# Patient Record
Sex: FEMALE | Race: WHITE | HISPANIC OR LATINO | Employment: UNEMPLOYED | URBAN - METROPOLITAN AREA
[De-identification: names, ages, dates, MRNs, and addresses within clinical notes are randomized per-mention and may not be internally consistent; named-entity substitution may affect disease eponyms.]

---

## 2018-07-24 ENCOUNTER — APPOINTMENT (OUTPATIENT)
Dept: NEUROLOGY | Facility: MEDICAL CENTER | Age: 33
End: 2018-07-24

## 2019-06-05 ENCOUNTER — OFFICE VISIT (OUTPATIENT)
Dept: NEUROLOGY | Facility: MEDICAL CENTER | Age: 34
End: 2019-06-05
Payer: MEDICAID

## 2019-06-05 VITALS
WEIGHT: 213.8 LBS | TEMPERATURE: 97.9 F | HEART RATE: 88 BPM | HEIGHT: 65 IN | BODY MASS INDEX: 35.62 KG/M2 | RESPIRATION RATE: 15 BRPM | DIASTOLIC BLOOD PRESSURE: 88 MMHG | SYSTOLIC BLOOD PRESSURE: 150 MMHG | OXYGEN SATURATION: 95 %

## 2019-06-05 DIAGNOSIS — G40.209 LOCALIZATION-RELATED FOCAL EPILEPSY WITH COMPLEX PARTIAL SEIZURES (HCC): Primary | ICD-10-CM

## 2019-06-05 PROCEDURE — 99205 OFFICE O/P NEW HI 60 MIN: CPT | Performed by: PSYCHIATRY & NEUROLOGY

## 2019-06-05 RX ORDER — LAMOTRIGINE 200 MG/1
300 TABLET ORAL 2 TIMES DAILY
Refills: 0 | COMMUNITY
Start: 2019-05-02 | End: 2019-06-05 | Stop reason: SDUPTHER

## 2019-06-05 RX ORDER — LAMOTRIGINE 200 MG/1
300 TABLET ORAL 2 TIMES DAILY
Qty: 270 TAB | Refills: 3 | Status: SHIPPED | OUTPATIENT
Start: 2019-06-05 | End: 2020-07-04 | Stop reason: SDUPTHER

## 2019-06-05 ASSESSMENT — ENCOUNTER SYMPTOMS
LOSS OF CONSCIOUSNESS: 0
TREMORS: 0
SEIZURES: 1
MEMORY LOSS: 0
HEADACHES: 0
DIZZINESS: 0

## 2019-06-05 ASSESSMENT — PATIENT HEALTH QUESTIONNAIRE - PHQ9: CLINICAL INTERPRETATION OF PHQ2 SCORE: 0

## 2019-06-05 ASSESSMENT — PAIN SCALES - GENERAL: PAINLEVEL: NO PAIN

## 2019-06-05 NOTE — PROGRESS NOTES
Subjective:      Noy Rose is a 34 y.o. female who presents for consultation from the office of Dr. Tristan, with a history of childhood onset epilepsy.    GREGORIA Villafuerte is a pleasant 34-year-old right-handed female whose seizure disorder was diagnosed at about 10 years of age when she suffered from her first generalized tonic-clonic seizure.  In retrospect she had begun to notice simple partial seizures that probably had started at about 3 years of age.    Discussions with her parents evidently found that she had brief episodes of unusual eye movements occur right about 3 years of age, though she was not clear whether or not there were associated changes in her behavior. Nothing was made much of this because they were brief.  She was doing well.  Subjectively, about 2 years later, she began to have episodes of a creepy sense of déjà vu, there was some confusion, she does not know if the eye movements were there at the same time.  These were brief, she would always recover.  It is not clear if anyone who observed her during these events saw a change in her behavior.    Her first generalized seizure occurred at night while she was asleep.  She had felt like she had the flu the preceding evening.  She woke with a postictal state, also having bitten her tongue and mouth, she had vomited and was incontinent.  It was at that time that she saw a neurologist.  The generalized seizures have since recurred infrequently, occurring anytime of day or night, she thinks she has had maybe a total of 15 in her lifetime, the last was 6 years ago when she had run out of medication.  With 2 pregnancies there was no effect on her seizures, nor was there a change when she was on birth control.    The complex partial events are still a creepy déjà vu with a heart racing, she hears things but cannot respond verbally, loud noises are very disconcerting for her.  Observers note that she stares and is less responsive, her   has noted that the legs can twitch if she is seated.  There is no loss of muscle tone, she is not incontinent.  The seizure stops after about 30 seconds.  With some mild malaise and confusion, the whole episode takes 5 minutes.  She is amnestic for portion.  Her last event was about 3 months ago, these events occur maybe 3 times in a year.    Originally on Tegretol, much younger at the time, she had gotten herself off the drug, and the simple partial events continued to worsen.  Because of this, Keppra was started but she had a significant personality change, she has been on lamotrigine ever since, starting at 50 mg twice daily, increasing to her present dose of 300 mg, twice daily after her last generalized tonic seizure 6 years ago.  She tolerates the drug without issues of rash, headache, dystonia or tremor, etc.  She is compliant.    When things first started as a child, she remembers being told that her EEG showed generalized abnormalities, after her first generalized seizure, they began to show left temporal localization and secondary generalization, her last EEG was after her seizure 6 years ago, again with a left temporal localization.  Her last MRI was as a child, she presumes it was normal.    The medical history is completely unremarkable otherwise.  There is no surgical history of note.  Her birth and developmental histories are both unremarkable.  As a child she denies any myoclonic type activity while she was asleep, he has never been a part of her seizures as an adult.    Her menses are regular, she is not on birth control.  Her father has a history of childhood onset seizures, they are generalized tonic-clonic, he was also recently diagnosed with MS.  Her mother has diabetes, she has 10 siblings none of them suffer from seizures.  Her 8-year-old and 13-year-old sons are both doing well.    She smokes maybe 0.5 pack/day, drinks alcohol rarely.  She is a full-time mother.  Her , Den, works  "locally in a dairy plant.  She is on Lamictal 300 mg, twice daily.    Review of Systems   Skin: Negative for itching and rash.   Neurological: Positive for seizures. Negative for dizziness, tremors, loss of consciousness and headaches.   Psychiatric/Behavioral: Negative for memory loss.   All other systems reviewed and are negative.       Objective:     /88 (BP Location: Right arm, Patient Position: Sitting)   Pulse 88   Temp 36.6 °C (97.9 °F) (Temporal)   Resp 15   Ht 1.651 m (5' 5\")   Wt 97 kg (213 lb 12.8 oz)   SpO2 95%   BMI 35.58 kg/m²      Physical Exam    She appears in no acute distress.  Vital signs are stable.  There is no malar rash or jaw claudication.  The neck is supple, range of motion is full.  Cardiac evaluation is unremarkable.  There is no evidence of rash.    She is fully oriented, there is no aphasia, agnosia, apraxia, or inattention.    PERRLA LA/EOMI, funduscopic exam reveals sharp disc margins, facial movements are symmetric, visual fields are full to finger counting on confrontation.  Sensory exam is intact to temperature and light touch, the tongue and uvula are midline without bulbar dysfunction, shoulder shrug and head rotation are intact and symmetric.    Musculoskeletal exam reveals normal tone without tremor, asterixis, or drift.  Strength is 5/5 bilaterally, reflexes are brisk and present at all points including the ankles, there are no asymmetries, both toes are downgoing.    She walks with normal station, arm swing is symmetric, heel, toe, and tandem walking can all be done independently.  There is no appendicular dystaxia throughout, fine motor control and repetitive movements are intact and symmetric with normal amplitude and frequencies bilaterally.    Sensory exam is intact to vibration and temperature, Romberg is absent.     Assessment/Plan:     1. 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  Fortunately the generalized seizures are infrequent, dose escalation on lamotrigine has been appropriate, she is in a fairly good therapeutic range at this time.  She understands the need for compliance.    We spent some time talking about the nature of her disorder, given what sounds like is a genetic predisposition from her father, her children are at risk and she is quite conscious of this fact.  Given that she is awake and alert as the focal seizures begin, she is certainly safe to drive.  I think her prognosis is quite good for continued control, though seizure disorders can worsen slowly as patient's age.  Still, on medication, she has breakthrough events, she will likely require medication lifelong.  Seizure recurrence risk includes structural abnormalities on MRI imaging and also abnormalities on EEG studies.  With this in mind, MRI and EEG will be done.  Baseline lamotrigine level will be checked as well CBC, CMP and vitamin D level.    We will continue lamotrigine 300 mg, twice daily.  We talked about circumstances under which she must go to an emergency room versus calling my office for seizure recurrence.  It was also discussed clearly that she must obtain some type of medical identification, and why this is the case.  We talked about circumstances that lower thresholds including alcohol, fatigue, stress, sleep deprivation, and medication such as Ultram, Benadryl, Sudafed, etc.  As for the vitamin D level, she was told of risk of osteoporosis and osteopenia in patients with epilepsy, let alone the risk for fracture during a generalized attack.  She should be on a vitamin D and calcium supplement daily.    We will call her with test results as they become available, otherwise we can follow-up in 6 months.  She will call the office for seizure recurrences as we have  discussed.    - lamotrigine (LAMICTAL) 200 MG tablet; Take 1.5 Tabs by mouth 2 times a day.  Dispense: 270 Tab; Refill: 3  - LAMOTRIGINE; Future  - CBC WITH DIFFERENTIAL; Future  - Comp Metabolic Panel; Future  - VITAMIN D, 1,25 + 25-HYDROXY  - MR-BRAIN-W/O; Future  - REFERRAL TO NEURODIAGNOSTICS (EEG,EP,EMG/NCS/DBS) Modality Requested: EEG    Time: 60-minute spent face-to-face for exam, review, discussion, and education, of this over 50% of the time spent counseling and coordinating care surrounding all of the above issues.

## 2019-08-14 ENCOUNTER — HOSPITAL ENCOUNTER (OUTPATIENT)
Dept: RADIOLOGY | Facility: MEDICAL CENTER | Age: 34
End: 2019-08-14
Attending: PSYCHIATRY & NEUROLOGY
Payer: MEDICAID

## 2019-08-14 DIAGNOSIS — G40.209 LOCALIZATION-RELATED FOCAL EPILEPSY WITH COMPLEX PARTIAL SEIZURES (HCC): ICD-10-CM

## 2019-08-14 PROCEDURE — 70551 MRI BRAIN STEM W/O DYE: CPT

## 2019-10-01 ENCOUNTER — NON-PROVIDER VISIT (OUTPATIENT)
Dept: NEUROLOGY | Facility: MEDICAL CENTER | Age: 34
End: 2019-10-01
Payer: MEDICAID

## 2019-10-01 DIAGNOSIS — G40.209 LOCALIZATION-RELATED FOCAL EPILEPSY WITH COMPLEX PARTIAL SEIZURES (HCC): ICD-10-CM

## 2019-10-01 PROCEDURE — 95819 EEG AWAKE AND ASLEEP: CPT | Performed by: PSYCHIATRY & NEUROLOGY

## 2019-10-01 NOTE — PROCEDURES
VIDEO ELECTROENCEPHALOGRAM REPORT      Referring provider: Dr. Javier Tristan    DOS: October 1, 2019 of 30-minute duration.    INDICATION:  Noy Rose 34 y.o. female presenting with a history of focal onset seizures with secondary generalization.  EEG is requested as a baseline study.  No previous tracings are available for comparison.    CURRENT ANTIEPILEPTIC REGIMEN: Lamictal 300 mg, twice daily.    TECHNIQUE: 30 channel video electroencephalogram (EEG) was performed in accordance with the international 10-20 system. The study was reviewed in bipolar and referential montages. The recording examined the patient during wakeful and drowsy/sleep state(s).     DESCRIPTION OF THE RECORD:  During the wakefulness, the background showed a symmetrical 9-10 Hz alpha activity posteriorly with amplitude of 70 mV.  There was reactivity to eye closure/opening.  A normal anterior-posterior gradient was noted with faster beta frequencies seen anteriorly.  During drowsiness, theta/delta frequencies were seen.    During the sleep state, background shows diffuse high-amplitude 4-5 Hz delta activity.  Symmetrical high-amplitude sleep spindles and vertex sharps were seen in the leads over the central regions.     ACTIVATION PROCEDURES:   Hyperventilation was performed by the patient for a total of 3 minutes. The technician performing the test noted good effort. This technique produced electroencephalographic changes in keeping with the expected bilaterally synchronous, frontally predominant, high amplitude slow waves build up.     Intermittent Photic stimulation was performed in a stepwise fashion from 1 to 30 Hz and elicited a normal response (photic driving), most noticeable in the posterior leads.    ICTAL AND/OR INTERICTAL FINDINGS:   Throughout the tracing, especially with drowsiness, focal slowing consisting of very sharply contoured, rhythmic theta activity seen of the right mid and posterior temporal region.  On rare  occasion there is correlate over the left posterior temporal region.  No clear epileptiform activity is seen.  With sleep, this slowing attenuates.    EKG: sampling of the EKG recording demonstrated sinus rhythm.     EVENTS: None    INTERPRETATION:  This is an abnormal video EEG recording in the awake and drowsy/sleep state(s).  Clinical correlation is recommended.  Findings are consistent with a right mid and posterior temporal region dysfunction with rhythmic discharge consistent with possible underlying epileptogenic potential, no clear epileptiform/seizure activity is seen.    Note: A normal EEG does not rule out epilepsy.  If the clinical suspicion remains high for seizures, a prolonged recording to capture clinical or subclinical events may be helpful.        Cyrus Medina M.D.

## 2019-11-12 ENCOUNTER — TELEPHONE (OUTPATIENT)
Dept: SCHEDULING | Facility: IMAGING CENTER | Age: 34
End: 2019-11-12

## 2019-12-05 ENCOUNTER — OFFICE VISIT (OUTPATIENT)
Dept: NEUROLOGY | Facility: MEDICAL CENTER | Age: 34
End: 2019-12-05
Payer: MEDICAID

## 2019-12-05 ENCOUNTER — HOSPITAL ENCOUNTER (OUTPATIENT)
Dept: LAB | Facility: MEDICAL CENTER | Age: 34
End: 2019-12-05
Attending: PSYCHIATRY & NEUROLOGY
Payer: MEDICAID

## 2019-12-05 VITALS
BODY MASS INDEX: 33.49 KG/M2 | SYSTOLIC BLOOD PRESSURE: 132 MMHG | HEIGHT: 65 IN | DIASTOLIC BLOOD PRESSURE: 78 MMHG | HEART RATE: 90 BPM | WEIGHT: 201 LBS | OXYGEN SATURATION: 95 %

## 2019-12-05 DIAGNOSIS — G40.209 LOCALIZATION-RELATED FOCAL EPILEPSY WITH COMPLEX PARTIAL SEIZURES (HCC): Primary | ICD-10-CM

## 2019-12-05 DIAGNOSIS — G40.209 LOCALIZATION-RELATED FOCAL EPILEPSY WITH COMPLEX PARTIAL SEIZURES (HCC): ICD-10-CM

## 2019-12-05 LAB
25(OH)D3 SERPL-MCNC: 21 NG/ML (ref 30–100)
ALBUMIN SERPL BCP-MCNC: 4.7 G/DL (ref 3.2–4.9)
ALBUMIN/GLOB SERPL: 1.6 G/DL
ALP SERPL-CCNC: 68 U/L (ref 30–99)
ALT SERPL-CCNC: 30 U/L (ref 2–50)
ANION GAP SERPL CALC-SCNC: 13 MMOL/L (ref 0–11.9)
AST SERPL-CCNC: 17 U/L (ref 12–45)
BASOPHILS # BLD AUTO: 0.2 % (ref 0–1.8)
BASOPHILS # BLD: 0.03 K/UL (ref 0–0.12)
BILIRUB SERPL-MCNC: 0.3 MG/DL (ref 0.1–1.5)
BUN SERPL-MCNC: 12 MG/DL (ref 8–22)
CALCIUM SERPL-MCNC: 9.6 MG/DL (ref 8.5–10.5)
CHLORIDE SERPL-SCNC: 105 MMOL/L (ref 96–112)
CO2 SERPL-SCNC: 22 MMOL/L (ref 20–33)
CREAT SERPL-MCNC: 0.68 MG/DL (ref 0.5–1.4)
EOSINOPHIL # BLD AUTO: 0 K/UL (ref 0–0.51)
EOSINOPHIL NFR BLD: 0 % (ref 0–6.9)
ERYTHROCYTE [DISTWIDTH] IN BLOOD BY AUTOMATED COUNT: 54.1 FL (ref 35.9–50)
GLOBULIN SER CALC-MCNC: 2.9 G/DL (ref 1.9–3.5)
GLUCOSE SERPL-MCNC: 159 MG/DL (ref 65–99)
HCT VFR BLD AUTO: 46.4 % (ref 37–47)
HGB BLD-MCNC: 14.4 G/DL (ref 12–16)
IMM GRANULOCYTES # BLD AUTO: 0.11 K/UL (ref 0–0.11)
IMM GRANULOCYTES NFR BLD AUTO: 0.6 % (ref 0–0.9)
LYMPHOCYTES # BLD AUTO: 2.25 K/UL (ref 1–4.8)
LYMPHOCYTES NFR BLD: 12.7 % (ref 22–41)
MCH RBC QN AUTO: 30.6 PG (ref 27–33)
MCHC RBC AUTO-ENTMCNC: 31 G/DL (ref 33.6–35)
MCV RBC AUTO: 98.7 FL (ref 81.4–97.8)
MONOCYTES # BLD AUTO: 0.51 K/UL (ref 0–0.85)
MONOCYTES NFR BLD AUTO: 2.9 % (ref 0–13.4)
NEUTROPHILS # BLD AUTO: 14.87 K/UL (ref 2–7.15)
NEUTROPHILS NFR BLD: 83.6 % (ref 44–72)
NRBC # BLD AUTO: 0 K/UL
NRBC BLD-RTO: 0 /100 WBC
PLATELET # BLD AUTO: 281 K/UL (ref 164–446)
PMV BLD AUTO: 10 FL (ref 9–12.9)
POTASSIUM SERPL-SCNC: 3.8 MMOL/L (ref 3.6–5.5)
PROT SERPL-MCNC: 7.6 G/DL (ref 6–8.2)
RBC # BLD AUTO: 4.7 M/UL (ref 4.2–5.4)
SODIUM SERPL-SCNC: 140 MMOL/L (ref 135–145)
WBC # BLD AUTO: 17.8 K/UL (ref 4.8–10.8)

## 2019-12-05 PROCEDURE — 99213 OFFICE O/P EST LOW 20 MIN: CPT | Performed by: PSYCHIATRY & NEUROLOGY

## 2019-12-05 PROCEDURE — 82306 VITAMIN D 25 HYDROXY: CPT

## 2019-12-05 PROCEDURE — 80175 DRUG SCREEN QUAN LAMOTRIGINE: CPT

## 2019-12-05 PROCEDURE — 82652 VIT D 1 25-DIHYDROXY: CPT

## 2019-12-05 PROCEDURE — 80053 COMPREHEN METABOLIC PANEL: CPT

## 2019-12-05 PROCEDURE — 36415 COLL VENOUS BLD VENIPUNCTURE: CPT

## 2019-12-05 PROCEDURE — 85025 COMPLETE CBC W/AUTO DIFF WBC: CPT

## 2019-12-05 RX ORDER — PREDNISONE 20 MG/1
20 TABLET ORAL DAILY
COMMUNITY
End: 2021-06-14

## 2019-12-05 RX ORDER — RANITIDINE 150 MG/1
150 TABLET ORAL 2 TIMES DAILY
COMMUNITY
End: 2021-06-14

## 2019-12-05 ASSESSMENT — ENCOUNTER SYMPTOMS
SEIZURES: 1
WEAKNESS: 0

## 2019-12-06 NOTE — PROGRESS NOTES
"Subjective:      Laly Rose is a 34 y.o. female who presents with her brother, for follow-up, with a history of focal onset seizures with altered sensorium.     HPI    Since last seen, Laly has done well.  Unfortunately she missed a dose of her drug on one occasion, she paid the price with a subsequent focal seizure.  Typical in nature, it resolved spontaneously with little post drome.  There is no secondary generalization.  This occurred maybe 1 month ago.  There has been a significant amount of stress in her life at this time.    She tolerates lamotrigine 300 mg, twice daily, without issue.  She does not drink.  At the time of procedure she was not sleep deprived, she had been eating regularly, was not using any OTC medications.    Her EEG was done on October 1, 2019, revealing bitemporal, this time right greater than left sided, involvement without clear epileptiform activity, simple paroxysmal irregular abnormalities.  Unfortunately, she was never able to get her blood work drawn, she was incorrectly told by laboratory staff that she needed to fast for the blood work, specifically vitamin D level, drug level and CBC with CMP.    Medical, surgical and family histories are reviewed in the electronic health record, there are no new drug allergies.  She is dealing with generalized angioedema.  She is on Lamictal 300 mg, twice daily, prednisone and Zantac.    Review of Systems   Cardiovascular: Positive for leg swelling.   Skin: Negative for rash.   Neurological: Positive for seizures. Negative for weakness.   All other systems reviewed and are negative.       Objective:     /78 (BP Location: Left arm, Patient Position: Sitting, BP Cuff Size: Adult)   Pulse 90   Ht 1.651 m (5' 5\")   Wt 91.2 kg (201 lb)   SpO2 95%   BMI 33.45 kg/m²      Physical Exam    She appears in no acute distress.  Vital signs are stable.  There is no malar rash.  The neck is supple.  Range of motion is full.  Cardiac evaluation " reveals a regular rhythm.    Including mental status, cranial nerves, musculoskeletal, coordination, and since evaluations, the full neurologic examination is done and reveals no evidence of deficits nor toxicities.     Assessment/Plan:     1. 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  She knows not to do this again.  The abnormalities on her EEG suggest that we now have some bilateral temporal modeling which makes the seizure disorder she has a little bit more refractory and difficult to treat, this will certainly explain some of the difficulties with increasing seizures over time.  She knows she will be on medicine lifelong.  She was told to get her drug levels drawn, fasting is not an issue.  We will call her with results if there are significant abnormalities, this is being done mostly as baseline.  She did obtain a medical alert identification.  We follow-up in 6 months, and if stable, we can do it on an annual basis.    Time: 20-minute spent face-to-face for exam, review, discussion, and education, of this over 50% of the time spent counseling and coordinating care.

## 2019-12-07 LAB
1,25(OH)2D3 SERPL-MCNC: 94.5 PG/ML (ref 19.9–79.3)
LAMOTRIGINE SERPL-MCNC: 7.4 UG/ML (ref 2.5–15)

## 2020-04-27 ENCOUNTER — APPOINTMENT (OUTPATIENT)
Dept: NEUROLOGY | Facility: MEDICAL CENTER | Age: 35
End: 2020-04-27
Payer: MEDICAID

## 2020-07-04 DIAGNOSIS — G40.209 LOCALIZATION-RELATED FOCAL EPILEPSY WITH COMPLEX PARTIAL SEIZURES (HCC): ICD-10-CM

## 2020-07-06 RX ORDER — LAMOTRIGINE 200 MG/1
300 TABLET ORAL 2 TIMES DAILY
Qty: 270 TAB | Refills: 3 | Status: SHIPPED | OUTPATIENT
Start: 2020-07-06 | End: 2021-06-14 | Stop reason: SDUPTHER

## 2020-08-06 ENCOUNTER — TELEPHONE (OUTPATIENT)
Dept: NEUROLOGY | Facility: MEDICAL CENTER | Age: 35
End: 2020-08-06

## 2020-08-06 NOTE — TELEPHONE ENCOUNTER
----- Message from Noy Rose sent at 8/5/2020  3:50 PM PDT -----  Regarding: Non-Urgent Medical Question  Contact: [phone]  Carlos Medina,    I just wanted to let you know that I had to reschedule my Appt. because I have come into contact with someone who possibly has Covid. She did get tested, but will not get the results until after my Aug. 10th. Unfortunately, The soonest Appt. I could get is November 30th. Also, I wanted you to know that I have been experiencing more focal seizures. About a week ago, I had 3 within a 24 hour period. They are quick, but I usually never have that many so close together. I haven't missed any doses of my meds but I have been having trouble sleeping, dizziness, and having migraines. So I don't know if that has anything to do with it. I haven't had any grandmals, or any other issues.  Please let me know what you think. Thanks

## 2020-08-06 NOTE — TELEPHONE ENCOUNTER
Let Laly know that it is a little bit disconcerting that she had that cluster occur once, but if things have calmed down, I would rather observe.  At the time if she was sleep deprived, the migraines had picked up, it is very possible all that simply provoked that small cluster.  I would be more concerned if she continues to have clusters of seizures without the sleep deprivation, increased migraine activity, etc.  For now, tell her to stay on board with her present regimen.

## 2021-06-14 ENCOUNTER — OFFICE VISIT (OUTPATIENT)
Dept: NEUROLOGY | Facility: MEDICAL CENTER | Age: 36
End: 2021-06-14
Attending: PSYCHIATRY & NEUROLOGY
Payer: MEDICAID

## 2021-06-14 VITALS
HEART RATE: 75 BPM | BODY MASS INDEX: 35.04 KG/M2 | TEMPERATURE: 97.5 F | DIASTOLIC BLOOD PRESSURE: 76 MMHG | OXYGEN SATURATION: 99 % | SYSTOLIC BLOOD PRESSURE: 130 MMHG | WEIGHT: 210.32 LBS | HEIGHT: 65 IN

## 2021-06-14 DIAGNOSIS — G40.209 LOCALIZATION-RELATED FOCAL EPILEPSY WITH COMPLEX PARTIAL SEIZURES (HCC): Primary | ICD-10-CM

## 2021-06-14 DIAGNOSIS — R20.2 PARESTHESIA OF BOTH FEET: ICD-10-CM

## 2021-06-14 PROBLEM — E28.2 POLYCYSTIC OVARY SYNDROME: Status: ACTIVE | Noted: 2021-06-14

## 2021-06-14 PROCEDURE — 99214 OFFICE O/P EST MOD 30 MIN: CPT | Performed by: PSYCHIATRY & NEUROLOGY

## 2021-06-14 PROCEDURE — 99211 OFF/OP EST MAY X REQ PHY/QHP: CPT | Performed by: PSYCHIATRY & NEUROLOGY

## 2021-06-14 RX ORDER — LAMOTRIGINE 200 MG/1
300 TABLET ORAL 2 TIMES DAILY
Qty: 270 TABLET | Refills: 3 | Status: SHIPPED | OUTPATIENT
Start: 2021-06-14 | End: 2022-03-23 | Stop reason: SDUPTHER

## 2021-06-14 ASSESSMENT — ENCOUNTER SYMPTOMS
LOSS OF CONSCIOUSNESS: 0
SENSORY CHANGE: 1
MEMORY LOSS: 0
TINGLING: 1
FALLS: 0
SEIZURES: 1

## 2021-06-14 ASSESSMENT — PATIENT HEALTH QUESTIONNAIRE - PHQ9: CLINICAL INTERPRETATION OF PHQ2 SCORE: 0

## 2021-06-14 ASSESSMENT — FIBROSIS 4 INDEX: FIB4 SCORE: 0.41

## 2021-06-14 NOTE — PROGRESS NOTES
"Subjective:      Laly Rose is a 36 y.o. female who presents for follow-up with a history of focal onset seizures with altered sensorium, but who has begun to suffer from uncomfortable paresthesias and dysesthesias in both feet beginning in mid 2020.    HPI    Seizures: Laly states that her seizures are well controlled.  She will have an occasional simple partial event where she suddenly stares off, noticeable to others, but she recovers within 30 seconds or so.  These are random, she cannot identify a clear pattern given how infrequent they are.  They certainly have not been escalating.  She has not been able to identify a cause when they happen.  She remains on Lamictal 300 mg, twice daily.  She is compliant, thus this is not an issue with seizure recurrence.    Dysesthesias: This started, she estimates, in July of last year.  She remembers a \"swimming\" feeling with the big toes of both feet, it became somewhat painful, and spread to involve the toes and then the distal portions of both feet, measuring from the instep.  There is some loss of sensation, at times some hypersensitivity of the skin.  There is no change in skin texture or color.  She can get some edema every once in a while, this does not make the symptoms worse.  There is no weakness.  Whether she walks or sits, the symptoms are about the same.  She is not injured her feet.  She denies back pain or sciatic-like symptoms radiating into the lower extremities on either side.  Bowel and bladder functions are maintained.  She has no such symptoms in the hands.    She was given gabapentin to help with the symptoms, the medication made her lower extremities feel weaker, she stopped the drug with resolution.    Diagnosed with diabetes years ago, she has been off oral medications for quite some time.  Review of our records from December, 2019, revealed a random glucose of 159, note was made that she was on steroids at the time because of severe pruritus.  " "Evidently her last hemoglobin A1c from last year was normal, though she does not know the value.  She was told that this was part of her \"fatty liver\" condition.    Medical, surgical and family histories are reviewed in the electronic health record, there are no new drug allergies.  She is on Lamictal 300 mg, twice daily.    Review of Systems   Musculoskeletal: Negative for falls.   Neurological: Positive for tingling, sensory change and seizures. Negative for loss of consciousness.   Psychiatric/Behavioral: Negative for memory loss.   All other systems reviewed and are negative.       Objective:     /76 (BP Location: Left arm)   Pulse 75   Temp 36.4 °C (97.5 °F) (Temporal)   Ht 1.651 m (5' 5\")   Wt 95.4 kg (210 lb 5.1 oz)   SpO2 99%   BMI 35.00 kg/m²      Physical Exam    She appears in no acute distress.  Vital signs are stable.  There is no malar rash or temporal tenderness.  The neck is supple, range of motion is full.  Cardiac evaluation is unremarkable.  Straight leg raising is negative bilaterally.  Distal pulses in the feet are intact.  There is no evidence of vascular insufficiency changes on either side.    Neurological Exam    She is fully oriented, in quick and cursory fashion, cognition is intact.    PERRLA/EOMI, visual fields are full, facial movements are symmetric, there is no bulbar dysfunction.    Musculoskeletal exam reveals normal tone throughout, there is no tremor, asterixis, or drift.  Strength is 5/5 throughout, attention paid to both lower extremities.  Reflexes are brisk and present at all points, none are dropped and there are no asymmetries in the legs.  The toes are downgoing bilaterally.    Repetitive movements and fine motor control are also intact and symmetric, showing normal amplitude and frequencies bilaterally.  She stands easily, armswing is symmetric, he does not look at the ground as she walks.  Stride length is maintained.  There is no appendicular dystaxia with " any of the extremities.    Sensory exam is intact to vibration and temperature in all 4 extremities.     Assessment/Plan:     1. 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  The occasional focal seizure is her baseline, these are so infrequent that I do not think adjusting her medication further is really necessary.  This type of micromanaging seizures that are otherwise very well controlled does not really amount to much.  She is complex.  Call the office if things get worse.  She had can follow-up in 1 year.    - lamotrigine (LAMICTAL) 200 MG tablet; Take 1.5 Tablets by mouth 2 times a day.  Dispense: 270 tablet; Refill: 3    2. Paresthesia of both feet  Separate issue, she describes nerve symptoms that could be consistent with a neuropathy, but at least her examination is normal.  Still, small fiber sensory neuropathies can present like this.  She may be a prediabetic, I will need to defer subsequent work-up and evaluations to her PCP.  She lacks a history of typical risk, absent autoimmune disease, history of malignancy, blood dyscrasia, drug exposure, etc.  This has nothing to do with the fatty liver or, for that matter, most types of hepatic disease.  For now we can simply observe.    Time: 25 minutes spent face-to-face for exam, review, discussion, and education, of this over 70% of the time spent counseling and coordinating care.

## 2021-07-29 ENCOUNTER — TELEPHONE (OUTPATIENT)
Dept: SCHEDULING | Facility: IMAGING CENTER | Age: 36
End: 2021-07-29

## 2021-08-31 ENCOUNTER — OFFICE VISIT (OUTPATIENT)
Dept: MEDICAL GROUP | Facility: MEDICAL CENTER | Age: 36
End: 2021-08-31
Attending: PHYSICIAN ASSISTANT
Payer: MEDICAID

## 2021-08-31 ENCOUNTER — TELEPHONE (OUTPATIENT)
Dept: MEDICAL GROUP | Facility: MEDICAL CENTER | Age: 36
End: 2021-08-31

## 2021-08-31 VITALS
OXYGEN SATURATION: 96 % | TEMPERATURE: 97.6 F | HEART RATE: 85 BPM | RESPIRATION RATE: 18 BRPM | SYSTOLIC BLOOD PRESSURE: 170 MMHG | BODY MASS INDEX: 35.27 KG/M2 | HEIGHT: 65 IN | WEIGHT: 211.7 LBS | DIASTOLIC BLOOD PRESSURE: 98 MMHG

## 2021-08-31 DIAGNOSIS — I83.813 VARICOSE VEINS OF BOTH LOWER EXTREMITIES WITH PAIN: ICD-10-CM

## 2021-08-31 DIAGNOSIS — R22.0 SCALP MASS: ICD-10-CM

## 2021-08-31 DIAGNOSIS — E88.819 INSULIN RESISTANCE: ICD-10-CM

## 2021-08-31 DIAGNOSIS — E28.2 PCOS (POLYCYSTIC OVARIAN SYNDROME): ICD-10-CM

## 2021-08-31 DIAGNOSIS — Z71.6 ENCOUNTER FOR SMOKING CESSATION COUNSELING: ICD-10-CM

## 2021-08-31 PROCEDURE — 99204 OFFICE O/P NEW MOD 45 MIN: CPT | Performed by: PHYSICIAN ASSISTANT

## 2021-08-31 PROCEDURE — 99203 OFFICE O/P NEW LOW 30 MIN: CPT | Performed by: PHYSICIAN ASSISTANT

## 2021-08-31 PROCEDURE — 99213 OFFICE O/P EST LOW 20 MIN: CPT | Performed by: PHYSICIAN ASSISTANT

## 2021-08-31 RX ORDER — GABAPENTIN 300 MG/1
300 CAPSULE ORAL
COMMUNITY
Start: 2021-07-20 | End: 2022-06-06 | Stop reason: SDUPTHER

## 2021-08-31 RX ORDER — METFORMIN HYDROCHLORIDE 500 MG/1
500 TABLET, EXTENDED RELEASE ORAL DAILY
Qty: 30 TABLET | Refills: 5 | Status: SHIPPED | OUTPATIENT
Start: 2021-08-31 | End: 2022-03-07 | Stop reason: SDUPTHER

## 2021-08-31 RX ORDER — NICOTINE 21 MG/24HR
1 PATCH, TRANSDERMAL 24 HOURS TRANSDERMAL EVERY 24 HOURS
Qty: 42 PATCH | Refills: 0 | Status: SHIPPED | OUTPATIENT
Start: 2021-08-31 | End: 2021-09-15

## 2021-08-31 SDOH — ECONOMIC STABILITY: FOOD INSECURITY: WITHIN THE PAST 12 MONTHS, THE FOOD YOU BOUGHT JUST DIDN'T LAST AND YOU DIDN'T HAVE MONEY TO GET MORE.: NEVER TRUE

## 2021-08-31 SDOH — ECONOMIC STABILITY: HOUSING INSECURITY
IN THE LAST 12 MONTHS, WAS THERE A TIME WHEN YOU DID NOT HAVE A STEADY PLACE TO SLEEP OR SLEPT IN A SHELTER (INCLUDING NOW)?: NO

## 2021-08-31 SDOH — HEALTH STABILITY: PHYSICAL HEALTH: ON AVERAGE, HOW MANY DAYS PER WEEK DO YOU ENGAGE IN MODERATE TO STRENUOUS EXERCISE (LIKE A BRISK WALK)?: 0 DAYS

## 2021-08-31 SDOH — ECONOMIC STABILITY: FOOD INSECURITY: WITHIN THE PAST 12 MONTHS, YOU WORRIED THAT YOUR FOOD WOULD RUN OUT BEFORE YOU GOT MONEY TO BUY MORE.: NEVER TRUE

## 2021-08-31 SDOH — ECONOMIC STABILITY: INCOME INSECURITY: IN THE LAST 12 MONTHS, WAS THERE A TIME WHEN YOU WERE NOT ABLE TO PAY THE MORTGAGE OR RENT ON TIME?: NO

## 2021-08-31 SDOH — ECONOMIC STABILITY: INCOME INSECURITY: HOW HARD IS IT FOR YOU TO PAY FOR THE VERY BASICS LIKE FOOD, HOUSING, MEDICAL CARE, AND HEATING?: NOT HARD AT ALL

## 2021-08-31 SDOH — HEALTH STABILITY: PHYSICAL HEALTH: ON AVERAGE, HOW MANY MINUTES DO YOU ENGAGE IN EXERCISE AT THIS LEVEL?: 0 MIN

## 2021-08-31 SDOH — HEALTH STABILITY: MENTAL HEALTH
STRESS IS WHEN SOMEONE FEELS TENSE, NERVOUS, ANXIOUS, OR CAN'T SLEEP AT NIGHT BECAUSE THEIR MIND IS TROUBLED. HOW STRESSED ARE YOU?: TO SOME EXTENT

## 2021-08-31 SDOH — ECONOMIC STABILITY: TRANSPORTATION INSECURITY
IN THE PAST 12 MONTHS, HAS LACK OF TRANSPORTATION KEPT YOU FROM MEETINGS, WORK, OR FROM GETTING THINGS NEEDED FOR DAILY LIVING?: NO

## 2021-08-31 SDOH — ECONOMIC STABILITY: TRANSPORTATION INSECURITY
IN THE PAST 12 MONTHS, HAS THE LACK OF TRANSPORTATION KEPT YOU FROM MEDICAL APPOINTMENTS OR FROM GETTING MEDICATIONS?: NO

## 2021-08-31 SDOH — ECONOMIC STABILITY: HOUSING INSECURITY

## 2021-08-31 SDOH — ECONOMIC STABILITY: TRANSPORTATION INSECURITY
IN THE PAST 12 MONTHS, HAS LACK OF RELIABLE TRANSPORTATION KEPT YOU FROM MEDICAL APPOINTMENTS, MEETINGS, WORK OR FROM GETTING THINGS NEEDED FOR DAILY LIVING?: NO

## 2021-08-31 ASSESSMENT — SOCIAL DETERMINANTS OF HEALTH (SDOH)
HOW OFTEN DO YOU GET TOGETHER WITH FRIENDS OR RELATIVES?: NEVER
HOW MANY DRINKS CONTAINING ALCOHOL DO YOU HAVE ON A TYPICAL DAY WHEN YOU ARE DRINKING: PATIENT DECLINED
HOW OFTEN DO YOU ATTENT MEETINGS OF THE CLUB OR ORGANIZATION YOU BELONG TO?: NEVER
IN A TYPICAL WEEK, HOW MANY TIMES DO YOU TALK ON THE PHONE WITH FAMILY, FRIENDS, OR NEIGHBORS?: MORE THAN THREE TIMES A WEEK
IN A TYPICAL WEEK, HOW MANY TIMES DO YOU TALK ON THE PHONE WITH FAMILY, FRIENDS, OR NEIGHBORS?: MORE THAN THREE TIMES A WEEK
DO YOU BELONG TO ANY CLUBS OR ORGANIZATIONS SUCH AS CHURCH GROUPS UNIONS, FRATERNAL OR ATHLETIC GROUPS, OR SCHOOL GROUPS?: NO
HOW HARD IS IT FOR YOU TO PAY FOR THE VERY BASICS LIKE FOOD, HOUSING, MEDICAL CARE, AND HEATING?: NOT HARD AT ALL
HOW OFTEN DO YOU ATTEND CHURCH OR RELIGIOUS SERVICES?: NEVER
HOW OFTEN DO YOU ATTENT MEETINGS OF THE CLUB OR ORGANIZATION YOU BELONG TO?: NEVER
HOW OFTEN DO YOU HAVE SIX OR MORE DRINKS ON ONE OCCASION: NEVER
HOW OFTEN DO YOU ATTEND CHURCH OR RELIGIOUS SERVICES?: NEVER
DO YOU BELONG TO ANY CLUBS OR ORGANIZATIONS SUCH AS CHURCH GROUPS UNIONS, FRATERNAL OR ATHLETIC GROUPS, OR SCHOOL GROUPS?: NO
WITHIN THE PAST 12 MONTHS, YOU WORRIED THAT YOUR FOOD WOULD RUN OUT BEFORE YOU GOT THE MONEY TO BUY MORE: NEVER TRUE
HOW OFTEN DO YOU HAVE A DRINK CONTAINING ALCOHOL: NEVER
HOW OFTEN DO YOU GET TOGETHER WITH FRIENDS OR RELATIVES?: NEVER

## 2021-08-31 ASSESSMENT — LIFESTYLE VARIABLES
HOW OFTEN DO YOU HAVE SIX OR MORE DRINKS ON ONE OCCASION: NEVER
HOW MANY STANDARD DRINKS CONTAINING ALCOHOL DO YOU HAVE ON A TYPICAL DAY: PATIENT DECLINED
HOW OFTEN DO YOU HAVE A DRINK CONTAINING ALCOHOL: NEVER

## 2021-08-31 ASSESSMENT — FIBROSIS 4 INDEX: FIB4 SCORE: 0.41

## 2021-08-31 NOTE — ASSESSMENT & PLAN NOTE
Laly presents today for follow up. She reports a history of PCOS induced insulin resistance in which she was on Metformin. She reports that her A1c has always been normal in the past. It has been her fasting glucose levels have been consistently elevated. She states that her last A1c was drawn 5 months ago and was: 5.9%. She used to take Metformin 500 mg once daily for her PCOS which was very beneficial for her symptoms, and overall kept her A1c controlled. She would like to discuss getting started back on the Metformin.

## 2021-08-31 NOTE — PROGRESS NOTES
"Chief Complaint   Patient presents with   • Establish Care     Subjective:     HPI:   Noy Rose is a 36 y.o. female here to establish care and to discuss the evaluation and management of:    Varicose veins of both lower extremities with pain  Laly presents today to establish care. She reports a history of varicose veins and \"popped blood vessels\" of the bilateral feet. She describes the pain as \"sharp, shooting pains.\" Associated symptoms include: \"painful skin on occasion,\" numbness without tingling and occasional swelling. The pain is made worse by wearing socks and shoes. The pain is usually worse in the left than the right. She reports that she has had a hx of painful varicose veins in the past in which she underwent a procedure of the bilateral lower legs that she reports was very beneficial for her pain. This was approximately 2 years ago. She is concerned that this is now occurring in her ankles/feet as the veins and \"popped blood vessels\" are tender on palpation.     Insulin resistance  Laly presents today for follow up. She reports a history of PCOS induced insulin resistance in which she was on Metformin. She reports that her A1c has always been normal in the past. It has been her fasting glucose levels have been consistently elevated. She states that her last A1c was drawn 5 months ago and was: 5.9%. She used to take Metformin 500 mg once daily for her PCOS which was very beneficial for her symptoms, and overall kept her A1c controlled. She would like to discuss getting started back on the Metformin.     Scalp mass  Laly presents today to establish care. She reports the development of two masses just underneath the skin that are located at the base of the head, one on the left side and another on the right side. She has noticed that these masses have grown over time. She also reports associated worsening headaches with the growth rate. She reports pain on palpation of the right side, but not the " left. She denies any associated rash or drainage. This has never been evaluated previously.     Encounter for smoking cessation counseling  This is a chronic condition.  She is smoking cigarettes.   Years used: 15 on and off smoking.   Packs/day: .50/day.   Previously quit/prior attempts: 10 times. Vaping and cold turkey.   Duration of success: little over 1 year.   Method used: oral nicotine pouches.   Triggers/reason for relapse: cravings, stress, anxiety.   Denies dyspnea, coughing or wheezing.  Set quit date: 9/14/21.  Quitting partner or good support system: Not really, partner smokes.     ROS  See HPI.     Allergies   Allergen Reactions   • Codeine Itching, Nausea and Shortness of Breath   • Hydrocodone-Acetaminophen Anxiety, Itching, Nausea, Palpitations and Shortness of Breath     Current medicines (including changes today)  Current Outpatient Medications   Medication Sig Dispense Refill   • metFORMIN ER (GLUCOPHAGE XR) 500 MG TABLET SR 24 HR Take 1 Tablet by mouth every day. 30 Tablet 5   • nicotine (NICODERM) 21 MG/24HR PATCH 24 HR Place 1 Patch on the skin every 24 hours. 42 Patch 0   • gabapentin (NEURONTIN) 300 MG Cap Take 300 mg by mouth.     • lamotrigine (LAMICTAL) 200 MG tablet Take 1.5 Tablets by mouth 2 times a day. 270 tablet 3     No current facility-administered medications for this visit.     She  has a past medical history of Anxiety, Head ache, Insulin resistance, Kidney cyst, acquired, and Kidney disease.  She  has a past surgical history that includes primary c section (2006, 2011) and tubal coagulation laparoscopic bilateral.  Social History     Tobacco Use   • Smoking status: Current Every Day Smoker     Packs/day: 0.50     Years: 20.00     Pack years: 10.00     Types: Cigarettes   • Smokeless tobacco: Never Used   • Tobacco comment: has quit on and off.    Vaping Use   • Vaping Use: Never used   Substance Use Topics   • Alcohol use: No     Comment: occ   • Drug use: No     Family  "History   Problem Relation Age of Onset   • Diabetes Mother    • Hyperlipidemia Maternal Uncle    • Hypertension Maternal Uncle    • Diabetes Maternal Uncle    • Cancer Maternal Grandmother         breast   • Cancer Paternal Grandfather      Family Status   Relation Name Status   • Mo  (Not Specified)   • MUnc  (Not Specified)   • MGMo     • PGFa       Patient Active Problem List    Diagnosis Date Noted   • Varicose veins of both lower extremities with pain 2021   • Insulin resistance 2021   • Scalp mass 2021   • Encounter for smoking cessation counseling 2021   • Paresthesia of both feet 2021   • Polycystic ovary syndrome 2021   • Localization-related focal epilepsy with complex partial seizures (HCC) 2019      Objective:     BP (!) 170/98 (BP Location: Left arm, Patient Position: Sitting, BP Cuff Size: Adult)   Pulse 85   Temp 36.4 °C (97.6 °F) (Temporal)   Resp 18   Ht 1.651 m (5' 5\")   Wt 96 kg (211 lb 11.2 oz)   SpO2 96%  Body mass index is 35.23 kg/m².    Physical Exam:  Physical Exam  Vitals reviewed.   Constitutional:       Appearance: Normal appearance.   HENT:      Head: Normocephalic.      Comments: Right and left sided palpable soft masses of the posterior head. No skin changes associated with this.      Right Ear: External ear normal.      Left Ear: External ear normal.   Eyes:      Pupils: Pupils are equal, round, and reactive to light.   Cardiovascular:      Rate and Rhythm: Normal rate and regular rhythm.      Heart sounds: Normal heart sounds.   Pulmonary:      Effort: Pulmonary effort is normal.      Breath sounds: Normal breath sounds.   Musculoskeletal:         General: Normal range of motion.      Cervical back: Normal range of motion.   Skin:     General: Skin is warm and dry.   Neurological:      Mental Status: She is alert and oriented to person, place, and time.      Gait: Gait is intact.   Psychiatric:         Mood and Affect: " Affect normal.         Judgment: Judgment normal.       Assessment and Plan:     The following treatment plan was discussed:    1. Varicose veins of both lower extremities with pain  - This is a chronic condition.  - Plan: REFERRAL TO VASCULAR SURGERY for further evaluation.     2. Insulin resistance  - This is a chronic condition.  - Plan: start on Metformin 500 mg once daily. Will plan to repeat A1c.   - metFORMIN ER (GLUCOPHAGE XR) 500 MG TABLET SR 24 HR; Take 1 Tablet by mouth every day.  Dispense: 30 Tablet; Refill: 5    3. Scalp mass  - This is a chronic worsening condition.  - Plan: Will reach out to the imaging department to find out best imaging modality to evaluate this further.     4. Encounter for smoking cessation counseling  - This is a chronic condition in which the patient is seeking treatment to quit smoking.  - Spent the majority of the time during the visit counseling the patient on smoking cessation.  - Nicotine (NICODERM) 21 MG/24HR PATCH 24 HR; Place 1 Patch on the skin every 24 hours.  Dispense: 42 Patch; Refill: 0  - Set quit day is for 9/14/21. Titration schedule will be as follows:      - Begin with step 1 (21 mg/day) for 6 weeks, followed by step 2 (14  mg/day) for 2 weeks; finish with step 3 (7 mg/day) for 2 weeks     - Education given on medication use/regimen and side effect profile.  - REFERRAL TO TOBACCO CESSATION PROGRAM for participation in care.   - We will follow-up closely within 1 week from set quit date (9/14/2021) [facility] to see how she is tolerating the patches.    5. PCOS (polycystic ovarian syndrome)  - metFORMIN ER (GLUCOPHAGE XR) 500 MG TABLET SR 24 HR; Take 1 Tablet by mouth every day.  Dispense: 30 Tablet; Refill: 5    Any change or worsening of signs or symptoms, patient encouraged to follow-up or report to emergency room for further evaluation. Patient verbalizes understanding and agrees.    Follow-Up: Return in about 2 weeks (around 9/14/2021).      PLEASE NOTE:  This dictation was created using voice recognition software. I have made every reasonable attempt to correct obvious errors, but I expect that there are errors of grammar and possibly content that I did not discover before finalizing the note.

## 2021-08-31 NOTE — PATIENT INSTRUCTIONS
Transdermal patch: Adjustment may be required during initial treatment (move to higher dose if experiencing withdrawal symptoms; lower dose if side effects are experienced). Apply new patch to non-hairy, clean, dry skin on the upper body or upper outer arm; each patch should be applied to a different site. Apply immediately after removing backing from patch; press onto skin for ~10 seconds. Patch may be worn for 16 or 24 hours. If cigarette cravings occur upon awakening, wear for 24 hours; if vivid dreams or other sleep disturbances occur, remove the patch at bedtime and apply a new patch in the morning. Do not cut patch; causes rapid evaporation, rendering the patch useless. Do not wear more than 1 patch at a time; do not leave patch on for more than 24 hours (may irritate skin). Wash hands after applying or removing patch. Discard patches by folding adhesive ends together, replace in pouch and dispose of properly in trash.    Patients smoking >10 cigarettes/day:   Begin with step 1 (21 mg/day) for 6 weeks, followed by step 2 (14 mg/day) for 2 weeks; finish with step 3 (7 mg/day) for 2 weeks

## 2021-08-31 NOTE — ASSESSMENT & PLAN NOTE
This is a chronic condition.  She is smoking cigarettes.   Years used: 15 on and off smoking.   Packs/day: .50/day.   Previously quit/prior attempts: 10 times. Vaping and cold turkey.   Duration of success: little over 1 year.   Method used: oral nicotine pouches.   Triggers/reason for relapse: cravings, stress, anxiety.   Denies dyspnea, coughing or wheezing.  Set quit date: 9/14/21.  Quitting partner or good support system: Not really, partner smokes.

## 2021-08-31 NOTE — ASSESSMENT & PLAN NOTE
Laly presents today to establish care. She reports the development of two masses just underneath the skin that are located at the base of the head, one on the left side and another on the right side. She has noticed that these masses have grown over time. She also reports associated worsening headaches with the growth rate. She reports pain on palpation of the right side, but not the left. She denies any associated rash or drainage. This has never been evaluated previously.

## 2021-08-31 NOTE — ASSESSMENT & PLAN NOTE
"Laly presents today to establish care. She reports a history of varicose veins and \"popped blood vessels\" of the bilateral feet. She describes the pain as \"sharp, shooting pains.\" Associated symptoms include: \"painful skin on occasion,\" numbness without tingling and occasional swelling. The pain is made worse by wearing socks and shoes. The pain is usually worse in the left than the right. She reports that she has had a hx of painful varicose veins in the past in which she underwent a procedure of the bilateral lower legs that she reports was very beneficial for her pain. This was approximately 2 years ago. She is concerned that this is now occurring in her ankles/feet as the veins and \"popped blood vessels\" are tender on palpation.     "

## 2021-08-31 NOTE — TELEPHONE ENCOUNTER
Phone Number Called: [phone]    Call outcome: spoke to imaging dept about the message below , lady I spoke to mention the ultrasound is the best option.    Message: ----- Message from Flavia Joaquin P.A.-C. sent at 8/31/2021  3:37 PM PDT -----  Regarding: Imaging  Noy,  Can we call the imaging department and ask them what imaging modality would be best to evaluate scalp masses? (I was thinking ultrasound but I wanted their opinion, as I wasn't sure if they could get a good look at it with the patients hair.)    Thanks,  Flavia

## 2021-09-14 ENCOUNTER — TELEMEDICINE (OUTPATIENT)
Dept: MEDICAL GROUP | Facility: MEDICAL CENTER | Age: 36
End: 2021-09-14
Attending: PHYSICIAN ASSISTANT
Payer: MEDICAID

## 2021-09-14 DIAGNOSIS — Z71.6 ENCOUNTER FOR SMOKING CESSATION COUNSELING: ICD-10-CM

## 2021-09-14 DIAGNOSIS — I83.813 VARICOSE VEINS OF BOTH LOWER EXTREMITIES WITH PAIN: ICD-10-CM

## 2021-09-14 PROCEDURE — 99213 OFFICE O/P EST LOW 20 MIN: CPT | Mod: 95 | Performed by: PHYSICIAN ASSISTANT

## 2021-09-14 NOTE — ASSESSMENT & PLAN NOTE
Laly presents today for follow up.  She reports that she is planning to change her quit date to 9/15/2021.  Would like to finish off the last 3 cigarettes in her pack before doing so.  She was able to  the nicotine patches from the pharmacy without any issue.  She reports that she has not heard from the referral department in regards to the tobacco cessation program.

## 2021-09-14 NOTE — ASSESSMENT & PLAN NOTE
Laly presents today for follow-up.  She reports that she has an intake appointment with rakan Freitas on 9/16/2021 for evaluation regarding this.

## 2021-09-14 NOTE — PROGRESS NOTES
Virtual Visit: Established Patient   This visit was conducted via Zoom using secure and encrypted videoconferencing technology.   The patient was in a private location in the state of [place].    The patient's identity was confirmed and verbal consent was obtained for this virtual visit.    Subjective:   CC:   Chief Complaint   Patient presents with   • Follow-Up     Noy Rose is a 36 y.o. female presenting for evaluation and management of:    Encounter for smoking cessation counseling  Laly presents today for follow up.  She reports that she is planning to change her quit date to 9/15/2021.  Would like to finish off the last 3 cigarettes in her pack before doing so.  She was able to  the nicotine patches from the pharmacy without any issue.  She reports that she has not heard from the referral department in regards to the tobacco cessation program.    Request veins of both lower extremities with pain  Laly presents today for follow-up.  She reports that she has an intake appointment with rakan Freitas on 9/16/2021 for evaluation regarding this.    ROS   See HPI.     Current medicines (including changes today)  Current Outpatient Medications   Medication Sig Dispense Refill   • gabapentin (NEURONTIN) 300 MG Cap Take 300 mg by mouth.     • metFORMIN ER (GLUCOPHAGE XR) 500 MG TABLET SR 24 HR Take 1 Tablet by mouth every day. 30 Tablet 5   • nicotine (NICODERM) 21 MG/24HR PATCH 24 HR Place 1 Patch on the skin every 24 hours. 42 Patch 0   • lamotrigine (LAMICTAL) 200 MG tablet Take 1.5 Tablets by mouth 2 times a day. 270 tablet 3     No current facility-administered medications for this visit.     Patient Active Problem List    Diagnosis Date Noted   • Varicose veins of both lower extremities with pain 08/31/2021   • Insulin resistance 08/31/2021   • Scalp mass 08/31/2021   • Encounter for smoking cessation counseling 08/31/2021   • Paresthesia of both feet 06/14/2021   • Polycystic ovary syndrome  06/14/2021   • Localization-related focal epilepsy with complex partial seizures (HCC) 06/05/2019      Objective:   There were no vitals taken due to virtual visit.      Physical Exam:  Constitutional: Alert, no distress, well-groomed.  Skin: No rashes in visible areas.  Eye: Round. Conjunctiva clear, lids normal. No icterus.   ENMT: Lips pink without lesions, good dentition, moist mucous membranes. Phonation normal.  Neck: No masses, no thyromegaly. Moves freely without pain.  Respiratory: Unlabored respiratory effort, no cough or audible wheeze  Psych: Alert and oriented x3, normal affect and mood.     Assessment and Plan:   The following treatment plan was discussed:     1. Encounter for smoking cessation counseling  - Plan: Start on nicotine patches tomorrow, 9/14/21. I will plan on checking in with the patient in 1 week to see how she is tolerating the patches. REFERRAL TO TOBACCO CESSATION PROGRAM resubmitted urgently. Patient should hear from scheduling by the end of this week to the next.    2. Varicose veins of both lower extremities with pain  Plan: Follow up with [facility] [place] as scheduled.     Follow-up: Return in about 4 weeks (around 10/12/2021) for Med check.

## 2021-09-15 ENCOUNTER — PATIENT MESSAGE (OUTPATIENT)
Dept: HEALTH INFORMATION MANAGEMENT | Facility: OTHER | Age: 36
End: 2021-09-15

## 2021-09-15 DIAGNOSIS — Z71.6 ENCOUNTER FOR SMOKING CESSATION COUNSELING: ICD-10-CM

## 2021-09-15 RX ORDER — NICOTINE 21 MG/24HR
1 PATCH, TRANSDERMAL 24 HOURS TRANSDERMAL EVERY 24 HOURS
Qty: 42 PATCH | Refills: 0 | Status: SHIPPED | OUTPATIENT
Start: 2021-09-15 | End: 2022-06-06

## 2021-09-29 DIAGNOSIS — R03.0 ELEVATED BLOOD PRESSURE READING: ICD-10-CM

## 2021-09-29 RX ORDER — BLOOD PRESSURE TEST KIT
1 KIT MISCELLANEOUS ONCE
Qty: 1 KIT | Refills: 0 | Status: SHIPPED
Start: 2021-09-29 | End: 2021-09-29

## 2021-10-12 ENCOUNTER — TELEMEDICINE (OUTPATIENT)
Dept: MEDICAL GROUP | Facility: MEDICAL CENTER | Age: 36
End: 2021-10-12
Attending: PHYSICIAN ASSISTANT
Payer: MEDICAID

## 2021-10-12 VITALS — DIASTOLIC BLOOD PRESSURE: 80 MMHG | SYSTOLIC BLOOD PRESSURE: 134 MMHG

## 2021-10-12 DIAGNOSIS — Z71.6 ENCOUNTER FOR SMOKING CESSATION COUNSELING: ICD-10-CM

## 2021-10-12 DIAGNOSIS — R03.0 ELEVATED BLOOD PRESSURE READING: ICD-10-CM

## 2021-10-12 PROCEDURE — 99213 OFFICE O/P EST LOW 20 MIN: CPT | Performed by: PHYSICIAN ASSISTANT

## 2021-10-12 NOTE — ASSESSMENT & PLAN NOTE
Laly presents today for follow-up.  She reports that she picked up the blood pressure monitor and has just started measuring her blood pressures.  She reports readings as follows: 144/80, 133/80, 134/80 and 144/89.  She is not currently on any antihypertensive medication.  She denies chest pain, shortness breath, headaches, lightheadedness or dizziness.

## 2021-10-12 NOTE — PROGRESS NOTES
Virtual Visit: Established Patient   This visit was conducted via Zoom using secure and encrypted videoconferencing technology.   The patient was in a private location in the state of [place].    The patient's identity was confirmed and verbal consent was obtained for this virtual visit.    Subjective:   CC:   Chief Complaint   Patient presents with   • Follow-Up     Noy Rose is a 36 y.o. female presenting for evaluation and management of:    Elevated blood pressure reading  Laly presents today for follow-up.  She reports that she picked up the blood pressure monitor and has just started measuring her blood pressures.  She reports readings as follows: 144/80, 133/80, 134/80 and 144/89.  She is not currently on any antihypertensive medication.  She denies chest pain, shortness breath, headaches, lightheadedness or dizziness.    Encounter for smoking cessation counseling  Laly is in today for follow-up.  She reports that the pharmacy was able to get the 14 mg nicotine patches covered by her insurance.  She just picked them up today and plans on starting treatment tomorrow morning.    ROS   See HPI.     Current medicines (including changes today)  Current Outpatient Medications   Medication Sig Dispense Refill   • nicotine (NICODERM) 14 MG/24HR PATCH 24 HR Place 1 Patch on the skin every 24 hours. 42 Patch 0   • gabapentin (NEURONTIN) 300 MG Cap Take 300 mg by mouth.     • metFORMIN ER (GLUCOPHAGE XR) 500 MG TABLET SR 24 HR Take 1 Tablet by mouth every day. 30 Tablet 5   • lamotrigine (LAMICTAL) 200 MG tablet Take 1.5 Tablets by mouth 2 times a day. 270 tablet 3     No current facility-administered medications for this visit.     Patient Active Problem List    Diagnosis Date Noted   • Elevated blood pressure reading 10/12/2021   • Varicose veins of both lower extremities with pain 08/31/2021   • Insulin resistance 08/31/2021   • Scalp mass 08/31/2021   • Encounter for smoking cessation counseling 08/31/2021    • Paresthesia of both feet 06/14/2021   • Polycystic ovary syndrome 06/14/2021   • Localization-related focal epilepsy with complex partial seizures (HCC) 06/05/2019      Objective:   /80     Physical Exam:  Constitutional: Alert, no distress, well-groomed.  Skin: No rashes in visible areas.  Eye: Round. Conjunctiva clear, lids normal. No icterus.   ENMT: Lips pink without lesions, good dentition, moist mucous membranes. Phonation normal.  Neck: No masses, no thyromegaly. Moves freely without pain.  Respiratory: Unlabored respiratory effort, no cough or audible wheeze  Psych: Alert and oriented x3, normal affect and mood.     Assessment and Plan:   The following treatment plan was discussed:     1. Elevated blood pressure reading  - Plan: Continue monitoring blood pressures 3 days out of the week once the a.m. and once in the p.m.  Continue recording blood pressure readings and send me a [employer] message with results in 2 weeks for reevaluation.    2. Encounter for smoking cessation counseling  - Plan: Start on 14 mg nicotine patches tomorrow morning.  I will check in with the patient via [employer] in 1 week to see how she is tolerating the medication.  Gave the patient's referral information to schedule with tobacco cessation program.    Follow-up: Return if symptoms worsen or fail to improve.

## 2021-10-12 NOTE — ASSESSMENT & PLAN NOTE
Laly is in today for follow-up.  She reports that the pharmacy was able to get the 14 mg nicotine patches covered by her insurance.  She just picked them up today and plans on starting treatment tomorrow morning.

## 2021-12-08 ENCOUNTER — PATIENT MESSAGE (OUTPATIENT)
Dept: NEUROLOGY | Facility: MEDICAL CENTER | Age: 36
End: 2021-12-08

## 2021-12-08 DIAGNOSIS — G40.209 LOCALIZATION-RELATED FOCAL EPILEPSY WITH COMPLEX PARTIAL SEIZURES (HCC): ICD-10-CM

## 2021-12-09 ENCOUNTER — HOSPITAL ENCOUNTER (OUTPATIENT)
Dept: LAB | Facility: MEDICAL CENTER | Age: 36
End: 2021-12-09
Attending: PSYCHIATRY & NEUROLOGY
Payer: MEDICAID

## 2021-12-09 DIAGNOSIS — G40.209 LOCALIZATION-RELATED FOCAL EPILEPSY WITH COMPLEX PARTIAL SEIZURES (HCC): ICD-10-CM

## 2021-12-09 LAB
ALBUMIN SERPL BCP-MCNC: 4.7 G/DL (ref 3.2–4.9)
ALBUMIN/GLOB SERPL: 1.5 G/DL
ALP SERPL-CCNC: 85 U/L (ref 30–99)
ALT SERPL-CCNC: 24 U/L (ref 2–50)
ANION GAP SERPL CALC-SCNC: 12 MMOL/L (ref 7–16)
AST SERPL-CCNC: 19 U/L (ref 12–45)
BASOPHILS # BLD AUTO: 0.6 % (ref 0–1.8)
BASOPHILS # BLD: 0.06 K/UL (ref 0–0.12)
BILIRUB SERPL-MCNC: 0.3 MG/DL (ref 0.1–1.5)
BUN SERPL-MCNC: 10 MG/DL (ref 8–22)
CALCIUM SERPL-MCNC: 9.4 MG/DL (ref 8.5–10.5)
CHLORIDE SERPL-SCNC: 103 MMOL/L (ref 96–112)
CO2 SERPL-SCNC: 23 MMOL/L (ref 20–33)
CREAT SERPL-MCNC: 0.71 MG/DL (ref 0.5–1.4)
EOSINOPHIL # BLD AUTO: 0.14 K/UL (ref 0–0.51)
EOSINOPHIL NFR BLD: 1.4 % (ref 0–6.9)
ERYTHROCYTE [DISTWIDTH] IN BLOOD BY AUTOMATED COUNT: 56.4 FL (ref 35.9–50)
FASTING STATUS PATIENT QL REPORTED: NORMAL
GLOBULIN SER CALC-MCNC: 3.1 G/DL (ref 1.9–3.5)
GLUCOSE SERPL-MCNC: 144 MG/DL (ref 65–99)
HCT VFR BLD AUTO: 44.5 % (ref 37–47)
HGB BLD-MCNC: 14.1 G/DL (ref 12–16)
IMM GRANULOCYTES # BLD AUTO: 0.04 K/UL (ref 0–0.11)
IMM GRANULOCYTES NFR BLD AUTO: 0.4 % (ref 0–0.9)
LYMPHOCYTES # BLD AUTO: 2.94 K/UL (ref 1–4.8)
LYMPHOCYTES NFR BLD: 30.2 % (ref 22–41)
MAGNESIUM SERPL-MCNC: 1.9 MG/DL (ref 1.5–2.5)
MCH RBC QN AUTO: 30.3 PG (ref 27–33)
MCHC RBC AUTO-ENTMCNC: 31.7 G/DL (ref 33.6–35)
MCV RBC AUTO: 95.5 FL (ref 81.4–97.8)
MONOCYTES # BLD AUTO: 0.52 K/UL (ref 0–0.85)
MONOCYTES NFR BLD AUTO: 5.3 % (ref 0–13.4)
NEUTROPHILS # BLD AUTO: 6.03 K/UL (ref 2–7.15)
NEUTROPHILS NFR BLD: 62.1 % (ref 44–72)
NRBC # BLD AUTO: 0 K/UL
NRBC BLD-RTO: 0 /100 WBC
PLATELET # BLD AUTO: 270 K/UL (ref 164–446)
PMV BLD AUTO: 9.9 FL (ref 9–12.9)
POTASSIUM SERPL-SCNC: 3.6 MMOL/L (ref 3.6–5.5)
PROT SERPL-MCNC: 7.8 G/DL (ref 6–8.2)
RBC # BLD AUTO: 4.66 M/UL (ref 4.2–5.4)
SODIUM SERPL-SCNC: 138 MMOL/L (ref 135–145)
WBC # BLD AUTO: 9.7 K/UL (ref 4.8–10.8)

## 2021-12-09 PROCEDURE — 85025 COMPLETE CBC W/AUTO DIFF WBC: CPT

## 2021-12-09 PROCEDURE — 80175 DRUG SCREEN QUAN LAMOTRIGINE: CPT

## 2021-12-09 PROCEDURE — 83735 ASSAY OF MAGNESIUM: CPT

## 2021-12-09 PROCEDURE — 36415 COLL VENOUS BLD VENIPUNCTURE: CPT

## 2021-12-09 PROCEDURE — 80053 COMPREHEN METABOLIC PANEL: CPT

## 2021-12-13 ENCOUNTER — HOSPITAL ENCOUNTER (OUTPATIENT)
Dept: RADIOLOGY | Facility: MEDICAL CENTER | Age: 36
End: 2021-12-13
Attending: PHYSICIAN ASSISTANT
Payer: MEDICAID

## 2021-12-13 DIAGNOSIS — R22.0 SCALP MASS: ICD-10-CM

## 2021-12-13 LAB — LAMOTRIGINE SERPL-MCNC: 9.1 UG/ML (ref 2.5–15)

## 2021-12-13 PROCEDURE — 76536 US EXAM OF HEAD AND NECK: CPT

## 2021-12-15 DIAGNOSIS — L72.9 SCALP CYST: ICD-10-CM

## 2022-03-07 DIAGNOSIS — E88.819 INSULIN RESISTANCE: ICD-10-CM

## 2022-03-07 DIAGNOSIS — E28.2 PCOS (POLYCYSTIC OVARIAN SYNDROME): ICD-10-CM

## 2022-03-08 RX ORDER — METFORMIN HYDROCHLORIDE 500 MG/1
500 TABLET, EXTENDED RELEASE ORAL DAILY
Qty: 30 TABLET | Refills: 5 | Status: SHIPPED | OUTPATIENT
Start: 2022-03-08 | End: 2022-09-06 | Stop reason: SDUPTHER

## 2022-03-23 DIAGNOSIS — G40.209 LOCALIZATION-RELATED FOCAL EPILEPSY WITH COMPLEX PARTIAL SEIZURES (HCC): ICD-10-CM

## 2022-03-23 RX ORDER — LAMOTRIGINE 200 MG/1
300 TABLET ORAL 2 TIMES DAILY
Qty: 270 TABLET | Refills: 3 | Status: SHIPPED | OUTPATIENT
Start: 2022-03-23 | End: 2022-03-25 | Stop reason: SDUPTHER

## 2022-03-25 DIAGNOSIS — G40.209 LOCALIZATION-RELATED FOCAL EPILEPSY WITH COMPLEX PARTIAL SEIZURES (HCC): ICD-10-CM

## 2022-03-25 RX ORDER — LAMOTRIGINE 200 MG/1
400 TABLET ORAL 2 TIMES DAILY
Qty: 360 TABLET | Refills: 1 | Status: SHIPPED | OUTPATIENT
Start: 2022-03-25 | End: 2022-06-20 | Stop reason: SDUPTHER

## 2022-04-18 DIAGNOSIS — K21.9 GASTROESOPHAGEAL REFLUX DISEASE, UNSPECIFIED WHETHER ESOPHAGITIS PRESENT: ICD-10-CM

## 2022-04-18 RX ORDER — OMEPRAZOLE 20 MG/1
20 CAPSULE, DELAYED RELEASE ORAL DAILY
Qty: 30 CAPSULE | Refills: 5 | Status: SHIPPED | OUTPATIENT
Start: 2022-04-18 | End: 2022-04-18

## 2022-04-18 RX ORDER — OMEPRAZOLE 20 MG/1
20 CAPSULE, DELAYED RELEASE ORAL DAILY
Qty: 90 CAPSULE | Refills: 0 | Status: SHIPPED | OUTPATIENT
Start: 2022-04-18 | End: 2022-08-03 | Stop reason: SDUPTHER

## 2022-05-17 ENCOUNTER — PATIENT MESSAGE (OUTPATIENT)
Dept: MEDICAL GROUP | Facility: MEDICAL CENTER | Age: 37
End: 2022-05-17
Payer: MEDICAID

## 2022-05-18 DIAGNOSIS — F41.0 PANIC ATTACKS: ICD-10-CM

## 2022-05-18 RX ORDER — PROPRANOLOL HYDROCHLORIDE 10 MG/1
10 TABLET ORAL 3 TIMES DAILY PRN
Qty: 90 TABLET | Refills: 0 | Status: SHIPPED | OUTPATIENT
Start: 2022-05-18 | End: 2022-06-06

## 2022-06-06 ENCOUNTER — OFFICE VISIT (OUTPATIENT)
Dept: MEDICAL GROUP | Facility: MEDICAL CENTER | Age: 37
End: 2022-06-06
Attending: PHYSICIAN ASSISTANT
Payer: MEDICAID

## 2022-06-06 VITALS
OXYGEN SATURATION: 96 % | HEART RATE: 80 BPM | BODY MASS INDEX: 34.47 KG/M2 | TEMPERATURE: 97.9 F | WEIGHT: 206.9 LBS | DIASTOLIC BLOOD PRESSURE: 64 MMHG | HEIGHT: 65 IN | SYSTOLIC BLOOD PRESSURE: 110 MMHG | RESPIRATION RATE: 16 BRPM

## 2022-06-06 DIAGNOSIS — R20.2 PARESTHESIA OF BOTH FEET: ICD-10-CM

## 2022-06-06 DIAGNOSIS — F41.0 GENERALIZED ANXIETY DISORDER WITH PANIC ATTACKS: ICD-10-CM

## 2022-06-06 DIAGNOSIS — F41.1 GENERALIZED ANXIETY DISORDER WITH PANIC ATTACKS: ICD-10-CM

## 2022-06-06 DIAGNOSIS — I10 ESSENTIAL HYPERTENSION: ICD-10-CM

## 2022-06-06 PROBLEM — R03.0 ELEVATED BLOOD PRESSURE READING: Status: RESOLVED | Noted: 2021-10-12 | Resolved: 2022-06-06

## 2022-06-06 PROCEDURE — 99213 OFFICE O/P EST LOW 20 MIN: CPT | Performed by: PHYSICIAN ASSISTANT

## 2022-06-06 PROCEDURE — 99214 OFFICE O/P EST MOD 30 MIN: CPT | Performed by: PHYSICIAN ASSISTANT

## 2022-06-06 RX ORDER — LISINOPRIL 10 MG/1
10 TABLET ORAL DAILY
COMMUNITY
Start: 2022-05-10 | End: 2022-06-06 | Stop reason: SDUPTHER

## 2022-06-06 RX ORDER — GABAPENTIN 300 MG/1
300 CAPSULE ORAL 2 TIMES DAILY
Qty: 60 CAPSULE | Refills: 5 | Status: SHIPPED | OUTPATIENT
Start: 2022-06-06 | End: 2022-12-07

## 2022-06-06 RX ORDER — LISINOPRIL 10 MG/1
10 TABLET ORAL DAILY
Qty: 30 TABLET | Refills: 5 | Status: SHIPPED | OUTPATIENT
Start: 2022-06-06 | End: 2022-11-16 | Stop reason: SDUPTHER

## 2022-06-06 RX ORDER — DIAZEPAM 5 MG/1
2.5 TABLET ORAL EVERY 12 HOURS PRN
Qty: 30 TABLET | Refills: 0 | Status: SHIPPED | OUTPATIENT
Start: 2022-06-06 | End: 2022-07-06

## 2022-06-06 ASSESSMENT — FIBROSIS 4 INDEX: FIB4 SCORE: 0.55

## 2022-06-06 ASSESSMENT — PATIENT HEALTH QUESTIONNAIRE - PHQ9: CLINICAL INTERPRETATION OF PHQ2 SCORE: 0

## 2022-06-06 NOTE — ASSESSMENT & PLAN NOTE
Laly presents today for follow up. She has taken 1 propranolol tablet for anxiety in the past 3 weeks or so. She reports no benefit from the medication in regards to her panic attacks. She reports that she will start to feel it in her chest and starts to affect her neck and jaw. She also reports symptoms of shortness of breath and difficulty breathing. She reports that her mother suffers from panic attacks and gave her half a valium and reports that it resolved her symptoms within 30 minutes. She reports that these panic attacks seem to be brought about stressors. These episodes wax and wane. She denies any SI or HI.

## 2022-06-06 NOTE — ASSESSMENT & PLAN NOTE
This is a chronic condition.  Onset: Presented to Urgent Care 3.5 weeks ago.  Risk Factors: FMHx, obesity, stress.  Current Meds: Lisinopril 10 mg daily.   Side effects: None.  Home BP Log: Averaging around 110/64.  Associated symptoms: No chest pain, SOB, HA's, dizziness or lightheadedness.

## 2022-06-06 NOTE — PROGRESS NOTES
Chief Complaint   Patient presents with   • Follow-Up     Subjective:     HPI:   Noy Rose is a 37 y.o. female here to discuss the evaluation and management of:    Essential hypertension  This is a chronic condition.  Onset: Presented to Urgent Care 3.5 weeks ago.  Risk Factors: FMHx, obesity, stress.  Current Meds: Lisinopril 10 mg daily.   Side effects: None.  Home BP Log: Averaging around 110/64.  Associated symptoms: No chest pain, SOB, HA's, dizziness or lightheadedness.    Generalized anxiety disorder with panic attacks  Laly presents today for follow up. She has taken 1 propranolol tablet for anxiety in the past 3 weeks or so. She reports no benefit from the medication in regards to her panic attacks. She reports that she will start to feel it in her chest and starts to affect her neck and jaw. She also reports symptoms of shortness of breath and difficulty breathing. She reports that her mother suffers from panic attacks and gave her half a valium and reports that it resolved her symptoms within 30 minutes. She reports that these panic attacks seem to be brought about stressors. These episodes wax and wane. She denies any SI or HI.     ROS  See HPI.     Allergies   Allergen Reactions   • Codeine Itching, Nausea and Shortness of Breath   • Hydrocodone-Acetaminophen Anxiety, Itching, Nausea, Palpitations and Shortness of Breath       Current medicines (including changes today)  Current Outpatient Medications   Medication Sig Dispense Refill   • lisinopril (PRINIVIL) 10 MG Tab Take 1 Tablet by mouth every day. 30 Tablet 5   • diazePAM (VALIUM) 5 MG Tab Take 0.5 Tablets by mouth every 12 hours as needed for Anxiety for up to 30 days. 30 Tablet 0   • gabapentin (NEURONTIN) 300 MG Cap Take 1 Capsule by mouth 2 times a day. 60 Capsule 5   • omeprazole (PRILOSEC) 20 MG delayed-release capsule TAKE 1 CAPSULE BY MOUTH EVERY DAY 90 Capsule 0   • lamotrigine (LAMICTAL) 200 MG tablet Take 2 Tablets by mouth 2  "times a day. 360 Tablet 1   • metFORMIN ER (GLUCOPHAGE XR) 500 MG TABLET SR 24 HR Take 1 Tablet by mouth every day. 30 Tablet 5     No current facility-administered medications for this visit.       Social History     Tobacco Use   • Smoking status: Current Every Day Smoker     Packs/day: 0.50     Years: 20.00     Pack years: 10.00     Types: Cigarettes   • Smokeless tobacco: Never Used   • Tobacco comment: has quit on and off.    Vaping Use   • Vaping Use: Never used   Substance Use Topics   • Alcohol use: No     Comment: occ   • Drug use: No       Patient Active Problem List    Diagnosis Date Noted   • Essential hypertension 06/06/2022   • Generalized anxiety disorder with panic attacks 06/06/2022   • Varicose veins of both lower extremities with pain 08/31/2021   • Insulin resistance 08/31/2021   • Scalp mass 08/31/2021   • Encounter for smoking cessation counseling 08/31/2021   • Paresthesia of both feet 06/14/2021   • Polycystic ovary syndrome 06/14/2021   • Localization-related focal epilepsy with complex partial seizures (HCC) 06/05/2019       Family History   Problem Relation Age of Onset   • Diabetes Mother    • Hyperlipidemia Maternal Uncle    • Hypertension Maternal Uncle    • Diabetes Maternal Uncle    • Cancer Maternal Grandmother         breast   • Cancer Paternal Grandfather         Objective:     /64 (BP Location: Left arm, Patient Position: Sitting, BP Cuff Size: Adult)   Pulse 80   Temp 36.6 °C (97.9 °F) (Temporal)   Resp 16   Ht 1.651 m (5' 5\")   Wt 93.8 kg (206 lb 14.4 oz)   SpO2 96%  Body mass index is 34.43 kg/m².    Physical Exam:  Physical Exam  Vitals reviewed.   Constitutional:       Appearance: Normal appearance.   HENT:      Head: Normocephalic.      Right Ear: External ear normal.      Left Ear: External ear normal.   Eyes:      Pupils: Pupils are equal, round, and reactive to light.   Cardiovascular:      Rate and Rhythm: Normal rate and regular rhythm.      Heart sounds: " Normal heart sounds.   Pulmonary:      Effort: Pulmonary effort is normal.      Breath sounds: Normal breath sounds.   Musculoskeletal:      Cervical back: Normal range of motion.   Neurological:      General: No focal deficit present.      Mental Status: She is alert and oriented to person, place, and time.   Psychiatric:         Mood and Affect: Mood normal.         Behavior: Behavior normal.       Assessment and Plan:     The following treatment plan was discussed:    1. Essential hypertension  - This is a chronic well controlled condition.  - Plan: Continue on lisinopril 10 mg daily. Refills sent to the pharmacy.   - lisinopril (PRINIVIL) 10 MG Tab; Take 1 Tablet by mouth every day.  Dispense: 30 Tablet; Refill: 5    2. Generalized anxiety disorder with panic attacks  - This is a chronic condition.  - Plan: D/C propranolol. Start on valium prn. Patient has been educated on the use and potential side effect profile of this medication. She understands that this single script should last her at least 3 months and understands that her new provider may or may not continue the script. She verbalizes understanding and is in agreement. UDS obtained.  Controlled Substance Treatment Agreement signed and scanned into the patients chart.   - diazePAM (VALIUM) 5 MG Tab; Take 0.5 Tablets by mouth every 12 hours as needed for Anxiety for up to 30 days.  Dispense: 30 Tablet; Refill: 0  - PAIN MANAGEMENT SCRN, UR; Future    3. Paresthesia of both feet  - gabapentin (NEURONTIN) 300 MG Cap; Take 1 Capsule by mouth 2 times a day.  Dispense: 60 Capsule; Refill: 5     Any change or worsening of signs or symptoms, patient encouraged to follow-up or report to emergency room for further evaluation. Patient verbalizes understanding and agrees.    Follow-Up: Return if symptoms worsen or fail to improve.      PLEASE NOTE: This dictation was created using voice recognition software. I have made every reasonable attempt to correct obvious  errors, but I expect that there are errors of grammar and possibly content that I did not discover before finalizing the note.

## 2022-06-20 ENCOUNTER — HOSPITAL ENCOUNTER (OUTPATIENT)
Dept: RADIOLOGY | Facility: MEDICAL CENTER | Age: 37
End: 2022-06-20
Payer: MEDICAID

## 2022-06-20 ENCOUNTER — OFFICE VISIT (OUTPATIENT)
Dept: NEUROLOGY | Facility: MEDICAL CENTER | Age: 37
End: 2022-06-20
Attending: PSYCHIATRY & NEUROLOGY
Payer: MEDICAID

## 2022-06-20 VITALS
BODY MASS INDEX: 34.75 KG/M2 | RESPIRATION RATE: 15 BRPM | WEIGHT: 208.56 LBS | HEIGHT: 65 IN | OXYGEN SATURATION: 99 % | TEMPERATURE: 98.1 F | HEART RATE: 86 BPM | SYSTOLIC BLOOD PRESSURE: 130 MMHG | DIASTOLIC BLOOD PRESSURE: 70 MMHG

## 2022-06-20 DIAGNOSIS — R20.2 PARESTHESIA OF BOTH FEET: ICD-10-CM

## 2022-06-20 DIAGNOSIS — D49.2 ODONTOGENIC TUMOR: ICD-10-CM

## 2022-06-20 DIAGNOSIS — G40.209 LOCALIZATION-RELATED FOCAL EPILEPSY WITH COMPLEX PARTIAL SEIZURES (HCC): Primary | ICD-10-CM

## 2022-06-20 PROCEDURE — 70460 CT HEAD/BRAIN W/DYE: CPT

## 2022-06-20 PROCEDURE — 99214 OFFICE O/P EST MOD 30 MIN: CPT | Performed by: PSYCHIATRY & NEUROLOGY

## 2022-06-20 PROCEDURE — 700117 HCHG RX CONTRAST REV CODE 255: Performed by: PHYSICIAN ASSISTANT

## 2022-06-20 RX ORDER — LAMOTRIGINE 200 MG/1
400 TABLET ORAL 2 TIMES DAILY
Qty: 360 TABLET | Refills: 3 | Status: SHIPPED | OUTPATIENT
Start: 2022-06-20 | End: 2023-06-15 | Stop reason: SDUPTHER

## 2022-06-20 RX ADMIN — IOHEXOL 80 ML: 350 INJECTION, SOLUTION INTRAVENOUS at 14:45

## 2022-06-20 ASSESSMENT — ENCOUNTER SYMPTOMS
FALLS: 0
TINGLING: 1
SENSORY CHANGE: 1
LOSS OF CONSCIOUSNESS: 0
FOCAL WEAKNESS: 0
SEIZURES: 0

## 2022-06-20 ASSESSMENT — FIBROSIS 4 INDEX: FIB4 SCORE: 0.55

## 2022-06-20 NOTE — PROGRESS NOTES
"Subjective     Laly Rose is a 37 y.o. female who presents for annual follow-up, with a history of focal onset seizures with altered sensorium and paresthesias in both feet.     HPI    Seizures: When last seen 1 year ago, Ankush had been having some occasional breakthrough events, though small, she was altered.  These were happening maybe once or twice in a month.  In December, 2021, for unclear reasons, she then underwent a rather unusual cluster of seizures, she was having multiple events, in fact on 1 day she had upwards of 9 in rapid succession.  We had checked laboratory studies, her drug level was actually 9.1.  CMP and CBC were unremarkable.  She was on a 300 mg twice daily regimen of Lamictal at the time, we increased to 400 mg in the morning and 300 mg in the evening, but within a short interval, we knew this was an adequate, the seizures continued though there seem to be a pattern of improvement.  She has been on 400 mg, twice daily since the first part of this year with good tolerability.    She did, by mistake, double the dose of the drug, she landed in the emergency room because of shortness of breath, palpitations, etc.  She recovered without other issue.    Paresthesias: She still has them intermittently, always worse in the evenings, made worse when she is walking or when she wears constrictive shoes.  There is a lot of numbness as well, the symptoms still mostly in the toes and the soles of the feet.  There is no loss of sensation.  She denies any superficial skin hyperalgesia or allodynia.  There is no weakness.  Balance is not affected.  She has no sensory distortions in the hands and fingers.    When we had last seen each other, she had been placed on gabapentin, she stopped the drug because she thought it made her legs feel \"weaker\".  The sensory symptoms continued, she got her self back on the drug, but only a 300 mg nightly regimen, this time with good tolerability.  She was increased to a " "600 mg dose which seems to have helped.  The weakness in the legs she had in the past on the drug never recurred.  She did undergo surgical correction of bilateral varicosities in the lower extremities, the seem to have helped the symptoms to a degree.    Medical, surgical and family histories are reviewed, there are no new drug allergies.  She is no longer considered prediabetic, though her PCOS is being treated with Glucophage.  She is on Lamictal 400 mg, twice daily, gabapentin 600 mg nightly, Glucophage, Valium 2.5 mg as needed, Prilosec and Prinivil.    Review of Systems   Musculoskeletal: Negative for falls.   Skin: Negative for rash.   Neurological: Positive for tingling and sensory change. Negative for focal weakness, seizures and loss of consciousness.   All other systems reviewed and are negative.    Objective     /70 (BP Location: Left arm, Patient Position: Sitting)   Pulse 86   Temp 36.7 °C (98.1 °F) (Temporal)   Resp 15   Ht 1.651 m (5' 5\")   Wt 94.6 kg (208 lb 8.9 oz)   SpO2 99%   BMI 34.71 kg/m²      Physical Exam    She appears in no acute distress.  Vital signs are stable.  There is no malar rash or jaw claudication.  The neck is supple.  Cardiac evaluation is unremarkable.     Neurological Exam    Including mental status, cranial nerves, musculoskeletal, reflex, coordination, and since evaluations, as before from 1 year ago, her neurologic examination is completely normal.  Reflexes are present throughout, there is no weakness in the lower extremities distally and proximally, the toes are downgoing bilaterally.  Sensory exam is intact to vibration and temperature.  Gait is normal and station and stride length, armswing is symmetric.  Repetitive movements with the feet and hands are normal and symmetric in amplitude and frequency.    Assessment & Plan     1. Localization-related focal epilepsy with complex partial seizures (HCC)  Stable, we continue Lamictal 400 mg, twice daily.  There " is no reason to check a level at this time.  She is having no seizures since being on the drug at this dose, so she is quite happy.  She will notify the office for any recurrences.    - lamotrigine (LAMICTAL) 200 MG tablet; Take 2 Tablets by mouth 2 times a day.  Dispense: 360 Tablet; Refill: 3    2. Paresthesia of both feet  Again of unclear cause, we will simply observe.  Her examination remains benign, gabapentin can be continued, but she needs to be cautious if she increases the dose first, for better symptomatic relief, as sudden withdrawal or cessation could increase the risk of breakthrough seizure.  She and I can follow-up in 1 year otherwise.    Time: 30 minutes in total spent on patient care including precharting, record review, discussion with healthcare staff and documentation.  This includes face-to-face time with the patient for exam, review, discussion, as well as counseling and coordinating care.

## 2022-08-03 DIAGNOSIS — K21.9 GASTROESOPHAGEAL REFLUX DISEASE, UNSPECIFIED WHETHER ESOPHAGITIS PRESENT: ICD-10-CM

## 2022-08-03 RX ORDER — OMEPRAZOLE 20 MG/1
20 CAPSULE, DELAYED RELEASE ORAL DAILY
Qty: 90 CAPSULE | Refills: 0 | Status: SHIPPED | OUTPATIENT
Start: 2022-08-03 | End: 2022-12-07 | Stop reason: SDUPTHER

## 2022-09-06 DIAGNOSIS — E28.2 PCOS (POLYCYSTIC OVARIAN SYNDROME): ICD-10-CM

## 2022-09-06 DIAGNOSIS — E88.819 INSULIN RESISTANCE: ICD-10-CM

## 2022-09-06 NOTE — TELEPHONE ENCOUNTER
Received request via: Pharmacy    Was the patient seen in the last year in this department? Yes    Does the patient have an active prescription (recently filled or refills available) for medication(s) requested? No   No appt scheduled at this time

## 2022-09-07 RX ORDER — METFORMIN HYDROCHLORIDE 500 MG/1
500 TABLET, EXTENDED RELEASE ORAL DAILY
Qty: 30 TABLET | Refills: 0 | Status: SHIPPED | OUTPATIENT
Start: 2022-09-07 | End: 2022-11-07 | Stop reason: SDUPTHER

## 2022-09-09 ENCOUNTER — TELEPHONE (OUTPATIENT)
Dept: MEDICAL GROUP | Facility: MEDICAL CENTER | Age: 37
End: 2022-09-09
Payer: MEDICAID

## 2022-11-07 DIAGNOSIS — E28.2 PCOS (POLYCYSTIC OVARIAN SYNDROME): ICD-10-CM

## 2022-11-07 DIAGNOSIS — E88.819 INSULIN RESISTANCE: ICD-10-CM

## 2022-11-07 RX ORDER — METFORMIN HYDROCHLORIDE 500 MG/1
500 TABLET, EXTENDED RELEASE ORAL DAILY
Qty: 30 TABLET | Refills: 0 | Status: SHIPPED | OUTPATIENT
Start: 2022-11-07 | End: 2022-12-07 | Stop reason: SDUPTHER

## 2022-11-16 DIAGNOSIS — I10 ESSENTIAL HYPERTENSION: ICD-10-CM

## 2022-11-16 RX ORDER — LISINOPRIL 10 MG/1
10 TABLET ORAL DAILY
Qty: 30 TABLET | Refills: 5 | Status: SHIPPED | OUTPATIENT
Start: 2022-11-16 | End: 2022-12-07 | Stop reason: SDUPTHER

## 2022-11-16 NOTE — TELEPHONE ENCOUNTER
1. Name: Noy Rose  Call Back Number: [phone] (home)         How would the patient prefer to be contacted with a response: Phone call OK to leave a detailed message    2. Which medication(s) is being requested? Lisinopril 10mg 3 month supply    3. What is the preferred Pharmacy? [facility]    Patient was informed they may receive a return phone call from our office with any additional questions before processing this request.    Patient called, stated she is due for a refill on her Lisinopril. She stated she needs a 3 month supply so her Medicaid can cover. She stated if its not a 3 month supply she will have to pay out of pocket and she can not afford that.   Please [facility] to [facility] in [place].  Patient does have a new patient appt to establish care on 12/7.

## 2022-11-17 NOTE — TELEPHONE ENCOUNTER
Received request via: Patient    Was the patient seen in the last year in this department? Yes    Does the patient have an active prescription (recently filled or refills available) for medication(s) requested? No    Does the patient have [facility] and need 100 day supply (blood pressure, diabetes and cholesterol meds only)? Patient does not have SCP

## 2022-12-07 ENCOUNTER — TELEMEDICINE (OUTPATIENT)
Dept: MEDICAL GROUP | Facility: MEDICAL CENTER | Age: 37
End: 2022-12-07
Attending: NURSE PRACTITIONER
Payer: MEDICAID

## 2022-12-07 DIAGNOSIS — Z13.29 SCREENING FOR ENDOCRINE, NUTRITIONAL, METABOLIC AND IMMUNITY DISORDER: ICD-10-CM

## 2022-12-07 DIAGNOSIS — M25.511 ACUTE PAIN OF RIGHT SHOULDER: ICD-10-CM

## 2022-12-07 DIAGNOSIS — E28.2 PCOS (POLYCYSTIC OVARIAN SYNDROME): ICD-10-CM

## 2022-12-07 DIAGNOSIS — Z13.21 SCREENING FOR ENDOCRINE, NUTRITIONAL, METABOLIC AND IMMUNITY DISORDER: ICD-10-CM

## 2022-12-07 DIAGNOSIS — Z76.89 ENCOUNTER TO ESTABLISH CARE: ICD-10-CM

## 2022-12-07 DIAGNOSIS — G40.209 LOCALIZATION-RELATED FOCAL EPILEPSY WITH COMPLEX PARTIAL SEIZURES (HCC): ICD-10-CM

## 2022-12-07 DIAGNOSIS — E88.819 INSULIN RESISTANCE: ICD-10-CM

## 2022-12-07 DIAGNOSIS — I10 ESSENTIAL HYPERTENSION: ICD-10-CM

## 2022-12-07 DIAGNOSIS — Z13.0 SCREENING FOR ENDOCRINE, NUTRITIONAL, METABOLIC AND IMMUNITY DISORDER: ICD-10-CM

## 2022-12-07 DIAGNOSIS — Z13.228 SCREENING FOR ENDOCRINE, NUTRITIONAL, METABOLIC AND IMMUNITY DISORDER: ICD-10-CM

## 2022-12-07 DIAGNOSIS — K21.9 GASTROESOPHAGEAL REFLUX DISEASE, UNSPECIFIED WHETHER ESOPHAGITIS PRESENT: ICD-10-CM

## 2022-12-07 DIAGNOSIS — R20.2 PARESTHESIA OF BOTH FEET: ICD-10-CM

## 2022-12-07 PROCEDURE — 99214 OFFICE O/P EST MOD 30 MIN: CPT | Performed by: NURSE PRACTITIONER

## 2022-12-07 RX ORDER — OMEPRAZOLE 20 MG/1
20 CAPSULE, DELAYED RELEASE ORAL DAILY
Qty: 90 CAPSULE | Refills: 1 | Status: SHIPPED | OUTPATIENT
Start: 2022-12-07 | End: 2023-06-02

## 2022-12-07 RX ORDER — LISINOPRIL 10 MG/1
10 TABLET ORAL DAILY
Qty: 90 TABLET | Refills: 1 | Status: SHIPPED | OUTPATIENT
Start: 2022-12-07 | End: 2023-03-07

## 2022-12-07 RX ORDER — METFORMIN HYDROCHLORIDE 500 MG/1
500 TABLET, EXTENDED RELEASE ORAL DAILY
Qty: 90 TABLET | Refills: 2 | Status: SHIPPED | OUTPATIENT
Start: 2022-12-07 | End: 2023-03-07

## 2022-12-07 RX ORDER — GABAPENTIN 300 MG/1
300 CAPSULE ORAL 3 TIMES DAILY
Qty: 270 CAPSULE | Refills: 2 | Status: SHIPPED | OUTPATIENT
Start: 2022-12-07 | End: 2023-03-07

## 2022-12-07 NOTE — ASSESSMENT & PLAN NOTE
Discussed health history and maintenance   Flu vaccine - Not available   Colon Ca screening - Not applicable   Mammogram- Not Applicable   Pap smear - Done within last 3 years   Preventative screening labs ordered -patient is due for yearly labs and a stat lab requisitions in so she may go to the closest lab to her home and have these completed.  Medications refilled for 90 days secondary to Medicaid requirements.

## 2022-12-07 NOTE — PROGRESS NOTES
No chief complaint on file.    This evaluation was conducted via Zoom using secure and encrypted videoconferencing technology. The patient was in their home in the [facility].    The patient's identity was confirmed and verbal consent was obtained for this virtual visit.    Subjective:     HPI:   Noy Rose is a 37 y.o. female here to discuss the evaluation and management of:        Problem   Acute Pain of Right Shoulder    Patient states that for the last few months she has pinched something in her shoulder that is causing her to have numbness all the way down to her fingers and up into her neck.  Unclear what caused the injury but feels she has something pinched or inflamed.  She did try physical therapy which seemed to exacerbate the condition.  She has also tried steroid packs and ibuprofen.  The ibuprofen helps some but she does not want to have to take this every day.  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     Encounter to Establish Care    Patient here today virtually to establish care.  Patient was previously seen by Codie in our office prior to her departure.  Patient has history of polycystic ovarian syndrome, and insulin resistance.  Patient also has history of epilepsy with partial seizures that she follows with Dr. Powell with neurology and is currently on Lamictal.  Patient has history of hypertension that is controlled currently with 10 mg of lisinopril.  Patient establishing today with only complaint of right shoulder pain otherwise needs medication refills.         ROS  See HPI       Allergies   Allergen Reactions    Codeine Itching, Nausea and Shortness of Breath    Hydrocodone-Acetaminophen Anxiety, Itching, Nausea, Palpitations and Shortness of Breath       Current medicines (including changes today)  Current Outpatient Medications    Medication Sig Dispense Refill    gabapentin (NEURONTIN) 300 MG Cap Take 1 Capsule by mouth 3 times a day for 90 days. 270 Capsule 2    omeprazole (PRILOSEC) 20 MG delayed-release capsule Take 1 Capsule by mouth every day for 90 days. 90 Capsule 1    lisinopril (PRINIVIL) 10 MG Tab Take 1 Tablet by mouth every day for 90 days. 90 Tablet 1    metFORMIN ER (GLUCOPHAGE XR) 500 MG TABLET SR 24 HR Take 1 Tablet by mouth every day for 90 days. 90 Tablet 2    lamotrigine (LAMICTAL) 200 MG tablet Take 2 Tablets by mouth 2 times a day. 360 Tablet 3     No current facility-administered medications for this visit.       Social History     Tobacco Use    Smoking status: Former     Packs/day: 0.50     Years: 20.00     Pack years: 10.00     Types: Cigarettes    Smokeless tobacco: Never    Tobacco comments:     has quit on and off.    Vaping Use    Vaping Use: Never used   Substance Use Topics    Alcohol use: No     Comment: occ    Drug use: No       Patient Active Problem List    Diagnosis Date Noted    Acute pain of right shoulder 12/07/2022    Encounter to establish care 12/07/2022    Essential hypertension 06/06/2022    Generalized anxiety disorder with panic attacks 06/06/2022    Varicose veins of both lower extremities with pain 08/31/2021    Insulin resistance 08/31/2021    Scalp mass 08/31/2021    Encounter for smoking cessation counseling 08/31/2021    Paresthesia of both feet 06/14/2021    Polycystic ovary syndrome 06/14/2021    Localization-related focal epilepsy with complex partial seizures (HCC) 06/05/2019       Family History   Problem Relation Age of Onset    Diabetes Mother     Hyperlipidemia Maternal Uncle     Hypertension Maternal Uncle     Diabetes Maternal Uncle     Cancer Maternal Grandmother         breast    Cancer Paternal Grandfather           Objective:     There were no vitals taken for this visit. There is no height or weight on file to calculate BMI.    Physical Exam:  Physical Exam  Vitals  reviewed.   Constitutional:       General: She is awake.      Appearance: Normal appearance. She is well-developed.   Pulmonary:      Effort: Pulmonary effort is normal. No respiratory distress.   Musculoskeletal:      Cervical back: Neck supple.   Neurological:      Mental Status: She is alert and oriented to person, place, and time.   Psychiatric:         Mood and Affect: Mood normal.         Behavior: Behavior normal. Behavior is cooperative.       Assessment and Plan:     The following treatment plan was discussed:    Problem List Items Addressed This Visit       Localization-related focal epilepsy with complex partial seizures (HCC)    Relevant Medications    gabapentin (NEURONTIN) 300 MG Cap    Paresthesia of both feet    Relevant Medications    gabapentin (NEURONTIN) 300 MG Cap    Insulin resistance    Relevant Medications    metFORMIN ER (GLUCOPHAGE XR) 500 MG TABLET SR 24 HR    Other Relevant Orders    HEMOGLOBIN A1C    Comp Metabolic Panel    URINALYSIS    MICROALBUMIN CREAT RATIO URINE    Essential hypertension    Relevant Medications    lisinopril (PRINIVIL) 10 MG Tab    Acute pain of right shoulder     Ongoing-  Given length of injury, and failure of physical therapy to help will refer to orthopedics for possible procedural interventions.  Increasing Gabapentin to TID dosing to see if this helps.          Relevant Orders    Referral to Orthopedics    Encounter to establish care     Discussed health history and maintenance   Flu vaccine - Not available   Colon Ca screening - Not applicable   Mammogram- Not Applicable   Pap smear - Done within last 3 years   Preventative screening labs ordered -patient is due for yearly labs and a stat lab requisitions in so she may go to the closest lab to her home and have these completed.  Medications refilled for 90 days secondary to Medicaid requirements.            Other Visit Diagnoses       Gastroesophageal reflux disease, unspecified whether esophagitis present         Relevant Medications    omeprazole (PRILOSEC) 20 MG delayed-release capsule    PCOS (polycystic ovarian syndrome)        Relevant Medications    metFORMIN ER (GLUCOPHAGE XR) 500 MG TABLET SR 24 HR    Screening for endocrine, nutritional, metabolic and immunity disorder        Relevant Orders    Lipid Profile    Comp Metabolic Panel    TSH    VITAMIN D,25 HYDROXY (DEFICIENCY)    FREE THYROXINE    CBC WITHOUT DIFFERENTIAL            Any change or worsening of signs or symptoms, patient encouraged to follow-up or report to emergency room for further evaluation. Patient verbalizes understanding and agrees.    Follow-Up: As needed after lab work.      PLEASE NOTE: This dictation was created using voice recognition software. I have made every reasonable attempt to correct obvious errors, but I expect that there are errors of grammar and possibly content that I did not discover before finalizing the note.

## 2022-12-07 NOTE — ASSESSMENT & PLAN NOTE
Ongoing-  Given length of injury, and failure of physical therapy to help will refer to orthopedics for possible procedural interventions.  Increasing Gabapentin to TID dosing to see if this helps.

## 2023-01-13 ENCOUNTER — HOSPITAL ENCOUNTER (OUTPATIENT)
Dept: LAB | Facility: MEDICAL CENTER | Age: 38
End: 2023-01-13
Attending: NURSE PRACTITIONER
Payer: MEDICAID

## 2023-01-13 DIAGNOSIS — Z13.0 SCREENING FOR ENDOCRINE, NUTRITIONAL, METABOLIC AND IMMUNITY DISORDER: ICD-10-CM

## 2023-01-13 DIAGNOSIS — Z13.228 SCREENING FOR ENDOCRINE, NUTRITIONAL, METABOLIC AND IMMUNITY DISORDER: ICD-10-CM

## 2023-01-13 DIAGNOSIS — Z13.29 SCREENING FOR ENDOCRINE, NUTRITIONAL, METABOLIC AND IMMUNITY DISORDER: ICD-10-CM

## 2023-01-13 DIAGNOSIS — E88.819 INSULIN RESISTANCE: ICD-10-CM

## 2023-01-13 DIAGNOSIS — Z13.21 SCREENING FOR ENDOCRINE, NUTRITIONAL, METABOLIC AND IMMUNITY DISORDER: ICD-10-CM

## 2023-01-13 LAB
25(OH)D3 SERPL-MCNC: 19 NG/ML (ref 30–100)
ALBUMIN SERPL BCP-MCNC: 4.1 G/DL (ref 3.2–4.9)
ALBUMIN/GLOB SERPL: 1.6 G/DL
ALP SERPL-CCNC: 76 U/L (ref 30–99)
ALT SERPL-CCNC: 43 U/L (ref 2–50)
ANION GAP SERPL CALC-SCNC: 10 MMOL/L (ref 7–16)
APPEARANCE UR: CLEAR
AST SERPL-CCNC: 34 U/L (ref 12–45)
BACTERIA #/AREA URNS HPF: NEGATIVE /HPF
BILIRUB SERPL-MCNC: 0.2 MG/DL (ref 0.1–1.5)
BILIRUB UR QL STRIP.AUTO: NEGATIVE
BUN SERPL-MCNC: 12 MG/DL (ref 8–22)
CALCIUM ALBUM COR SERPL-MCNC: 8.9 MG/DL (ref 8.5–10.5)
CALCIUM SERPL-MCNC: 9 MG/DL (ref 8.5–10.5)
CHLORIDE SERPL-SCNC: 105 MMOL/L (ref 96–112)
CHOLEST SERPL-MCNC: 166 MG/DL (ref 100–199)
CO2 SERPL-SCNC: 24 MMOL/L (ref 20–33)
COLOR UR: YELLOW
CREAT SERPL-MCNC: 0.69 MG/DL (ref 0.5–1.4)
CREAT UR-MCNC: 82.55 MG/DL
EPI CELLS #/AREA URNS HPF: NEGATIVE /HPF
ERYTHROCYTE [DISTWIDTH] IN BLOOD BY AUTOMATED COUNT: 52 FL (ref 35.9–50)
FASTING STATUS PATIENT QL REPORTED: NORMAL
GFR SERPLBLD CREATININE-BSD FMLA CKD-EPI: 113 ML/MIN/1.73 M 2
GLOBULIN SER CALC-MCNC: 2.5 G/DL (ref 1.9–3.5)
GLUCOSE SERPL-MCNC: 149 MG/DL (ref 65–99)
GLUCOSE UR STRIP.AUTO-MCNC: NEGATIVE MG/DL
HCT VFR BLD AUTO: 42.9 % (ref 37–47)
HDLC SERPL-MCNC: 32 MG/DL
HGB BLD-MCNC: 13.3 G/DL (ref 12–16)
HYALINE CASTS #/AREA URNS LPF: ABNORMAL /LPF
KETONES UR STRIP.AUTO-MCNC: NEGATIVE MG/DL
LDLC SERPL CALC-MCNC: 97 MG/DL
LEUKOCYTE ESTERASE UR QL STRIP.AUTO: NEGATIVE
MCH RBC QN AUTO: 30.1 PG (ref 27–33)
MCHC RBC AUTO-ENTMCNC: 31 G/DL (ref 33.6–35)
MCV RBC AUTO: 97.1 FL (ref 81.4–97.8)
MICRO URNS: ABNORMAL
MICROALBUMIN UR-MCNC: 1.8 MG/DL
MICROALBUMIN/CREAT UR: 22 MG/G (ref 0–30)
NITRITE UR QL STRIP.AUTO: NEGATIVE
PH UR STRIP.AUTO: 6 [PH] (ref 5–8)
PLATELET # BLD AUTO: 249 K/UL (ref 164–446)
PMV BLD AUTO: 10.3 FL (ref 9–12.9)
POTASSIUM SERPL-SCNC: 4.3 MMOL/L (ref 3.6–5.5)
PROT SERPL-MCNC: 6.6 G/DL (ref 6–8.2)
PROT UR QL STRIP: NEGATIVE MG/DL
RBC # BLD AUTO: 4.42 M/UL (ref 4.2–5.4)
RBC # URNS HPF: >150 /HPF
RBC UR QL AUTO: ABNORMAL
SODIUM SERPL-SCNC: 139 MMOL/L (ref 135–145)
SP GR UR STRIP.AUTO: 1.02
T4 FREE SERPL-MCNC: 1.23 NG/DL (ref 0.93–1.7)
TRIGL SERPL-MCNC: 185 MG/DL (ref 0–149)
TSH SERPL DL<=0.005 MIU/L-ACNC: 1.17 UIU/ML (ref 0.38–5.33)
UROBILINOGEN UR STRIP.AUTO-MCNC: 0.2 MG/DL
WBC # BLD AUTO: 9.7 K/UL (ref 4.8–10.8)
WBC #/AREA URNS HPF: ABNORMAL /HPF

## 2023-01-13 PROCEDURE — 85027 COMPLETE CBC AUTOMATED: CPT

## 2023-01-13 PROCEDURE — 80053 COMPREHEN METABOLIC PANEL: CPT

## 2023-01-13 PROCEDURE — 82570 ASSAY OF URINE CREATININE: CPT

## 2023-01-13 PROCEDURE — 80061 LIPID PANEL: CPT

## 2023-01-13 PROCEDURE — 84443 ASSAY THYROID STIM HORMONE: CPT

## 2023-01-13 PROCEDURE — 81001 URINALYSIS AUTO W/SCOPE: CPT

## 2023-01-13 PROCEDURE — 82043 UR ALBUMIN QUANTITATIVE: CPT

## 2023-01-13 PROCEDURE — 36415 COLL VENOUS BLD VENIPUNCTURE: CPT

## 2023-01-13 PROCEDURE — 83036 HEMOGLOBIN GLYCOSYLATED A1C: CPT

## 2023-01-13 PROCEDURE — 82306 VITAMIN D 25 HYDROXY: CPT

## 2023-01-13 PROCEDURE — 84439 ASSAY OF FREE THYROXINE: CPT

## 2023-01-14 LAB
EST. AVERAGE GLUCOSE BLD GHB EST-MCNC: 143 MG/DL
HBA1C MFR BLD: 6.6 % (ref 4–5.6)

## 2023-01-17 DIAGNOSIS — E55.9 VITAMIN D DEFICIENCY: ICD-10-CM

## 2023-01-17 RX ORDER — ERGOCALCIFEROL 1.25 MG/1
50000 CAPSULE ORAL
Qty: 12 CAPSULE | Refills: 0 | Status: SHIPPED | OUTPATIENT
Start: 2023-01-17 | End: 2023-04-25 | Stop reason: SDUPTHER

## 2023-02-22 ENCOUNTER — TELEMEDICINE (OUTPATIENT)
Dept: MEDICAL GROUP | Facility: MEDICAL CENTER | Age: 38
End: 2023-02-22
Attending: NURSE PRACTITIONER
Payer: MEDICAID

## 2023-02-22 DIAGNOSIS — F41.9 ANXIETY: ICD-10-CM

## 2023-02-22 PROCEDURE — 99214 OFFICE O/P EST MOD 30 MIN: CPT | Performed by: NURSE PRACTITIONER

## 2023-02-22 ASSESSMENT — PATIENT HEALTH QUESTIONNAIRE - PHQ9: CLINICAL INTERPRETATION OF PHQ2 SCORE: 0

## 2023-02-22 NOTE — ASSESSMENT & PLAN NOTE
Ongoing-  Placed new referral to see if there was anyone for psychiatry near her home in [facility].  Discussed with patient that she would need to have an in person visit with me prior to me taking over any controlled substances and then have to follow controlled substance policy.   Patient is going to try to see what her 's work schedule is and make an appointment for in person during one of his days off.

## 2023-02-22 NOTE — PROGRESS NOTES
No chief complaint on file.    This evaluation was conducted via Zoom using secure and encrypted videoconferencing technology. The patient was in their home in the [facility].    The patient's identity was confirmed and verbal consent was obtained for this virtual visit.    Subjective:     HPI:   Noy Rose is a 38 y.o. female here to discuss the evaluation and management of:      Problem   Anxiety    Patient attended meeting virtually to discuss labs and anxiety.  Patient has been seen provider near her home however she is not feeling comfortable seeing a provider any further but would like to continue to take her as needed Valium as needed for her anxiety.  Patient states that her anxiety is usually well controlled and sometimes she will only need about 2.5 mg of her Valium other times she will have panic attacks and require a full 5 mg.  Patient has a hard time doing in person visit secondary to her 's work schedule.  She has significant neuropathy in her feet which prevents her from driving long distances.         ROS  See HPI     Allergies   Allergen Reactions    Codeine Itching, Nausea and Shortness of Breath    Hydrocodone-Acetaminophen Anxiety, Itching, Nausea, Palpitations and Shortness of Breath       Current medicines (including changes today)  Current Outpatient Medications   Medication Sig Dispense Refill    ergocalciferol (DRISDOL) 30018 UNIT capsule Take 1 Capsule by mouth every 7 days. 12 Capsule 0    gabapentin (NEURONTIN) 300 MG Cap Take 1 Capsule by mouth 3 times a day for 90 days. 270 Capsule 2    omeprazole (PRILOSEC) 20 MG delayed-release capsule Take 1 Capsule by mouth every day for 90 days. 90 Capsule 1    lisinopril (PRINIVIL) 10 MG Tab Take 1 Tablet by mouth every day for 90 days. 90 Tablet 1    metFORMIN ER (GLUCOPHAGE XR) 500 MG TABLET SR 24 HR Take 1 Tablet by mouth every day for 90 days. 90 Tablet 2    lamotrigine (LAMICTAL) 200 MG tablet Take 2 Tablets by mouth 2  times a day. 360 Tablet 3     No current facility-administered medications for this visit.       Social History     Tobacco Use    Smoking status: Former     Packs/day: 0.50     Years: 20.00     Pack years: 10.00     Types: Cigarettes    Smokeless tobacco: Never    Tobacco comments:     has quit on and off.    Vaping Use    Vaping Use: Never used   Substance Use Topics    Alcohol use: No     Comment: occ    Drug use: No       Patient Active Problem List    Diagnosis Date Noted    Anxiety 02/22/2023    Acute pain of right shoulder 12/07/2022    Encounter to establish care 12/07/2022    Essential hypertension 06/06/2022    Generalized anxiety disorder with panic attacks 06/06/2022    Varicose veins of both lower extremities with pain 08/31/2021    Insulin resistance 08/31/2021    Scalp mass 08/31/2021    Encounter for smoking cessation counseling 08/31/2021    Paresthesia of both feet 06/14/2021    Polycystic ovary syndrome 06/14/2021    Localization-related focal epilepsy with complex partial seizures (HCC) 06/05/2019       Family History   Problem Relation Age of Onset    Diabetes Mother     Hyperlipidemia Maternal Uncle     Hypertension Maternal Uncle     Diabetes Maternal Uncle     Cancer Maternal Grandmother         breast    Cancer Paternal Grandfather           Objective:     There were no vitals taken for this visit. There is no height or weight on file to calculate BMI.    Physical Exam:  Physical Exam  Vitals reviewed.   Constitutional:       General: She is awake.      Appearance: Normal appearance. She is well-developed.   HENT:      Head: Normocephalic.   Pulmonary:      Effort: Pulmonary effort is normal. No respiratory distress.   Neurological:      Mental Status: She is alert and oriented to person, place, and time.   Psychiatric:         Mood and Affect: Mood normal.         Behavior: Behavior normal. Behavior is cooperative.            Assessment and Plan:     The following treatment plan was  discussed:    Problem List Items Addressed This Visit       Anxiety     Ongoing-  Placed new referral to see if there was anyone for psychiatry near her home in [facility].  Discussed with patient that she would need to have an in person visit with me prior to me taking over any controlled substances and then have to follow controlled substance policy.   Patient is going to try to see what her 's work schedule is and make an appointment for in person during one of his days off.         Relevant Orders    Referral to Psychiatry       Any change or worsening of signs or symptoms, patient encouraged to follow-up or report to emergency room for further evaluation. Patient verbalizes understanding and agrees.    Follow-Up: Follow-up for in person visit prior to taking her medications      PLEASE NOTE: This dictation was created using voice recognition software. I have made every reasonable attempt to correct obvious errors, but I expect that there are errors of grammar and possibly content that I did not discover before finalizing the note.

## 2023-03-21 DIAGNOSIS — G40.209 LOCALIZATION-RELATED FOCAL EPILEPSY WITH COMPLEX PARTIAL SEIZURES (HCC): ICD-10-CM

## 2023-03-24 ENCOUNTER — NON-PROVIDER VISIT (OUTPATIENT)
Dept: NEUROLOGY | Facility: MEDICAL CENTER | Age: 38
End: 2023-03-24
Attending: PSYCHIATRY & NEUROLOGY
Payer: MEDICAID

## 2023-03-24 DIAGNOSIS — G40.209 LOCALIZATION-RELATED FOCAL EPILEPSY WITH COMPLEX PARTIAL SEIZURES (HCC): ICD-10-CM

## 2023-03-24 PROCEDURE — 95819 EEG AWAKE AND ASLEEP: CPT | Mod: 26 | Performed by: STUDENT IN AN ORGANIZED HEALTH CARE EDUCATION/TRAINING PROGRAM

## 2023-03-24 PROCEDURE — 95819 EEG AWAKE AND ASLEEP: CPT | Performed by: STUDENT IN AN ORGANIZED HEALTH CARE EDUCATION/TRAINING PROGRAM

## 2023-03-24 NOTE — PROCEDURES
OUTPATIENT ROUTINE VIDEO ELECTROENCEPHALOGRAM REPORT    REFERRING PROVIDER: Cyrus Medina M.D.  [address] [address]  [place] [id number]  DOS: 03/24/2023    STUDY DURATION: 0 hours and 25 minutes of total recording time.     INDICATION:  Noy Rose 38 y.o. female presenting with seizure(s)    RELEVANT MEDICATIONS/TREATMENTS:   Current Outpatient Medications   Medication Instructions    ergocalciferol (DRISDOL) 50,000 Units, Oral, EVERY 7 DAYS    lamotrigine (LAMICTAL) 400 mg, Oral, 2 TIMES DAILY        TECHNIQUE:   Routine VEEG was set up by a Neurodiagnostic technologist who performed education to the patient and staff. A minimum of 23 electrodes and 23 channel recording was setup and performed by Neurodiagnostic technologist, in accordance with the international 10-20 system. The study was reviewed in bipolar and referential montages. The recording examined the patient in the  awake, drowsy, and sleep state(s).     DESCRIPTION OF THE RECORD:  During wakefulness, the background was continuous and showed a 9 Hz posterior dominant rhythm.  There was reactivity to eye closure/opening.  An anterior-posterior gradient was noted with faster beta frequencies seen anteriorly.  During drowsiness, theta/delta frequencies were seen.    EEG Sleep: Sleep was captured and was characterized by diffuse background delta/theta activity with a loss of myogenic artifact.  N2 sleep transients in the form of sleep spindles and vertex waves were seen in the leads over the central regions.     ICTAL AND INTERICTAL FINDINGS:   No focal or generalized epileptiform activity noted.     No regional slowing or persistent focal asymmetries were seen.    No seizures.     ACTIVATION PROCEDURES:   Hyperventilation was performed by the patient for a total of 3 minutes. The technician performing the test noted good effort. This technique did not elicited any abnormalities on EEG.  and Intermittent Photic stimulation was performed  in a stepwise fashion from 1 to 30 Hz and did not elicited any abnormalities on EEG.     EKG: Sampling of the EKG recording showed sinus rhythm    EVENTS:  None    INTERPRETATION:   Normal video EEG recording in the awake, drowsy, and sleep state(s):  - No regional slowing or persistent focal asymmetries were seen.  - No epileptiform discharges were seen.  - No seizures. Clinical correlation is recommended.      Note: This EEG does not rule the possibility of seizures  If the clinical suspicion remains high for seizures, a prolonged recording to capture clinical or subclinical events may be helpful.      Yusuf Murry MD  Department of Neurology at [facility]  General Neurologist and Epileptologist  Director of [facility]'s Level III Comprehensive Epilepsy Program  Professor of Clinical Neurology, [facility].   Phone: [phone]  Fax: [phone]  E-mail: [email or web address]

## 2023-04-25 DIAGNOSIS — E55.9 VITAMIN D DEFICIENCY: ICD-10-CM

## 2023-04-26 RX ORDER — ERGOCALCIFEROL 1.25 MG/1
50000 CAPSULE ORAL
Qty: 12 CAPSULE | Refills: 0 | Status: SHIPPED | OUTPATIENT
Start: 2023-04-26 | End: 2023-08-30 | Stop reason: SDUPTHER

## 2023-05-22 DIAGNOSIS — T78.40XA ALLERGY, INITIAL ENCOUNTER: ICD-10-CM

## 2023-05-22 RX ORDER — FLUTICASONE PROPIONATE 50 MCG
2 SPRAY, SUSPENSION (ML) NASAL DAILY
Qty: 16 G | Refills: 2 | Status: SHIPPED | OUTPATIENT
Start: 2023-05-22 | End: 2023-09-27

## 2023-05-22 RX ORDER — MONTELUKAST SODIUM 10 MG/1
10 TABLET ORAL DAILY
Qty: 30 TABLET | Refills: 2 | Status: SHIPPED | OUTPATIENT
Start: 2023-05-22 | End: 2023-09-27

## 2023-05-26 DIAGNOSIS — T78.40XA ALLERGY, INITIAL ENCOUNTER: ICD-10-CM

## 2023-05-26 RX ORDER — FLUTICASONE PROPIONATE 50 MCG
2 SPRAY, SUSPENSION (ML) NASAL DAILY
Qty: 16 G | Refills: 2 | OUTPATIENT
Start: 2023-05-26

## 2023-05-26 RX ORDER — MONTELUKAST SODIUM 10 MG/1
10 TABLET ORAL DAILY
Qty: 30 TABLET | Refills: 2 | OUTPATIENT
Start: 2023-05-26

## 2023-06-02 DIAGNOSIS — K21.9 GASTROESOPHAGEAL REFLUX DISEASE, UNSPECIFIED WHETHER ESOPHAGITIS PRESENT: ICD-10-CM

## 2023-06-02 RX ORDER — OMEPRAZOLE 20 MG/1
CAPSULE, DELAYED RELEASE ORAL
Qty: 90 CAPSULE | Refills: 1 | Status: SHIPPED | OUTPATIENT
Start: 2023-06-02 | End: 2023-11-14

## 2023-06-02 NOTE — TELEPHONE ENCOUNTER
Received request via: Pharmacy    Was the patient seen in the last year in this department? Yes    Does the patient have an active prescription (recently filled or refills available) for medication(s) requested? No    Does the patient have assisted Plus and need 100 day supply (blood pressure, diabetes and cholesterol meds only)? Patient does not have SCP  Future Appointments         Provider Department [place]    6/2/2023 10:00 AM (Arrive by 9:45 AM) [facility]    6/15/2023 3:00 PM Cyrus Medina M.D. [facility] Neurology

## 2023-06-12 ENCOUNTER — TELEPHONE (OUTPATIENT)
Dept: NEUROLOGY | Facility: MEDICAL CENTER | Age: 38
End: 2023-06-12
Payer: MEDICAID

## 2023-06-12 NOTE — TELEPHONE ENCOUNTER
NEUROLOGY PATIENT PRE-VISIT PLANNING     Patient was NOT contacted to complete PVP.    Patient Appointment is scheduled as: Established Patient     Is visit type and length scheduled correctly? Yes    [facility] is [facility] in Patient Demographics? Yes    3.   Is referral attached to visit? Yes    4. Were records received from referring provider? Yes    4. Patient was NOT contacted to have someone accompany them to visit.     5. Is this appointment scheduled as a Hospital Follow-Up?  No    6. Does the patient require any pre procedure or post procedure follow up? No    7. If any orders were placed at last visit or intended to be done for this visit do we have Results/Consult Notes? Yes  Labs - Labs were not ordered at last office visit.  Imaging - Imaging was not ordered at last office visit.  Referrals - No referrals were ordered at last office visit.    8. If patient appointment is for Botox - is order pended for provider? N/A

## 2023-06-15 ENCOUNTER — OFFICE VISIT (OUTPATIENT)
Dept: NEUROLOGY | Facility: MEDICAL CENTER | Age: 38
End: 2023-06-15
Attending: PSYCHIATRY & NEUROLOGY
Payer: MEDICAID

## 2023-06-15 VITALS
DIASTOLIC BLOOD PRESSURE: 80 MMHG | OXYGEN SATURATION: 96 % | SYSTOLIC BLOOD PRESSURE: 126 MMHG | BODY MASS INDEX: 36 KG/M2 | HEIGHT: 65 IN | TEMPERATURE: 98.5 F | WEIGHT: 216.05 LBS | HEART RATE: 86 BPM

## 2023-06-15 DIAGNOSIS — G40.209 LOCALIZATION-RELATED FOCAL EPILEPSY WITH COMPLEX PARTIAL SEIZURES (HCC): ICD-10-CM

## 2023-06-15 PROCEDURE — 99212 OFFICE O/P EST SF 10 MIN: CPT | Performed by: PSYCHIATRY & NEUROLOGY

## 2023-06-15 PROCEDURE — 3074F SYST BP LT 130 MM HG: CPT | Performed by: PSYCHIATRY & NEUROLOGY

## 2023-06-15 PROCEDURE — 99214 OFFICE O/P EST MOD 30 MIN: CPT | Performed by: PSYCHIATRY & NEUROLOGY

## 2023-06-15 PROCEDURE — 3079F DIAST BP 80-89 MM HG: CPT | Performed by: PSYCHIATRY & NEUROLOGY

## 2023-06-15 RX ORDER — DIAZEPAM 5 MG/1
TABLET ORAL
COMMUNITY
End: 2024-01-19

## 2023-06-15 RX ORDER — LISINOPRIL 10 MG/1
TABLET ORAL
COMMUNITY
End: 2023-08-28

## 2023-06-15 RX ORDER — OMEPRAZOLE 10 MG/1
CAPSULE, DELAYED RELEASE ORAL
COMMUNITY
End: 2023-07-31

## 2023-06-15 RX ORDER — GABAPENTIN 300 MG/1
300 CAPSULE ORAL 3 TIMES DAILY
COMMUNITY
Start: 2023-03-23 | End: 2023-10-03 | Stop reason: SDUPTHER

## 2023-06-15 RX ORDER — DEXAMETHASONE 6 MG/1
TABLET ORAL
COMMUNITY
End: 2023-09-27

## 2023-06-15 RX ORDER — METFORMIN HYDROCHLORIDE 500 MG/1
500 TABLET, EXTENDED RELEASE ORAL
COMMUNITY
Start: 2023-05-30 | End: 2023-09-05 | Stop reason: SDUPTHER

## 2023-06-15 RX ORDER — LAMOTRIGINE 200 MG/1
400 TABLET ORAL 2 TIMES DAILY
Qty: 360 TABLET | Refills: 3 | Status: ON HOLD | OUTPATIENT
Start: 2023-06-15 | End: 2023-10-01

## 2023-06-15 RX ORDER — TRAMADOL HYDROCHLORIDE 50 MG/1
TABLET ORAL
COMMUNITY
Start: 2023-06-14 | End: 2023-06-15

## 2023-06-15 RX ORDER — METHOCARBAMOL 500 MG/1
TABLET, FILM COATED ORAL
COMMUNITY
End: 2023-07-31

## 2023-06-15 ASSESSMENT — ENCOUNTER SYMPTOMS
MEMORY LOSS: 0
SEIZURES: 1
LOSS OF CONSCIOUSNESS: 0

## 2023-06-15 ASSESSMENT — FIBROSIS 4 INDEX: FIB4 SCORE: 0.81

## 2023-06-15 NOTE — PROGRESS NOTES
Subjective     Laly Rose is a 38 y.o. female who presents for follow-up, with a history of focal onset seizures.    HPI    Laly had been doing fairly well, on Lamictal 400 mg, twice daily, when on March 10 of this year she had a seizure that actually had started before she had awakened.  This was something new to her, she remembers being awake but out of control, her jaw was clenched, there were involuntary eye movements and bilateral twitching of the muscles.  Her  took a video, she remembers hearing what he was saying to him but did not seem to be able to respond to though she did not attempt to do so.  The event was self-limited, she was tired but did power through the rest of the day.    At the time she was under a significant amount of stress, she actually had gotten to bed much later than usual the night before.  The stress and sleep interruptions have been ongoing for about a month.  Subsequent to the seizure, we did obtain an EEG which was normal.    She also began to develop what sounds like our panic attacks.  She now has Valium 5 mg which she uses when she feels the symptoms which entail chest pain and shortness of breath initially, and this calms her down enough to avoid going into a full-blown panic episode.  These are infrequent.    Medical, surgical and family histories are reviewed, there are no new drug allergies.  She is about to undergo IRMA because of menorrhagia as well as endometriosis.  She is on Lamictal 400 mg, twice daily, Prilosec, vitamin D, gabapentin 300 mg, 3 times daily, Valium 5 mg as needed, Glucophage, Robaxin, Prinivil, and Decadron.    Review of Systems   Neurological:  Positive for seizures. Negative for loss of consciousness.   Psychiatric/Behavioral:  Negative for memory loss.    All other systems reviewed and are negative.    Objective     /80 (BP Location: Right arm, Patient Position: Sitting, BP Cuff Size: Adult)   Pulse 86   Temp 36.9 °C (98.5 °F)  "(Temporal)   Ht 1.651 m (5' 5\")   Wt 98 kg (216 lb 0.8 oz)   SpO2 96%   BMI 35.95 kg/m²      Physical Exam    She appears in no acute distress.  Vital signs are stable.  There is no malar rash or temporal tenderness.  The neck is supple, range of motion is full.  Cardiac evaluation is unremarkable.     Neurological Exam    Fully oriented, there is no aphasia, apraxia, or inattention.    PERRLA/EOMI, visual fields are full, facial movements are symmetric, there is no bulbar dysfunction.  Shoulder shrug and head rotation are normal.    Musculoskeletal exam reveals normal tone without tremor, asterixis, or drift.  Strength is intact bilaterally.  There are no reflex asymmetries.    She stands easily, gait is normal and station and stride length, there is no appendicular dystaxia.  Fine motor control and repetitive movements are normal in all 4 extremities with symmetric amplitude and frequencies.    Sensory exam is intact to light touch.    Assessment & Plan     1. Localization-related focal epilepsy with complex partial seizures (HCC)  We will continue the lamotrigine 400 mg twice daily regimen.  It seems that she is becoming very sensitive to distortions of sleep cycling, and though the stress level certainly played a role with this single recurrence, it also sounds like the presence of sleep distortions for the days preceding the seizure itself played a role.  She was compliant, there were no new medications being taken at the time.  It was explained that seizures can occur when people are asleep, in fact there is a higher incidence of recurrence in those transitions from arousal to sleep and back again.  Fortunately her interictal EEG patterns are normal which means that her susceptibility is still fairly low, the lamotrigine doing its job.  For now we will simply follow-up in 1 year, she knows to call the office for any recurrences.  She is safe to drive.    As for the Valium use, it is not frequent and " regular enough to warrant concerned about withdrawal.  It certainly is a drug that can be used safely if somebody is having a seizure, though that is not what I am proposing.  She was recently given a prescription for Ultram which is not a drug that should be used in patients with epilepsy.  Thus, postoperatively after her upcoming surgery, she was told to say no to this drug and use other narcotics if needed.    - lamotrigine (LAMICTAL) 200 MG tablet; Take 2 Tablets by mouth 2 times a day.  Dispense: 360 Tablet; Refill: 3    Time: 30 minutes in total spent on patient care including pre-charting, record review, discussion with healthcare staff and documentation.  This includes face-to-face time for exam, review, discussion, as well as counseling and coordinating care.

## 2023-07-24 ENCOUNTER — OFFICE VISIT (OUTPATIENT)
Dept: MEDICAL GROUP | Facility: MEDICAL CENTER | Age: 38
End: 2023-07-24
Attending: NURSE PRACTITIONER
Payer: MEDICAID

## 2023-07-24 VITALS
WEIGHT: 215.9 LBS | OXYGEN SATURATION: 96 % | BODY MASS INDEX: 35.97 KG/M2 | HEART RATE: 86 BPM | SYSTOLIC BLOOD PRESSURE: 120 MMHG | RESPIRATION RATE: 17 BRPM | HEIGHT: 65 IN | DIASTOLIC BLOOD PRESSURE: 74 MMHG | TEMPERATURE: 96.6 F

## 2023-07-24 DIAGNOSIS — F41.9 ANXIETY: ICD-10-CM

## 2023-07-24 PROCEDURE — 99212 OFFICE O/P EST SF 10 MIN: CPT | Performed by: NURSE PRACTITIONER

## 2023-07-24 PROCEDURE — 3078F DIAST BP <80 MM HG: CPT | Performed by: NURSE PRACTITIONER

## 2023-07-24 PROCEDURE — 99214 OFFICE O/P EST MOD 30 MIN: CPT | Performed by: NURSE PRACTITIONER

## 2023-07-24 PROCEDURE — 3074F SYST BP LT 130 MM HG: CPT | Performed by: NURSE PRACTITIONER

## 2023-07-24 ASSESSMENT — FIBROSIS 4 INDEX: FIB4 SCORE: 0.81

## 2023-07-31 NOTE — PROGRESS NOTES
No chief complaint on file.      Subjective:     HPI:   Noy Rose is a 38 y.o. female here to discuss the evaluation and management of:      Problem   Anxiety    Patient in clinic today to discuss her anxiety and continuing Valium prescription. Patient was previously receiving this prescription from her mental health provider - Den Bellamy. Unfortunately she feels that he is not the best fit for her and would like to discontinue seeing him.          ROS  See HPI     Allergies   Allergen Reactions    Codeine Itching, Nausea and Shortness of Breath    Hydrocodone-Acetaminophen Anxiety, Itching, Nausea, Palpitations and Shortness of Breath       Current medicines (including changes today)  Current Outpatient Medications   Medication Sig Dispense Refill    diazePAM (VALIUM) 5 MG Tab diazepam 5 mg tablet   TAKE 1/2 TO 1 TABLET BY MOUTH EVERY DAY AS NEEDED FOR SEVERE ANXIETY OR PANIC OR INSOMNIA      gabapentin (NEURONTIN) 300 MG Cap Take 300 mg by mouth 3 times a day.      lisinopril (PRINIVIL) 10 MG Tab       metFORMIN ER (GLUCOPHAGE XR) 500 MG TABLET SR 24 HR Take 500 mg by mouth every day.      omeprazole (PRILOSEC) 10 MG CAPSULE DELAYED RELEASE       dexamethasone (DECADRON) 6 MG Tab dexamethasone 6 mg tablet   TAKE 1 TABLET BY MOUTH TWICE DAILY FOR 7 DAYS      lamotrigine (LAMICTAL) 200 MG tablet Take 2 Tablets by mouth 2 times a day. 360 Tablet 3    omeprazole (PRILOSEC) 20 MG delayed-release capsule TAKE 1 CAPSULE BY MOUTH EVERY DAY 90 Capsule 1    fluticasone (FLONASE) 50 MCG/ACT nasal spray Administer 2 Sprays into affected nostril(S) every day. At night 16 g 2    montelukast (SINGULAIR) 10 MG Tab Take 1 Tablet by mouth every day. In morning 30 Tablet 2    ergocalciferol (DRISDOL) 78726 UNIT capsule Take 1 Capsule by mouth every 7 days. 12 Capsule 0     No current facility-administered medications for this visit.       Social History     Tobacco Use    Smoking status: Former     Packs/day: 0.50      "Years: 20.00     Pack years: 10.00     Types: Cigarettes    Smokeless tobacco: Never    Tobacco comments:     has quit on and off.    Vaping Use    Vaping Use: Never used   Substance Use Topics    Alcohol use: No     Comment: occ    Drug use: No       Patient Active Problem List    Diagnosis Date Noted    Anxiety 02/22/2023    Acute pain of right shoulder 12/07/2022    Encounter to establish care 12/07/2022    Essential hypertension 06/06/2022    Generalized anxiety disorder with panic attacks 06/06/2022    Varicose veins of both lower extremities with pain 08/31/2021    Insulin resistance 08/31/2021    Scalp mass 08/31/2021    Encounter for smoking cessation counseling 08/31/2021    Paresthesia of both feet 06/14/2021    Polycystic ovary syndrome 06/14/2021    Localization-related focal epilepsy with complex partial seizures (HCC) 06/05/2019       Family History   Problem Relation Age of Onset    Diabetes Mother     Hyperlipidemia Maternal Uncle     Hypertension Maternal Uncle     Diabetes Maternal Uncle     Cancer Maternal Grandmother         breast    Cancer Paternal Grandfather           Objective:     /74 (BP Location: Left arm, Patient Position: Sitting, BP Cuff Size: Adult)   Pulse 86   Temp 35.9 °C (96.6 °F) (Temporal)   Resp 17   Ht 1.651 m (5' 5\")   Wt 97.9 kg (215 lb 14.4 oz)   SpO2 96%  Body mass index is 35.93 kg/m².    Physical Exam:  Physical Exam  Vitals reviewed.   Constitutional:       General: She is awake.      Appearance: Normal appearance. She is well-developed.   HENT:      Head: Normocephalic.   Eyes:      Conjunctiva/sclera: Conjunctivae normal.   Cardiovascular:      Rate and Rhythm: Normal rate.   Pulmonary:      Effort: Pulmonary effort is normal. No respiratory distress.   Musculoskeletal:      Cervical back: Neck supple.   Skin:     General: Skin is warm and dry.   Neurological:      Mental Status: She is alert and oriented to person, place, and time.   Psychiatric:         " Mood and Affect: Mood normal.         Behavior: Behavior normal. Behavior is cooperative.              Assessment and Plan:     The following treatment plan was discussed:    Problem List Items Addressed This Visit       Anxiety     Ongoing-   Discussed that I can have her follow up with another Psychiatrist but I do not prescribe Valium in clinic as mono therapy without referral to psych to treat underlying condition.   She is not interested in trying another medication at this time and would like referral to Psychiatry          Relevant Orders    Referral to Psychiatry       Any change or worsening of signs or symptoms, patient encouraged to follow-up or report to emergency room for further evaluation. Patient verbalizes understanding and agrees.    Follow-Up:Follow up as needed       PLEASE NOTE: This dictation was created using voice recognition software. I have made every reasonable attempt to correct obvious errors, but I expect that there are errors of grammar and possibly content that I did not discover before finalizing the note.

## 2023-07-31 NOTE — ASSESSMENT & PLAN NOTE
Ongoing-   Discussed that I can have her follow up with another Psychiatrist but I do not prescribe Valium in clinic as mono therapy without referral to psych to treat underlying condition.   She is not interested in trying another medication at this time and would like referral to Psychiatry

## 2023-07-31 NOTE — ASSESSMENT & PLAN NOTE
Patient in clinic today to discuss her anxiety and continuing Valium prescription. Patient was previously receiving this prescription from her mental health provider - Den Bellamy. Unfortunately she feels that he is not the best fit for her and would like to discontinue seeing him.

## 2023-08-22 DIAGNOSIS — I10 ESSENTIAL HYPERTENSION: ICD-10-CM

## 2023-08-28 RX ORDER — LISINOPRIL 10 MG/1
10 TABLET ORAL
Qty: 90 TABLET | Refills: 0 | Status: SHIPPED | OUTPATIENT
Start: 2023-08-28 | End: 2023-11-01 | Stop reason: SINTOL

## 2023-08-30 DIAGNOSIS — E55.9 VITAMIN D DEFICIENCY: ICD-10-CM

## 2023-08-30 NOTE — TELEPHONE ENCOUNTER
Received request via: Pharmacy    Was the patient seen in the last year in this department? Yes    Does the patient have an active prescription (recently filled or refills available) for medication(s) requested? No    Does the patient have care home Plus and need 100 day supply (blood pressure, diabetes and cholesterol meds only)? Patient does not have SCP

## 2023-09-05 DIAGNOSIS — E28.2 PCOS (POLYCYSTIC OVARIAN SYNDROME): ICD-10-CM

## 2023-09-05 DIAGNOSIS — E88.819 INSULIN RESISTANCE: ICD-10-CM

## 2023-09-05 RX ORDER — METFORMIN HYDROCHLORIDE 500 MG/1
500 TABLET, EXTENDED RELEASE ORAL
Qty: 30 TABLET | Refills: 0 | Status: SHIPPED | OUTPATIENT
Start: 2023-09-05 | End: 2023-09-08

## 2023-09-05 RX ORDER — ERGOCALCIFEROL 1.25 MG/1
50000 CAPSULE ORAL
Qty: 12 CAPSULE | Refills: 0 | Status: SHIPPED | OUTPATIENT
Start: 2023-09-05 | End: 2023-09-27

## 2023-09-05 NOTE — TELEPHONE ENCOUNTER
Received request via: Pharmacy    Was the patient seen in the last year in this department? Yes    Does the patient have an active prescription (recently filled or refills available) for medication(s) requested? No    Does the patient have skilled nursing Plus and need 100 day supply (blood pressure, diabetes and cholesterol meds only)? Patient does not have SCP

## 2023-09-08 DIAGNOSIS — E11.65 TYPE 2 DIABETES MELLITUS WITH HYPERGLYCEMIA, WITHOUT LONG-TERM CURRENT USE OF INSULIN (HCC): ICD-10-CM

## 2023-09-08 DIAGNOSIS — E88.819 INSULIN RESISTANCE: ICD-10-CM

## 2023-09-08 DIAGNOSIS — E28.2 PCOS (POLYCYSTIC OVARIAN SYNDROME): ICD-10-CM

## 2023-09-08 RX ORDER — METFORMIN HYDROCHLORIDE 500 MG/1
500 TABLET, EXTENDED RELEASE ORAL 2 TIMES DAILY
Qty: 60 TABLET | Refills: 4 | Status: SHIPPED | OUTPATIENT
Start: 2023-09-08 | End: 2023-10-10

## 2023-09-08 RX ORDER — LANCETS 30 GAUGE
EACH MISCELLANEOUS
Qty: 100 EACH | Refills: 0 | Status: SHIPPED | OUTPATIENT
Start: 2023-09-08 | End: 2023-09-27

## 2023-09-08 RX ORDER — GLUCOSAMINE HCL/CHONDROITIN SU 500-400 MG
CAPSULE ORAL
Qty: 100 EACH | Refills: 0 | Status: SHIPPED | OUTPATIENT
Start: 2023-09-08 | End: 2023-09-27

## 2023-09-27 ENCOUNTER — TELEPHONE (OUTPATIENT)
Dept: NEUROLOGY | Facility: MEDICAL CENTER | Age: 38
End: 2023-09-27

## 2023-09-27 ENCOUNTER — HOSPITAL ENCOUNTER (OUTPATIENT)
Facility: MEDICAL CENTER | Age: 38
End: 2023-10-01
Attending: EMERGENCY MEDICINE | Admitting: INTERNAL MEDICINE
Payer: MEDICAID

## 2023-09-27 DIAGNOSIS — R29.898 LEG WEAKNESS, BILATERAL: ICD-10-CM

## 2023-09-27 DIAGNOSIS — H53.2 DIPLOPIA: ICD-10-CM

## 2023-09-27 DIAGNOSIS — R29.898 RIGIDITY: ICD-10-CM

## 2023-09-27 DIAGNOSIS — R56.9 SEIZURE-LIKE ACTIVITY (HCC): ICD-10-CM

## 2023-09-27 PROBLEM — E66.811 OBESITY (BMI 30.0-34.9): Status: ACTIVE | Noted: 2023-09-27

## 2023-09-27 PROBLEM — E66.9 OBESITY (BMI 30.0-34.9): Status: ACTIVE | Noted: 2023-09-27

## 2023-09-27 PROBLEM — E11.9 TYPE 2 DIABETES MELLITUS WITHOUT COMPLICATION, WITHOUT LONG-TERM CURRENT USE OF INSULIN (HCC): Status: ACTIVE | Noted: 2023-09-27

## 2023-09-27 LAB
ALBUMIN SERPL BCP-MCNC: 4.3 G/DL (ref 3.2–4.9)
ALBUMIN/GLOB SERPL: 1.4 G/DL
ALP SERPL-CCNC: 92 U/L (ref 30–99)
ALT SERPL-CCNC: 73 U/L (ref 2–50)
ANION GAP SERPL CALC-SCNC: 13 MMOL/L (ref 7–16)
APPEARANCE UR: CLEAR
AST SERPL-CCNC: 52 U/L (ref 12–45)
BACTERIA #/AREA URNS HPF: NEGATIVE /HPF
BASOPHILS # BLD AUTO: 0.1 % (ref 0–1.8)
BASOPHILS # BLD: 0.01 K/UL (ref 0–0.12)
BILIRUB SERPL-MCNC: 0.2 MG/DL (ref 0.1–1.5)
BILIRUB UR QL STRIP.AUTO: NEGATIVE
BUN SERPL-MCNC: 10 MG/DL (ref 8–22)
CALCIUM ALBUM COR SERPL-MCNC: 8.9 MG/DL (ref 8.5–10.5)
CALCIUM SERPL-MCNC: 9.1 MG/DL (ref 8.5–10.5)
CHLORIDE SERPL-SCNC: 104 MMOL/L (ref 96–112)
CO2 SERPL-SCNC: 22 MMOL/L (ref 20–33)
COLOR UR: YELLOW
CREAT SERPL-MCNC: 0.69 MG/DL (ref 0.5–1.4)
EKG IMPRESSION: NORMAL
EOSINOPHIL # BLD AUTO: 0.1 K/UL (ref 0–0.51)
EOSINOPHIL NFR BLD: 1 % (ref 0–6.9)
EPI CELLS #/AREA URNS HPF: NORMAL /HPF
ERYTHROCYTE [DISTWIDTH] IN BLOOD BY AUTOMATED COUNT: 50.4 FL (ref 35.9–50)
GFR SERPLBLD CREATININE-BSD FMLA CKD-EPI: 113 ML/MIN/1.73 M 2
GLOBULIN SER CALC-MCNC: 3 G/DL (ref 1.9–3.5)
GLUCOSE BLD STRIP.AUTO-MCNC: 128 MG/DL (ref 65–99)
GLUCOSE BLD STRIP.AUTO-MCNC: 146 MG/DL (ref 65–99)
GLUCOSE SERPL-MCNC: 102 MG/DL (ref 65–99)
GLUCOSE UR STRIP.AUTO-MCNC: NEGATIVE MG/DL
HCG SERPL QL: NEGATIVE
HCT VFR BLD AUTO: 43.3 % (ref 37–47)
HGB BLD-MCNC: 13.1 G/DL (ref 12–16)
HYALINE CASTS #/AREA URNS LPF: NORMAL /LPF
IMM GRANULOCYTES # BLD AUTO: 0.05 K/UL (ref 0–0.11)
IMM GRANULOCYTES NFR BLD AUTO: 0.5 % (ref 0–0.9)
KETONES UR STRIP.AUTO-MCNC: ABNORMAL MG/DL
LEUKOCYTE ESTERASE UR QL STRIP.AUTO: ABNORMAL
LYMPHOCYTES # BLD AUTO: 3.71 K/UL (ref 1–4.8)
LYMPHOCYTES NFR BLD: 36.1 % (ref 22–41)
MCH RBC QN AUTO: 29 PG (ref 27–33)
MCHC RBC AUTO-ENTMCNC: 30.3 G/DL (ref 32.2–35.5)
MCV RBC AUTO: 96 FL (ref 81.4–97.8)
MICRO URNS: ABNORMAL
MONOCYTES # BLD AUTO: 0.53 K/UL (ref 0–0.85)
MONOCYTES NFR BLD AUTO: 5.2 % (ref 0–13.4)
NEUTROPHILS # BLD AUTO: 5.88 K/UL (ref 1.82–7.42)
NEUTROPHILS NFR BLD: 57.1 % (ref 44–72)
NITRITE UR QL STRIP.AUTO: NEGATIVE
NRBC # BLD AUTO: 0 K/UL
NRBC BLD-RTO: 0 /100 WBC (ref 0–0.2)
PH UR STRIP.AUTO: 5.5 [PH] (ref 5–8)
PLATELET # BLD AUTO: 265 K/UL (ref 164–446)
PMV BLD AUTO: 10 FL (ref 9–12.9)
POTASSIUM SERPL-SCNC: 4.4 MMOL/L (ref 3.6–5.5)
PROT SERPL-MCNC: 7.3 G/DL (ref 6–8.2)
PROT UR QL STRIP: NEGATIVE MG/DL
RBC # BLD AUTO: 4.51 M/UL (ref 4.2–5.4)
RBC # URNS HPF: NORMAL /HPF
RBC UR QL AUTO: NEGATIVE
SODIUM SERPL-SCNC: 139 MMOL/L (ref 135–145)
SP GR UR STRIP.AUTO: 1.01
UROBILINOGEN UR STRIP.AUTO-MCNC: 0.2 MG/DL
WBC # BLD AUTO: 10.3 K/UL (ref 4.8–10.8)
WBC #/AREA URNS HPF: NORMAL /HPF

## 2023-09-27 PROCEDURE — 80053 COMPREHEN METABOLIC PANEL: CPT

## 2023-09-27 PROCEDURE — 700102 HCHG RX REV CODE 250 W/ 637 OVERRIDE(OP): Mod: UD | Performed by: INTERNAL MEDICINE

## 2023-09-27 PROCEDURE — 36415 COLL VENOUS BLD VENIPUNCTURE: CPT

## 2023-09-27 PROCEDURE — 700111 HCHG RX REV CODE 636 W/ 250 OVERRIDE (IP): Mod: JZ,UD | Performed by: INTERNAL MEDICINE

## 2023-09-27 PROCEDURE — 85025 COMPLETE CBC W/AUTO DIFF WBC: CPT

## 2023-09-27 PROCEDURE — 81001 URINALYSIS AUTO W/SCOPE: CPT

## 2023-09-27 PROCEDURE — 80175 DRUG SCREEN QUAN LAMOTRIGINE: CPT

## 2023-09-27 PROCEDURE — 99223 1ST HOSP IP/OBS HIGH 75: CPT | Performed by: INTERNAL MEDICINE

## 2023-09-27 PROCEDURE — 99285 EMERGENCY DEPT VISIT HI MDM: CPT

## 2023-09-27 PROCEDURE — G0378 HOSPITAL OBSERVATION PER HR: HCPCS

## 2023-09-27 PROCEDURE — 93005 ELECTROCARDIOGRAM TRACING: CPT | Performed by: EMERGENCY MEDICINE

## 2023-09-27 PROCEDURE — 94760 N-INVAS EAR/PLS OXIMETRY 1: CPT

## 2023-09-27 PROCEDURE — 96374 THER/PROPH/DIAG INJ IV PUSH: CPT

## 2023-09-27 PROCEDURE — 95819 EEG AWAKE AND ASLEEP: CPT | Mod: 26 | Performed by: PSYCHIATRY & NEUROLOGY

## 2023-09-27 PROCEDURE — 700111 HCHG RX REV CODE 636 W/ 250 OVERRIDE (IP): Mod: UD | Performed by: EMERGENCY MEDICINE

## 2023-09-27 PROCEDURE — 82962 GLUCOSE BLOOD TEST: CPT | Mod: 91

## 2023-09-27 PROCEDURE — 96372 THER/PROPH/DIAG INJ SC/IM: CPT

## 2023-09-27 PROCEDURE — A9270 NON-COVERED ITEM OR SERVICE: HCPCS | Mod: UD | Performed by: INTERNAL MEDICINE

## 2023-09-27 PROCEDURE — 99244 OFF/OP CNSLTJ NEW/EST MOD 40: CPT | Mod: 25 | Performed by: PSYCHIATRY & NEUROLOGY

## 2023-09-27 PROCEDURE — 700111 HCHG RX REV CODE 636 W/ 250 OVERRIDE (IP): Mod: UD

## 2023-09-27 PROCEDURE — 96376 TX/PRO/DX INJ SAME DRUG ADON: CPT

## 2023-09-27 PROCEDURE — 84703 CHORIONIC GONADOTROPIN ASSAY: CPT

## 2023-09-27 PROCEDURE — 95819 EEG AWAKE AND ASLEEP: CPT | Performed by: PSYCHIATRY & NEUROLOGY

## 2023-09-27 RX ORDER — LAMOTRIGINE 100 MG/1
400 TABLET ORAL 2 TIMES DAILY
Status: DISCONTINUED | OUTPATIENT
Start: 2023-09-27 | End: 2023-10-01 | Stop reason: HOSPADM

## 2023-09-27 RX ORDER — LORAZEPAM 2 MG/ML
INJECTION INTRAMUSCULAR
Status: COMPLETED
Start: 2023-09-27 | End: 2023-09-27

## 2023-09-27 RX ORDER — DEXTROSE MONOHYDRATE 25 G/50ML
25 INJECTION, SOLUTION INTRAVENOUS
Status: DISCONTINUED | OUTPATIENT
Start: 2023-09-27 | End: 2023-10-01 | Stop reason: HOSPADM

## 2023-09-27 RX ORDER — CETIRIZINE HYDROCHLORIDE 10 MG/1
10 TABLET ORAL DAILY
Status: DISCONTINUED | OUTPATIENT
Start: 2023-09-28 | End: 2023-10-01 | Stop reason: HOSPADM

## 2023-09-27 RX ORDER — DIAZEPAM 2 MG/1
2 TABLET ORAL 2 TIMES DAILY PRN
Status: DISCONTINUED | OUTPATIENT
Start: 2023-09-27 | End: 2023-10-01 | Stop reason: HOSPADM

## 2023-09-27 RX ORDER — AMOXICILLIN 250 MG
2 CAPSULE ORAL 2 TIMES DAILY
Status: DISCONTINUED | OUTPATIENT
Start: 2023-09-27 | End: 2023-10-01 | Stop reason: HOSPADM

## 2023-09-27 RX ORDER — POLYETHYLENE GLYCOL 3350 17 G/17G
1 POWDER, FOR SOLUTION ORAL
Status: DISCONTINUED | OUTPATIENT
Start: 2023-09-27 | End: 2023-10-01 | Stop reason: HOSPADM

## 2023-09-27 RX ORDER — GABAPENTIN 300 MG/1
300 CAPSULE ORAL 3 TIMES DAILY
Status: DISCONTINUED | OUTPATIENT
Start: 2023-09-27 | End: 2023-10-01 | Stop reason: HOSPADM

## 2023-09-27 RX ORDER — BISACODYL 10 MG
10 SUPPOSITORY, RECTAL RECTAL
Status: DISCONTINUED | OUTPATIENT
Start: 2023-09-27 | End: 2023-10-01 | Stop reason: HOSPADM

## 2023-09-27 RX ORDER — CEPHALEXIN 500 MG/1
500 CAPSULE ORAL 2 TIMES DAILY
Status: ON HOLD | COMMUNITY
End: 2023-10-01

## 2023-09-27 RX ORDER — ENOXAPARIN SODIUM 100 MG/ML
40 INJECTION SUBCUTANEOUS DAILY
Status: DISCONTINUED | OUTPATIENT
Start: 2023-09-27 | End: 2023-10-01 | Stop reason: HOSPADM

## 2023-09-27 RX ORDER — OMEPRAZOLE 20 MG/1
20 CAPSULE, DELAYED RELEASE ORAL DAILY
Status: DISCONTINUED | OUTPATIENT
Start: 2023-09-28 | End: 2023-10-01 | Stop reason: HOSPADM

## 2023-09-27 RX ORDER — LISINOPRIL 10 MG/1
10 TABLET ORAL EVERY EVENING
Status: DISCONTINUED | OUTPATIENT
Start: 2023-09-28 | End: 2023-10-01 | Stop reason: HOSPADM

## 2023-09-27 RX ORDER — LORAZEPAM 2 MG/ML
1 INJECTION INTRAMUSCULAR ONCE
Status: COMPLETED | OUTPATIENT
Start: 2023-09-27 | End: 2023-09-27

## 2023-09-27 RX ORDER — CETIRIZINE HYDROCHLORIDE 10 MG/1
10 TABLET ORAL DAILY
COMMUNITY
End: 2023-10-20 | Stop reason: SDUPTHER

## 2023-09-27 RX ORDER — PAROXETINE HYDROCHLORIDE 20 MG/1
10 TABLET, FILM COATED ORAL EVERY EVENING
Status: DISCONTINUED | OUTPATIENT
Start: 2023-09-27 | End: 2023-10-01 | Stop reason: HOSPADM

## 2023-09-27 RX ORDER — PAROXETINE 10 MG/1
20 TABLET, FILM COATED ORAL EVERY EVENING
COMMUNITY
Start: 2023-08-16

## 2023-09-27 RX ORDER — ACETAMINOPHEN 325 MG/1
650 TABLET ORAL EVERY 6 HOURS PRN
Status: DISCONTINUED | OUTPATIENT
Start: 2023-09-27 | End: 2023-10-01 | Stop reason: HOSPADM

## 2023-09-27 RX ORDER — LORAZEPAM 2 MG/ML
0.5 INJECTION INTRAMUSCULAR ONCE
Status: COMPLETED | OUTPATIENT
Start: 2023-09-27 | End: 2023-09-27

## 2023-09-27 RX ADMIN — LORAZEPAM 0.5 MG: 2 INJECTION INTRAMUSCULAR; INTRAVENOUS at 23:02

## 2023-09-27 RX ADMIN — ENOXAPARIN SODIUM 40 MG: 100 INJECTION SUBCUTANEOUS at 19:14

## 2023-09-27 RX ADMIN — PAROXETINE HYDROCHLORIDE 10 MG: 20 TABLET, FILM COATED ORAL at 19:15

## 2023-09-27 RX ADMIN — SENNOSIDES AND DOCUSATE SODIUM 2 TABLET: 50; 8.6 TABLET ORAL at 19:14

## 2023-09-27 RX ADMIN — LAMOTRIGINE 400 MG: 100 TABLET ORAL at 19:15

## 2023-09-27 RX ADMIN — LORAZEPAM 1 MG: 2 INJECTION INTRAMUSCULAR; INTRAVENOUS at 15:31

## 2023-09-27 RX ADMIN — DIAZEPAM 2 MG: 2 TABLET ORAL at 19:20

## 2023-09-27 RX ADMIN — GABAPENTIN 300 MG: 300 CAPSULE ORAL at 19:15

## 2023-09-27 RX ADMIN — LORAZEPAM 0.5 MG: 2 INJECTION INTRAMUSCULAR at 23:02

## 2023-09-27 ASSESSMENT — ENCOUNTER SYMPTOMS
EYE PAIN: 0
NECK PAIN: 0
CHILLS: 0
WEAKNESS: 1
CONSTIPATION: 0
WEIGHT LOSS: 0
BLURRED VISION: 1
VOMITING: 0
PHOTOPHOBIA: 0
HEADACHES: 0
HALLUCINATIONS: 0
SPEECH CHANGE: 0
DOUBLE VISION: 0
DIZZINESS: 1
DIARRHEA: 0
PALPITATIONS: 0
ORTHOPNEA: 0
SPUTUM PRODUCTION: 0
BACK PAIN: 0
TREMORS: 0
TINGLING: 0
NAUSEA: 0
ABDOMINAL PAIN: 0
FEVER: 0
FOCAL WEAKNESS: 0
MYALGIAS: 0
SHORTNESS OF BREATH: 0
SENSORY CHANGE: 0
COUGH: 0

## 2023-09-27 ASSESSMENT — FIBROSIS 4 INDEX
FIB4 SCORE: 0.81
FIB4 SCORE: 0.9

## 2023-09-27 ASSESSMENT — PATIENT HEALTH QUESTIONNAIRE - PHQ9
SUM OF ALL RESPONSES TO PHQ9 QUESTIONS 1 AND 2: 0
1. LITTLE INTEREST OR PLEASURE IN DOING THINGS: NOT AT ALL
2. FEELING DOWN, DEPRESSED, IRRITABLE, OR HOPELESS: NOT AT ALL
SUM OF ALL RESPONSES TO PHQ9 QUESTIONS 1 AND 2: 0
2. FEELING DOWN, DEPRESSED, IRRITABLE, OR HOPELESS: NOT AT ALL
1. LITTLE INTEREST OR PLEASURE IN DOING THINGS: NOT AT ALL

## 2023-09-27 ASSESSMENT — LIFESTYLE VARIABLES
EVER FELT BAD OR GUILTY ABOUT YOUR DRINKING: NO
SUBSTANCE_ABUSE: 0
TOTAL SCORE: 0
CONSUMPTION TOTAL: NEGATIVE
ON A TYPICAL DAY WHEN YOU DRINK ALCOHOL HOW MANY DRINKS DO YOU HAVE: 0
HAVE PEOPLE ANNOYED YOU BY CRITICIZING YOUR DRINKING: NO
HAVE YOU EVER FELT YOU SHOULD CUT DOWN ON YOUR DRINKING: NO
EVER HAD A DRINK FIRST THING IN THE MORNING TO STEADY YOUR NERVES TO GET RID OF A HANGOVER: NO
AVERAGE NUMBER OF DAYS PER WEEK YOU HAVE A DRINK CONTAINING ALCOHOL: 0
ALCOHOL_USE: NO
TOTAL SCORE: 0
HOW MANY TIMES IN THE PAST YEAR HAVE YOU HAD 5 OR MORE DRINKS IN A DAY: 0
DOES PATIENT WANT TO STOP DRINKING: NO
TOTAL SCORE: 0

## 2023-09-27 NOTE — CONSULTS
Neurology Initial Consult H&P  Neurohospitalist Service, [facility]    Referring Physician: YON Thomas*    Chief Complaint   Patient presents with    Dizziness     Episodes of inability to move bilateral upper extremities and neck x 5 days occurring between 9am-12pm daily. Episodes affect different areas of body. Hx epilepsy. GCS 15.        HPI: Noy Rose is a 38 y.o. female with history of seizures on lamictal 400 mg bid presenting to the hospital for recurrent spells. Each morning, starting between 9 am and 11 am, she develops an 'abnormal eye sensation', followed by monocular diplopia and the appearance that objects are moving in her vision. She also notes a spinning sensation, where the room appears to be spinning up. Following this, she develops ascending weakness, starting in her feet and moving superiorly to involve her arms. She states she is unable to move her arms for several hours. It slowly fades during the afternoon and she feels normal in the evenings. No LOC/convulsion or loss of b/b control. No sensory changes. She reports dizziness and double vision currently.    Review of systems: In addition to what is detailed in the HPI above, (and scanned into the chart if and when applicable), all other systems reviewed and are negative.    Past Medical History:    has a past medical history of Anxiety, Head ache, Insulin resistance, Kidney cyst, acquired, and Kidney disease.    FHx:  family history includes Cancer in her maternal grandmother and paternal grandfather; Diabetes in her maternal uncle and mother; Hyperlipidemia in her maternal uncle; Hypertension in her maternal uncle.    SHx:   reports that she has quit smoking. Her smoking use included cigarettes. She has a 10.0 pack-year smoking history. She has never used smokeless tobacco. She reports that she does not drink alcohol and does not use drugs.    Allergies:  Allergies   Allergen Reactions     Codeine Itching, Nausea and Shortness of Breath    Hydrocodone-Acetaminophen Anxiety, Itching, Nausea, Palpitations and Shortness of Breath       Medications:    Current Facility-Administered Medications:     senna-docusate (Pericolace Or Senokot S) 8.6-50 MG per tablet 2 Tablet, 2 Tablet, Oral, BID **AND** polyethylene glycol/lytes (Miralax) PACKET 1 Packet, 1 Packet, Oral, QDAY PRN **AND** magnesium hydroxide (Milk Of Magnesia) suspension 30 mL, 30 mL, Oral, QDAY PRN **AND** bisacodyl (Dulcolax) suppository 10 mg, 10 mg, Rectal, QDAY PRN, Robbie Carver M.D.    enoxaparin (Lovenox) inj 40 mg, 40 mg, Subcutaneous, DAILY AT 1800, Robbie Carver M.D.    acetaminophen (Tylenol) tablet 650 mg, 650 mg, Oral, Q6HRS PRN, Robbie Carver M.D.    [START ON 9/28/2023] cetirizine (ZyrTEC) tablet 10 mg, 10 mg, Oral, DAILY, Robbie Carver M.D.    diazePAM (Valium) tablet 2 mg, 2 mg, Oral, BID PRN, Robbie Carver M.D.    gabapentin (Neurontin) capsule 300 mg, 300 mg, Oral, TID, Robbie Carver M.D.    lamoTRIgine (LaMICtal) tablet 400 mg, 400 mg, Oral, BID, Robbie Carver M.D.    [START ON 9/28/2023] lisinopril (Prinivil) tablet 10 mg, 10 mg, Oral, Q EVENING, oRbbie Carver M.D.    [START ON 9/28/2023] omeprazole (PriLOSEC) capsule 20 mg, 20 mg, Oral, DAILY, Robbie Carver M.D.    PARoxetine (Paxil) tablet 10 mg, 10 mg, Oral, Q EVENING, Robbie Carver M.D.    insulin regular (HumuLIN R,NovoLIN R) injection, 1-6 Units, Subcutaneous, 4X/DAY ACHS **AND** POC blood glucose manual result, , , Q AC AND BEDTIME(S) **AND** NOTIFY MD and PharmD, , , Once **AND** Administer 20 grams of glucose (approximately 8 ounces of fruit juice) every 15 minutes PRN FSBG less than 70 mg/dL, , , PRN **AND** dextrose 50% (D50W) injection 25 g, 25 g, Intravenous, Q15 MIN PRN, Robbie Carver M.D.    Current Outpatient Medications:     cetirizine (ZYRTEC) 10 MG Tab, Take  10 mg by mouth every day., Disp: , Rfl:     cephALEXin (KEFLEX) 500 MG Cap, Take 500 mg by mouth 2 times a day. Indications: Simple Infection of the Urinary Tract, Disp: , Rfl:     PARoxetine (PAXIL) 10 MG Tab, Take 10 mg by mouth every evening., Disp: , Rfl:     metFORMIN ER (GLUCOPHAGE XR) 500 MG TABLET SR 24 HR, Take 1 Tablet by mouth 2 times a day., Disp: 60 Tablet, Rfl: 4    lisinopril (PRINIVIL) 10 MG Tab, TAKE 1 TABLET BY MOUTH EVERY DAY, Disp: 90 Tablet, Rfl: 0    diazePAM (VALIUM) 5 MG Tab, diazepam 5 mg tablet  TAKE 1/2 TO 1 TABLET BY MOUTH EVERY DAY AS NEEDED FOR SEVERE ANXIETY OR PANIC OR INSOMNIA, Disp: , Rfl:     gabapentin (NEURONTIN) 300 MG Cap, Take 300 mg by mouth 3 times a day., Disp: , Rfl:     lamotrigine (LAMICTAL) 200 MG tablet, Take 2 Tablets by mouth 2 times a day., Disp: 360 Tablet, Rfl: 3    omeprazole (PRILOSEC) 20 MG delayed-release capsule, TAKE 1 CAPSULE BY MOUTH EVERY DAY, Disp: 90 Capsule, Rfl: 1    Physical Examination:     Vitals:    09/27/23 1026 09/27/23 1321 09/27/23 1404 09/27/23 1531   BP:  131/61 113/58 105/61   Pulse:  85 85 84   Resp:  18 16 16   Temp:       TempSrc:       SpO2:  99% 98%    Weight: 93 kg (205 lb)          General: Patient is awake and in no acute distress    NEUROLOGICAL EXAM:     Mental status: Awake, alert and fully oriented, follows commands  Speech and language: speech is clear and fluent. The patient is able to name and repeat.  Cranial nerve exam: Pupils are equal, round and reactive to light bilaterally. Visual fields are full. Extraocular muscles are intact. Sensation in the face is intact to light touch. Face is symmetric. Hearing to finger rub equal. Palate elevates symmetrically. Shoulder shrug is full. Tongue is midline.  Motor exam: Strength is 5/5 in all extremities both distally and proximally. Tone is normal. No abnormal movements were seen on exam.  Sensory exam: No sensory deficits identified   Deep tendon reflexes:  2+ and symmetric. Toes  "down-going bilaterally.  Coordination: no ataxia   Gait: deferred     Objective Data:    Labs:  No results found for: \"PROTHROMBTM\", \"INR\"   Lab Results   Component Value Date/Time    WBC 10.3 09/27/2023 01:28 PM    RBC 4.51 09/27/2023 01:28 PM    HEMOGLOBIN 13.1 09/27/2023 01:28 PM    HEMATOCRIT 43.3 09/27/2023 01:28 PM    MCV 96.0 09/27/2023 01:28 PM    MCH 29.0 09/27/2023 01:28 PM    MCHC 30.3 (L) 09/27/2023 01:28 PM    MPV 10.0 09/27/2023 01:28 PM    NEUTSPOLYS 57.10 09/27/2023 01:28 PM    LYMPHOCYTES 36.10 09/27/2023 01:28 PM    MONOCYTES 5.20 09/27/2023 01:28 PM    EOSINOPHILS 1.00 09/27/2023 01:28 PM    BASOPHILS 0.10 09/27/2023 01:28 PM      Lab Results   Component Value Date/Time    SODIUM 139 09/27/2023 01:28 PM    POTASSIUM 4.4 09/27/2023 01:28 PM    CHLORIDE 104 09/27/2023 01:28 PM    CO2 22 09/27/2023 01:28 PM    GLUCOSE 102 (H) 09/27/2023 01:28 PM    BUN 10 09/27/2023 01:28 PM    CREATININE 0.69 09/27/2023 01:28 PM      Lab Results   Component Value Date/Time    CHOLSTRLTOT 166 01/13/2023 10:57 AM    LDL 97 01/13/2023 10:57 AM    HDL 32 (A) 01/13/2023 10:57 AM    TRIGLYCERIDE 185 (H) 01/13/2023 10:57 AM       Lab Results   Component Value Date/Time    ALKPHOSPHAT 92 09/27/2023 01:28 PM    ASTSGOT 52 (H) 09/27/2023 01:28 PM    ALTSGPT 73 (H) 09/27/2023 01:28 PM    TBILIRUBIN 0.2 09/27/2023 01:28 PM        Imaging/Testing:  MRI brain, C, T spine pending.     Assessment and Plan:    Recurrent spells of dizziness/monocular diplopia followed by ascending weakness, lasting several hours. No objective exam findings. No sensory changes of loss of b/b control. Etiology unclear. I will initiate workup for stroke/TIA and myelopathy. A functional/inorganic etiology is also possible.    Plan:  MRI brain, C, T spine   EEG  Continue lamictal 400 mg bid  Will follow      This chart was partially generated using voice recognition technology and sound alike word replacement may be present, best efforts were made to make " the chart accurate.    Quirino Bob MD  Board Certified Neurology, ABPN

## 2023-09-27 NOTE — PROCEDURES
VIDEO ELECTROENCEPHALOGRAM REPORT      Referring provider: Dr. Carver    DOS: 09/27/23 (total recording of 24 minutes).     INDICATION:  Noy Rose 38 y.o. female presenting with vision change     CURRENT ANTIEPILEPTIC REGIMEN: LTG and JOSÉ MIGUEL prn     TECHNIQUE: 30 channel video electroencephalogram (EEG) was performed in accordance with the international 10-20 system. The study was reviewed in bipolar and referential montages. The recording examined the patient during   awake, drowsy and sleep states    DESCRIPTION OF THE RECORD:  During the wakefulness, the background showed a symmetrical 9 Hz alpha activity posteriorly with amplitude of 70 mV.  Intermixed fast frequency was seen. There was reactivity to eye closure/opening.  A normal anterior-posterior gradient was noted with faster beta frequencies seen anteriorly.  During drowsiness, increased theta/beta frequencies were seen. During the sleep state,symmetrical sleep spindles and vertex sharps were seen in the leads over the central regions. No slow wave stage seen.     ACTIVATION PROCEDURES:     Hyperventilation was performed by the patient for a total of 3 minutes. The technician performing the test noted good effort. No physiological build up seen.     Intermittent Photic stimulation was performed in a stepwise fashion from 1 to 30 Hz and elicited no photic driving response.     ICTAL AND/OR INTERICTAL FINDINGS:   No focal or generalized epileptiform activity noted. No regional slowing was seen during this routine study.  No clinical events or seizures were reported or recorded during the study.     EKG: sampling of the EKG recording demonstrated sinus rhythm.     EVENTS: none     INTERPRETATION:    This is a normal video EEG recording in the awake, drowsy and sleep states.     Note: A normal EEG does not rule out epilepsy.  If the clinical suspicion remains high for seizures, a prolonged recording to capture clinical or subclinical events may be  helpful.    Dustin Pope MD  Diplomate in Neurology&Epilepsy  Office: [phone]  Fax: [phone]

## 2023-09-27 NOTE — ED PROVIDER NOTES
"ER Provider Note    Scribed for Gary Holt M.D. by Aleida Kirk. 9/27/2023   12:30 PM    Primary Care Provider: OUMAR Martinez    CHIEF COMPLAINT  Chief Complaint   Patient presents with    Dizziness     Episodes of inability to move bilateral upper extremities and neck x 5 days occurring between 9am-12pm daily. Episodes affect different areas of body. Hx epilepsy. GCS 15.      EXTERNAL RECORDS REVIEWED  Outpatient Notes Patient is followed by Neurology. Beginning of this month she was messaging her PCP about hyperglycemia. She messaged her Neurologist Dr. Medina 3 days ago. She messaged about an episode about 20-20 minutes of double vision and inability to move. Patient went to ED and had a CT of her head done.  Per patient the patient was also diagnosed with a urinary tract infection, she is currently on Keflex. Reviewed records from outside hospital. She had a CT scan of the head which was negative.     HPI/ROS  LIMITATION TO HISTORY   None  OUTSIDE HISTORIAN(S):  Mother who is present at bedside and contributed to medical history.     Noy Rose is a 38 y.o. female with a history of epilepsy who presents to the ED for evaluation of dizziness onset 5 days ago. Patient follows up with Neurologist Dr. Medina for epilepsy. She states she has grand mal seizures. She notes it was generalized to her left temporal lobe but was told from her last EEG it has spread. Patient reports taking her medication as normal. Patient reports episodes of inability to move bilateral upper extremities and neck. She notes this happens between 9 am - 12 pm daily and last a few hours but go away on their own. She describes these \"episodes\" affect different areas of her body. She describes getting double vision every day for the last 5 days. Patient feels like she sees \"things moving\". Mother reports she has a history of UTI and hysterectomy and is concerned it may be related to an infection she is fighting. " Mother agrees she has been having more stressful events happening in her life as of lately. Denies abdominal pain, nausea, vomiting, dysuria and hematuria.Patient has a drug allergy to codeine and hydrocodone-acetaminophen. No alleviating or exacerbating factors noted.     PAST MEDICAL HISTORY  Past Medical History:   Diagnosis Date    Anxiety     Head ache     Insulin resistance     Kidney cyst, acquired     LEFT     Kidney disease      SURGICAL HISTORY  Past Surgical History:   Procedure Laterality Date    PRIMARY C SECTION  2006, 2011    TUBAL COAGULATION LAPAROSCOPIC BILATERAL       FAMILY HISTORY  Family History   Problem Relation Age of Onset    Diabetes Mother     Hyperlipidemia Maternal Uncle     Hypertension Maternal Uncle     Diabetes Maternal Uncle     Cancer Maternal Grandmother         breast    Cancer Paternal Grandfather      SOCIAL HISTORY   reports that she has quit smoking. Her smoking use included cigarettes. She has a 10.0 pack-year smoking history. She has never used smokeless tobacco. She reports that she does not drink alcohol and does not use drugs.    CURRENT MEDICATIONS  Current Discharge Medication List        CONTINUE these medications which have NOT CHANGED    Details   cetirizine (ZYRTEC) 10 MG Tab Take 10 mg by mouth every day.      cephALEXin (KEFLEX) 500 MG Cap Take 500 mg by mouth 2 times a day. Indications: Simple Infection of the Urinary Tract      PARoxetine (PAXIL) 10 MG Tab Take 10 mg by mouth every evening.      metFORMIN ER (GLUCOPHAGE XR) 500 MG TABLET SR 24 HR Take 1 Tablet by mouth 2 times a day.  Qty: 60 Tablet, Refills: 4    Associated Diagnoses: PCOS (polycystic ovarian syndrome); Insulin resistance      lisinopril (PRINIVIL) 10 MG Tab TAKE 1 TABLET BY MOUTH EVERY DAY  Qty: 90 Tablet, Refills: 0    Associated Diagnoses: Essential hypertension      diazePAM (VALIUM) 5 MG Tab diazepam 5 mg tablet   TAKE 1/2 TO 1 TABLET BY MOUTH EVERY DAY AS NEEDED FOR SEVERE ANXIETY OR  PANIC OR INSOMNIA      gabapentin (NEURONTIN) 300 MG Cap Take 300 mg by mouth 3 times a day.      lamotrigine (LAMICTAL) 200 MG tablet Take 2 Tablets by mouth 2 times a day.  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)      omeprazole (PRILOSEC) 20 MG delayed-release capsule TAKE 1 CAPSULE BY MOUTH EVERY DAY  Qty: 90 Capsule, Refills: 1    Associated Diagnoses: Gastroesophageal reflux disease, unspecified whether esophagitis present           ALLERGIES  Allergies   Allergen Reactions    Codeine Itching, Nausea and Shortness of Breath    Hydrocodone-Acetaminophen Anxiety, Itching, Nausea, Palpitations and Shortness of Breath      PHYSICAL EXAM  BP (!) 161/88   Pulse 99   Temp 36.5 °C (97.7 °F) (Temporal)   Resp 18   Wt 93 kg (205 lb)   SpO2 98%   BMI 34.11 kg/m²    Constitutional: Well developed, Well nourished, mild distress,    HENT: Normocephalic, Atraumatic   Eyes: PERRLA, EOMI, Conjunctiva normal, No discharge.  No nystagmus, the patient claims diplopia on lateral vision fields  Neck: No tenderness, Supple, No stridor.   Cardiovascular: Normal heart rate, Normal rhythm.   Thorax & Lungs: Clear to auscultation bilaterally, No respiratory distress, No wheezing, No crackles.   Abdomen: Soft, No tenderness, No masses, No pulsatile masses.   Skin: Warm, Dry, No erythema, No rash.    Musculoskeletal: No major deformities noted.  Intact distal pulses  Neurologic: Awake alert claims double vision on lateral vision, muscle strength 5 out of 5 bilateral upper extremities.  Lower extremities are rigid, there is some 1 beat clonus at the ankles bilaterally, she cannot bend her knees, I cannot elicit DTRs in bilateral patellar reflexes, she does have some voluntary approximately 3 out of 5 plantar and flexor muscle strength at the ankles.  Her DTRs of bilateral biceps are appropriate if not brisk.    Psychiatric: Slightly anxious, Judgment  normal, Mood normal.     DIAGNOSTIC STUDIES  Labs:   Results for orders placed or performed during the hospital encounter of 09/27/23   CBC WITH DIFFERENTIAL   Result Value Ref Range    WBC 10.3 4.8 - 10.8 K/uL    RBC 4.51 4.20 - 5.40 M/uL    Hemoglobin 13.1 12.0 - 16.0 g/dL    Hematocrit 43.3 37.0 - 47.0 %    MCV 96.0 81.4 - 97.8 fL    MCH 29.0 27.0 - 33.0 pg    MCHC 30.3 (L) 32.2 - 35.5 g/dL    RDW 50.4 (H) 35.9 - 50.0 fL    Platelet Count 265 164 - 446 K/uL    MPV 10.0 9.0 - 12.9 fL    Neutrophils-Polys 57.10 44.00 - 72.00 %    Lymphocytes 36.10 22.00 - 41.00 %    Monocytes 5.20 0.00 - 13.40 %    Eosinophils 1.00 0.00 - 6.90 %    Basophils 0.10 0.00 - 1.80 %    Immature Granulocytes 0.50 0.00 - 0.90 %    Nucleated RBC 0.00 0.00 - 0.20 /100 WBC    Neutrophils (Absolute) 5.88 1.82 - 7.42 K/uL    Lymphs (Absolute) 3.71 1.00 - 4.80 K/uL    Monos (Absolute) 0.53 0.00 - 0.85 K/uL    Eos (Absolute) 0.10 0.00 - 0.51 K/uL    Baso (Absolute) 0.01 0.00 - 0.12 K/uL    Immature Granulocytes (abs) 0.05 0.00 - 0.11 K/uL    NRBC (Absolute) 0.00 K/uL   COMP METABOLIC PANEL   Result Value Ref Range    Sodium 139 135 - 145 mmol/L    Potassium 4.4 3.6 - 5.5 mmol/L    Chloride 104 96 - 112 mmol/L    Co2 22 20 - 33 mmol/L    Anion Gap 13.0 7.0 - 16.0    Glucose 102 (H) 65 - 99 mg/dL    Bun 10 8 - 22 mg/dL    Creatinine 0.69 0.50 - 1.40 mg/dL    Calcium 9.1 8.5 - 10.5 mg/dL    Correct Calcium 8.9 8.5 - 10.5 mg/dL    AST(SGOT) 52 (H) 12 - 45 U/L    ALT(SGPT) 73 (H) 2 - 50 U/L    Alkaline Phosphatase 92 30 - 99 U/L    Total Bilirubin 0.2 0.1 - 1.5 mg/dL    Albumin 4.3 3.2 - 4.9 g/dL    Total Protein 7.3 6.0 - 8.2 g/dL    Globulin 3.0 1.9 - 3.5 g/dL    A-G Ratio 1.4 g/dL   HCG Qual Serum   Result Value Ref Range    Beta-Hcg Qualitative Serum Negative Negative   ESTIMATED GFR   Result Value Ref Range    GFR (CKD-EPI) 113 >60 mL/min/1.73 m 2   URINALYSIS    Specimen: Urine   Result Value Ref Range    Color Yellow     Character Clear      Specific Gravity 1.012 <1.035    Ph 5.5 5.0 - 8.0    Glucose Negative Negative mg/dL    Ketones Trace (A) Negative mg/dL    Protein Negative Negative mg/dL    Bilirubin Negative Negative    Urobilinogen, Urine 0.2 Negative    Nitrite Negative Negative    Leukocyte Esterase Trace (A) Negative    Occult Blood Negative Negative    Micro Urine Req Microscopic    URINE MICROSCOPIC (W/UA)   Result Value Ref Range    WBC 2-5 /hpf    RBC 0-2 /hpf    Bacteria Negative None /hpf    Epithelial Cells Few /hpf    Hyaline Cast 0-2 /lpf   EKG (NOW)   Result Value Ref Range    Report       [facility]    Test Date:  2023  Pt Name:    OMARI GONZALEZ               Department: ER  MRN:        [id number]                      Room:       [facility]  Gender:     Female                       Technician: [id number]  :        1985                   Requested By:BETINA FLOWERS  Order #:    [id number]                    Reading MD: BETINA FLOWERS MD    Measurements  Intervals                                Axis  Rate:       81                           P:          46  CT:         200                          QRS:        41  QRSD:       98                           T:          25  QT:         362  QTc:        421    Interpretive Statements  Sinus rhythm  No previous ECG available for comparison  Electronically Signed On 2023 14:14:45 PDT by BETINA FLOWERS MD     POCT glucose device results   Result Value Ref Range    POC Glucose, Blood 146 (H) 65 - 99 mg/dL     COURSE & MEDICAL DECISION MAKING   ED Observation Status? No; Patient does not meet criteria for ED Observation.     INITIAL ASSESSMENT, COURSE AND PLAN  Care Narrative: Patient with intermittent episodes of abnormal movement along with diplopia of uncertain etiology.  The patient has been communicating with her neurologist who ultimately recommends a epilepsy monitoring unit.  Patient already had a CT scan done at outside hospital  that was negative.  I reviewed those results.  Discussed the case with neurology, discussed the case with the hospitalist for hospitalization.    12:30 PM - Patient was seen and evaluated at bedside. Patient presents to the ED for dizziness onset 5 days ago. On exam patient presented with no nystagmus, the patient claims diplopia on lateral vision fields. After my exam, I discussed with the patient the plan of care, which includes obtaining lab work and imaging for further evaluation. Patient understands and verbalizes agreement to plan of care. Ordered HCG qual serum, CBC w/ diff, CMP and EKG to evaluate.     Differential diagnoses include but not limited to: Seizures versus conversion disorder versus pseudoseizures    1:14 PM - Neurology was paged.     1:41 PM - I discussed the patient's case and the above findings with Dr. Bob (Neurology).    2:16 PM - Paged Hospitalist.     2:21 PM - Hospitalist responded. I discussed the patient's case and the above findings with Dr. Carver (Hospitalist) who agrees to evaluate the patient for hospitalization.     2:33 PM - Patient was reevaluated at bedside. Discussed lab results with the patient and informed them with plan for hospitalization for further evaluation. Patient verbalizes understanding and agreement to this plan of care.     HTN/IDDM FOLLOW UP:  The patient is referred to a primary physician for blood pressure management, diabetic screening, and for all other preventive health concerns    DISPOSITION AND DISCUSSIONS    I have discussed management of the patient with the following physicians and EDSON's:  Dr. Bob (Neurology) and Dr. Carver (Hospitalist)    Discussion of management with other [facility] [place] [facility] or appropriate source(s): None     DISPOSITION:  Patient will be hospitalized by Dr. Carver in guarded condition.    FINAL DIAGNOSIS  1. Diplopia    2. Rigidity      I, Aleida Kirk (Scribe), am scribing for, and in the presence of, Gary Holt  M.D..    Electronically signed by: Aleida Kirk (Scribe), 9/27/2023    IGary M.D. personally performed the services described in this documentation, as scribed by Aleida Kirk in my presence, and it is both accurate and complete.      The note accurately reflects work and decisions made by me.  Gary Holt M.D.  9/27/2023  7:16 PM

## 2023-09-27 NOTE — H&P
Hospital Medicine History & Physical Note    Date of Service  9/27/2023    Primary Care Physician  KARYN Martinez.    Consultants  neurology    Specialist Names: Dr. Bob consulted by ER physician    Code Status  Full Code    Chief Complaint  Chief Complaint   Patient presents with    Dizziness     Episodes of inability to move bilateral upper extremities and neck x 5 days occurring between 9am-12pm daily. Episodes affect different areas of body. Hx epilepsy. GCS 15.        History of Presenting Illness    Noy Rose is a 38 y.o. female with past medical history of seizure disorder, anxiety and diabetes who presented 9/27/2023 with seizure-like episodes.  Patient reported for 5 days and morning between 9 AM to 12 PM she gets episode where she feels neck muscle tightness and blurry vision and feeling hallucination.  She also reported that she has weakness in her upper extremities as well as lower extremities.  That episode last for few minutes to hours.  There is no specific aggravating or elevating factors.  She has been taking her seizure medication as prescribed she follows Dr. Powell for neurology and she was last evaluated by him in June.  Patient also reported that she has a history of grand mal seizure but this is completely different from her seizure and her last grand mal seizure was long time ago.  She denies drinking alcohol, smoking or using any drugs.  She expressed that she drives.  She was started on paroxetine 2 months ago for her anxiety and she has upcoming appointment with psychiatrist for her anxiety.  She denies taking any new medication.  She was also diagnosed with UTI and she was prescribed antibiotic on September 25, 2023 and she has been taking it for 2 days.  Currently she denies symptoms of dysuria, frequency, flank pain, fever or symptoms of UTI.  She denies any other acute complaints or associated symptoms.  Patient's mother at the bedside and patient gave consent  to discuss plan of care in front of her.    ER course: Patient lab work-up including urinalysis did not show any acute abnormalities.  She does not have symptoms of UTI and her UA did not show signs of UTI.  I discussed with patient and her mother to not to continue antibiotic to avoid adverse effect.  Neurology was consulted.  I ordered EEG.    I discussed about this admission with ER physician Dr. Holt.    I discussed the plan of care with patient, family, and bedside RN.    Review of Systems  Review of Systems   Constitutional:  Negative for chills, fever and weight loss.   HENT:  Negative for hearing loss and tinnitus.    Eyes:  Positive for blurred vision. Negative for double vision, photophobia and pain.   Respiratory:  Negative for cough, sputum production and shortness of breath.    Cardiovascular:  Negative for chest pain, palpitations, orthopnea and leg swelling.   Gastrointestinal:  Negative for abdominal pain, constipation, diarrhea, nausea and vomiting.   Genitourinary:  Negative for dysuria, frequency and urgency.   Musculoskeletal:  Negative for back pain, joint pain, myalgias and neck pain.   Skin:  Negative for rash.   Neurological:  Positive for dizziness and weakness. Negative for tingling, tremors, sensory change, speech change, focal weakness and headaches.   Psychiatric/Behavioral:  Negative for hallucinations and substance abuse.    All other systems reviewed and are negative.      Past Medical History   has a past medical history of Anxiety, Head ache, Insulin resistance, Kidney cyst, acquired, and Kidney disease.    Surgical History   has a past surgical history that includes primary c section (2006, 2011) and tubal coagulation laparoscopic bilateral.     Family History  family history includes Cancer in her maternal grandmother and paternal grandfather; Diabetes in her maternal uncle and mother; Hyperlipidemia in her maternal uncle; Hypertension in her maternal uncle.   Family history  reviewed with patient. There is no family history that is pertinent to the chief complaint.     Social History   reports that she has quit smoking. Her smoking use included cigarettes. She has a 10.0 pack-year smoking history. She has never used smokeless tobacco. She reports that she does not drink alcohol and does not use drugs.    Allergies  Allergies   Allergen Reactions    Codeine Itching, Nausea and Shortness of Breath    Hydrocodone-Acetaminophen Anxiety, Itching, Nausea, Palpitations and Shortness of Breath       Medications  Prior to Admission Medications   Prescriptions Last Dose Informant Patient Reported? Taking?   PARoxetine (PAXIL) 10 MG Tab 9/26/2023 at {< Patient, Rx Bottle (For Med Information) Yes No   Sig: Take 10 mg by mouth every evening.   cephALEXin (KEFLEX) 500 MG Cap 9/27/2023 at AM Patient, Rx Bottle (For Med Information) Yes Yes   Sig: Take 500 mg by mouth 2 times a day. Indications: Simple Infection of the Urinary Tract   cetirizine (ZYRTEC) 10 MG Tab 9/27/2023 at AM Patient, Rx Bottle (For Med Information) Yes Yes   Sig: Take 10 mg by mouth every day.   diazePAM (VALIUM) 5 MG Tab 9/26/2023 at PM Patient Yes No   Sig: diazepam 5 mg tablet   TAKE 1/2 TO 1 TABLET BY MOUTH EVERY DAY AS NEEDED FOR SEVERE ANXIETY OR PANIC OR INSOMNIA   gabapentin (NEURONTIN) 300 MG Cap 9/26/2023 at PM Patient Yes No   Sig: Take 300 mg by mouth 3 times a day.   lamotrigine (LAMICTAL) 200 MG tablet 9/27/2023 at AM Patient No No   Sig: Take 2 Tablets by mouth 2 times a day.   lisinopril (PRINIVIL) 10 MG Tab 9/27/2023 at AM Patient No No   Sig: TAKE 1 TABLET BY MOUTH EVERY DAY   metFORMIN ER (GLUCOPHAGE XR) 500 MG TABLET SR 24 HR 9/27/2023 at AM Patient No No   Sig: Take 1 Tablet by mouth 2 times a day.   omeprazole (PRILOSEC) 20 MG delayed-release capsule 9/27/2023 at AM Patient No No   Sig: TAKE 1 CAPSULE BY MOUTH EVERY DAY      Facility-Administered Medications: None       Physical Exam  Temp:  [36.5 °C (97.7  °F)] 36.5 °C (97.7 °F)  Pulse:  [85-99] 85  Resp:  [16-18] 16  BP: (113-161)/(58-88) 113/58  SpO2:  [98 %-99 %] 98 %  Blood Pressure: 113/58   Temperature: 36.5 °C (97.7 °F)   Pulse: 85   Respiration: 16   Pulse Oximetry: 98 %       Physical Exam  Vitals reviewed.   Constitutional:       General: She is not in acute distress.     Appearance: She is obese. She is not ill-appearing.   HENT:      Head: Normocephalic and atraumatic.      Nose: No congestion.   Eyes:      General:         Right eye: No discharge.         Left eye: No discharge.      Pupils: Pupils are equal, round, and reactive to light.   Cardiovascular:      Rate and Rhythm: Normal rate and regular rhythm.      Pulses: Normal pulses.      Heart sounds: Normal heart sounds. No murmur heard.  Pulmonary:      Effort: Pulmonary effort is normal. No respiratory distress.      Breath sounds: Normal breath sounds. No stridor.   Abdominal:      General: Bowel sounds are normal. There is no distension.      Palpations: Abdomen is soft.      Tenderness: There is no abdominal tenderness.   Musculoskeletal:         General: No swelling or tenderness. Normal range of motion.      Cervical back: Normal range of motion. No rigidity.   Skin:     General: Skin is warm.      Capillary Refill: Capillary refill takes less than 2 seconds.      Coloration: Skin is not jaundiced or pale.      Findings: No bruising.   Neurological:      General: No focal deficit present.      Mental Status: She is alert and oriented to person, place, and time.      Cranial Nerves: No cranial nerve deficit.      Comments: No focal neurological deficit on physical exam   Psychiatric:         Mood and Affect: Mood normal.         Behavior: Behavior normal.         Laboratory:  Recent Labs     09/27/23  1328   WBC 10.3   RBC 4.51   HEMOGLOBIN 13.1   HEMATOCRIT 43.3   MCV 96.0   MCH 29.0   MCHC 30.3*   RDW 50.4*   PLATELETCT 265   MPV 10.0     Recent Labs     09/27/23  1328   SODIUM 139  "  POTASSIUM 4.4   CHLORIDE 104   CO2 22   GLUCOSE 102*   BUN 10   CREATININE 0.69   CALCIUM 9.1     Recent Labs     09/27/23  1328   ALTSGPT 73*   ASTSGOT 52*   ALKPHOSPHAT 92   TBILIRUBIN 0.2   GLUCOSE 102*         No results for input(s): \"NTPROBNP\" in the last 72 hours.      No results for input(s): \"TROPONINT\" in the last 72 hours.    Imaging:  No orders to display         Assessment/Plan:  Justification for Admission Status  I anticipate this patient is appropriate for observation status at this time because to evaluate for seizure-like episodes that will require EEG and MRI    Patient will need a Med/Surg bed on EMERGENCY service .  The need is secondary to seizure-like episode.    * Seizure-like activity (HCC)- (present on admission)  Assessment & Plan  Patient has a history of seizure disorder and now her current episodes concerning for seizures.  Neurology was consulted.  I order EEG.  Seizure precautions.  I am continuing her outpatient medications.  I discussed with patient if her symptoms will determine by neurology to be seizure she should not drive as it is dangerous for her and people around her and she expressed understanding.    Obesity (BMI 30.0-34.9)  Assessment & Plan  Lifestyle modification.  Outpatient follow-up    Type 2 diabetes mellitus without complication, without long-term current use of insulin (HCC)  Assessment & Plan  Patient last HbA1c was 6.6  I started her on insulin sliding scale with hypoglycemia protocol.  I am holding patient outpatient metformin.  I ordered HbA1c.    Generalized anxiety disorder with panic attacks- (present on admission)  Assessment & Plan  I am continuing her outpatient paroxetine and diazepam    Polycystic ovary syndrome- (present on admission)  Assessment & Plan  I am holding her outpatient metformin    Localization-related focal epilepsy with complex partial seizures (HCC)- (present on admission)  Assessment & Plan  I am continuing her outpatient " lamotrigine and gabapentin  Seizure precautions  Neurology consulted.      I discussed about this admission with ER physician Dr. Holt    I reviewed neurology note Dr. Powell from Yesy 15, 2003    I discussed plan of care with neurologist Dr. Bob.    >67 minutes total time spent in records review, lab/radiology assessment, patient history and assessment, and directing plan of care with high level medical decision making complexity. See orders.      VTE prophylaxis: enoxaparin ppx

## 2023-09-27 NOTE — ED TRIAGE NOTES
Chief Complaint   Patient presents with    Dizziness     Episodes of inability to move bilateral upper extremities and neck x 5 days occurring between 9am-12pm daily. Episodes affect different areas of body. Hx epilepsy. GCS 15.         BP (!) 161/88   Pulse 99   Temp 36.5 °C (97.7 °F) (Temporal)   Resp 18   SpO2 98%

## 2023-09-27 NOTE — ASSESSMENT & PLAN NOTE
Patient has a history of seizure disorder and now her current episodes concerning for seizures.  MRI T spine showed DJD, MRI C-spine showed several areas of mod-severe stenosis, MRI brain wnl  EEG ordered - NEG for seizures x2 including during her normal event time  Seizure precautions.  Continue home meds  Neurology was consulted, unlikely seizures possible functional component, rec NS consult for spinal stenosis, discussed case with them rec outpatient f/u  Concern for Lamictal toxicity as level is elevated, will hold and observe

## 2023-09-27 NOTE — ASSESSMENT & PLAN NOTE
Patient last HbA1c was 6.6  insulin sliding scale with hypoglycemia protocol.  outpatient metformin.

## 2023-09-27 NOTE — ED NOTES
Med rec completed per patient and RX bottles at bedside (Bottles returned)  Allergies reviewed    Patient is currently on a course of Cephalexin 500 mg twice daily for a UTI. She states she has about three more days of the medications.

## 2023-09-27 NOTE — ED NOTES
Patient from ANDRE to 58 yellow. Patient placed on monitors, blood draw completed and sent to lab.

## 2023-09-28 ENCOUNTER — APPOINTMENT (OUTPATIENT)
Dept: RADIOLOGY | Facility: MEDICAL CENTER | Age: 38
End: 2023-09-28
Attending: NURSE PRACTITIONER
Payer: MEDICAID

## 2023-09-28 LAB
ALBUMIN SERPL BCP-MCNC: 4 G/DL (ref 3.2–4.9)
ALBUMIN/GLOB SERPL: 1.9 G/DL
ALP SERPL-CCNC: 69 U/L (ref 30–99)
ALT SERPL-CCNC: 55 U/L (ref 2–50)
ANION GAP SERPL CALC-SCNC: 10 MMOL/L (ref 7–16)
AST SERPL-CCNC: 32 U/L (ref 12–45)
BASOPHILS # BLD AUTO: 0.1 % (ref 0–1.8)
BASOPHILS # BLD: 0.01 K/UL (ref 0–0.12)
BILIRUB SERPL-MCNC: 0.2 MG/DL (ref 0.1–1.5)
BUN SERPL-MCNC: 9 MG/DL (ref 8–22)
CALCIUM ALBUM COR SERPL-MCNC: 9 MG/DL (ref 8.5–10.5)
CALCIUM SERPL-MCNC: 9 MG/DL (ref 8.5–10.5)
CHLORIDE SERPL-SCNC: 105 MMOL/L (ref 96–112)
CO2 SERPL-SCNC: 24 MMOL/L (ref 20–33)
CREAT SERPL-MCNC: 0.64 MG/DL (ref 0.5–1.4)
EOSINOPHIL # BLD AUTO: 0.16 K/UL (ref 0–0.51)
EOSINOPHIL NFR BLD: 1.4 % (ref 0–6.9)
ERYTHROCYTE [DISTWIDTH] IN BLOOD BY AUTOMATED COUNT: 49.7 FL (ref 35.9–50)
GFR SERPLBLD CREATININE-BSD FMLA CKD-EPI: 115 ML/MIN/1.73 M 2
GLOBULIN SER CALC-MCNC: 2.1 G/DL (ref 1.9–3.5)
GLUCOSE BLD STRIP.AUTO-MCNC: 105 MG/DL (ref 65–99)
GLUCOSE BLD STRIP.AUTO-MCNC: 108 MG/DL (ref 65–99)
GLUCOSE BLD STRIP.AUTO-MCNC: 156 MG/DL (ref 65–99)
GLUCOSE BLD STRIP.AUTO-MCNC: 171 MG/DL (ref 65–99)
GLUCOSE SERPL-MCNC: 88 MG/DL (ref 65–99)
HCT VFR BLD AUTO: 35.3 % (ref 37–47)
HGB BLD-MCNC: 10.9 G/DL (ref 12–16)
IMM GRANULOCYTES # BLD AUTO: 0.04 K/UL (ref 0–0.11)
IMM GRANULOCYTES NFR BLD AUTO: 0.4 % (ref 0–0.9)
LYMPHOCYTES # BLD AUTO: 4.96 K/UL (ref 1–4.8)
LYMPHOCYTES NFR BLD: 44.9 % (ref 22–41)
MCH RBC QN AUTO: 29.2 PG (ref 27–33)
MCHC RBC AUTO-ENTMCNC: 30.9 G/DL (ref 32.2–35.5)
MCV RBC AUTO: 94.6 FL (ref 81.4–97.8)
MONOCYTES # BLD AUTO: 0.63 K/UL (ref 0–0.85)
MONOCYTES NFR BLD AUTO: 5.7 % (ref 0–13.4)
NEUTROPHILS # BLD AUTO: 5.24 K/UL (ref 1.82–7.42)
NEUTROPHILS NFR BLD: 47.5 % (ref 44–72)
NRBC # BLD AUTO: 0 K/UL
NRBC BLD-RTO: 0 /100 WBC (ref 0–0.2)
PLATELET # BLD AUTO: 268 K/UL (ref 164–446)
PMV BLD AUTO: 9.2 FL (ref 9–12.9)
POTASSIUM SERPL-SCNC: 4 MMOL/L (ref 3.6–5.5)
PROT SERPL-MCNC: 6.1 G/DL (ref 6–8.2)
RBC # BLD AUTO: 3.73 M/UL (ref 4.2–5.4)
SODIUM SERPL-SCNC: 139 MMOL/L (ref 135–145)
WBC # BLD AUTO: 11 K/UL (ref 4.8–10.8)

## 2023-09-28 PROCEDURE — 36415 COLL VENOUS BLD VENIPUNCTURE: CPT

## 2023-09-28 PROCEDURE — 700102 HCHG RX REV CODE 250 W/ 637 OVERRIDE(OP): Mod: UD | Performed by: NURSE PRACTITIONER

## 2023-09-28 PROCEDURE — 700117 HCHG RX CONTRAST REV CODE 255: Mod: UD | Performed by: NURSE PRACTITIONER

## 2023-09-28 PROCEDURE — 82962 GLUCOSE BLOOD TEST: CPT

## 2023-09-28 PROCEDURE — A9579 GAD-BASE MR CONTRAST NOS,1ML: HCPCS | Mod: UD | Performed by: NURSE PRACTITIONER

## 2023-09-28 PROCEDURE — 85025 COMPLETE CBC W/AUTO DIFF WBC: CPT

## 2023-09-28 PROCEDURE — G0378 HOSPITAL OBSERVATION PER HR: HCPCS

## 2023-09-28 PROCEDURE — 99233 SBSQ HOSP IP/OBS HIGH 50: CPT | Mod: FS | Performed by: INTERNAL MEDICINE

## 2023-09-28 PROCEDURE — 72156 MRI NECK SPINE W/O & W/DYE: CPT

## 2023-09-28 PROCEDURE — 99214 OFFICE O/P EST MOD 30 MIN: CPT | Mod: 25 | Performed by: PSYCHIATRY & NEUROLOGY

## 2023-09-28 PROCEDURE — 72157 MRI CHEST SPINE W/O & W/DYE: CPT

## 2023-09-28 PROCEDURE — 70551 MRI BRAIN STEM W/O DYE: CPT

## 2023-09-28 PROCEDURE — A9270 NON-COVERED ITEM OR SERVICE: HCPCS | Mod: UD | Performed by: NURSE PRACTITIONER

## 2023-09-28 PROCEDURE — 96372 THER/PROPH/DIAG INJ SC/IM: CPT

## 2023-09-28 PROCEDURE — 700102 HCHG RX REV CODE 250 W/ 637 OVERRIDE(OP): Mod: UD | Performed by: INTERNAL MEDICINE

## 2023-09-28 PROCEDURE — 96376 TX/PRO/DX INJ SAME DRUG ADON: CPT

## 2023-09-28 PROCEDURE — 80053 COMPREHEN METABOLIC PANEL: CPT

## 2023-09-28 PROCEDURE — 700111 HCHG RX REV CODE 636 W/ 250 OVERRIDE (IP): Mod: JZ,UD | Performed by: INTERNAL MEDICINE

## 2023-09-28 PROCEDURE — A9270 NON-COVERED ITEM OR SERVICE: HCPCS | Mod: UD | Performed by: INTERNAL MEDICINE

## 2023-09-28 PROCEDURE — 700111 HCHG RX REV CODE 636 W/ 250 OVERRIDE (IP): Mod: UD | Performed by: INTERNAL MEDICINE

## 2023-09-28 PROCEDURE — 700101 HCHG RX REV CODE 250: Mod: UD | Performed by: NURSE PRACTITIONER

## 2023-09-28 RX ORDER — LORAZEPAM 2 MG/ML
0.5 INJECTION INTRAMUSCULAR EVERY 4 HOURS PRN
Status: DISCONTINUED | OUTPATIENT
Start: 2023-09-28 | End: 2023-09-30

## 2023-09-28 RX ORDER — TRAMADOL HYDROCHLORIDE 50 MG/1
50 TABLET ORAL EVERY 6 HOURS PRN
Status: DISCONTINUED | OUTPATIENT
Start: 2023-09-28 | End: 2023-10-01 | Stop reason: HOSPADM

## 2023-09-28 RX ORDER — CYCLOBENZAPRINE HCL 10 MG
10 TABLET ORAL ONCE
Status: COMPLETED | OUTPATIENT
Start: 2023-09-28 | End: 2023-09-28

## 2023-09-28 RX ORDER — LIDOCAINE 50 MG/G
2 PATCH TOPICAL EVERY 24 HOURS
Status: DISCONTINUED | OUTPATIENT
Start: 2023-09-28 | End: 2023-10-01 | Stop reason: HOSPADM

## 2023-09-28 RX ADMIN — ENOXAPARIN SODIUM 40 MG: 100 INJECTION SUBCUTANEOUS at 18:22

## 2023-09-28 RX ADMIN — CYCLOBENZAPRINE 10 MG: 10 TABLET, FILM COATED ORAL at 21:38

## 2023-09-28 RX ADMIN — DIAZEPAM 2 MG: 2 TABLET ORAL at 09:03

## 2023-09-28 RX ADMIN — LISINOPRIL 10 MG: 10 TABLET ORAL at 18:21

## 2023-09-28 RX ADMIN — PAROXETINE HYDROCHLORIDE 10 MG: 20 TABLET, FILM COATED ORAL at 18:23

## 2023-09-28 RX ADMIN — GABAPENTIN 300 MG: 300 CAPSULE ORAL at 18:21

## 2023-09-28 RX ADMIN — ACETAMINOPHEN 650 MG: 325 TABLET, FILM COATED ORAL at 03:32

## 2023-09-28 RX ADMIN — CETIRIZINE HYDROCHLORIDE 10 MG: 10 TABLET, FILM COATED ORAL at 06:14

## 2023-09-28 RX ADMIN — TRAMADOL HYDROCHLORIDE 50 MG: 50 TABLET ORAL at 16:56

## 2023-09-28 RX ADMIN — OMEPRAZOLE 20 MG: 20 CAPSULE, DELAYED RELEASE ORAL at 06:13

## 2023-09-28 RX ADMIN — INSULIN HUMAN 1 UNITS: 100 INJECTION, SOLUTION PARENTERAL at 12:52

## 2023-09-28 RX ADMIN — DIAZEPAM 2 MG: 2 TABLET ORAL at 21:42

## 2023-09-28 RX ADMIN — LORAZEPAM 0.5 MG: 2 INJECTION INTRAMUSCULAR; INTRAVENOUS at 07:26

## 2023-09-28 RX ADMIN — LORAZEPAM 0.5 MG: 2 INJECTION INTRAMUSCULAR; INTRAVENOUS at 14:01

## 2023-09-28 RX ADMIN — GADOTERIDOL 20 ML: 279.3 INJECTION, SOLUTION INTRAVENOUS at 16:00

## 2023-09-28 RX ADMIN — GABAPENTIN 300 MG: 300 CAPSULE ORAL at 06:13

## 2023-09-28 RX ADMIN — LIDOCAINE 2 PATCH: 50 PATCH CUTANEOUS at 23:01

## 2023-09-28 RX ADMIN — INSULIN HUMAN 1 UNITS: 100 INJECTION, SOLUTION PARENTERAL at 18:25

## 2023-09-28 RX ADMIN — LAMOTRIGINE 400 MG: 100 TABLET ORAL at 06:14

## 2023-09-28 RX ADMIN — LAMOTRIGINE 400 MG: 100 TABLET ORAL at 18:22

## 2023-09-28 RX ADMIN — GABAPENTIN 300 MG: 300 CAPSULE ORAL at 12:52

## 2023-09-28 ASSESSMENT — ENCOUNTER SYMPTOMS
RESPIRATORY NEGATIVE: 1
DEPRESSION: 1
SENSORY CHANGE: 1
CHILLS: 0
WEAKNESS: 1
NERVOUS/ANXIOUS: 1
CARDIOVASCULAR NEGATIVE: 1
TREMORS: 1
GASTROINTESTINAL NEGATIVE: 1
FEVER: 0
EYES NEGATIVE: 1
MYALGIAS: 1
SEIZURES: 1

## 2023-09-28 ASSESSMENT — FIBROSIS 4 INDEX: FIB4 SCORE: 0.63

## 2023-09-28 ASSESSMENT — PAIN DESCRIPTION - PAIN TYPE
TYPE: ACUTE PAIN
TYPE: ACUTE PAIN
TYPE: ACUTE PAIN;CHRONIC PAIN
TYPE: ACUTE PAIN;CHRONIC PAIN

## 2023-09-28 NOTE — CARE PLAN
Problem: Fall Risk  Goal: Patient will remain free from falls  Outcome: Progressing     Problem: Pain - Standard  Goal: Alleviation of pain or a reduction in pain to the patient’s comfort goal  Outcome: Progressing     Problem: Knowledge Deficit - Standard  Goal: Patient and family/care givers will demonstrate understanding of plan of care, disease process/condition, diagnostic tests and medications  Outcome: Progressing   The patient is Stable - Low risk of patient condition declining or worsening    Shift Goals  Clinical Goals: MRI  Patient Goals: rest    Progress made toward(s) clinical / shift goals:  Patient updated on POC    Patient is not progressing towards the following goals:

## 2023-09-28 NOTE — PROGRESS NOTES
Patient states she is feeling very anxious and requested for her valium. Provided Rx per MAR. Completed bedside report with Terry SANCHEZ.

## 2023-09-28 NOTE — CARE PLAN
Problem: Fall Risk  Goal: Patient will remain free from falls  Outcome: Progressing     Problem: Pain - Standard  Goal: Alleviation of pain or a reduction in pain to the patient’s comfort goal  Outcome: Progressing     Problem: Knowledge Deficit - Standard  Goal: Patient and family/care givers will demonstrate understanding of plan of care, disease process/condition, diagnostic tests and medications  Outcome: Progressing   The patient is Watcher - Medium risk of patient condition declining or worsening    Shift Goals  Clinical Goals: MRI, PT/OT tomorrow.  Patient Goals: Rest and symptom relief.    Progress made toward(s) clinical / shift goals:  yes    Patient had another episode of SOB/anxiety. Ativan alleviated most of this. She continued to have some trouble maintaining 92% 02 or greater without a liter of oxygen per nasal canula. It seemed to be better when she slept on her side or raised the head of the bed to 45 degrees.

## 2023-09-28 NOTE — PROGRESS NOTES
Hospital Medicine Daily Progress Note    Date of Service  9/28/2023    Chief Complaint  Noy Rose is a 38 y.o. female admitted 9/27/2023 with seizure like activity, dizziness    Hospital Course  Ms. Noy Rose is a 38 y.o. female with past medical history of seizure disorder, anxiety and diabetes who presented 9/27/2023 with seizure-like episodes.      Patient reported for 5 days and morning between 9 AM to 12 PM she gets episode where she feels neck muscle tightness and blurry vision and feeling hallucination.  She also reported that she has weakness in her upper extremities as well as lower extremities.  That episode last for few minutes to hours.  There is no specific aggravating or elevating factors.  She has been taking her seizure medication as prescribed she follows Dr. Powell for neurology and she was last evaluated by him in June.  Patient also reported that she has a history of grand mal seizure but this is completely different from her seizure and her last grand mal seizure was long time ago.  She denies drinking alcohol, smoking or using any drugs.  She expressed that she drives.  She was started on paroxetine 2 months ago for her anxiety and she has upcoming appointment with psychiatrist for her anxiety.  She denies taking any new medication.  She was also diagnosed with UTI and she was prescribed antibiotic on September 25, 2023 and she has been taking it for 2 days.  Currently she denies symptoms of dysuria, frequency, flank pain, fever or symptoms of UTI.  She denies any other acute complaints or associated symptoms.  Patient's mother at the bedside and patient gave consent to discuss plan of care in front of her.     ER course: Patient lab work-up including urinalysis did not show any acute abnormalities.  She does not have symptoms of UTI and her UA did not show signs of UTI.  I discussed with patient and her mother to not to continue antibiotic to avoid adverse effect. Neurology  was consulted.  EEG order.  Patient admitted to hospital medicine for management of care.    During this hospital stay, we will pursue MRI brain, C and T-spine as recommended by neurology Dr. Bob.  EEG was also unremarkable for any signs of seizures at this time.       Interval Problem Update  -Patient seen and examined.  Patient reports significant lower extremity weakness and unable to move, however, patient was still unable to use bedside commode.  Also noted tremors on  all extremities more prominent with movement, however no tremors noted during fasts.  On examination while patient was moving from bed to commode, no tremors were noted.  -Plan of care: Continue to monitor patient; monitor for safety as patient is high risk for fall; pursue MRI brain, C and T-spine  -Disposition: Anticipated to stay overnight until MRI brain, C and T-spine has been ordered and resulted  -Lab work; reviewed; expected  -VSS at this time    I have discussed this patient's plan of care and discharge plan at IDT rounds today with Case Management, Nursing, Nursing leadership, and other members of the IDT team.    Consultants/Specialty  neurology    Code Status  Full Code    Disposition  The patient is not medically cleared for discharge to home or a post-acute facility.  Anticipate discharge to: home with close outpatient follow-up    I have placed the appropriate orders for post-discharge needs.    Review of Systems  Review of Systems   Constitutional:  Positive for malaise/fatigue. Negative for chills and fever.   HENT: Negative.     Eyes: Negative.    Respiratory: Negative.     Cardiovascular: Negative.    Gastrointestinal: Negative.    Genitourinary: Negative.    Musculoskeletal:  Positive for myalgias.   Skin: Negative.    Neurological:  Positive for tremors, sensory change, seizures and weakness.   Endo/Heme/Allergies: Negative.    Psychiatric/Behavioral:  Positive for depression. The patient is nervous/anxious.          Physical Exam  Temp:  [36.2 °C (97.2 °F)-36.7 °C (98.1 °F)] 36.7 °C (98.1 °F)  Pulse:  [] 97  Resp:  [16-22] 16  BP: (105-124)/(52-73) 124/59  SpO2:  [93 %-99 %] 96 %    Physical Exam  Vitals and nursing note reviewed.   Constitutional:       Appearance: She is obese.   HENT:      Head: Normocephalic.      Nose: Nose normal.      Mouth/Throat:      Mouth: Mucous membranes are moist.      Pharynx: Oropharynx is clear.   Cardiovascular:      Rate and Rhythm: Normal rate and regular rhythm.      Pulses: Normal pulses.      Heart sounds: Normal heart sounds.   Pulmonary:      Effort: Pulmonary effort is normal.      Breath sounds: Normal breath sounds.   Abdominal:      General: Bowel sounds are normal.      Palpations: Abdomen is soft.   Musculoskeletal:         General: Tenderness present.      Cervical back: Normal range of motion.   Skin:     General: Skin is dry.      Capillary Refill: Capillary refill takes 2 to 3 seconds.   Neurological:      Mental Status: She is alert. Mental status is at baseline.      Motor: Weakness present.         Fluids  No intake or output data in the 24 hours ending 09/28/23 1419    Laboratory  Recent Labs     09/27/23  1328 09/28/23  0133   WBC 10.3 11.0*   RBC 4.51 3.73*   HEMOGLOBIN 13.1 10.9*   HEMATOCRIT 43.3 35.3*   MCV 96.0 94.6   MCH 29.0 29.2   MCHC 30.3* 30.9*   RDW 50.4* 49.7   PLATELETCT 265 268   MPV 10.0 9.2     Recent Labs     09/27/23  1328 09/28/23  0133   SODIUM 139 139   POTASSIUM 4.4 4.0   CHLORIDE 104 105   CO2 22 24   GLUCOSE 102* 88   BUN 10 9   CREATININE 0.69 0.64   CALCIUM 9.1 9.0                   Imaging  MR-BRAIN-W/O    (Results Pending)   MR-CERVICAL SPINE-WITH & W/O    (Results Pending)   MR-THORACIC SPINE-WITH & W/O    (Results Pending)        Assessment/Plan  * Seizure-like activity (HCC)- (present on admission)  Assessment & Plan  Patient has a history of seizure disorder and now her current episodes concerning for seizures.  Neurology was  consulted.  EEG ordered - NEG for seizures  Seizure precautions.  Continue home meds  I discussed with patient if her symptoms will determine by neurology to be seizure she should not drive as it is dangerous for her and people around her and she expressed understanding.  Per neuro, obtain MRI brain, C and T-spine - PENDING    Obesity (BMI 30.0-34.9)  Assessment & Plan  Lifestyle modification.  Outpatient follow-up    Type 2 diabetes mellitus without complication, without long-term current use of insulin (HCC)  Assessment & Plan  Patient last HbA1c was 6.6  I started her on insulin sliding scale with hypoglycemia protocol.  I am holding patient outpatient metformin.  I ordered HbA1c.    Generalized anxiety disorder with panic attacks- (present on admission)  Assessment & Plan  Continue paroxetine and diazepam    Polycystic ovary syndrome- (present on admission)  Assessment & Plan  Continue outpatient metformin    Localization-related focal epilepsy with complex partial seizures (HCC)- (present on admission)  Assessment & Plan  Continue lamotrigine and gabapentin  Seizure precautions  Neurology consulte - Dr. Godoy         VTE prophylaxis:    enoxaparin ppx      I have performed a physical exam and reviewed and updated ROS and Plan today (9/28/2023). In review of yesterday's note (9/27/2023), there are no changes except as documented above.    I, Marion Castañeda DNP performed a substantiated portion of the service face-to-face with same patient on the same date of service INDEPENDENTLY from the MD on assessment, examination, and discussion and plan of care FOR 21 MINUTES.  I was personally involved in reviewing and conducting the medical decision making, including the information as described above.          Was The Patient On Physician Recommended Anticoagulation Therapy?: Please Select the Appropriate Response

## 2023-09-28 NOTE — PROGRESS NOTES
Neurology Progress Note  Neurohospitalist Service, [facility]    [facility] Phys[facility]: [facility]    Chief Complaint   Patient presents with    Dizziness     Episodes of inability to move bilateral upper extremities and neck x 5 days occurring between 9am-12pm daily. Episodes affect different areas of body. Hx epilepsy. GCS 15.        HPI: Refer to initial documented Neurology H&P, as detailed in the patient's chart.    Interval History:   Symptoms of weakness and diplopia improved yesterday evening but recurred again this am.     Review of systems: In addition to what is detailed in the HPI and/or updated in the interval history, all other systems reviewed and are negative.    Past Medical History:    has a past medical history of Anxiety, Head ache, Insulin resistance, Kidney cyst, acquired, and Kidney disease.    FHx:  family history includes Cancer in her maternal grandmother and paternal grandfather; Diabetes in her maternal uncle and mother; Hyperlipidemia in her maternal uncle; Hypertension in her maternal uncle.    SHx:   reports that she has quit smoking. Her smoking use included cigarettes. She has a 10.0 pack-year smoking history. She has never used smokeless tobacco. She reports that she does not drink alcohol and does not use drugs.    Medications:    Current Facility-Administered Medications:     LORazepam (Ativan) injection 0.5 mg, 0.5 mg, Intravenous, Q4HRS PRN, Froy Betancur M.D., 0.5 mg at 09/28/23 1401    senna-docusate (Pericolace Or Senokot S) 8.6-50 MG per tablet 2 Tablet, 2 Tablet, Oral, BID, 2 Tablet at 09/27/23 1914 **AND** polyethylene glycol/lytes (Miralax) PACKET 1 Packet, 1 Packet, Oral, QDAY PRN **AND** magnesium hydroxide (Milk Of Magnesia) suspension 30 mL, 30 mL, Oral, QDAY PRN **AND** bisacodyl (Dulcolax) suppository 10 mg, 10 mg, Rectal, QDAY PRN, Robbie Carver M.D.    enoxaparin (Lovenox) inj 40 mg, 40 mg, Subcutaneous, DAILY AT  1800, Robbie Carver M.D., 40 mg at 09/27/23 1914    acetaminophen (Tylenol) tablet 650 mg, 650 mg, Oral, Q6HRS PRN, Robbie Carver M.D., 650 mg at 09/28/23 0332    cetirizine (ZyrTEC) tablet 10 mg, 10 mg, Oral, DAILY, Robbie Carver M.D., 10 mg at 09/28/23 0614    diazePAM (Valium) tablet 2 mg, 2 mg, Oral, BID PRN, Robbie Carver M.D., 2 mg at 09/28/23 0903    gabapentin (Neurontin) capsule 300 mg, 300 mg, Oral, TID, Robbie Carver M.D., 300 mg at 09/28/23 1252    lamoTRIgine (LaMICtal) tablet 400 mg, 400 mg, Oral, BID, Robbie Carver M.D., 400 mg at 09/28/23 0614    lisinopril (Prinivil) tablet 10 mg, 10 mg, Oral, Q EVENING, Robbie Carver M.D.    omeprazole (PriLOSEC) capsule 20 mg, 20 mg, Oral, DAILY, Robbie Carver M.D., 20 mg at 09/28/23 0613    PARoxetine (Paxil) tablet 10 mg, 10 mg, Oral, Q EVENING, Robbie Carver M.D., 10 mg at 09/27/23 1915    insulin regular (HumuLIN R,NovoLIN R) injection, 1-6 Units, Subcutaneous, 4X/DAY ACHS, 1 Units at 09/28/23 1252 **AND** POC blood glucose manual result, , , Q AC AND BEDTIME(S) **AND** NOTIFY MD and PharmD, , , Once **AND** Administer 20 grams of glucose (approximately 8 ounces of fruit juice) every 15 minutes PRN FSBG less than 70 mg/dL, , , PRN **AND** dextrose 50% (D50W) injection 25 g, 25 g, Intravenous, Q15 MIN PRN, Robbie Carver M.D.    Physical Examination:     Vitals:    09/28/23 0300 09/28/23 0639 09/28/23 0703 09/28/23 1031   BP: 107/73 118/60  124/59   Pulse: 80 84 (!) 103 97   Resp: 16 16 (!) 22 16   Temp: 36.2 °C (97.2 °F) 36.3 °C (97.4 °F)  36.7 °C (98.1 °F)   TempSrc: Temporal Temporal  Temporal   SpO2: 93% 93% 97% 96%   Weight:       Height:           General: Patient is awake and in no acute distress    NEUROLOGICAL EXAM:     Mental status: Awake, alert and fully oriented, follows commands  Speech and language: speech is clear and fluent. The patient is able to name and  "repeat.  Cranial nerve exam: Pupils are equal, round and reactive to light bilaterally. Visual fields are full. Extraocular muscles are intact. Sensation in the face is intact to light touch. Face is symmetric. Hearing to finger rub equal. Palate elevates symmetrically. Shoulder shrug is full. Tongue is midline.  Motor exam: Strength is 5/5 in all extremities both distally and proximally. Tone is normal. No abnormal movements were seen on exam.  Sensory exam: No sensory deficits identified     Objective Data:    Labs:  No results found for: \"PROTHROMBTM\", \"INR\"   Lab Results   Component Value Date/Time    WBC 11.0 (H) 09/28/2023 01:33 AM    RBC 3.73 (L) 09/28/2023 01:33 AM    HEMOGLOBIN 10.9 (L) 09/28/2023 01:33 AM    HEMATOCRIT 35.3 (L) 09/28/2023 01:33 AM    MCV 94.6 09/28/2023 01:33 AM    MCH 29.2 09/28/2023 01:33 AM    MCHC 30.9 (L) 09/28/2023 01:33 AM    MPV 9.2 09/28/2023 01:33 AM    NEUTSPOLYS 47.50 09/28/2023 01:33 AM    LYMPHOCYTES 44.90 (H) 09/28/2023 01:33 AM    MONOCYTES 5.70 09/28/2023 01:33 AM    EOSINOPHILS 1.40 09/28/2023 01:33 AM    BASOPHILS 0.10 09/28/2023 01:33 AM      Lab Results   Component Value Date/Time    SODIUM 139 09/28/2023 01:33 AM    POTASSIUM 4.0 09/28/2023 01:33 AM    CHLORIDE 105 09/28/2023 01:33 AM    CO2 24 09/28/2023 01:33 AM    GLUCOSE 88 09/28/2023 01:33 AM    BUN 9 09/28/2023 01:33 AM    CREATININE 0.64 09/28/2023 01:33 AM      Lab Results   Component Value Date/Time    CHOLSTRLTOT 166 01/13/2023 10:57 AM    LDL 97 01/13/2023 10:57 AM    HDL 32 (A) 01/13/2023 10:57 AM    TRIGLYCERIDE 185 (H) 01/13/2023 10:57 AM       Lab Results   Component Value Date/Time    ALKPHOSPHAT 69 09/28/2023 01:33 AM    ASTSGOT 32 09/28/2023 01:33 AM    ALTSGPT 55 (H) 09/28/2023 01:33 AM    TBILIRUBIN 0.2 09/28/2023 01:33 AM        Imaging/Testing:  MRI brain, C, T spine pending.      Assessment and Plan:     Recurrent spells of dizziness/monocular diplopia followed by ascending weakness, lasting " several hours. No objective exam findings. No sensory changes of loss of b/b control. Etiology unclear. I will initiate workup for stroke/TIA and myelopathy. A functional/inorganic etiology is also possible.     Plan:  MRI brain, C, T spine   EEG nml  Continue lamictal 400 mg bid  Will follow    This chart was partially generated using voice recognition technology and sound alike word replacement may be present, best efforts were made to make the chart accurate.    Quirino Bob MD  Board Certified Neurology, ABPN

## 2023-09-28 NOTE — HOSPITAL COURSE
Ms. Noy Rose is a 38 y.o. female with past medical history of seizure disorder, anxiety and diabetes who presented 9/27/2023 with seizure-like episodes.      Patient reported for 5 days and morning between 9 AM to 12 PM she gets episode where she feels neck muscle tightness and blurry vision and feeling hallucination.  She also reported that she has weakness in her upper extremities as well as lower extremities.  That episode last for few minutes to hours.  There is no specific aggravating or elevating factors.  She has been taking her seizure medication as prescribed she follows Dr. Powell for neurology and she was last evaluated by him in June.  Patient also reported that she has a history of grand mal seizure but this is completely different from her seizure and her last grand mal seizure was long time ago.  She denies drinking alcohol, smoking or using any drugs.  She expressed that she drives.  She was started on paroxetine 2 months ago for her anxiety and she has upcoming appointment with psychiatrist for her anxiety.  She denies taking any new medication.  She was also diagnosed with UTI and she was prescribed antibiotic on September 25, 2023 and she has been taking it for 2 days.  Currently she denies symptoms of dysuria, frequency, flank pain, fever or symptoms of UTI.  She denies any other acute complaints or associated symptoms.  Patient's mother at the bedside and patient gave consent to discuss plan of care in front of her.     ER course: Patient lab work-up including urinalysis did not show any acute abnormalities.  She does not have symptoms of UTI and her UA did not show signs of UTI.  I discussed with patient and her mother to not to continue antibiotic to avoid adverse effect. Neurology was consulted.  EEG order.  Patient admitted to hospital medicine for management of care.    During this hospital stay, we will pursue MRI brain, C and T-spine as recommended by neurology Dr. Bob.   EEG was also unremarkable for any signs of seizures at this time.

## 2023-09-28 NOTE — PROGRESS NOTES
Patient arrived to CDU by transport from ER. Patient alert and following commands. Placed patient on tele box with arrival - okay per APRN. Patient unable to move from gurney to bed - patient was placed on slide board. Patient can move her legs but plantar/dorsal flexion is very weak. Patient states she has full sensation.  at arrival. Patient verbalizes that she still feels dizzy and has double vision. MRI screening complete. Mother at bedside. Waiting on pharmacy to approve medications. Will continue to monitor.

## 2023-09-28 NOTE — PROGRESS NOTES
Assumed patient care and received report from Terry SANCHEZ Assessment completed. Pt A&Ox 4. Respirations are even and unlabored on room air. Pt reports pain at this time. Monitor patient, VS stable, call light and belongings within reach. POC updated (MRI). Pt educated on room and call light, pt verbalized understanding. Communication board updated. Needs met.

## 2023-09-29 LAB
ANION GAP SERPL CALC-SCNC: 12 MMOL/L (ref 7–16)
BUN SERPL-MCNC: 9 MG/DL (ref 8–22)
CALCIUM SERPL-MCNC: 8.6 MG/DL (ref 8.5–10.5)
CHLORIDE SERPL-SCNC: 104 MMOL/L (ref 96–112)
CO2 SERPL-SCNC: 23 MMOL/L (ref 20–33)
CREAT SERPL-MCNC: 0.67 MG/DL (ref 0.5–1.4)
ERYTHROCYTE [DISTWIDTH] IN BLOOD BY AUTOMATED COUNT: 49.1 FL (ref 35.9–50)
GFR SERPLBLD CREATININE-BSD FMLA CKD-EPI: 113 ML/MIN/1.73 M 2
GLUCOSE BLD STRIP.AUTO-MCNC: 174 MG/DL (ref 65–99)
GLUCOSE BLD STRIP.AUTO-MCNC: 178 MG/DL (ref 65–99)
GLUCOSE BLD STRIP.AUTO-MCNC: 95 MG/DL (ref 65–99)
GLUCOSE BLD STRIP.AUTO-MCNC: 99 MG/DL (ref 65–99)
GLUCOSE SERPL-MCNC: 102 MG/DL (ref 65–99)
HCT VFR BLD AUTO: 36.5 % (ref 37–47)
HGB BLD-MCNC: 11.3 G/DL (ref 12–16)
LAMOTRIGINE SERPL-MCNC: 18.9 UG/ML (ref 3–15)
MCH RBC QN AUTO: 29.1 PG (ref 27–33)
MCHC RBC AUTO-ENTMCNC: 31 G/DL (ref 32.2–35.5)
MCV RBC AUTO: 94.1 FL (ref 81.4–97.8)
PLATELET # BLD AUTO: 267 K/UL (ref 164–446)
PMV BLD AUTO: 9 FL (ref 9–12.9)
POTASSIUM SERPL-SCNC: 3.8 MMOL/L (ref 3.6–5.5)
RBC # BLD AUTO: 3.88 M/UL (ref 4.2–5.4)
SODIUM SERPL-SCNC: 139 MMOL/L (ref 135–145)
WBC # BLD AUTO: 9 K/UL (ref 4.8–10.8)

## 2023-09-29 PROCEDURE — 700111 HCHG RX REV CODE 636 W/ 250 OVERRIDE (IP): Mod: JZ,UD | Performed by: INTERNAL MEDICINE

## 2023-09-29 PROCEDURE — 97163 PT EVAL HIGH COMPLEX 45 MIN: CPT

## 2023-09-29 PROCEDURE — A9270 NON-COVERED ITEM OR SERVICE: HCPCS | Mod: UD | Performed by: INTERNAL MEDICINE

## 2023-09-29 PROCEDURE — 85027 COMPLETE CBC AUTOMATED: CPT

## 2023-09-29 PROCEDURE — 95819 EEG AWAKE AND ASLEEP: CPT | Mod: 26 | Performed by: PSYCHIATRY & NEUROLOGY

## 2023-09-29 PROCEDURE — 99231 SBSQ HOSP IP/OBS SF/LOW 25: CPT | Performed by: STUDENT IN AN ORGANIZED HEALTH CARE EDUCATION/TRAINING PROGRAM

## 2023-09-29 PROCEDURE — 96372 THER/PROPH/DIAG INJ SC/IM: CPT

## 2023-09-29 PROCEDURE — 99214 OFFICE O/P EST MOD 30 MIN: CPT | Performed by: PSYCHIATRY & NEUROLOGY

## 2023-09-29 PROCEDURE — 700102 HCHG RX REV CODE 250 W/ 637 OVERRIDE(OP): Mod: UD | Performed by: INTERNAL MEDICINE

## 2023-09-29 PROCEDURE — 700101 HCHG RX REV CODE 250: Mod: UD | Performed by: NURSE PRACTITIONER

## 2023-09-29 PROCEDURE — 95819 EEG AWAKE AND ASLEEP: CPT | Performed by: PSYCHIATRY & NEUROLOGY

## 2023-09-29 PROCEDURE — 80048 BASIC METABOLIC PNL TOTAL CA: CPT

## 2023-09-29 PROCEDURE — 97535 SELF CARE MNGMENT TRAINING: CPT

## 2023-09-29 PROCEDURE — 700102 HCHG RX REV CODE 250 W/ 637 OVERRIDE(OP): Mod: UD | Performed by: NURSE PRACTITIONER

## 2023-09-29 PROCEDURE — 36415 COLL VENOUS BLD VENIPUNCTURE: CPT

## 2023-09-29 PROCEDURE — A9270 NON-COVERED ITEM OR SERVICE: HCPCS | Mod: UD | Performed by: NURSE PRACTITIONER

## 2023-09-29 PROCEDURE — 82962 GLUCOSE BLOOD TEST: CPT | Mod: 91

## 2023-09-29 PROCEDURE — 97165 OT EVAL LOW COMPLEX 30 MIN: CPT

## 2023-09-29 PROCEDURE — G0378 HOSPITAL OBSERVATION PER HR: HCPCS

## 2023-09-29 RX ADMIN — CETIRIZINE HYDROCHLORIDE 10 MG: 10 TABLET, FILM COATED ORAL at 04:37

## 2023-09-29 RX ADMIN — LAMOTRIGINE 400 MG: 100 TABLET ORAL at 17:56

## 2023-09-29 RX ADMIN — OMEPRAZOLE 20 MG: 20 CAPSULE, DELAYED RELEASE ORAL at 04:37

## 2023-09-29 RX ADMIN — LAMOTRIGINE 400 MG: 100 TABLET ORAL at 04:37

## 2023-09-29 RX ADMIN — INSULIN HUMAN 1 UNITS: 100 INJECTION, SOLUTION PARENTERAL at 20:54

## 2023-09-29 RX ADMIN — PAROXETINE HYDROCHLORIDE 10 MG: 20 TABLET, FILM COATED ORAL at 17:55

## 2023-09-29 RX ADMIN — LIDOCAINE 2 PATCH: 50 PATCH CUTANEOUS at 23:20

## 2023-09-29 RX ADMIN — DIAZEPAM 2 MG: 2 TABLET ORAL at 15:17

## 2023-09-29 RX ADMIN — GABAPENTIN 300 MG: 300 CAPSULE ORAL at 12:49

## 2023-09-29 RX ADMIN — SENNOSIDES AND DOCUSATE SODIUM 2 TABLET: 50; 8.6 TABLET ORAL at 04:38

## 2023-09-29 RX ADMIN — TRAMADOL HYDROCHLORIDE 50 MG: 50 TABLET ORAL at 08:43

## 2023-09-29 RX ADMIN — INSULIN HUMAN 1 UNITS: 100 INJECTION, SOLUTION PARENTERAL at 12:48

## 2023-09-29 RX ADMIN — TRAMADOL HYDROCHLORIDE 50 MG: 50 TABLET ORAL at 21:39

## 2023-09-29 RX ADMIN — GABAPENTIN 300 MG: 300 CAPSULE ORAL at 17:54

## 2023-09-29 RX ADMIN — TRAMADOL HYDROCHLORIDE 50 MG: 50 TABLET ORAL at 01:14

## 2023-09-29 RX ADMIN — SENNOSIDES AND DOCUSATE SODIUM 2 TABLET: 50; 8.6 TABLET ORAL at 17:54

## 2023-09-29 RX ADMIN — ACETAMINOPHEN 650 MG: 325 TABLET, FILM COATED ORAL at 15:16

## 2023-09-29 RX ADMIN — ENOXAPARIN SODIUM 40 MG: 100 INJECTION SUBCUTANEOUS at 17:54

## 2023-09-29 RX ADMIN — GABAPENTIN 300 MG: 300 CAPSULE ORAL at 04:38

## 2023-09-29 RX ADMIN — DIAZEPAM 2 MG: 2 TABLET ORAL at 08:43

## 2023-09-29 RX ADMIN — LISINOPRIL 10 MG: 10 TABLET ORAL at 17:55

## 2023-09-29 RX ADMIN — TRAMADOL HYDROCHLORIDE 50 MG: 50 TABLET ORAL at 15:17

## 2023-09-29 ASSESSMENT — PAIN DESCRIPTION - PAIN TYPE
TYPE: ACUTE PAIN;CHRONIC PAIN

## 2023-09-29 ASSESSMENT — COGNITIVE AND FUNCTIONAL STATUS - GENERAL
WALKING IN HOSPITAL ROOM: A LITTLE
DRESSING REGULAR LOWER BODY CLOTHING: A LITTLE
TOILETING: A LITTLE
STANDING UP FROM CHAIR USING ARMS: A LITTLE
MOVING TO AND FROM BED TO CHAIR: A LITTLE
DAILY ACTIVITIY SCORE: 20
DAILY ACTIVITIY SCORE: 22
SUGGESTED CMS G CODE MODIFIER MOBILITY: CK
SUGGESTED CMS G CODE MODIFIER DAILY ACTIVITY: CJ
CLIMB 3 TO 5 STEPS WITH RAILING: A LOT
SUGGESTED CMS G CODE MODIFIER DAILY ACTIVITY: CJ
DRESSING REGULAR UPPER BODY CLOTHING: A LITTLE
DRESSING REGULAR LOWER BODY CLOTHING: A LITTLE
MOVING FROM LYING ON BACK TO SITTING ON SIDE OF FLAT BED: A LITTLE
MOBILITY SCORE: 18
MOBILITY SCORE: 20
SUGGESTED CMS G CODE MODIFIER MOBILITY: CJ
WALKING IN HOSPITAL ROOM: A LITTLE
HELP NEEDED FOR BATHING: A LITTLE
STANDING UP FROM CHAIR USING ARMS: A LITTLE
HELP NEEDED FOR BATHING: A LITTLE
CLIMB 3 TO 5 STEPS WITH RAILING: A LOT

## 2023-09-29 ASSESSMENT — ENCOUNTER SYMPTOMS
NAUSEA: 0
DIARRHEA: 0
SPEECH CHANGE: 1
CONSTIPATION: 0
SENSORY CHANGE: 1
VOMITING: 0
FOCAL WEAKNESS: 1
FEVER: 0
ABDOMINAL PAIN: 0
BLURRED VISION: 0
MYALGIAS: 0
CHILLS: 0
SHORTNESS OF BREATH: 0

## 2023-09-29 ASSESSMENT — ACTIVITIES OF DAILY LIVING (ADL): TOILETING: INDEPENDENT

## 2023-09-29 NOTE — PROGRESS NOTES
Neurology Progress Note  Neurohospitalist Service, [facility]    Referring Physician: Marion Daniel*    Chief Complaint   Patient presents with    Dizziness     Episodes of inability to move bilateral upper extremities and neck x 5 days occurring between 9am-12pm daily. Episodes affect different areas of body. Hx epilepsy. GCS 15.        HPI: Refer to initial documented Neurology H&P, as detailed in the patient's chart.    Interval History:   Ongoing B LE weakness; UE weakness improved; endorses mild diplopia today.     Review of systems: In addition to what is detailed in the HPI and/or updated in the interval history, all other systems reviewed and are negative.    Past Medical History:    has a past medical history of Anxiety, Head ache, Insulin resistance, Kidney cyst, acquired, and Kidney disease.    FHx:  family history includes Cancer in her maternal grandmother and paternal grandfather; Diabetes in her maternal uncle and mother; Hyperlipidemia in her maternal uncle; Hypertension in her maternal uncle.    SHx:   reports that she has quit smoking. Her smoking use included cigarettes. She has a 10.0 pack-year smoking history. She has never used smokeless tobacco. She reports that she does not drink alcohol and does not use drugs.    Medications:    Current Facility-Administered Medications:     LORazepam (Ativan) injection 0.5 mg, 0.5 mg, Intravenous, Q4HRS PRN, Froy Betancur M.D., 0.5 mg at 09/28/23 1401    traMADol (Ultram) 50 MG tablet 50 mg, 50 mg, Oral, Q6HRS PRN, Marion Castañeda, A.P.R.N., 50 mg at 09/29/23 0843    lidocaine (Lidoderm) 5 % 2 Patch, 2 Patch, Transdermal, Q24HR, Beth Rebolledo, A.P.R.N., 2 Patch at 09/28/23 2301    senna-docusate (Pericolace Or Senokot S) 8.6-50 MG per tablet 2 Tablet, 2 Tablet, Oral, BID, 2 Tablet at 09/29/23 0438 **AND** polyethylene glycol/lytes (Miralax) PACKET 1 Packet, 1 Packet, Oral, QDAY PRN **AND** magnesium  hydroxide (Milk Of Magnesia) suspension 30 mL, 30 mL, Oral, QDAY PRN **AND** bisacodyl (Dulcolax) suppository 10 mg, 10 mg, Rectal, QDAY PRN, Robbie Carver M.D.    enoxaparin (Lovenox) inj 40 mg, 40 mg, Subcutaneous, DAILY AT 1800, Robbie Carver M.D., 40 mg at 09/28/23 1822    acetaminophen (Tylenol) tablet 650 mg, 650 mg, Oral, Q6HRS PRN, Robbie Carver M.D., 650 mg at 09/28/23 0332    cetirizine (ZyrTEC) tablet 10 mg, 10 mg, Oral, DAILY, Robbie Carver M.D., 10 mg at 09/29/23 0437    diazePAM (Valium) tablet 2 mg, 2 mg, Oral, BID PRN, Robbie Carver M.D., 2 mg at 09/29/23 0843    gabapentin (Neurontin) capsule 300 mg, 300 mg, Oral, TID, Robbie Carver M.D., 300 mg at 09/29/23 1249    lamoTRIgine (LaMICtal) tablet 400 mg, 400 mg, Oral, BID, Robbie Carver M.D., 400 mg at 09/29/23 0437    lisinopril (Prinivil) tablet 10 mg, 10 mg, Oral, Q EVENING, Robbie Carver M.D., 10 mg at 09/28/23 1821    omeprazole (PriLOSEC) capsule 20 mg, 20 mg, Oral, DAILY, Robbie Carver M.D., 20 mg at 09/29/23 0437    PARoxetine (Paxil) tablet 10 mg, 10 mg, Oral, Q EVENING, Robbie Carver M.D., 10 mg at 09/28/23 1823    insulin regular (HumuLIN R,NovoLIN R) injection, 1-6 Units, Subcutaneous, 4X/DAY ACHS, 1 Units at 09/29/23 1248 **AND** POC blood glucose manual result, , , Q AC AND BEDTIME(S) **AND** NOTIFY MD and PharmD, , , Once **AND** Administer 20 grams of glucose (approximately 8 ounces of fruit juice) every 15 minutes PRN FSBG less than 70 mg/dL, , , PRN **AND** dextrose 50% (D50W) injection 25 g, 25 g, Intravenous, Q15 MIN PRN, Robbie Carver M.D.    Physical Examination:     Vitals:    09/28/23 2004 09/29/23 0114 09/29/23 0337 09/29/23 0741   BP: 128/59  117/55    Pulse: 90  77 76   Resp: 18  16 16   Temp: 36.6 °C (97.9 °F)  36.6 °C (97.9 °F) 36.3 °C (97.3 °F)   TempSrc: Temporal  Temporal Temporal   SpO2: 96% 94% 91% 93%   Weight: 96.1 kg  "(211 lb 13.8 oz)      Height:           General: Patient is awake and in no acute distress    NEUROLOGICAL EXAM:     Mental status: Awake, alert and fully oriented, follows commands  Speech and language: speech is clear and fluent. The patient is able to name and repeat.  Cranial nerve exam: Pupils are equal, round and reactive to light bilaterally. Visual fields are full. Extraocular muscles are intact. Sensation in the face is intact to light touch. Face is symmetric. Hearing to finger rub equal. Palate elevates symmetrically. Shoulder shrug is full. Tongue is midline.  Motor exam: Strength is 5/5 in B upper extremities both distally and proximally. B LE weakness with HF, though inconsistent and appears to be giveway. Tone is normal. No abnormal movements were seen on exam.  Sensory exam: No sensory deficits identified     Objective Data:    Labs:  No results found for: \"PROTHROMBTM\", \"INR\"   Lab Results   Component Value Date/Time    WBC 9.0 09/29/2023 05:16 AM    RBC 3.88 (L) 09/29/2023 05:16 AM    HEMOGLOBIN 11.3 (L) 09/29/2023 05:16 AM    HEMATOCRIT 36.5 (L) 09/29/2023 05:16 AM    MCV 94.1 09/29/2023 05:16 AM    MCH 29.1 09/29/2023 05:16 AM    MCHC 31.0 (L) 09/29/2023 05:16 AM    MPV 9.0 09/29/2023 05:16 AM    NEUTSPOLYS 47.50 09/28/2023 01:33 AM    LYMPHOCYTES 44.90 (H) 09/28/2023 01:33 AM    MONOCYTES 5.70 09/28/2023 01:33 AM    EOSINOPHILS 1.40 09/28/2023 01:33 AM    BASOPHILS 0.10 09/28/2023 01:33 AM      Lab Results   Component Value Date/Time    SODIUM 139 09/29/2023 05:16 AM    POTASSIUM 3.8 09/29/2023 05:16 AM    CHLORIDE 104 09/29/2023 05:16 AM    CO2 23 09/29/2023 05:16 AM    GLUCOSE 102 (H) 09/29/2023 05:16 AM    BUN 9 09/29/2023 05:16 AM    CREATININE 0.67 09/29/2023 05:16 AM      Lab Results   Component Value Date/Time    CHOLSTRLTOT 166 01/13/2023 10:57 AM    LDL 97 01/13/2023 10:57 AM    HDL 32 (A) 01/13/2023 10:57 AM    TRIGLYCERIDE 185 (H) 01/13/2023 10:57 AM       Lab Results   Component " Value Date/Time    ALKPHOSPHAT 69 09/28/2023 01:33 AM    ASTSGOT 32 09/28/2023 01:33 AM    ALTSGPT 55 (H) 09/28/2023 01:33 AM    TBILIRUBIN 0.2 09/28/2023 01:33 AM        Imaging/Testing:  MRI brain, C, T spine pending.      Assessment and Plan:     Recurrent spells of dizziness/monocular diplopia followed by ascending weakness, lasting several hours. Weakness in her B LE appears to be inconsistent. No sensory changes of loss of b/b control. MRI brain unremarkable. MRI C spine with multilevel mod-severe stenosis. MRI T spine with degenerative disease but no stenosis. A functional/inorganic etiology is also possible.     Plan:  MRI brain, C, T spine as above  EEG nml x 2  Continue lamictal 400 mg bid  Recommend neurosurgery consult  She endorses significant stressors at home. Consider pyschiatry eval  Will follow    This chart was partially generated using voice recognition technology and sound alike word replacement may be present, best efforts were made to make the chart accurate.    Quirino Bob MD  Board Certified Neurology, ABPN

## 2023-09-29 NOTE — CARE PLAN
Problem: Seizure Precautions  Goal: Implementation of seizure precautions  Description: Target End Date:  Prior to discharge or change in level of care    1.  Padded side rails up at all times  2.  Suction equipment and oxygen delivery system at bedside  3.  Continuous pulse oximeter in use  4.  Implement fall precautions, bed alarm on, bed in lowest position  5.  IV access (per order)  6.  Provide low stimulus environment, avoid exposure to triggers  7.  Instruct patient to use call light/seizure button if having warning signs of impending seizure  Outcome: Progressing     Problem: Mobility  Goal: Patient's capacity to carry out activities will improve  Description: Target End Date:  Prior to discharge or change in level of care    1.  Assess for barriers to mobility/activity  2.  Implement activity per interdisciplinary team recommendations  3.  Target activity level identified and patient/family/caregiver aware of goal  4.  Provide assistive devices  5.  Instruct patient/caregiver on proper use of assistive/adaptive devices  6.  Schedule activities and rest periods to decrease effects of fatigue  7.  Encourage mobilization to extent of ability  8.  Maintain proper body alignment  9.  Provide adequate pain management to allow progressive mobilization  10. Implement pace maker precautions as needed  Outcome: Progressing   The patient is Stable - Low risk of patient condition declining or worsening    Shift Goals  Clinical Goals: monitor for seizures  Patient Goals: no seizures pain control  Family Goals: alisa    Progress made toward(s) clinical / shift goals:      Patient is not progressing towards the following goals:

## 2023-09-29 NOTE — PROGRESS NOTES
4 Eyes Skin Assessment Completed by DANIEL Murillo and DANIEL Todd.    Head WDL  Ears WDL  Nose WDL  Mouth WDL  Neck WDL  Breast/Chest WDL  Shoulder Blades WDL  Spine WDL  (R) Arm/Elbow/Hand Redness, Hives  (L) Arm/Elbow/Hand Redness, Hives  Abdomen Redness, Discoloration, Moisture (under pannus)  Groin WDL  Scrotum/Coccyx/Buttocks Redness, Blanching, and Discoloration  (R) Leg Redness, Hives  (L) Leg Redness, Hives  (R) Heel/Foot/Toe WDL  (L) Heel/Foot/Toe WDL          Devices In Places Pulse Ox and SCD's      Interventions In Place N/A    Possible Skin Injury No    Pictures Uploaded Into Epic N/A  Wound Consult Placed N/A  RN Wound Prevention Protocol Ordered No

## 2023-09-29 NOTE — THERAPY
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 with multiple degenerative changes on thoracic MRI; pt denies bowel and bladder changes or saddle paraesthesias; she does admit to a very stressed and strained relationship with her  and her son who has autism and ADHD; her mother at bedside reports she (mother) is a hypochondriac as well and needed redirection from self diagnosis/assisting with diagnosis from videos she is 'googling'; physical exam is inconsistent, per CNA has been up self without device; today demo'd purposeful facilitations in quads and tib ant during command following, reporting could not get her legs to edge of bed however is 5/5 with testing and able to stand and hold balance with stand by only; no hyperrflexive patellar response bilaterally; she does endorse MS in her biological father and is 'worried' about that being a possibility though no reports of demyelination on brain MRI, no temperature sensitivity; difficult to predict physical prognosis given inorganic appearance of symptoms; if pt feels she cannot dc home when medically plan of care is finished then recommend PMR consult for acute rehab as she is observation status; however, functionally is capable of dc home when medical plan of care is finished per pt abilities that day. Recommend outpatient PT referral for cervical findings and psych follow up as deemed medically appropriate for variable anxiety reports pt related during session;      Plan    Physical Therapy Initial Treatment Plan   Treatment Plan : Equipment, Bed Mobility, Gait Training, Manual Therapy, Neuro Re-Education / Balance, Self Care / Home Evaluation, Stair Training, Therapeutic Activities, Therapeutic Exercise  Treatment Frequency: 3 Times per Week  Duration: Until Therapy Goals Met    DC Equipment Recommendations: Unable to determine at this time  Discharge Recommendations: functionally capable of dc home once medical w/u completed, however if she does not feel she can dc to her home when appropriate would benefit from PMR/acute rehab for return to baseline;        Abridged Subjective/Objective     09/29/23 1225   Prior Living Situation   Prior Services None   Housing / Facility 1 Story House   Steps Into Home 4   Equipment Owned None   Lives with - Patient's Self Care Capacity Spouse;Child Less than 18 Years of Age   Comments reports her eldest has autism/ADHD and that her  does not deal well with it; pt reports she is the 2nd eldest of 12 children; father had MS per her report but was not in her life; mother at bedside reports she (mother) is a hypercondract and was looking up neuro videos on 'doctor Medigus' for diagnosis; pt reports high anxiety with her  and sometimes valium does not work for her; has been on for about a year; denies falls or prior limitations;   Prior Level of Functional Mobility   Bed Mobility Independent   Transfer Status Independent   Ambulation Independent   Ambulation Distance to tolerance   Assistive Devices Used None   Stairs Independent   Cognition    Cognition / Consciousness X   Level of Consciousness Alert   Comments purposeful slow speech initially; improves with continud conversation and when mother leaves hospital room;  and both children present by end of session, of note they all brought pt presents;   Strength Upper Body   Upper Body Strength  WDL   Sensation Upper Body   Upper Extremity Sensation  WDL   Passive ROM  Lower Body   Passive ROM Lower Body WDL   Strength Lower Body   Lower Body Strength  X   Comments reporting inability to extend toes to testing bilaterally, then holding sustained extension and DF after testing on both legs; unable to perform long arc quad on right then holding in complete extension with noted contract relax contractions in quads, no visiable effort in quad tendon to command for long arc quad; left resisting into extension with hamstrings then sustaining in extension until light touch assisting back to floor; able to stand with contact guard and no knee buckling/facilitations; confirmed with CNA pt has been ambulating with them to bathroom vs up self yesterday;   Sensation Lower Body   Lower Extremity Sensation   X   Comments reports baseline diabetic neuropathy bilatearlly up to shin, discussed how she tells temperature of water;   Balance Assessment   Sitting Balance (Static) Good   Sitting Balance (Dynamic) Fair +   Standing Balance (Static) Fair   Standing Balance (Dynamic) Fair   Weight Shift Sitting Good   Weight Shift Standing Fair   Comments B UE support in sitting/standing; no tremors or loss of balance but declined to walk; per CNA/RN/OT has been walking to the bathroom;   Bed Mobility    Supine to Sit Minimal Assist  (min A for LE complaints; raised HOB)   Sit to Supine Minimal Assist  (for LE management per pt request then able to SLR for SCDs)   Gait Analysis   Comments per CNA has been up supervision with/without device, 'could not' walk with this therapist;   Functional Mobility   Sit to Stand Standby Assist  (with FWW)   Bed, Chair, Wheelchair Transfer Standby Assist  (with FWW)   Edema / Skin Assessment   Edema / Skin  X   Comments noted hives in left LE   Patient / Family Goals    Patient / Family Goal #1 to improve strength   Short Term Goals    Short Term Goal # 1 Pt will perform supine<>sit from flat HOB/no railing with supervision within 6 visits to ensure independent mobility  at home.   Short Term Goal # 2 Pt will sit<>stand with FWW and supervision within 6 visits to ensure independent mobility at home.   Short Term Goal # 3 Pt will ambulate x 150ft with FWW and supervision within 6 visits to return to independence.   Short Term Goal # 4 Pt will ascend/descend 4 stairs B UE support to enter/exit home with CGA within 6 visits to enter/exit home.   Education Group   Role of Physical Therapist Patient Response Patient;Acceptance;Demonstration;Explanation;Verbal Demonstration;Action Demonstration   Use of Assistive Device Patient Response Patient;Acceptance;Explanation;Demonstration;Action Demonstration;Verbal Demonstration   Additional Comments outpatient follow up for PT; role of anxiety in recovery

## 2023-09-29 NOTE — PROCEDURES
VIDEO ELECTROENCEPHALOGRAM REPORT      Referring provider: Dr. Becker     DOS: 09/29/23 (total recording of 30 minutes).     INDICATION:  Noy Rose 38 y.o. female presenting with vision change     CURRENT ANTIEPILEPTIC REGIMEN: LTG and JOSÉ MIGUEL prn     TECHNIQUE: 30 channel video electroencephalogram (EEG) was performed in accordance with the international 10-20 system. The study was reviewed in bipolar and referential montages. The recording examined the patient during   awake, drowsy and sleep states    DESCRIPTION OF THE RECORD:  During the wakefulness, the background showed a symmetrical 9 Hz alpha activity posteriorly with amplitude of 70 mV.  Intermixed fast frequency was seen. There was reactivity to eye closure/opening.  A normal anterior-posterior gradient was noted with faster beta frequencies seen anteriorly.  During drowsiness, increased theta/beta frequencies were seen. During the sleep state,symmetrical sleep spindles and vertex sharps were seen in the leads over the central regions. No slow wave stage seen.     ACTIVATION PROCEDURES:     Hyperventilation was performed by the patient for a total of 3 minutes. The technician performing the test noted good effort. No physiological build up seen.     Intermittent Photic stimulation was performed in a stepwise fashion from 1 to 30 Hz and elicited no photic driving response.     ICTAL AND/OR INTERICTAL FINDINGS:   No focal or generalized epileptiform activity noted. No regional slowing was seen during this routine study.  No clinical events or seizures were reported or recorded during the study.     EKG: sampling of the EKG recording demonstrated sinus rhythm.     EVENTS: none     INTERPRETATION:    This is a normal video EEG recording in the awake, drowsy and sleep states.     Note: A normal EEG does not rule out epilepsy.  If the clinical suspicion remains high for seizures, a prolonged recording to capture clinical or subclinical events may be  helpful.    Dustin Pope MD  Diplomate in Neurology&Epilepsy  Office: [phone]  Fax: [phone]

## 2023-09-29 NOTE — CARE PLAN
The patient is Stable - Low risk of patient condition declining or worsening    Shift Goals  Clinical Goals: Monitor for seizures, pain management  Patient Goals: Rest  Family Goals: DEEPAK    Progress made toward(s) clinical / shift goals:    Problem: Fall Risk  Goal: Patient will remain free from falls  Outcome: Progressing  Note: Bed alarm on, call light within reach, and bed in the low position and locked.      Problem: Pain - Standard  Goal: Alleviation of pain or a reduction in pain to the patient’s comfort goal  Outcome: Progressing  Note: Provided pharmacologic and non-pharmacologic pain control options. Patient was able to rest comfortably throughout the night.      Problem: Seizure Precautions  Goal: Implementation of seizure precautions  Outcome: Progressing  Note: Padded side rails up at all times, suction equipment and oxygen delivery system at bedside, continuous pulse oximeter in use, and patent IV access in place as seizure precautions.      Problem: Mobility  Goal: Patient's capacity to carry out activities will improve  Outcome: Progressing  Note: Patient has 5/5 strength in the lower extremities. She was able to ambulate up to the bathroom with a hand held assist.        Patient is not progressing towards the following goals: N/A

## 2023-09-29 NOTE — THERAPY
"Occupational Therapy   Initial Evaluation     Patient Name: Noy Rose  Age:  38 y.o., Sex:  female  Medical Record #: [id number]  Today's Date: 9/29/2023     Precautions  Precautions: Fall Risk, Other (See Comments) (seizure activity)    Assessment  Pt is 38 y.o. female who present to [facility] with dizziness and inability to move BUEs and neck was seen for OT evaluation. During evaluation pt was able to complete bed and functional mobility with SBA, but pt reports fluctuating changes in LE strength and movement impacting her ability to move IND. Pt reports blurred and double vision. Pt was able to complete seated g/h EOB and washed hair in bed with HOB elevated with SPV. Pt presents with decreased functional independence, impaired functional mobility, and decreased strength, impacting her ability to participate in daily activities. Pt will benefit from skilled OT to maximize  functional independence. Recommend post-acute placement for additional occupational therapy services prior to discharge home.     Plan    Occupational Therapy Initial Treatment Plan   Treatment Interventions: Self Care / Activities of Daily Living, Adaptive Equipment, Neuro Re-Education / Balance, Therapeutic Exercises, Therapeutic Activity  Treatment Frequency: 3 Times per Week  Duration: Until Therapy Goals Met    DC Equipment Recommendations: Unable to determine at this time  Discharge Recommendations: Recommend post-acute placement for additional occupational therapy services prior to discharge home     Subjective    Pt reported \"my body feels like it normally feels before I have seizures\" when walking back from bathroom with RN.      Objective       09/29/23 0902   Charge Group   OT Evaluation OT Evaluation Low   Total Time Spent   OT Time Spent Yes   OT Evaluation (Minutes) 15   OT Total Time Spent (Calculated) 15   Initial Contact Note    Initial Contact Note Order Received and Verified, Occupational Therapy Evaluation in Progress " "with Full Report to Follow.   Prior Living Situation   Prior Services None   Housing / Facility 1 Story House   Equipment Owned None   Lives with - Patient's Self Care Capacity Spouse;Child Less than 18 Years of Age   Comments Pt lives with  and 2 kids under 18   Prior Level of ADL Function   Self Feeding Independent   Grooming / Hygiene Independent   Bathing Independent   Dressing Independent   Toileting Independent   Prior Level of IADL Function   Medication Management Independent   Laundry Independent   Kitchen Mobility Independent   Finances Independent   Home Management Independent   Shopping Independent   Prior Level Of Mobility Independent Without Device in Community;Independent Without Device in Home   Driving / Transportation Driving Independent   Occupation (Pre-Hospital Vocational) Homemaker   Precautions   Precautions Fall Risk;Other (See Comments)  (seizure activity)   Vitals   O2 Delivery Device None - Room Air   Pain   Pain Scales 0 to 10 Scale    Intervention Declines   Pain 0 - 10 Group   Location Unable to Evaluate   Therapist Pain Assessment Post Activity Pain Same as Prior to Activity;Nurse Notified  (Pt was walking with pt from bathroom, with pt reporting 'feeling I get before I have a seizure\")   Non Verbal Descriptors   Non Verbal Scale  Calm   Cognition    Cognition / Consciousness X   Speech/ Communication Dysarthric  (able to answer questions appropriately, but slowed speech observed)   Level of Consciousness Alert   Passive ROM Upper Body   Passive ROM Upper Body WDL   Active ROM Upper Body   Active ROM Upper Body  WDL   Dominant Hand Right   Strength Upper Body   Upper Body Strength  X   Comments BUE shoulder flexion, extension, abduction and adduction (4), elbow flexion, extension (4), wrist pronation, supination (4), B hand  strength (5)   Sensation Upper Body   Upper Extremity Sensation  WDL   Coordination Upper Body   Coordination WDL   Balance Assessment   Sitting Balance " (Static) Fair   Sitting Balance (Dynamic) Fair   Standing Balance (Static) Fair   Standing Balance (Dynamic) Fair -   Weight Shift Sitting Good   Weight Shift Standing Fair   Comments FWW   Bed Mobility    Sit to Supine Supervised   Scooting Supervised   Comments During evaluation pt was able to complete bed mobility with SPV, but pt reports fluctuating changes in LE strength and movement impacting her ability to move IND   ADL Assessment   Eating Independent   Grooming Seated;Independent   Bathing Independent  (washed hair in bed using shower cap)   Toileting Contact Guard Assist   How much help from another person does the patient currently need...   Putting on and taking off regular lower body clothing? 3   Bathing (including washing, rinsing, and drying)? 3   Toileting, which includes using a toilet, bedpan, or urinal? 4   Putting on and taking off regular upper body clothing? 4   Taking care of personal grooming such as brushing teeth? 4   Eating meals? 4   6 Clicks Daily Activity Score 22   Functional Mobility   Sit to Stand Standby Assist   Bed, Chair, Wheelchair Transfer Standby Assist   Toilet Transfers Standby Assist   Mobility bathroom <> sitting EOB <> back to bed  (FWW for functional ambulation in bathroom)   Comments During evaluation pt was able to complete mobility, but pt reports fluctuating changes in LE strength and movement impacting her ability to move IND   Visual Perception   Visual Perception  X   Comments blurred and double vision   Activity Tolerance   Sitting Edge of Bed 10   Standing 2   Patient / Family Goals   Patient / Family Goal #1 get better   Short Term Goals   Short Term Goal # 1 Pt will tolerate standing at sink to complete g/h ADLs with SPV   Short Term Goal # 2 Pt will complete LB dressing with SPV   Education Group   Education Provided Role of Occupational Therapist;Activities of Daily Living   Role of Occupational Therapist Patient Response  Patient;Acceptance;Explanation;Verbal Demonstration   ADL Patient Response Patient;Acceptance;Explanation;Verbal Demonstration;Action Demonstration   Occupational Therapy Initial Treatment Plan    Treatment Interventions Self Care / Activities of Daily Living;Adaptive Equipment;Neuro Re-Education / Balance;Therapeutic Exercises;Therapeutic Activity   Treatment Frequency 3 Times per Week   Duration Until Therapy Goals Met   Problem List   Problem List Decreased Active Daily Living Skills;Decreased Homemaking Skills;Decreased Upper Extremity Strength Right;Decreased Upper Extremity Strength Left;Decreased Functional Mobility;Decreased Activity Tolerance;Impaired Postural Control / Balance   Anticipated Discharge Equipment and Recommendations   DC Equipment Recommendations Unable to determine at this time   Discharge Recommendations Recommend post-acute placement for additional occupational therapy services prior to discharge home   Interdisciplinary Plan of Care Collaboration   IDT Collaboration with  Nursing   Patient Position at End of Therapy In Bed;Call Light within Reach;Other (Comments)   Collaboration Comments RN updated   Session Information   Date / Session Number  9/29 #1 (1/3, 10/5)

## 2023-09-30 LAB
APPEARANCE UR: CLEAR
BACTERIA #/AREA URNS HPF: NEGATIVE /HPF
BILIRUB UR QL STRIP.AUTO: NEGATIVE
COLOR UR: YELLOW
EPI CELLS #/AREA URNS HPF: NEGATIVE /HPF
GLUCOSE BLD STRIP.AUTO-MCNC: 124 MG/DL (ref 65–99)
GLUCOSE BLD STRIP.AUTO-MCNC: 126 MG/DL (ref 65–99)
GLUCOSE BLD STRIP.AUTO-MCNC: 166 MG/DL (ref 65–99)
GLUCOSE BLD STRIP.AUTO-MCNC: 93 MG/DL (ref 65–99)
GLUCOSE UR STRIP.AUTO-MCNC: NEGATIVE MG/DL
HYALINE CASTS #/AREA URNS LPF: ABNORMAL /LPF
KETONES UR STRIP.AUTO-MCNC: NEGATIVE MG/DL
LEUKOCYTE ESTERASE UR QL STRIP.AUTO: ABNORMAL
MICRO URNS: ABNORMAL
NITRITE UR QL STRIP.AUTO: NEGATIVE
PH UR STRIP.AUTO: 6 [PH] (ref 5–8)
PROT UR QL STRIP: NEGATIVE MG/DL
RBC # URNS HPF: ABNORMAL /HPF
RBC UR QL AUTO: NEGATIVE
SP GR UR STRIP.AUTO: 1.01
UROBILINOGEN UR STRIP.AUTO-MCNC: 0.2 MG/DL
WBC #/AREA URNS HPF: ABNORMAL /HPF

## 2023-09-30 PROCEDURE — 99214 OFFICE O/P EST MOD 30 MIN: CPT | Performed by: PSYCHIATRY & NEUROLOGY

## 2023-09-30 PROCEDURE — 700102 HCHG RX REV CODE 250 W/ 637 OVERRIDE(OP): Mod: UD | Performed by: NURSE PRACTITIONER

## 2023-09-30 PROCEDURE — G0378 HOSPITAL OBSERVATION PER HR: HCPCS

## 2023-09-30 PROCEDURE — 96376 TX/PRO/DX INJ SAME DRUG ADON: CPT

## 2023-09-30 PROCEDURE — 82962 GLUCOSE BLOOD TEST: CPT

## 2023-09-30 PROCEDURE — 700111 HCHG RX REV CODE 636 W/ 250 OVERRIDE (IP): Mod: JZ,UD | Performed by: INTERNAL MEDICINE

## 2023-09-30 PROCEDURE — 99231 SBSQ HOSP IP/OBS SF/LOW 25: CPT | Performed by: STUDENT IN AN ORGANIZED HEALTH CARE EDUCATION/TRAINING PROGRAM

## 2023-09-30 PROCEDURE — 700101 HCHG RX REV CODE 250: Mod: UD | Performed by: NURSE PRACTITIONER

## 2023-09-30 PROCEDURE — 700102 HCHG RX REV CODE 250 W/ 637 OVERRIDE(OP): Mod: UD | Performed by: INTERNAL MEDICINE

## 2023-09-30 PROCEDURE — 700111 HCHG RX REV CODE 636 W/ 250 OVERRIDE (IP): Mod: UD | Performed by: INTERNAL MEDICINE

## 2023-09-30 PROCEDURE — 81001 URINALYSIS AUTO W/SCOPE: CPT

## 2023-09-30 PROCEDURE — 96372 THER/PROPH/DIAG INJ SC/IM: CPT

## 2023-09-30 PROCEDURE — 700111 HCHG RX REV CODE 636 W/ 250 OVERRIDE (IP): Mod: UD | Performed by: STUDENT IN AN ORGANIZED HEALTH CARE EDUCATION/TRAINING PROGRAM

## 2023-09-30 PROCEDURE — A9270 NON-COVERED ITEM OR SERVICE: HCPCS | Mod: UD | Performed by: NURSE PRACTITIONER

## 2023-09-30 PROCEDURE — A9270 NON-COVERED ITEM OR SERVICE: HCPCS | Mod: UD | Performed by: INTERNAL MEDICINE

## 2023-09-30 RX ORDER — LORAZEPAM 2 MG/ML
0.5 INJECTION INTRAMUSCULAR EVERY 4 HOURS PRN
Status: DISCONTINUED | OUTPATIENT
Start: 2023-09-30 | End: 2023-10-01 | Stop reason: HOSPADM

## 2023-09-30 RX ADMIN — OMEPRAZOLE 20 MG: 20 CAPSULE, DELAYED RELEASE ORAL at 04:59

## 2023-09-30 RX ADMIN — LISINOPRIL 10 MG: 10 TABLET ORAL at 16:55

## 2023-09-30 RX ADMIN — LAMOTRIGINE 400 MG: 100 TABLET ORAL at 04:59

## 2023-09-30 RX ADMIN — ENOXAPARIN SODIUM 40 MG: 100 INJECTION SUBCUTANEOUS at 16:54

## 2023-09-30 RX ADMIN — ACETAMINOPHEN 650 MG: 325 TABLET, FILM COATED ORAL at 14:53

## 2023-09-30 RX ADMIN — GABAPENTIN 300 MG: 300 CAPSULE ORAL at 04:59

## 2023-09-30 RX ADMIN — TRAMADOL HYDROCHLORIDE 50 MG: 50 TABLET ORAL at 10:12

## 2023-09-30 RX ADMIN — LORAZEPAM 0.5 MG: 2 INJECTION INTRAMUSCULAR; INTRAVENOUS at 10:13

## 2023-09-30 RX ADMIN — LIDOCAINE 2 PATCH: 50 PATCH CUTANEOUS at 23:14

## 2023-09-30 RX ADMIN — INSULIN HUMAN 1 UNITS: 100 INJECTION, SOLUTION PARENTERAL at 16:48

## 2023-09-30 RX ADMIN — CETIRIZINE HYDROCHLORIDE 10 MG: 10 TABLET, FILM COATED ORAL at 04:59

## 2023-09-30 RX ADMIN — SENNOSIDES AND DOCUSATE SODIUM 2 TABLET: 50; 8.6 TABLET ORAL at 05:00

## 2023-09-30 RX ADMIN — PAROXETINE HYDROCHLORIDE 10 MG: 20 TABLET, FILM COATED ORAL at 16:54

## 2023-09-30 RX ADMIN — TRAMADOL HYDROCHLORIDE 50 MG: 50 TABLET ORAL at 23:13

## 2023-09-30 RX ADMIN — GABAPENTIN 300 MG: 300 CAPSULE ORAL at 11:57

## 2023-09-30 RX ADMIN — GABAPENTIN 300 MG: 300 CAPSULE ORAL at 16:54

## 2023-09-30 RX ADMIN — LORAZEPAM 0.5 MG: 2 INJECTION INTRAMUSCULAR; INTRAVENOUS at 23:14

## 2023-09-30 RX ADMIN — TRAMADOL HYDROCHLORIDE 50 MG: 50 TABLET ORAL at 16:15

## 2023-09-30 ASSESSMENT — ENCOUNTER SYMPTOMS
SHORTNESS OF BREATH: 0
FOCAL WEAKNESS: 1
DIARRHEA: 0
FEVER: 0
MYALGIAS: 0
CONSTIPATION: 0
ABDOMINAL PAIN: 0
VOMITING: 0
NAUSEA: 0
SPEECH CHANGE: 1
BLURRED VISION: 0
SENSORY CHANGE: 1
CHILLS: 0

## 2023-09-30 ASSESSMENT — PAIN DESCRIPTION - PAIN TYPE: TYPE: ACUTE PAIN

## 2023-09-30 NOTE — CARE PLAN
The patient is Stable - Low risk of patient condition declining or worsening    Shift Goals  Clinical Goals: Monitor for seizures, mobility, safety  Patient Goals: Pain control, rest  Family Goals: DEEPAK    Progress made toward(s) clinical / shift goals:    Problem: Fall Risk  Goal: Patient will remain free from falls  Outcome: Progressing  Note: Bed alarm on, bed in the low position and locked, and call light within reach as fall precautions.      Problem: Pain - Standard  Goal: Alleviation of pain or a reduction in pain to the patient’s comfort goal  Outcome: Progressing  Note: Provided pharmacologic and non-pharmacologic pain management options. Patient was able to rest comfortably throughout the night.     Problem: Seizure Precautions  Goal: Implementation of seizure precautions  Outcome: Progressing  Note: Padded side rails up at all times, suction equipment and oxygen delivery system at bedside, continuous pulse oximeter in use, and patent IV access in place as seizure precautions.     Problem: Mobility  Goal: Patient's capacity to carry out activities will improve  Outcome: Progressing  Note: Mobilized patient to the bathroom as hand held assist with the front wheel walker. Patient tolerated ambulation well and is steady with some general weakness.        Patient is not progressing towards the following goals: N/A

## 2023-09-30 NOTE — PROGRESS NOTES
Hospital Medicine Daily Progress Note    Date of Service  9/30/2023    Chief Complaint  Noy Rose is a 38 y.o. female admitted 9/27/2023 with seizure like activity, dizziness    Hospital Course  Ms. Noy Rose is a 38 y.o. female with past medical history of seizure disorder, anxiety and diabetes who presented 9/27/2023 with seizure-like episodes.      Patient reported for 5 days and morning between 9 AM to 12 PM she gets episode where she feels neck muscle tightness and blurry vision and feeling hallucination.  She also reported that she has weakness in her upper extremities as well as lower extremities.  That episode last for few minutes to hours.  There is no specific aggravating or elevating factors.  She has been taking her seizure medication as prescribed she follows Dr. Powell for neurology and she was last evaluated by him in June.  Patient also reported that she has a history of grand mal seizure but this is completely different from her seizure and her last grand mal seizure was long time ago.  She denies drinking alcohol, smoking or using any drugs.  She expressed that she drives.  She was started on paroxetine 2 months ago for her anxiety and she has upcoming appointment with psychiatrist for her anxiety.  She denies taking any new medication.  She was also diagnosed with UTI and she was prescribed antibiotic on September 25, 2023 and she has been taking it for 2 days.  Currently she denies symptoms of dysuria, frequency, flank pain, fever or symptoms of UTI.  She denies any other acute complaints or associated symptoms.  Patient's mother at the bedside and patient gave consent to discuss plan of care in front of her.     ER course: Patient lab work-up including urinalysis did not show any acute abnormalities.  She does not have symptoms of UTI and her UA did not show signs of UTI.  I discussed with patient and her mother to not to continue antibiotic to avoid adverse effect. Neurology  was consulted.  EEG order.  Patient admitted to hospital medicine for management of care.    During this hospital stay, we will pursue MRI brain, C and T-spine as recommended by neurology Dr. Bob.  EEG was also unremarkable for any signs of seizures at this time.       Interval Problem Update  Neuro recs holding lamictal and observing as lvl is high, pt continues to have episodes though felt less severe today, pt notes dysuria so checking UA    I have discussed this patient's plan of care and discharge plan at IDT rounds today with Case Management, Nursing, Nursing leadership, and other members of the IDT team.    Consultants/Specialty  neurology    Code Status  Full Code    Disposition  The patient is not medically cleared for discharge to home or a post-acute facility.  Anticipate discharge to: home with organized home healthcare and close outpatient follow-up    I have placed the appropriate orders for post-discharge needs.    Review of Systems  Review of Systems   Constitutional:  Positive for malaise/fatigue. Negative for chills and fever.   HENT:  Negative for congestion.    Eyes:  Negative for blurred vision.   Respiratory:  Negative for shortness of breath.    Cardiovascular:  Negative for chest pain and leg swelling.   Gastrointestinal:  Negative for abdominal pain, constipation, diarrhea, nausea and vomiting.   Genitourinary:  Negative for dysuria.   Musculoskeletal:  Negative for myalgias.   Skin:  Negative for rash.   Neurological:  Positive for sensory change, speech change and focal weakness.        Physical Exam  Temp:  [36.2 °C (97.1 °F)-36.6 °C (97.9 °F)] 36.2 °C (97.1 °F)  Pulse:  [76-96] 76  Resp:  [16-18] 17  BP: (114-140)/(56-64) 114/64  SpO2:  [90 %-95 %] 91 %    Physical Exam  Constitutional:       General: She is not in acute distress.     Appearance: Normal appearance.   HENT:      Head: Normocephalic and atraumatic.      Nose: Nose normal.      Mouth/Throat:      Mouth: Mucous membranes  are moist.      Pharynx: Oropharynx is clear.   Eyes:      Conjunctiva/sclera: Conjunctivae normal.      Pupils: Pupils are equal, round, and reactive to light.   Cardiovascular:      Rate and Rhythm: Normal rate and regular rhythm.      Heart sounds: No murmur heard.  Pulmonary:      Effort: Pulmonary effort is normal.      Breath sounds: No wheezing, rhonchi or rales.   Abdominal:      General: There is no distension.      Palpations: Abdomen is soft.      Tenderness: There is no abdominal tenderness. There is no guarding or rebound.   Musculoskeletal:         General: Tenderness present. No swelling.      Cervical back: Normal range of motion and neck supple.   Skin:     General: Skin is warm and dry.   Neurological:      Mental Status: She is alert.      GCS: GCS eye subscore is 4. GCS verbal subscore is 5. GCS motor subscore is 6.      Cranial Nerves: No facial asymmetry.      Sensory: Sensory deficit present.      Motor: Weakness present.   Psychiatric:         Mood and Affect: Mood normal.         Behavior: Behavior normal.         Fluids    Intake/Output Summary (Last 24 hours) at 9/30/2023 1422  Last data filed at 9/30/2023 0400  Gross per 24 hour   Intake 237 ml   Output --   Net 237 ml         Laboratory  Recent Labs     09/28/23  0133 09/29/23  0516   WBC 11.0* 9.0   RBC 3.73* 3.88*   HEMOGLOBIN 10.9* 11.3*   HEMATOCRIT 35.3* 36.5*   MCV 94.6 94.1   MCH 29.2 29.1   MCHC 30.9* 31.0*   RDW 49.7 49.1   PLATELETCT 268 267   MPV 9.2 9.0       Recent Labs     09/28/23  0133 09/29/23  0516   SODIUM 139 139   POTASSIUM 4.0 3.8   CHLORIDE 105 104   CO2 24 23   GLUCOSE 88 102*   BUN 9 9   CREATININE 0.64 0.67   CALCIUM 9.0 8.6                     Imaging  MR-BRAIN-W/O   Final Result      Unremarkable noncontrast MR examination of the brain.      MR-CERVICAL SPINE-WITH & W/O   Final Result      1.  Multifocal degenerative disease in the cervical spine as described above.   2.  Severe central canal stenosis at C6-7  and C7-T1.   3.  Moderate to severe central canal stenosis at C5-6.   4.  Moderate central canal stenosis at C5-6 and C6-7.   5.  Multifocal neural foraminal stenosis as described above.      MR-THORACIC SPINE-WITH & W/O   Final Result      Multifocal degenerative disease in the thoracic spine as described above.             Assessment/Plan  * Seizure-like activity (HCC)- (present on admission)  Assessment & Plan  Patient has a history of seizure disorder and now her current episodes concerning for seizures.  MRI T spine showed DJD, MRI C-spine showed several areas of mod-severe stenosis, MRI brain wnl  EEG ordered - NEG for seizures x2 including during her normal event time  Seizure precautions.  Continue home meds  Neurology was consulted, unlikely seizures possible functional component, rec NS consult for spinal stenosis, discussed case with them rec outpatient f/u  Concern for Lamictal toxicity as level is elevated, will hold and observe       Obesity (BMI 30.0-34.9)  Assessment & Plan  Lifestyle modification.  Outpatient follow-up    Type 2 diabetes mellitus without complication, without long-term current use of insulin (HCC)  Assessment & Plan  Patient last HbA1c was 6.6  insulin sliding scale with hypoglycemia protocol.  outpatient metformin.      Generalized anxiety disorder with panic attacks- (present on admission)  Assessment & Plan  Continue paroxetine and diazepam    Polycystic ovary syndrome- (present on admission)  Assessment & Plan  Continue outpatient metformin    Localization-related focal epilepsy with complex partial seizures (HCC)- (present on admission)  Assessment & Plan  Continue gabapentin  Seizure precautions  Neurology consulted  Holding Lamictal          VTE prophylaxis:    enoxaparin ppx      I have performed a physical exam and reviewed and updated ROS and Plan today (9/30/2023). In review of yesterday's note (9/29/2023), there are no changes except as documented above.    I

## 2023-09-30 NOTE — PROGRESS NOTES
Neurology Progress Note        Subjective:  Laly reports she is experiencing one of her spells today although it is not quite as severe.  Her arms don't feel as bad.  She says her spells start with blurry or double vision then progress to a feeling like her limbs won't work.    Objective:  Vitals:  Vitals:    09/29/23 1544 09/29/23 1917 09/30/23 0311 09/30/23 0800   BP: (!) 140/56 118/62 117/63 114/64   Pulse: 80 96 82 76   Resp: 16 18 16 17   Temp: 36.6 °C (97.9 °F) 36.5 °C (97.7 °F) 36.2 °C (97.1 °F)    TempSrc: Temporal Temporal Temporal Temporal   SpO2: 95% 95% 90% 91%   Weight:       Height:         General:  Awake, alert and in no apparent distress, cooperative with exam  Mental Status:  Alert, oriented times 3, speech is fluent, comprehension is intact.  Cranial Nerves:  PERRL, EOMI with no nystagmus, face is symmetric, facial sensation is intact.  No dysarthria.  Motor: No drift in the arms, mild action tremor left greater than right.  She has difficulty lifting her legs off the bed, but quad/hamstring strength and ankle df/pf are all 5/5.  Reflexes:  2+ and symmetric at the patellas with toes downgoing bilaterally  Coordination:  Normal finger to nose bilaterally  Gait:  Deferred    Recent Labs     09/27/23  1328 09/28/23  0133 09/29/23  0516   WBC 10.3 11.0* 9.0   RBC 4.51 3.73* 3.88*   HEMOGLOBIN 13.1 10.9* 11.3*   HEMATOCRIT 43.3 35.3* 36.5*   MCV 96.0 94.6 94.1   MCH 29.0 29.2 29.1   MCHC 30.3* 30.9* 31.0*   RDW 50.4* 49.7 49.1   PLATELETCT 265 268 267   MPV 10.0 9.2 9.0     Recent Labs     09/27/23  1328 09/28/23  0133 09/29/23  0516   SODIUM 139 139 139   POTASSIUM 4.4 4.0 3.8   CHLORIDE 104 105 104   CO2 22 24 23   GLUCOSE 102* 88 102*   BUN 10 9 9   CREATININE 0.69 0.64 0.67   CALCIUM 9.1 9.0 8.6                     Recent Labs     09/27/23  1328 09/28/23  0133 09/29/23  0516   SODIUM 139 139 139   POTASSIUM 4.4 4.0 3.8   CHLORIDE 104 105 104   CO2 22 24 23   GLUCOSE 102* 88 102*   BUN 10 9 9      Recent Labs     09/27/23  1328 09/28/23  0133 09/29/23  0516   SODIUM 139 139 139   POTASSIUM 4.4 4.0 3.8   CHLORIDE 104 105 104   CO2 22 24 23   BUN 10 9 9   CREATININE 0.69 0.64 0.67   CALCIUM 9.1 9.0 8.6         No results found for this or any previous visit.           Imaging: neuroimaging reviewed and directly visualized by me  MR-BRAIN-W/O   Final Result      Unremarkable noncontrast MR examination of the brain.      MR-CERVICAL SPINE-WITH & W/O   Final Result      1.  Multifocal degenerative disease in the cervical spine as described above.   2.  Severe central canal stenosis at C6-7 and C7-T1.   3.  Moderate to severe central canal stenosis at C5-6.   4.  Moderate central canal stenosis at C5-6 and C6-7.   5.  Multifocal neural foraminal stenosis as described above.      MR-THORACIC SPINE-WITH & W/O   Final Result      Multifocal degenerative disease in the thoracic spine as described above.          Impression:   Recurrent spells of dizziness/diplopia followed by ascending weakness, lasting several hours. Weakness in her B LE appears to be inconsistent. No sensory changes of loss of b/b control. MRI brain unremarkable. MRI C spine with multilevel mod-severe stenosis but would not really explain intermittent symptoms. MRI T spine with degenerative disease but no stenosis. Her lamictal level is above the therapeutic range.  Lamictal toxicity can cause ataxia, tremor, dizziness and double vision so it certainly possible this is causing some symptoms she is experiencing, however, likely not all.  A functional/inorganic etiology is also possible.     Plan:  Hold Lamictal dose tonight and tomorrow and see how she does symptom wise.  PT, OT and Speech therapy  She endorses significant stressors at home. Consider pyschiatry eval  Will follow    Irving Moreno MD

## 2023-09-30 NOTE — PROGRESS NOTES
Hospital Medicine Daily Progress Note    Date of Service  9/29/2023    Chief Complaint  Noy Rose is a 38 y.o. female admitted 9/27/2023 with seizure like activity, dizziness    Hospital Course  Ms. Noy Rose is a 38 y.o. female with past medical history of seizure disorder, anxiety and diabetes who presented 9/27/2023 with seizure-like episodes.      Patient reported for 5 days and morning between 9 AM to 12 PM she gets episode where she feels neck muscle tightness and blurry vision and feeling hallucination.  She also reported that she has weakness in her upper extremities as well as lower extremities.  That episode last for few minutes to hours.  There is no specific aggravating or elevating factors.  She has been taking her seizure medication as prescribed she follows Dr. Powell for neurology and she was last evaluated by him in June.  Patient also reported that she has a history of grand mal seizure but this is completely different from her seizure and her last grand mal seizure was long time ago.  She denies drinking alcohol, smoking or using any drugs.  She expressed that she drives.  She was started on paroxetine 2 months ago for her anxiety and she has upcoming appointment with psychiatrist for her anxiety.  She denies taking any new medication.  She was also diagnosed with UTI and she was prescribed antibiotic on September 25, 2023 and she has been taking it for 2 days.  Currently she denies symptoms of dysuria, frequency, flank pain, fever or symptoms of UTI.  She denies any other acute complaints or associated symptoms.  Patient's mother at the bedside and patient gave consent to discuss plan of care in front of her.     ER course: Patient lab work-up including urinalysis did not show any acute abnormalities.  She does not have symptoms of UTI and her UA did not show signs of UTI.  I discussed with patient and her mother to not to continue antibiotic to avoid adverse effect. Neurology  was consulted.  EEG order.  Patient admitted to hospital medicine for management of care.    During this hospital stay, we will pursue MRI brain, C and T-spine as recommended by neurology Dr. Bob.  EEG was also unremarkable for any signs of seizures at this time.       Interval Problem Update  Evaluated pt in morning after nurse stated pt felt like seizure coming on, speaking in short spurts states her arm movements are slowed and lacks strength in legs, Imaging/eeg results suggest likely nonorganic, PT/OT with inconsistent exams, discussed with neuro rec psych and NS consult, NS rec outpt f/u    I have discussed this patient's plan of care and discharge plan at IDT rounds today with Case Management, Nursing, Nursing leadership, and other members of the IDT team.    Consultants/Specialty  neurology    Code Status  Full Code    Disposition  The patient is not medically cleared for discharge to home or a post-acute facility.  Anticipate discharge to: home with close outpatient follow-up    I have placed the appropriate orders for post-discharge needs.    Review of Systems  Review of Systems   Constitutional:  Positive for malaise/fatigue. Negative for chills and fever.   HENT:  Negative for congestion.    Eyes:  Negative for blurred vision.   Respiratory:  Negative for shortness of breath.    Cardiovascular:  Negative for chest pain and leg swelling.   Gastrointestinal:  Negative for abdominal pain, constipation, diarrhea, nausea and vomiting.   Genitourinary:  Negative for dysuria.   Musculoskeletal:  Negative for myalgias.   Skin:  Negative for rash.   Neurological:  Positive for sensory change, speech change and focal weakness.        Physical Exam  Temp:  [36.3 °C (97.3 °F)-36.6 °C (97.9 °F)] 36.6 °C (97.9 °F)  Pulse:  [76-90] 80  Resp:  [16-18] 16  BP: (117-140)/(55-59) 140/56  SpO2:  [91 %-96 %] 95 %    Physical Exam  Constitutional:       General: She is not in acute distress.     Appearance: Normal  appearance.   HENT:      Head: Normocephalic and atraumatic.      Nose: Nose normal.      Mouth/Throat:      Mouth: Mucous membranes are moist.      Pharynx: Oropharynx is clear.   Eyes:      Conjunctiva/sclera: Conjunctivae normal.      Pupils: Pupils are equal, round, and reactive to light.   Cardiovascular:      Rate and Rhythm: Normal rate and regular rhythm.      Heart sounds: No murmur heard.  Pulmonary:      Effort: Pulmonary effort is normal.      Breath sounds: No wheezing, rhonchi or rales.   Abdominal:      General: There is no distension.      Palpations: Abdomen is soft.      Tenderness: There is no abdominal tenderness. There is no guarding or rebound.   Musculoskeletal:         General: Tenderness present. No swelling.      Cervical back: Normal range of motion and neck supple.   Skin:     General: Skin is warm and dry.   Neurological:      Mental Status: She is alert.      GCS: GCS eye subscore is 4. GCS verbal subscore is 5. GCS motor subscore is 6.      Cranial Nerves: No facial asymmetry.      Motor: Weakness present.   Psychiatric:         Mood and Affect: Mood normal.         Behavior: Behavior normal.         Fluids    Intake/Output Summary (Last 24 hours) at 9/29/2023 1709  Last data filed at 9/29/2023 0448  Gross per 24 hour   Intake 637 ml   Output 0 ml   Net 637 ml       Laboratory  Recent Labs     09/27/23  1328 09/28/23  0133 09/29/23  0516   WBC 10.3 11.0* 9.0   RBC 4.51 3.73* 3.88*   HEMOGLOBIN 13.1 10.9* 11.3*   HEMATOCRIT 43.3 35.3* 36.5*   MCV 96.0 94.6 94.1   MCH 29.0 29.2 29.1   MCHC 30.3* 30.9* 31.0*   RDW 50.4* 49.7 49.1   PLATELETCT 265 268 267   MPV 10.0 9.2 9.0       Recent Labs     09/27/23  1328 09/28/23  0133 09/29/23  0516   SODIUM 139 139 139   POTASSIUM 4.4 4.0 3.8   CHLORIDE 104 105 104   CO2 22 24 23   GLUCOSE 102* 88 102*   BUN 10 9 9   CREATININE 0.69 0.64 0.67   CALCIUM 9.1 9.0 8.6                     Imaging  MR-BRAIN-W/O   Final Result      Unremarkable  noncontrast MR examination of the brain.      MR-CERVICAL SPINE-WITH & W/O   Final Result      1.  Multifocal degenerative disease in the cervical spine as described above.   2.  Severe central canal stenosis at C6-7 and C7-T1.   3.  Moderate to severe central canal stenosis at C5-6.   4.  Moderate central canal stenosis at C5-6 and C6-7.   5.  Multifocal neural foraminal stenosis as described above.      MR-THORACIC SPINE-WITH & W/O   Final Result      Multifocal degenerative disease in the thoracic spine as described above.             Assessment/Plan  * Seizure-like activity (HCC)- (present on admission)  Assessment & Plan  Patient has a history of seizure disorder and now her current episodes concerning for seizures.  MRI T spine showed DJD, MRI C-spine showed several areas of mod-severe stenosis, MRI brain wnl  EEG ordered - NEG for seizures x2 including during her normal event time  Seizure precautions.  Continue home meds  Neurology was consulted, unlikely seizures rec NS consult, discussed case with them rec outpatient f/u      Obesity (BMI 30.0-34.9)  Assessment & Plan  Lifestyle modification.  Outpatient follow-up    Type 2 diabetes mellitus without complication, without long-term current use of insulin (HCC)  Assessment & Plan  Patient last HbA1c was 6.6  insulin sliding scale with hypoglycemia protocol.  outpatient metformin.      Generalized anxiety disorder with panic attacks- (present on admission)  Assessment & Plan  Continue paroxetine and diazepam    Polycystic ovary syndrome- (present on admission)  Assessment & Plan  Continue outpatient metformin    Localization-related focal epilepsy with complex partial seizures (HCC)- (present on admission)  Assessment & Plan  Continue lamotrigine and gabapentin  Seizure precautions  Neurology consulted         VTE prophylaxis:    enoxaparin ppx      I have performed a physical exam and reviewed and updated ROS and Plan today (9/29/2023). In review of  yesterday's note (9/28/2023), there are no changes except as documented above.    I

## 2023-10-01 ENCOUNTER — PHARMACY VISIT (OUTPATIENT)
Dept: PHARMACY | Facility: MEDICAL CENTER | Age: 38
End: 2023-10-01
Payer: COMMERCIAL

## 2023-10-01 VITALS
HEIGHT: 65 IN | BODY MASS INDEX: 34.64 KG/M2 | RESPIRATION RATE: 16 BRPM | HEART RATE: 85 BPM | DIASTOLIC BLOOD PRESSURE: 57 MMHG | TEMPERATURE: 98.1 F | SYSTOLIC BLOOD PRESSURE: 107 MMHG | OXYGEN SATURATION: 90 % | WEIGHT: 207.89 LBS

## 2023-10-01 DIAGNOSIS — T78.40XA ALLERGY, INITIAL ENCOUNTER: ICD-10-CM

## 2023-10-01 LAB
ANION GAP SERPL CALC-SCNC: 11 MMOL/L (ref 7–16)
BUN SERPL-MCNC: 10 MG/DL (ref 8–22)
CALCIUM SERPL-MCNC: 8.9 MG/DL (ref 8.5–10.5)
CHLORIDE SERPL-SCNC: 104 MMOL/L (ref 96–112)
CO2 SERPL-SCNC: 24 MMOL/L (ref 20–33)
CREAT SERPL-MCNC: 0.74 MG/DL (ref 0.5–1.4)
ERYTHROCYTE [DISTWIDTH] IN BLOOD BY AUTOMATED COUNT: 48.2 FL (ref 35.9–50)
GFR SERPLBLD CREATININE-BSD FMLA CKD-EPI: 105 ML/MIN/1.73 M 2
GLUCOSE BLD STRIP.AUTO-MCNC: 110 MG/DL (ref 65–99)
GLUCOSE BLD STRIP.AUTO-MCNC: 170 MG/DL (ref 65–99)
GLUCOSE SERPL-MCNC: 116 MG/DL (ref 65–99)
HCT VFR BLD AUTO: 36.5 % (ref 37–47)
HGB BLD-MCNC: 11.6 G/DL (ref 12–16)
MCH RBC QN AUTO: 29.7 PG (ref 27–33)
MCHC RBC AUTO-ENTMCNC: 31.8 G/DL (ref 32.2–35.5)
MCV RBC AUTO: 93.6 FL (ref 81.4–97.8)
PLATELET # BLD AUTO: 237 K/UL (ref 164–446)
PMV BLD AUTO: 9.2 FL (ref 9–12.9)
POTASSIUM SERPL-SCNC: 3.8 MMOL/L (ref 3.6–5.5)
RBC # BLD AUTO: 3.9 M/UL (ref 4.2–5.4)
SODIUM SERPL-SCNC: 139 MMOL/L (ref 135–145)
WBC # BLD AUTO: 9.3 K/UL (ref 4.8–10.8)

## 2023-10-01 PROCEDURE — 99214 OFFICE O/P EST MOD 30 MIN: CPT | Performed by: PSYCHIATRY & NEUROLOGY

## 2023-10-01 PROCEDURE — 99239 HOSP IP/OBS DSCHRG MGMT >30: CPT | Performed by: STUDENT IN AN ORGANIZED HEALTH CARE EDUCATION/TRAINING PROGRAM

## 2023-10-01 PROCEDURE — 85027 COMPLETE CBC AUTOMATED: CPT

## 2023-10-01 PROCEDURE — 36415 COLL VENOUS BLD VENIPUNCTURE: CPT

## 2023-10-01 PROCEDURE — A9270 NON-COVERED ITEM OR SERVICE: HCPCS | Mod: UD | Performed by: NURSE PRACTITIONER

## 2023-10-01 PROCEDURE — A9270 NON-COVERED ITEM OR SERVICE: HCPCS | Mod: UD | Performed by: INTERNAL MEDICINE

## 2023-10-01 PROCEDURE — 700102 HCHG RX REV CODE 250 W/ 637 OVERRIDE(OP): Mod: UD | Performed by: INTERNAL MEDICINE

## 2023-10-01 PROCEDURE — 96372 THER/PROPH/DIAG INJ SC/IM: CPT

## 2023-10-01 PROCEDURE — 80048 BASIC METABOLIC PNL TOTAL CA: CPT

## 2023-10-01 PROCEDURE — 700102 HCHG RX REV CODE 250 W/ 637 OVERRIDE(OP): Mod: UD | Performed by: NURSE PRACTITIONER

## 2023-10-01 PROCEDURE — G0378 HOSPITAL OBSERVATION PER HR: HCPCS

## 2023-10-01 PROCEDURE — 700111 HCHG RX REV CODE 636 W/ 250 OVERRIDE (IP): Mod: UD | Performed by: STUDENT IN AN ORGANIZED HEALTH CARE EDUCATION/TRAINING PROGRAM

## 2023-10-01 PROCEDURE — 82962 GLUCOSE BLOOD TEST: CPT

## 2023-10-01 PROCEDURE — RXMED WILLOW AMBULATORY MEDICATION CHARGE: Performed by: STUDENT IN AN ORGANIZED HEALTH CARE EDUCATION/TRAINING PROGRAM

## 2023-10-01 PROCEDURE — 96376 TX/PRO/DX INJ SAME DRUG ADON: CPT

## 2023-10-01 RX ORDER — LAMOTRIGINE 200 MG/1
300 TABLET ORAL 2 TIMES DAILY
Qty: 90 TABLET | Refills: 0 | Status: SHIPPED | OUTPATIENT
Start: 2023-10-01

## 2023-10-01 RX ORDER — TRAMADOL HYDROCHLORIDE 50 MG/1
50 TABLET ORAL EVERY 6 HOURS PRN
Qty: 20 TABLET | Refills: 0 | Status: SHIPPED | OUTPATIENT
Start: 2023-10-01 | End: 2023-10-06

## 2023-10-01 RX ADMIN — GABAPENTIN 300 MG: 300 CAPSULE ORAL at 05:02

## 2023-10-01 RX ADMIN — INSULIN HUMAN 1 UNITS: 100 INJECTION, SOLUTION PARENTERAL at 12:28

## 2023-10-01 RX ADMIN — LORAZEPAM 0.5 MG: 2 INJECTION INTRAMUSCULAR; INTRAVENOUS at 11:05

## 2023-10-01 RX ADMIN — OMEPRAZOLE 20 MG: 20 CAPSULE, DELAYED RELEASE ORAL at 05:02

## 2023-10-01 RX ADMIN — GABAPENTIN 300 MG: 300 CAPSULE ORAL at 11:06

## 2023-10-01 RX ADMIN — CETIRIZINE HYDROCHLORIDE 10 MG: 10 TABLET, FILM COATED ORAL at 05:02

## 2023-10-01 RX ADMIN — TRAMADOL HYDROCHLORIDE 50 MG: 50 TABLET ORAL at 05:02

## 2023-10-01 RX ADMIN — SENNOSIDES AND DOCUSATE SODIUM 2 TABLET: 50; 8.6 TABLET ORAL at 05:02

## 2023-10-01 RX ADMIN — LORAZEPAM 0.5 MG: 2 INJECTION INTRAMUSCULAR; INTRAVENOUS at 15:11

## 2023-10-01 RX ADMIN — TRAMADOL HYDROCHLORIDE 50 MG: 50 TABLET ORAL at 11:05

## 2023-10-01 ASSESSMENT — PAIN DESCRIPTION - PAIN TYPE
TYPE: ACUTE PAIN

## 2023-10-01 ASSESSMENT — FIBROSIS 4 INDEX: FIB4 SCORE: 0.63

## 2023-10-01 NOTE — CARE PLAN
The patient is Stable - Low risk of patient condition declining or worsening    Shift Goals  Clinical Goals: monitor for seizure, monitor neuro status  Patient Goals: pain control, rest  Family Goals: alisa    Progress made toward(s) clinical / shift goals:    Problem: Fall Risk  Goal: Patient will remain free from falls  Outcome: Progressing     Problem: Pain - Standard  Goal: Alleviation of pain or a reduction in pain to the patient’s comfort goal  Outcome: Progressing     Problem: Knowledge Deficit - Standard  Goal: Patient and family/care givers will demonstrate understanding of plan of care, disease process/condition, diagnostic tests and medications  Outcome: Progressing     Problem: Seizure Precautions  Goal: Implementation of seizure precautions  Outcome: Progressing     Problem: Mobility  Goal: Patient's capacity to carry out activities will improve  Outcome: Progressing       Patient is not progressing towards the following goals: n/a

## 2023-10-01 NOTE — CARE PLAN
Problem: Fall Risk  Goal: Patient will remain free from falls  Outcome: Progressing     Problem: Pain - Standard  Goal: Alleviation of pain or a reduction in pain to the patient’s comfort goal  Outcome: Progressing     Problem: Knowledge Deficit - Standard  Goal: Patient and family/care givers will demonstrate understanding of plan of care, disease process/condition, diagnostic tests and medications  Outcome: Progressing     Problem: Seizure Precautions  Goal: Implementation of seizure precautions  Outcome: Progressing     Problem: Mobility  Goal: Patient's capacity to carry out activities will improve  Outcome: Progressing   The patient is Stable - Low risk of patient condition declining or worsening    Shift Goals  Clinical Goals: Monitor seizure activity  Patient Goals: Pain control  Family Goals: alisa    Progress made toward(s) clinical / shift goals:  Patient is adequate for discharge. VSS. Discharge instructions provided.    Patient is not progressing towards the following goals:

## 2023-10-01 NOTE — DISCHARGE SUMMARY
Discharge Summary    CHIEF COMPLAINT ON ADMISSION  Chief Complaint   Patient presents with    Dizziness     Episodes of inability to move bilateral upper extremities and neck x 5 days occurring between 9am-12pm daily. Episodes affect different areas of body. Hx epilepsy. GCS 15.        Reason for Admission  breathing     Admission Date  9/27/2023    CODE STATUS  Full Code    HPI & HOSPITAL COURSE  Ms. Noy Rose is a 38 y.o. female with past medical history of seizure disorder, anxiety and diabetes who presented 9/27/2023 with seizure-like episodes.      Patient reported for 5 days prior in the morning between 9 AM to 12 PM she gets episode where she feels neck muscle tightness and blurry vision and feeling hallucination.  She also reported that she has weakness in her upper extremities as well as lower extremities.  That episode last for few minutes to hours.  There is no specific aggravating or elevating factors.  She has been taking her seizure medication as prescribed she follows Dr. Powell for neurology and she was last evaluated by him in June.  She was started on paroxetine 2 months ago for her anxiety and she has upcoming appointment with psychiatrist for her anxiety. She was recently diagnosed with UTI and she was prescribed antibiotic but currently denies dysuria.       In the ED lab work-up including urinalysis did not show any acute abnormalities but Lamictal dose was high. Neurology was consulted.      On the floor EEG x2 including one during her daily event did not show epileptiform waves. MRI brain, C, and T-spine showed some mod-severe central canal stenosis in C5-T1 as well as DJD in the thoracic spine. Neurology recommended neurosurgical consultation for this, case was discussed and outpatient follow up was recommended. Concern for Lamictal overdose as etiology of symptoms so it was held for 24 hours and patient was observed with some possible improvement in symptoms, neurology recommended  decreasing dose to 300 mg BID and timely outpatient follow up for possible further titration. Functional etiology is also a possibility though will be diagnosis of exclusion.        Therefore, she is discharged in fair and stable condition to home with close outpatient follow-up.    The patient met 2-midnight criteria for an inpatient stay at the time of discharge.    Discharge Date  10/01/23    FOLLOW UP ITEMS POST DISCHARGE  Follow up with neurology for further titration of antieleptics  Follow up with neurosurgery for further evaluation of cervical stenosis    DISCHARGE DIAGNOSES  Principal Problem:    Seizure-like activity (HCC) (POA: Yes)  Active Problems:    Localization-related focal epilepsy with complex partial seizures (HCC) (POA: Yes)    Polycystic ovary syndrome (POA: Yes)    Generalized anxiety disorder with panic attacks (POA: Yes)    Type 2 diabetes mellitus without complication, without long-term current use of insulin (HCC) (POA: Unknown)    Obesity (BMI 30.0-34.9) (POA: Unknown)  Resolved Problems:    * No resolved hospital problems. *      FOLLOW UP  Future Appointments   Date Time Provider Department Center   10/6/2023  4:40 PM OUMAR Martinez [facility]     No follow-up provider specified.    MEDICATIONS ON DISCHARGE     Medication List        START taking these medications        Instructions   traMADol 50 MG Tabs  Commonly known as: Ultram   Take 1 Tablet by mouth every 6 hours as needed for Severe Pain for up to 5 days.  Dose: 50 mg            CHANGE how you take these medications        Instructions   lamotrigine 200 MG tablet  What changed: how much to take  Commonly known as: LaMICtal   Take 1.5 Tablets by mouth 2 times a day.  Dose: 300 mg            CONTINUE taking these medications        Instructions   cetirizine 10 MG Tabs  Commonly known as: ZyrTEC   Take 10 mg by mouth every day.  Dose: 10 mg     diazePAM 5 MG Tabs  Commonly known as: Valium   diazepam 5 mg  tablet   TAKE 1/2 TO 1 TABLET BY MOUTH EVERY DAY AS NEEDED FOR SEVERE ANXIETY OR PANIC OR INSOMNIA     gabapentin 300 MG Caps  Commonly known as: Neurontin   Take 300 mg by mouth 3 times a day.  Dose: 300 mg     lisinopril 10 MG Tabs  Commonly known as: Prinivil   TAKE 1 TABLET BY MOUTH EVERY DAY  Dose: 10 mg     metFORMIN  MG Tb24  Commonly known as: Glucophage XR   Take 1 Tablet by mouth 2 times a day.  Dose: 500 mg     omeprazole 20 MG delayed-release capsule  Commonly known as: PriLOSEC   TAKE 1 CAPSULE BY MOUTH EVERY DAY     PARoxetine 10 MG Tabs  Commonly known as: Paxil   Take 10 mg by mouth every evening.  Dose: 10 mg            STOP taking these medications      cephALEXin 500 MG Caps  Commonly known as: Keflex              Allergies  Allergies   Allergen Reactions    Codeine Itching, Nausea and Shortness of Breath    Hydrocodone-Acetaminophen Anxiety, Itching, Nausea, Palpitations and Shortness of Breath       DIET  Orders Placed This Encounter   Procedures    Diet Order Diet: Consistent CHO (Diabetic)     Standing Status:   Standing     Number of Occurrences:   1     Order Specific Question:   Diet:     Answer:   Consistent CHO (Diabetic) [4]       ACTIVITY  As tolerated.  Weight bearing as tolerated    CONSULTATIONS  Neuro    PROCEDURES  None    LABORATORY  Lab Results   Component Value Date    SODIUM 139 10/01/2023    POTASSIUM 3.8 10/01/2023    CHLORIDE 104 10/01/2023    CO2 24 10/01/2023    GLUCOSE 116 (H) 10/01/2023    BUN 10 10/01/2023    CREATININE 0.74 10/01/2023        Lab Results   Component Value Date    WBC 9.3 10/01/2023    HEMOGLOBIN 11.6 (L) 10/01/2023    HEMATOCRIT 36.5 (L) 10/01/2023    PLATELETCT 237 10/01/2023        Total time of the discharge process exceeds 35 minutes.

## 2023-10-01 NOTE — DISCHARGE PLANNING
Case discussed in morning DC rounds:  Per Dr. Becker, anticipating DC Home/Self Care today pending Neurology clearance.  No CM DC needs identified.

## 2023-10-01 NOTE — PROGRESS NOTES
Neurology Progress Note        Subjective:    Laly feels like she is having another episode of vision changes and limb weakness this morning, though not as intense as previous episodes.  No other new neurological symptoms.  She is concerned about some hives she has on her legs.    Objective:  Vitals:  Vitals:    10/01/23 0337 10/01/23 0600 10/01/23 0700 10/01/23 0800   BP: 107/59   107/57   Pulse: 79 76 77 85   Resp: 15   16   Temp: 36.4 °C (97.5 °F)   36.7 °C (98.1 °F)   TempSrc: Temporal   Temporal   SpO2: 90% 90% 90% 90%   Weight: 94.3 kg (207 lb 14.3 oz)      Height:         General:  Awake, alert and in no apparent distress, cooperative with exam  Mental Status:  Alert, oriented times 3, speech is fluent, comprehension is intact.  Cranial Nerves:  PERRL, EOMI with no nystagmus, face is symmetric, facial sensation is intact.  No dysarthria.  Motor: No drift in the arms, mild action tremor left greater than right.  She has difficulty lifting her legs off the bed, but quad/hamstring strength and ankle df/pf are all 5/5.  Reflexes:  2+ and symmetric at the patellas with toes downgoing bilaterally  Coordination:  Normal finger to nose bilaterally  Gait:  Deferred    Recent Labs     09/29/23  0516 10/01/23  0448   WBC 9.0 9.3   RBC 3.88* 3.90*   HEMOGLOBIN 11.3* 11.6*   HEMATOCRIT 36.5* 36.5*   MCV 94.1 93.6   MCH 29.1 29.7   MCHC 31.0* 31.8*   RDW 49.1 48.2   PLATELETCT 267 237   MPV 9.0 9.2     Recent Labs     09/29/23  0516 10/01/23  0448   SODIUM 139 139   POTASSIUM 3.8 3.8   CHLORIDE 104 104   CO2 23 24   GLUCOSE 102* 116*   BUN 9 10   CREATININE 0.67 0.74   CALCIUM 8.6 8.9                     Recent Labs     09/29/23  0516 10/01/23  0448   SODIUM 139 139   POTASSIUM 3.8 3.8   CHLORIDE 104 104   CO2 23 24   GLUCOSE 102* 116*   BUN 9 10     Recent Labs     09/29/23  0516 10/01/23  0448   SODIUM 139 139   POTASSIUM 3.8 3.8   CHLORIDE 104 104   CO2 23 24   BUN 9 10   CREATININE 0.67 0.74   CALCIUM 8.6 8.9          No results found for this or any previous visit.           Imaging: neuroimaging reviewed and directly visualized by me  MR-BRAIN-W/O   Final Result      Unremarkable noncontrast MR examination of the brain.      MR-CERVICAL SPINE-WITH & W/O   Final Result      1.  Multifocal degenerative disease in the cervical spine as described above.   2.  Severe central canal stenosis at C6-7 and C7-T1.   3.  Moderate to severe central canal stenosis at C5-6.   4.  Moderate central canal stenosis at C5-6 and C6-7.   5.  Multifocal neural foraminal stenosis as described above.      MR-THORACIC SPINE-WITH & W/O   Final Result      Multifocal degenerative disease in the thoracic spine as described above.          Impression:   Recurrent spells of dizziness/diplopia followed by ascending weakness, lasting several hours. Weakness in her B LE appears to be inconsistent. No sensory changes of loss of b/b control. MRI brain unremarkable. MRI C spine with multilevel mod-severe stenosis but would not really explain intermittent symptoms. MRI T spine with degenerative disease but no stenosis. Her lamictal level is above the therapeutic range.  Lamictal toxicity can cause ataxia, tremor, dizziness and double vision so it certainly possible this is causing some symptoms she is experiencing, however, likely not all.  A functional component is likely contributing to some degree.     Plan:  Resume Lamictal at 300mg bid starting tonight  PT, OT and Speech therapy  She endorsed significant stressors at home to the previous neurologist. Consider pyschiatry eval  No further testing or treatment recommendations at this time.     Irving Moreno MD

## 2023-10-02 RX ORDER — MONTELUKAST SODIUM 10 MG/1
10 TABLET ORAL EVERY MORNING
Qty: 30 TABLET | Refills: 1 | Status: SHIPPED | OUTPATIENT
Start: 2023-10-02 | End: 2023-11-01

## 2023-10-02 RX ORDER — FLUTICASONE PROPIONATE 50 MCG
SPRAY, SUSPENSION (ML) NASAL
Qty: 16 ML | Refills: 1 | Status: SHIPPED | OUTPATIENT
Start: 2023-10-02 | End: 2023-11-29

## 2023-10-02 RX ORDER — GABAPENTIN 300 MG/1
300 CAPSULE ORAL 3 TIMES DAILY
Qty: 90 CAPSULE | Status: CANCELLED | OUTPATIENT
Start: 2023-10-02

## 2023-10-03 DIAGNOSIS — R20.2 PARESTHESIA OF BOTH FEET: ICD-10-CM

## 2023-10-03 RX ORDER — GABAPENTIN 300 MG/1
300 CAPSULE ORAL 3 TIMES DAILY
Qty: 90 CAPSULE | Refills: 1 | Status: SHIPPED | OUTPATIENT
Start: 2023-10-03 | End: 2023-12-12 | Stop reason: SDUPTHER

## 2023-10-05 ENCOUNTER — PATIENT MESSAGE (OUTPATIENT)
Dept: NEUROLOGY | Facility: MEDICAL CENTER | Age: 38
End: 2023-10-05
Payer: MEDICAID

## 2023-10-06 ENCOUNTER — TELEMEDICINE (OUTPATIENT)
Dept: MEDICAL GROUP | Facility: MEDICAL CENTER | Age: 38
End: 2023-10-06
Attending: NURSE PRACTITIONER
Payer: MEDICAID

## 2023-10-06 VITALS
BODY MASS INDEX: 34.16 KG/M2 | WEIGHT: 205 LBS | HEIGHT: 65 IN | DIASTOLIC BLOOD PRESSURE: 60 MMHG | SYSTOLIC BLOOD PRESSURE: 113 MMHG

## 2023-10-06 DIAGNOSIS — T78.40XA ALLERGY, INITIAL ENCOUNTER: ICD-10-CM

## 2023-10-06 PROCEDURE — 99213 OFFICE O/P EST LOW 20 MIN: CPT | Mod: 95 | Performed by: NURSE PRACTITIONER

## 2023-10-06 PROCEDURE — 99212 OFFICE O/P EST SF 10 MIN: CPT | Performed by: NURSE PRACTITIONER

## 2023-10-06 ASSESSMENT — FIBROSIS 4 INDEX: FIB4 SCORE: 0.71

## 2023-10-06 NOTE — PROGRESS NOTES
I contacted the patient via email message.  I have contacted Dr. Trina Arreola MD, from a neurosurgical standpoint, and who will be contacting the patient to get her in to his office and be seen early next week.  She was told that the MRI of her neck did show severe narrowing around the spinal cord itself as well as the nerves going into the arms, all of which could cause the symptoms that she had presented with including the upper extremity weakness.

## 2023-10-09 DIAGNOSIS — E28.2 PCOS (POLYCYSTIC OVARIAN SYNDROME): ICD-10-CM

## 2023-10-09 DIAGNOSIS — E88.819 INSULIN RESISTANCE: ICD-10-CM

## 2023-10-10 RX ORDER — METFORMIN HYDROCHLORIDE 500 MG/1
500 TABLET, EXTENDED RELEASE ORAL 2 TIMES DAILY
Qty: 180 TABLET | Refills: 1 | Status: SHIPPED | OUTPATIENT
Start: 2023-10-10

## 2023-10-10 NOTE — TELEPHONE ENCOUNTER
Received request via: Pharmacy    Was the patient seen in the last year in this department? Yes    Does the patient have an active prescription (recently filled or refills available) for medication(s) requested? No    Does the patient have [facility] and need 100 day supply (blood pressure, diabetes and cholesterol meds only)? Patient does not have SCP

## 2023-10-16 PROBLEM — T78.40XA ALLERGIES: Status: ACTIVE | Noted: 2023-10-16

## 2023-10-16 NOTE — ASSESSMENT & PLAN NOTE
Ongoing-   Ref to immunology placed per request of patient, will see if there is one available locally.

## 2023-10-16 NOTE — PROGRESS NOTES
Chief Complaint   Patient presents with    Referral Needed     This evaluation was conducted via Zoom using secure and encrypted videoconferencing technology. The patient was in their home in the state of [place].    The patient's identity was confirmed and verbal consent was obtained for this virtual visit.   Subjective:     HPI:   Noy Rose is a 38 y.o. female here to discuss the evaluation and management of:      Problem   Allergies    Patient was recently in the hospital and is awaiting getting seen by neurosurgery for spinal issues. Patient states she has had allergy testing in the past and would like to see an immunologist. Unclear if there is one locally.          ROS  See HPI     Allergies   Allergen Reactions    Codeine Itching, Nausea and Shortness of Breath    Hydrocodone-Acetaminophen Anxiety, Itching, Nausea, Palpitations and Shortness of Breath       Current medicines (including changes today)  Current Outpatient Medications   Medication Sig Dispense Refill    gabapentin (NEURONTIN) 300 MG Cap Take 1 Capsule by mouth 3 times a day. 90 Capsule 1    fluticasone (FLONASE) 50 MCG/ACT nasal spray SPRAY TWICE IN EACH NOSTRIL EVERYDAY AS DIRECTED. 16 mL 1    montelukast (SINGULAIR) 10 MG Tab TAKE 1 TABLET BY MOUTH DAILY IN THE MORNING 30 Tablet 1    lamotrigine (LAMICTAL) 200 MG tablet Take 1.5 Tablets by mouth 2 times a day. 90 Tablet 0    PARoxetine (PAXIL) 10 MG Tab Take 20 mg by mouth every evening.      cetirizine (ZYRTEC) 10 MG Tab Take 10 mg by mouth every day.      lisinopril (PRINIVIL) 10 MG Tab TAKE 1 TABLET BY MOUTH EVERY DAY 90 Tablet 0    diazePAM (VALIUM) 5 MG Tab diazepam 5 mg tablet   TAKE 1/2 TO 1 TABLET BY MOUTH EVERY DAY AS NEEDED FOR SEVERE ANXIETY OR PANIC OR INSOMNIA      omeprazole (PRILOSEC) 20 MG delayed-release capsule TAKE 1 CAPSULE BY MOUTH EVERY DAY 90 Capsule 1    metFORMIN ER (GLUCOPHAGE XR) 500 MG TABLET SR 24 HR TAKE 1 TABLET BY MOUTH TWICE A  Tablet 1     No  "current facility-administered medications for this visit.       Social History     Tobacco Use    Smoking status: Former     Current packs/day: 0.50     Average packs/day: 0.5 packs/day for 20.0 years (10.0 ttl pk-yrs)     Types: Cigarettes    Smokeless tobacco: Never    Tobacco comments:     has quit on and off.    Vaping Use    Vaping Use: Never used   Substance Use Topics    Alcohol use: No     Comment: occ    Drug use: No       Patient Active Problem List    Diagnosis Date Noted    Allergies 10/16/2023    Seizure-like activity (HCC) 09/27/2023    Type 2 diabetes mellitus without complication, without long-term current use of insulin (HCC) 09/27/2023    Obesity (BMI 30.0-34.9) 09/27/2023    Anxiety 02/22/2023    Acute pain of right shoulder 12/07/2022    Encounter to establish care 12/07/2022    Essential hypertension 06/06/2022    Generalized anxiety disorder with panic attacks 06/06/2022    Varicose veins of both lower extremities with pain 08/31/2021    Insulin resistance 08/31/2021    Scalp mass 08/31/2021    Encounter for smoking cessation counseling 08/31/2021    Paresthesia of both feet 06/14/2021    Polycystic ovary syndrome 06/14/2021    Localization-related focal epilepsy with complex partial seizures (HCC) 06/05/2019       Family History   Problem Relation Age of Onset    Diabetes Mother     Hyperlipidemia Maternal Uncle     Hypertension Maternal Uncle     Diabetes Maternal Uncle     Cancer Maternal Grandmother         breast    Cancer Paternal Grandfather           Objective:     /60 (BP Location: Left arm, Patient Position: Sitting)   Ht 1.651 m (5' 5\")   Wt 93 kg (205 lb)  Body mass index is 34.11 kg/m².    Physical Exam:  Physical Exam  Constitutional:       General: She is awake.      Appearance: Normal appearance. She is well-developed.   HENT:      Head: Normocephalic.   Pulmonary:      Effort: Pulmonary effort is normal. No respiratory distress.   Musculoskeletal:      Cervical back: " Neck supple.   Neurological:      Mental Status: She is alert and oriented to person, place, and time.   Psychiatric:         Mood and Affect: Mood normal.         Behavior: Behavior normal. Behavior is cooperative.              Assessment and Plan:     The following treatment plan was discussed:    Problem List Items Addressed This Visit       Allergies     Ongoing-   Ref to immunology placed per request of patient, will see if there is one available locally.          Relevant Orders    Referral to Immunology       Any change or worsening of signs or symptoms, patient encouraged to follow-up or report to emergency room for further evaluation. Patient verbalizes understanding and agrees.    Follow-Up: Follow up as needed       PLEASE NOTE: This dictation was created using voice recognition software. I have made every reasonable attempt to correct obvious errors, but I expect that there are errors of grammar and possibly content that I did not discover before finalizing the note.

## 2023-10-20 DIAGNOSIS — T78.40XA ALLERGY, INITIAL ENCOUNTER: ICD-10-CM

## 2023-10-20 RX ORDER — CETIRIZINE HYDROCHLORIDE 10 MG/1
10 TABLET ORAL DAILY
Qty: 30 TABLET | Refills: 5 | Status: SHIPPED | OUTPATIENT
Start: 2023-10-20 | End: 2024-01-09 | Stop reason: SDUPTHER

## 2023-11-01 ENCOUNTER — HOSPITAL ENCOUNTER (OUTPATIENT)
Dept: LAB | Facility: MEDICAL CENTER | Age: 38
End: 2023-11-01
Payer: MEDICAID

## 2023-11-01 ENCOUNTER — OFFICE VISIT (OUTPATIENT)
Dept: MEDICAL GROUP | Facility: MEDICAL CENTER | Age: 38
End: 2023-11-01
Payer: MEDICAID

## 2023-11-01 VITALS
SYSTOLIC BLOOD PRESSURE: 110 MMHG | TEMPERATURE: 97.8 F | RESPIRATION RATE: 16 BRPM | HEART RATE: 84 BPM | DIASTOLIC BLOOD PRESSURE: 60 MMHG | HEIGHT: 65 IN | WEIGHT: 203.4 LBS | OXYGEN SATURATION: 95 % | BODY MASS INDEX: 33.89 KG/M2

## 2023-11-01 DIAGNOSIS — L50.9 HIVES: ICD-10-CM

## 2023-11-01 DIAGNOSIS — T78.3XXA ANGIOEDEMA, INITIAL ENCOUNTER: ICD-10-CM

## 2023-11-01 DIAGNOSIS — E11.9 TYPE 2 DIABETES MELLITUS WITHOUT COMPLICATION, WITHOUT LONG-TERM CURRENT USE OF INSULIN (HCC): ICD-10-CM

## 2023-11-01 DIAGNOSIS — I10 ESSENTIAL HYPERTENSION: ICD-10-CM

## 2023-11-01 LAB
APPEARANCE UR: ABNORMAL
BACTERIA #/AREA URNS HPF: NEGATIVE /HPF
BILIRUB UR QL STRIP.AUTO: NEGATIVE
CAOX CRY #/AREA URNS HPF: NORMAL /HPF
COLOR UR: YELLOW
EPI CELLS #/AREA URNS HPF: NORMAL /HPF
ERYTHROCYTE [SEDIMENTATION RATE] IN BLOOD BY WESTERGREN METHOD: 6 MM/HOUR (ref 0–25)
GLUCOSE UR STRIP.AUTO-MCNC: NEGATIVE MG/DL
HBA1C MFR BLD: 5.7 % (ref ?–5.8)
HYALINE CASTS #/AREA URNS LPF: NORMAL /LPF
KETONES UR STRIP.AUTO-MCNC: ABNORMAL MG/DL
LEUKOCYTE ESTERASE UR QL STRIP.AUTO: NEGATIVE
MICRO URNS: ABNORMAL
NITRITE UR QL STRIP.AUTO: NEGATIVE
PH UR STRIP.AUTO: 5.5 [PH] (ref 5–8)
POCT INT CON NEG: NEGATIVE
POCT INT CON POS: POSITIVE
PROT UR QL STRIP: NEGATIVE MG/DL
RBC # URNS HPF: NORMAL /HPF
RBC UR QL AUTO: NEGATIVE
SP GR UR STRIP.AUTO: 1.04
UROBILINOGEN UR STRIP.AUTO-MCNC: 0.2 MG/DL
WBC #/AREA URNS HPF: NORMAL /HPF

## 2023-11-01 PROCEDURE — 86160 COMPLEMENT ANTIGEN: CPT | Mod: 91

## 2023-11-01 PROCEDURE — 86431 RHEUMATOID FACTOR QUANT: CPT

## 2023-11-01 PROCEDURE — 99213 OFFICE O/P EST LOW 20 MIN: CPT | Mod: 25

## 2023-11-01 PROCEDURE — 81001 URINALYSIS AUTO W/SCOPE: CPT

## 2023-11-01 PROCEDURE — 85652 RBC SED RATE AUTOMATED: CPT

## 2023-11-01 PROCEDURE — 96372 THER/PROPH/DIAG INJ SC/IM: CPT

## 2023-11-01 PROCEDURE — 3078F DIAST BP <80 MM HG: CPT

## 2023-11-01 PROCEDURE — 86038 ANTINUCLEAR ANTIBODIES: CPT | Mod: 91

## 2023-11-01 PROCEDURE — 83036 HEMOGLOBIN GLYCOSYLATED A1C: CPT

## 2023-11-01 PROCEDURE — 36415 COLL VENOUS BLD VENIPUNCTURE: CPT

## 2023-11-01 PROCEDURE — 700111 HCHG RX REV CODE 636 W/ 250 OVERRIDE (IP): Mod: JZ,UD

## 2023-11-01 PROCEDURE — 3074F SYST BP LT 130 MM HG: CPT

## 2023-11-01 PROCEDURE — 99214 OFFICE O/P EST MOD 30 MIN: CPT

## 2023-11-01 RX ORDER — METHYLPREDNISOLONE 4 MG/1
4 TABLET ORAL DAILY
COMMUNITY
Start: 2023-10-22 | End: 2023-11-01

## 2023-11-01 RX ORDER — BETAMETHASONE DIPROPIONATE 0.5 MG/G
1 CREAM TOPICAL 2 TIMES DAILY
Qty: 45 G | Refills: 0 | Status: SHIPPED | OUTPATIENT
Start: 2023-11-01 | End: 2024-02-16

## 2023-11-01 RX ORDER — TRIAMCINOLONE ACETONIDE 40 MG/ML
40 INJECTION, SUSPENSION INTRA-ARTICULAR; INTRAMUSCULAR ONCE
Status: DISCONTINUED | OUTPATIENT
Start: 2023-11-01 | End: 2023-11-01

## 2023-11-01 RX ORDER — HYDROXYZINE HYDROCHLORIDE 25 MG/1
50 TABLET, FILM COATED ORAL 3 TIMES DAILY PRN
Qty: 30 TABLET | Refills: 0 | Status: SHIPPED | OUTPATIENT
Start: 2023-11-01

## 2023-11-01 RX ORDER — TRIAMCINOLONE ACETONIDE 40 MG/ML
40 INJECTION, SUSPENSION INTRA-ARTICULAR; INTRAMUSCULAR ONCE
Status: COMPLETED | OUTPATIENT
Start: 2023-11-01 | End: 2023-11-01

## 2023-11-01 RX ORDER — FAMOTIDINE 40 MG/1
40 TABLET, FILM COATED ORAL DAILY
COMMUNITY
Start: 2023-10-24 | End: 2023-11-01 | Stop reason: SDUPTHER

## 2023-11-01 RX ORDER — LOSARTAN POTASSIUM 25 MG/1
25 TABLET ORAL DAILY
Qty: 30 TABLET | Refills: 2 | Status: SHIPPED | OUTPATIENT
Start: 2023-11-01 | End: 2023-12-01 | Stop reason: SDUPTHER

## 2023-11-01 RX ORDER — FAMOTIDINE 40 MG/1
40 TABLET, FILM COATED ORAL DAILY
Qty: 30 TABLET | Refills: 2 | Status: SHIPPED | OUTPATIENT
Start: 2023-11-01 | End: 2024-01-09 | Stop reason: SDUPTHER

## 2023-11-01 RX ORDER — SEMAGLUTIDE 0.68 MG/ML
0.25 INJECTION, SOLUTION SUBCUTANEOUS
COMMUNITY
Start: 2023-09-26 | End: 2023-12-21

## 2023-11-01 RX ORDER — EPINEPHRINE 0.3 MG/.3ML
0.3 INJECTION SUBCUTANEOUS PRN
COMMUNITY
Start: 2023-09-18

## 2023-11-01 RX ADMIN — TRIAMCINOLONE ACETONIDE 40 MG: 40 INJECTION, SUSPENSION INTRA-ARTICULAR; INTRAMUSCULAR at 12:13

## 2023-11-01 ASSESSMENT — ENCOUNTER SYMPTOMS
HEARTBURN: 0
WEIGHT LOSS: 0
COUGH: 0
SHORTNESS OF BREATH: 0
PALPITATIONS: 0
MYALGIAS: 0
FEVER: 0
VOMITING: 0
CHILLS: 0
NAUSEA: 0
ABDOMINAL PAIN: 0

## 2023-11-01 ASSESSMENT — FIBROSIS 4 INDEX: FIB4 SCORE: 0.71

## 2023-11-01 NOTE — PROGRESS NOTES
"Subjective:     CC: Hives    HPI:   Noy presents today with    Problem   Hives    Patient reports that every couple of years she ends up with full body hives. She has been seen in the ER. They treat her with a 4 day dose pack of steroids. She reports that her eyes swell, her tongue swells and her lips swell. She denies food or environmental triggers. She was seen by an allergist who recommended she be seen by an immunologist. She has an appointment in January to see an immunologist. She has tried benadry, atarax, topical solutions.     She reports this started 11 years ago and occurs every few years. She reports that every 2-3years the severity of the hives and angioedema gets worse. She has 2 unexpired epi pens that she carries with her all that.     She hasnt started any new medications. She reports that only thing that improves her symptoms is a steroid pack.             ROS:  Review of Systems   Constitutional:  Negative for chills, fever, malaise/fatigue and weight loss.   Respiratory:  Negative for cough and shortness of breath.    Cardiovascular:  Negative for chest pain and palpitations.   Gastrointestinal:  Negative for abdominal pain, heartburn, nausea and vomiting.   Genitourinary:  Negative for dysuria.   Musculoskeletal:  Negative for joint pain and myalgias.   Skin:  Positive for itching and rash.   Endo/Heme/Allergies:  Negative for environmental allergies.        Please see HPI for additional ROS.       Objective:     Exam:  /60 (BP Location: Left arm, Patient Position: Sitting, BP Cuff Size: Adult)   Pulse 84   Temp 36.6 °C (97.8 °F) (Temporal)   Resp 16   Ht 1.651 m (5' 5\")   Wt 92.3 kg (203 lb 6.4 oz)   SpO2 95%   BMI 33.85 kg/m²  Body mass index is 33.85 kg/m².    Physical Exam  Constitutional:       Appearance: Normal appearance.   Cardiovascular:      Rate and Rhythm: Normal rate and regular rhythm.      Heart sounds: Normal heart sounds.   Pulmonary:      Effort: Pulmonary " effort is normal.      Breath sounds: Normal breath sounds.   Musculoskeletal:      Cervical back: Normal range of motion and neck supple.   Skin:     General: Skin is warm and dry.   Neurological:      Mental Status: She is alert.         Labs: Reviewed    Assessment & Plan:     38 y.o. female with the following -     1. Hives  2. Angioedema, initial encounter  Chronic, etiology unsure prognosis uncertain.  This is an ongoing problem for the patient that occurs every few years.  She has seen a allergist who recommend that she see an immunologist.  She has had frequent hospitalizations for angioedema.  She has not started any new medications she uses hypoallergenic laundry soap, does not wear make-up, uses scent free hypoallergenic lotions.  Rheumatological labs ordered.  Other causes of the hives and angioedema explored.  We discontinued the lisinopril and started her on losartan.  She was given a Kenalog shot in the office today.  She was also prescribed a topical steroid for the itching.  We also gave her a refill of the hydroxyzine.  - LUPUS COMPREHENSIVE PANEL; Future  - famotidine (PEPCID) 40 MG Tab; Take 1 Tablet by mouth every day.  Dispense: 30 Tablet; Refill: 2  - Sed Rate; Future  - GARRET REFLEXIVE PROFILE; Future  - hydrOXYzine HCl (ATARAX) 25 MG Tab; Take 2 Tablets by mouth 3 times a day as needed for Itching.  Dispense: 30 Tablet; Refill: 0  - COMPLEMENT COMPONENT 1Q LEVEL; Future  - betamethasone dipropionate 0.05 % Cream; Apply 1 Application topically 2 times a day.  Dispense: 45 g; Refill: 0  - triamcinolone acetonide (Kenalog-40) injection 40 mg  - URINALYSIS; Future      3. Type 2 diabetes mellitus without complication, without long-term current use of insulin (HCC)  Chronic, controlled.  Hemoglobin A1c is under 7.0%.  She is up-to-date with all diabetic screenings, on an ARB for renal protection, and is on a statin for cardiovascular protection.  We will continue the current regimen.  - POCT A1C  -  POCT Retinal Eye Exam  - Diabetic Monofilament Lower Extremity Exam    4. Essential hypertension  Chronic, controlled.  Blood pressure is at goal under 140/90 in the office today.  We will continue the current regimen.  Given her angioedema, we will discontinue the lisinopril and start her on losartan.  She has a blood pressure cuff at home and will continue to monitor her blood pressures.  - losartan (COZAAR) 25 MG Tab; Take 1 Tablet by mouth every day.  Dispense: 30 Tablet; Refill: 2            Return in about 2 weeks (around 11/15/2023).    Please note that this dictation was created using voice recognition software. I have made every reasonable attempt to correct obvious errors, but I expect that there are errors of grammar and possibly content that I did not discover before finalizing the note.

## 2023-11-02 DIAGNOSIS — E11.9 TYPE 2 DIABETES MELLITUS WITHOUT COMPLICATION, WITHOUT LONG-TERM CURRENT USE OF INSULIN (HCC): ICD-10-CM

## 2023-11-02 RX ORDER — LANCETS 30 GAUGE
EACH MISCELLANEOUS
Qty: 100 EACH | Refills: 0 | Status: SHIPPED | OUTPATIENT
Start: 2023-11-02 | End: 2023-11-06 | Stop reason: SDUPTHER

## 2023-11-03 LAB — NUCLEAR IGG SER QL IA: NORMAL

## 2023-11-04 LAB
C3 SERPL-MCNC: 133 MG/DL (ref 90–180)
C4 SERPL-MCNC: 24 MG/DL (ref 10–40)
RHEUMATOID FACT SER NEPH-ACNC: <10 IU/ML (ref 0–14)

## 2023-11-06 DIAGNOSIS — E11.9 TYPE 2 DIABETES MELLITUS WITHOUT COMPLICATION, WITHOUT LONG-TERM CURRENT USE OF INSULIN (HCC): ICD-10-CM

## 2023-11-06 RX ORDER — LANCETS 30 GAUGE
EACH MISCELLANEOUS
Qty: 300 EACH | Refills: 0 | Status: SHIPPED | OUTPATIENT
Start: 2023-11-06

## 2023-11-07 DIAGNOSIS — E11.9 TYPE 2 DIABETES MELLITUS WITHOUT COMPLICATION, WITHOUT LONG-TERM CURRENT USE OF INSULIN (HCC): ICD-10-CM

## 2023-11-08 LAB — C1Q SERPL-MCNC: 126 UG/ML (ref 109–242)

## 2023-11-11 DIAGNOSIS — K21.9 GASTROESOPHAGEAL REFLUX DISEASE, UNSPECIFIED WHETHER ESOPHAGITIS PRESENT: ICD-10-CM

## 2023-11-14 RX ORDER — OMEPRAZOLE 20 MG/1
CAPSULE, DELAYED RELEASE ORAL
Qty: 90 CAPSULE | Refills: 1 | Status: SHIPPED | OUTPATIENT
Start: 2023-11-14

## 2023-11-15 ENCOUNTER — OFFICE VISIT (OUTPATIENT)
Dept: MEDICAL GROUP | Facility: MEDICAL CENTER | Age: 38
End: 2023-11-15
Payer: MEDICAID

## 2023-11-15 ENCOUNTER — HOSPITAL ENCOUNTER (OUTPATIENT)
Facility: MEDICAL CENTER | Age: 38
End: 2023-11-15
Payer: MEDICAID

## 2023-11-15 VITALS
HEIGHT: 64 IN | HEART RATE: 83 BPM | SYSTOLIC BLOOD PRESSURE: 128 MMHG | OXYGEN SATURATION: 96 % | RESPIRATION RATE: 16 BRPM | BODY MASS INDEX: 34.49 KG/M2 | TEMPERATURE: 97.9 F | DIASTOLIC BLOOD PRESSURE: 70 MMHG | WEIGHT: 202 LBS

## 2023-11-15 DIAGNOSIS — L98.9 SKIN LESION OF LEFT ARM: ICD-10-CM

## 2023-11-15 DIAGNOSIS — L98.9 SKIN LESION: ICD-10-CM

## 2023-11-15 LAB
AMBIGUOUS DTTM AMBI4: NORMAL
PATHOLOGY CONSULT NOTE: NORMAL

## 2023-11-15 PROCEDURE — 3074F SYST BP LT 130 MM HG: CPT

## 2023-11-15 PROCEDURE — 11104 PUNCH BX SKIN SINGLE LESION: CPT

## 2023-11-15 PROCEDURE — 99214 OFFICE O/P EST MOD 30 MIN: CPT

## 2023-11-15 PROCEDURE — 88305 TISSUE EXAM BY PATHOLOGIST: CPT

## 2023-11-15 PROCEDURE — 3078F DIAST BP <80 MM HG: CPT

## 2023-11-15 PROCEDURE — 700101 HCHG RX REV CODE 250: Mod: UD

## 2023-11-15 RX ORDER — LIDOCAINE HCL/EPINEPHRINE/PF 2%-1:200K
20 VIAL (ML) INJECTION ONCE
Status: COMPLETED | OUTPATIENT
Start: 2023-11-15 | End: 2023-11-15

## 2023-11-15 RX ADMIN — LIDOCAINE HYDROCHLORIDE AND EPINEPHRINE 20 ML: 20; 5 INJECTION, SOLUTION EPIDURAL; INFILTRATION; INTRACAUDAL; PERINEURAL at 11:42

## 2023-11-15 ASSESSMENT — ENCOUNTER SYMPTOMS
FEVER: 0
COUGH: 0
SHORTNESS OF BREATH: 0
PALPITATIONS: 0
CHILLS: 0

## 2023-11-15 ASSESSMENT — FIBROSIS 4 INDEX: FIB4 SCORE: 0.71

## 2023-11-15 NOTE — PROGRESS NOTES
"Subjective:     CC: skin lesions    HPI:   Noy presents today with    Problem   Skin Lesion    Patient reports that after a recent episode of hives she experienced small given lesions scattered over her body.  They have since began to heal.  She went to the ER and was told that they were bug bites however she is not convinced that they are bug bites.              ROS:  Review of Systems   Constitutional:  Negative for chills and fever.   Respiratory:  Negative for cough and shortness of breath.    Cardiovascular:  Negative for chest pain and palpitations.   Skin:  Positive for itching and rash.        Please see HPI for additional ROS.       Objective:     Exam:  /70 (BP Location: Left arm, Patient Position: Sitting, BP Cuff Size: Adult)   Pulse 83   Temp 36.6 °C (97.9 °F) (Temporal)   Resp 16   Ht 1.626 m (5' 4\")   Wt 91.6 kg (202 lb)   SpO2 96%   BMI 34.67 kg/m²  Body mass index is 34.67 kg/m².    Physical Exam  Constitutional:       Appearance: Normal appearance.   Cardiovascular:      Rate and Rhythm: Normal rate and regular rhythm.      Heart sounds: Normal heart sounds.   Pulmonary:      Effort: Pulmonary effort is normal.      Breath sounds: Normal breath sounds.   Musculoskeletal:      Cervical back: Normal range of motion and neck supple.   Skin:     Findings: Rash present. Rash is macular and papular.          Neurological:      Mental Status: She is alert.         After informed written consent was obtained, using Betadine for cleansing and 1% Lidocaine with epinephrine for anesthetic, with sterile technique a 8 mm punch biopsy was used to obtain a biopsy specimen of the lesion. Hemostasis was obtained by pressure and silver nitrate.  Wound was then cleaned and  dressing is applied. Wound care instructions provided. Be alert for any signs of cutaneous infection. The specimen is labeled and sent to pathology for evaluation. The procedure was well tolerated without complications.    Labs: " Reviewed    Assessment & Plan:     38 y.o. female with the following -       1. Skin lesion of left arm  Acute, resolving.  Patient reports that after her most recent episode of generalized hives that she developed papule lesions scattered throughout her body.  She still has some of them present and would like to have them biopsied.  We discussed the risk and benefits of biopsy.  Punch biopsy completed homeostasis achieved.  Discussed follow-up after the specimen has been processed.  - Pathology Specimen; Future  - lidocaine-EPINEPHrine 2%-1:[id number] injection 20 mL        Return if symptoms worsen or fail to improve.    Please note that this dictation was created using voice recognition software. I have made every reasonable attempt to correct obvious errors, but I expect that there are errors of grammar and possibly content that I did not discover before finalizing the note.

## 2023-11-26 DIAGNOSIS — T78.40XA ALLERGY, INITIAL ENCOUNTER: ICD-10-CM

## 2023-11-29 RX ORDER — FLUTICASONE PROPIONATE 50 MCG
SPRAY, SUSPENSION (ML) NASAL
Qty: 16 ML | Refills: 1 | Status: SHIPPED | OUTPATIENT
Start: 2023-11-29 | End: 2023-12-21

## 2023-12-01 DIAGNOSIS — I10 ESSENTIAL HYPERTENSION: ICD-10-CM

## 2023-12-07 RX ORDER — LOSARTAN POTASSIUM 25 MG/1
25 TABLET ORAL DAILY
Qty: 30 TABLET | Refills: 2 | Status: SHIPPED | OUTPATIENT
Start: 2023-12-07 | End: 2023-12-12 | Stop reason: SDUPTHER

## 2023-12-12 ENCOUNTER — TELEPHONE (OUTPATIENT)
Dept: SCHEDULING | Facility: IMAGING CENTER | Age: 38
End: 2023-12-12

## 2023-12-12 DIAGNOSIS — R20.2 PARESTHESIA OF BOTH FEET: ICD-10-CM

## 2023-12-12 DIAGNOSIS — I10 ESSENTIAL HYPERTENSION: ICD-10-CM

## 2023-12-12 RX ORDER — GABAPENTIN 300 MG/1
300 CAPSULE ORAL 3 TIMES DAILY
Qty: 90 CAPSULE | Refills: 2 | Status: SHIPPED | OUTPATIENT
Start: 2023-12-12 | End: 2024-03-05

## 2023-12-12 RX ORDER — LOSARTAN POTASSIUM 25 MG/1
25 TABLET ORAL DAILY
Qty: 90 TABLET | Refills: 2 | Status: SHIPPED | OUTPATIENT
Start: 2023-12-12 | End: 2024-02-27 | Stop reason: SDUPTHER

## 2023-12-21 ENCOUNTER — TELEMEDICINE (OUTPATIENT)
Dept: MEDICAL GROUP | Facility: MEDICAL CENTER | Age: 38
End: 2023-12-21
Payer: MEDICAID

## 2023-12-21 VITALS — BODY MASS INDEX: 33.64 KG/M2 | RESPIRATION RATE: 16 BRPM | WEIGHT: 196 LBS

## 2023-12-21 DIAGNOSIS — M51.34 DEGENERATIVE DISC DISEASE, THORACIC: ICD-10-CM

## 2023-12-21 DIAGNOSIS — M48.02 CERVICAL STENOSIS OF SPINAL CANAL: ICD-10-CM

## 2023-12-21 PROCEDURE — 99214 OFFICE O/P EST MOD 30 MIN: CPT | Mod: 95

## 2023-12-21 RX ORDER — SEMAGLUTIDE 1.34 MG/ML
1 INJECTION, SOLUTION SUBCUTANEOUS
COMMUNITY
Start: 2023-12-20

## 2023-12-21 RX ORDER — CELECOXIB 100 MG/1
100 CAPSULE ORAL 2 TIMES DAILY
Qty: 60 CAPSULE | Refills: 2 | Status: SHIPPED | OUTPATIENT
Start: 2023-12-21 | End: 2024-01-03

## 2023-12-21 RX ORDER — CYCLOBENZAPRINE HCL 5 MG
5-10 TABLET ORAL 3 TIMES DAILY PRN
Qty: 30 TABLET | Refills: 0 | Status: SHIPPED | OUTPATIENT
Start: 2023-12-21 | End: 2024-01-17 | Stop reason: SDUPTHER

## 2023-12-21 ASSESSMENT — ENCOUNTER SYMPTOMS
FEVER: 0
SHORTNESS OF BREATH: 0
PALPITATIONS: 0
CHILLS: 0
NECK PAIN: 1
BACK PAIN: 1
COUGH: 0

## 2023-12-21 ASSESSMENT — FIBROSIS 4 INDEX: FIB4 SCORE: 0.71

## 2023-12-21 NOTE — ASSESSMENT & PLAN NOTE
Chronic, ongoing.  Patient reports that she is not experiencing any improvement from Tylenol or ibuprofen.  And her pain is just getting so severe that she is unable to participate in her day-to-day life as she does have a follow-up appointment with Dr. Fierro to discuss other options first week of January.  We discussed starting Celebrex 100 mg 2 times daily and Flexeril 5 to 10 mg as needed for muscle spasms.  We discussed that if she needed something stronger for pain medication that she would have to be seen in the office.

## 2023-12-21 NOTE — PROGRESS NOTES
Virtual Visit: Established Patient   This visit was conducted via Zoom using secure and encrypted videoconferencing technology.   The patient was in their home in the [facility].    The patient's identity was confirmed and verbal consent was obtained for this virtual visit.     Subjective:   CC:   Chief Complaint   Patient presents with    Back Pain    Neck Pain       Noy Rose is a 38 y.o. female presenting for evaluation and management of:    Back and Neck pain:    Patient had MRI done on 9/2023 with following results she has an appointment with Dr. Fierro neurosurgeon first week of January.  For possible surgical options.  IMPRESSION:  1.  Multifocal degenerative disease in the cervical spine as described above.  2.  Severe central canal stenosis at C6-7 and C7-T1.  3.  Moderate to severe central canal stenosis at C5-6.  4.  Moderate central canal stenosis at C5-6 and C6-7.  5.  Multifocal neural foraminal stenosis as described above.     Patient reports that her pain is so severe that she is unable to do her activities of daily living such as cooking family and being active with her children.          ROS   Review of Systems   Constitutional:  Negative for chills and fever.   Respiratory:  Negative for cough and shortness of breath.    Cardiovascular:  Negative for chest pain and palpitations.   Musculoskeletal:  Positive for back pain, joint pain and neck pain.        Current medicines (including changes today)  Current Outpatient Medications   Medication Sig Dispense Refill    OZEMPIC, 1 MG/DOSE, 4 MG/3ML Solution Pen-injector Inject 1 mg under the skin every 7 days.      cyclobenzaprine (FLEXERIL) 5 mg tablet Take 1-2 Tablets by mouth 3 times a day as needed for Moderate Pain or Muscle Spasms. 30 Tablet 0    celecoxib (CELEBREX) 100 MG Cap Take 1 Capsule by mouth 2 times a day. 60 Capsule 2    gabapentin (NEURONTIN) 300 MG Cap Take 1 Capsule by mouth 3 times a day. 90 Capsule 2    losartan  (COZAAR) 25 MG Tab Take 1 Tablet by mouth every day. 90 Tablet 2    fluticasone (FLONASE) 50 MCG/ACT nasal spray SPRAY TWICE IN EACH NOSTRIL EVERYDAY AS DIRECTED. 16 mL 1    omeprazole (PRILOSEC) 20 MG delayed-release capsule TAKE 1 CAPSULE BY MOUTH EVERY DAY 90 Capsule 1    Blood Glucose Test Strips 1 Strip by Other route in the morning, at noon, and at bedtime. Use one test strip to test blood sugar three times daily. 300 Strip 0    Lancets Use one lancet to test blood sugar three times daily. 300 Each 0    EPINEPHrine (EPIPEN) 0.3 MG/0.3ML Solution Auto-injector solution for injection Inject 0.3 mg into the shoulder, thigh, or buttocks as needed.      famotidine (PEPCID) 40 MG Tab Take 1 Tablet by mouth every day. 30 Tablet 2    hydrOXYzine HCl (ATARAX) 25 MG Tab Take 2 Tablets by mouth 3 times a day as needed for Itching. 30 Tablet 0    betamethasone dipropionate 0.05 % Cream Apply 1 Application topically 2 times a day. 45 g 0    cetirizine (ZYRTEC) 10 MG Tab Take 1 Tablet by mouth every day. 30 Tablet 5    metFORMIN ER (GLUCOPHAGE XR) 500 MG TABLET SR 24 HR TAKE 1 TABLET BY MOUTH TWICE A  Tablet 1    lamotrigine (LAMICTAL) 200 MG tablet Take 1.5 Tablets by mouth 2 times a day. 90 Tablet 0    PARoxetine (PAXIL) 10 MG Tab Take 20 mg by mouth every evening.      diazePAM (VALIUM) 5 MG Tab diazepam 5 mg tablet   TAKE 1/2 TO 1 TABLET BY MOUTH EVERY DAY AS NEEDED FOR SEVERE ANXIETY OR PANIC OR INSOMNIA       No current facility-administered medications for this visit.       Patient Active Problem List    Diagnosis Date Noted    Cervical stenosis of spinal canal 12/21/2023    Degenerative disc disease, thoracic 12/21/2023    Skin lesion 11/15/2023    Hives 11/01/2023    Allergies 10/16/2023    Seizure-like activity (HCC) 09/27/2023    Type 2 diabetes mellitus without complication, without long-term current use of insulin (HCC) 09/27/2023    Obesity (BMI 30.0-34.9) 09/27/2023    Anxiety 02/22/2023    Acute pain  of right shoulder 12/07/2022    Encounter to establish care 12/07/2022    Essential hypertension 06/06/2022    Generalized anxiety disorder with panic attacks 06/06/2022    Varicose veins of both lower extremities with pain 08/31/2021    Insulin resistance 08/31/2021    Scalp mass 08/31/2021    Encounter for smoking cessation counseling 08/31/2021    Paresthesia of both feet 06/14/2021    Polycystic ovary syndrome 06/14/2021    Localization-related focal epilepsy with complex partial seizures (HCC) 06/05/2019        Objective:   Resp 16   Wt 88.9 kg (196 lb)   BMI 33.64 kg/m²     Physical Exam:  Constitutional: Alert, no distress, well-groomed.  Skin: No rashes in visible areas.  Eye: Round. Conjunctiva clear, lids normal. No icterus.   ENMT: Lips pink without lesions, good dentition, moist mucous membranes. Phonation normal.  Neck: No masses, no thyromegaly. Moves freely without pain.  Respiratory: Unlabored respiratory effort, no cough or audible wheeze  Psych: Alert and oriented x3, normal affect and mood.     Assessment and Plan:   The following treatment plan was discussed:     1. Cervical stenosis of spinal canal  Chronic, ongoing.  Patient reports that she is not experiencing any improvement from Tylenol or ibuprofen.  And her pain is just getting so severe that she is unable to participate in her day-to-day life as she does have a follow-up appointment with Dr. Fierro to discuss other options first week of January.  We discussed starting Celebrex 100 mg 2 times daily and Flexeril 5 to 10 mg as needed for muscle spasms.  We discussed that if she needed something stronger for pain medication that she would have to be seen in the office.  - cyclobenzaprine (FLEXERIL) 5 mg tablet; Take 1-2 Tablets by mouth 3 times a day as needed for Moderate Pain or Muscle Spasms.  Dispense: 30 Tablet; Refill: 0  - celecoxib (CELEBREX) 100 MG Cap; Take 1 Capsule by mouth 2 times a day.  Dispense: 60 Capsule; Refill: 2    2.  Degenerative disc disease, thoracic  Chronic, ongoing.  This was also noted on MRI in September.  Follow-up precautions given.  - cyclobenzaprine (FLEXERIL) 5 mg tablet; Take 1-2 Tablets by mouth 3 times a day as needed for Moderate Pain or Muscle Spasms.  Dispense: 30 Tablet; Refill: 0  - celecoxib (CELEBREX) 100 MG Cap; Take 1 Capsule by mouth 2 times a day.  Dispense: 60 Capsule; Refill: 2        Follow-up: Return if symptoms worsen or fail to improve.

## 2024-01-03 ENCOUNTER — OFFICE VISIT (OUTPATIENT)
Dept: MEDICAL GROUP | Facility: MEDICAL CENTER | Age: 39
End: 2024-01-03
Payer: MEDICAID

## 2024-01-03 ENCOUNTER — HOSPITAL ENCOUNTER (OUTPATIENT)
Facility: MEDICAL CENTER | Age: 39
End: 2024-01-03
Payer: MEDICAID

## 2024-01-03 VITALS
SYSTOLIC BLOOD PRESSURE: 120 MMHG | WEIGHT: 204.2 LBS | BODY MASS INDEX: 34.86 KG/M2 | HEART RATE: 82 BPM | OXYGEN SATURATION: 98 % | TEMPERATURE: 97.2 F | DIASTOLIC BLOOD PRESSURE: 60 MMHG | RESPIRATION RATE: 16 BRPM | HEIGHT: 64 IN

## 2024-01-03 DIAGNOSIS — Z79.899 HIGH RISK MEDICATION USE: ICD-10-CM

## 2024-01-03 DIAGNOSIS — M48.02 CERVICAL STENOSIS OF SPINAL CANAL: ICD-10-CM

## 2024-01-03 DIAGNOSIS — E11.9 TYPE 2 DIABETES MELLITUS WITHOUT COMPLICATION, WITHOUT LONG-TERM CURRENT USE OF INSULIN (HCC): ICD-10-CM

## 2024-01-03 DIAGNOSIS — Z23 NEED FOR VACCINATION: ICD-10-CM

## 2024-01-03 DIAGNOSIS — Z11.3 SCREENING EXAMINATION FOR SEXUALLY TRANSMITTED DISEASE: ICD-10-CM

## 2024-01-03 LAB — AMBIGUOUS DTTM AMBI4: NORMAL

## 2024-01-03 PROCEDURE — 3074F SYST BP LT 130 MM HG: CPT

## 2024-01-03 PROCEDURE — 90471 IMMUNIZATION ADMIN: CPT

## 2024-01-03 PROCEDURE — 3078F DIAST BP <80 MM HG: CPT

## 2024-01-03 PROCEDURE — 90686 IIV4 VACC NO PRSV 0.5 ML IM: CPT

## 2024-01-03 PROCEDURE — 99214 OFFICE O/P EST MOD 30 MIN: CPT

## 2024-01-03 PROCEDURE — 99001 SPECIMEN HANDLING PT-LAB: CPT

## 2024-01-03 PROCEDURE — 80307 DRUG TEST PRSMV CHEM ANLYZR: CPT

## 2024-01-03 PROCEDURE — G0480 DRUG TEST DEF 1-7 CLASSES: HCPCS | Mod: XU

## 2024-01-03 PROCEDURE — 99213 OFFICE O/P EST LOW 20 MIN: CPT | Mod: 25

## 2024-01-03 RX ORDER — NALOXONE HYDROCHLORIDE 4 MG/.1ML
SPRAY NASAL
Qty: 1 EACH | Refills: 0 | Status: SHIPPED | OUTPATIENT
Start: 2024-01-03

## 2024-01-03 RX ORDER — TRAMADOL HYDROCHLORIDE 50 MG/1
50 TABLET ORAL EVERY 8 HOURS PRN
Qty: 12 TABLET | Refills: 0 | Status: SHIPPED | OUTPATIENT
Start: 2024-01-03 | End: 2024-01-10

## 2024-01-03 ASSESSMENT — PATIENT HEALTH QUESTIONNAIRE - PHQ9
SUM OF ALL RESPONSES TO PHQ QUESTIONS 1-9: 9
5. POOR APPETITE OR OVEREATING: 0 - NOT AT ALL
CLINICAL INTERPRETATION OF PHQ2 SCORE: 3

## 2024-01-03 ASSESSMENT — FIBROSIS 4 INDEX: FIB4 SCORE: 0.71

## 2024-01-03 NOTE — PROGRESS NOTES
"Subjective:     CC: severe pain in back and neck    HPI:   Noy presents today with    Problem   Cervical Stenosis of Spinal Canal    Patient had MRI done on 9/2023 with following results she has an appointment with Dr. Fierro neurosurgeon on January 20th for possible surgical options. Patient previously reported that her pain is so severe that she is unable to do her activities of daily living and it has not improved. She was previously given Celebrex with no improvement of her symptoms. She has been taking gabapentin 300mg TID. She was given Tramadol while in the hospital and this helped her pain.   IMPRESSION:  1.  Multifocal degenerative disease in the cervical spine as described above.  2.  Severe central canal stenosis at C6-7 and C7-T1.  3.  Moderate to severe central canal stenosis at C5-6.  4.  Moderate central canal stenosis at C5-6 and C6-7.  5.  Multifocal neural foraminal stenosis as described above.              ROS:  - CONSTITUTIONAL: Denies weight loss, fever and chills.  - HEENT: Denies changes in vision and hearing.  - RESPIRATORY: Denies SOB and cough.  - CV: Denies palpitations and CP.  - GI: Denies abdominal pain, nausea, vomiting and diarrhea.  - : Denies dysuria and urinary frequency.  - MSK: + myalgia and joint pain.  - SKIN: Denies rash and pruritus.  - NEUROLOGICAL: Denies any syncope. + head ache   - PSYCHIATRIC: Denies recent changes in mood. Denies anxiety and depression.    Please see HPI for additional ROS.       Objective:     Exam:  /60 (BP Location: Left arm, Patient Position: Sitting, BP Cuff Size: Adult)   Pulse 82   Temp 36.2 °C (97.2 °F) (Temporal)   Resp 16   Ht 1.626 m (5' 4\")   Wt 92.6 kg (204 lb 3.2 oz)   SpO2 98%   BMI 35.05 kg/m²  Body mass index is 35.05 kg/m².    Physical Exam:  Constitutional: Alert, no distress, well-groomed.  Skin: Warm, dry, good turgor, no rashes in visible areas.  Eye: Equal, round and reactive, conjunctiva clear, lids " "normal.  ENMT: Lips without lesions, good dentition, moist mucous membranes.  Neck: Trachea midline, no masses, no thyromegaly.  Respiratory: Unlabored respiratory effort, no cough.  Abd: soft, non tender, non distended, normal BS  MSK: Normal gait, moves all extremities.  Neuro: Grossly non-focal.   Psych: Alert and oriented x3, normal affect and mood.    Labs: Reviewed    Assessment & Plan:     38 y.o. female with the following -     1. Cervical stenosis of spinal canal  Ongoing problem.  Patient has an appointment with neurosurgery on January 20 to explore surgical options.  Her pain has worsened despite the use of Tylenol, gabapentin, Celebrex.  She is currently unable to participate in her activities of daily living.  Patient understands this prescription is a controlled substance which is potentially habit-forming and its use is regulated by the [place]. We also discussed the new \"black box\" warning regarding the lethal combination of opioids and benzodiazepines. Refills are subject to terms of a controlled substance agreement and patient has an updated one on file. PDMP reviewed with no abnormal findings. Any refill requires an office visit. Narcotics have may adverse effects and the risks of addiction, accidental overdose and death were emphasized. Provided prescriptions for the next 7 days.  Patient is going to stop taking her prescribed Valium for anxiety as she reports it is the pain that gives her anxiety and she would rather be treated with pain.  We discussed that we would repeat the urine drug screen at a later date and that per renounce policy, opioid pain medications cannot be prescribed with other controlled substances.  - PAIN MANAGEMENT PANEL, SCRN W/ RFLX TO QNT; Future  - Controlled Substance Treatment Agreement  - traMADol (ULTRAM) 50 MG Tab; Take 1 Tablet by mouth every 8 hours as needed for Severe Pain for up to 7 days.  Dispense: 12 Tablet; Refill: 0    2. Need for vaccination  Patient requests " vaccination today. Vaccine given. Patient tolerated well. Follow up precautions given.    - INFLUENZA VACCINE QUAD INJ (PF)  - Tdap Vaccine =>8YO IM  - Pneumococcal Conjugate Vaccine 20-Valent (6 wks+)    3. Type 2 diabetes mellitus without complication, without long-term current use of insulin (HCC)  Chronic, controlled.  Hemoglobin A1c is under 7.0%.  She is up-to-date with all diabetic screenings.  We will continue the current regimen and follow-up with labs in 2 months.   - HEMOGLOBIN A1C; Future  - Lipid Profile; Future  - Basic Metabolic Panel; Future  - Microalbumin Creat Ratio Urine (Lab Collect); Future    4. High risk medication use  - PAIN MANAGEMENT PANEL, SCRN W/ RFLX TO QNT; Future  - Controlled Substance Treatment Agreement  - Naloxone (NARCAN) 4 MG/0.1ML Liquid; One spray in one nostril for overdose and call 911.  Dispense: 1 Each; Refill: 0    5. Screening examination for sexually transmitted disease  - HIV AG/AB COMBO ASSAY SCREENING; Future  - HEP C VIRUS ANTIBODY; Future      No follow-ups on file.    Please note that this dictation was created using voice recognition software. I have made every reasonable attempt to correct obvious errors, but I expect that there are errors of grammar and possibly content that I did not discover before finalizing the note.

## 2024-01-05 LAB
AMPHET CTO UR CFM-MCNC: NEGATIVE NG/ML
BARBITURATES CTO UR CFM-MCNC: NEGATIVE NG/ML
BENZODIAZ CTO UR CFM-MCNC: NORMAL NG/ML
BUPRENORPHINE UR-MCNC: NEGATIVE NG/ML
CANNABINOIDS CTO UR CFM-MCNC: NEGATIVE NG/ML
CARISOPRODOL UR-MCNC: NEGATIVE NG/ML
COCAINE CTO UR CFM-MCNC: NEGATIVE NG/ML
CREAT UR-MCNC: 60.3 MG/DL (ref 20–400)
DRUG SCREEN COMMENT UR-IMP: NORMAL
ETHYL GLUCURONIDE UR QL SCN: NEGATIVE NG/ML
FENTANYL UR-MCNC: NEGATIVE NG/ML
MEPERIDINE CTO UR SCN-MCNC: NEGATIVE NG/ML
METHADONE CTO UR CFM-MCNC: NEGATIVE NG/ML
OPIATES UR QL SCN: NEGATIVE NG/ML
OXYCDOXYM URSCRN 2005102: NEGATIVE NG/ML
PCP CTO UR CFM-MCNC: NEGATIVE NG/ML
PROPOXYPH CTO UR CFM-MCNC: NEGATIVE NG/ML
TAPENTADOL UR-MCNC: NEGATIVE NG/ML
TRAMADOL CTO UR SCN-MCNC: NEGATIVE NG/ML
ZOLPIDEM UR-MCNC: NEGATIVE NG/ML

## 2024-01-07 LAB
1OH-MIDAZOLAM UR CFM-MCNC: <20 NG/ML
7AMINOCLONAZEPAM UR CFM-MCNC: <5 NG/ML
A-OH ALPRAZ UR CFM-MCNC: <5 NG/ML
ALPRAZ UR CFM-MCNC: <5 NG/ML
CHLORDIAZEP UR CFM-MCNC: <20 NG/ML
CLONAZEPAM UR CFM-MCNC: <5 NG/ML
DIAZEPAM UR CFM-MCNC: <20 NG/ML
LORAZEPAM UR CFM-MCNC: <20 NG/ML
MIDAZOLAM UR CFM-MCNC: <20 NG/ML
NORDIAZEPAM UR CFM-MCNC: 120 NG/ML
OXAZEPAM UR CFM-MCNC: 301 NG/ML
TEMAZEPAM UR CFM-MCNC: 292 NG/ML

## 2024-01-07 PROCEDURE — G0480 DRUG TEST DEF 1-7 CLASSES: HCPCS

## 2024-01-09 DIAGNOSIS — T78.3XXA ANGIOEDEMA, INITIAL ENCOUNTER: ICD-10-CM

## 2024-01-09 DIAGNOSIS — L50.9 HIVES: ICD-10-CM

## 2024-01-09 DIAGNOSIS — T78.40XA ALLERGY, INITIAL ENCOUNTER: ICD-10-CM

## 2024-01-09 RX ORDER — FAMOTIDINE 40 MG/1
40 TABLET, FILM COATED ORAL 2 TIMES DAILY
Qty: 60 TABLET | Refills: 5 | Status: SHIPPED | OUTPATIENT
Start: 2024-01-09 | End: 2024-01-25

## 2024-01-09 RX ORDER — CETIRIZINE HYDROCHLORIDE 10 MG/1
10 TABLET ORAL 2 TIMES DAILY
Qty: 60 TABLET | Refills: 5 | Status: SHIPPED | OUTPATIENT
Start: 2024-01-09

## 2024-01-16 ENCOUNTER — PATIENT MESSAGE (OUTPATIENT)
Dept: MEDICAL GROUP | Facility: MEDICAL CENTER | Age: 39
End: 2024-01-16
Payer: MEDICAID

## 2024-01-17 DIAGNOSIS — M48.02 CERVICAL STENOSIS OF SPINAL CANAL: ICD-10-CM

## 2024-01-17 DIAGNOSIS — M51.34 DEGENERATIVE DISC DISEASE, THORACIC: ICD-10-CM

## 2024-01-17 RX ORDER — CYCLOBENZAPRINE HCL 5 MG
5-10 TABLET ORAL 3 TIMES DAILY PRN
Qty: 30 TABLET | Refills: 0 | Status: SHIPPED | OUTPATIENT
Start: 2024-01-17 | End: 2024-02-12 | Stop reason: SDUPTHER

## 2024-01-19 ENCOUNTER — OFFICE VISIT (OUTPATIENT)
Dept: MEDICAL GROUP | Facility: MEDICAL CENTER | Age: 39
End: 2024-01-19
Payer: MEDICAID

## 2024-01-19 ENCOUNTER — HOSPITAL ENCOUNTER (OUTPATIENT)
Dept: LAB | Facility: MEDICAL CENTER | Age: 39
End: 2024-01-19
Payer: MEDICAID

## 2024-01-19 ENCOUNTER — DOCUMENTATION (OUTPATIENT)
Dept: MEDICAL GROUP | Facility: MEDICAL CENTER | Age: 39
End: 2024-01-19

## 2024-01-19 VITALS
BODY MASS INDEX: 33.29 KG/M2 | OXYGEN SATURATION: 100 % | RESPIRATION RATE: 16 BRPM | WEIGHT: 195 LBS | HEIGHT: 64 IN | HEART RATE: 92 BPM | TEMPERATURE: 97.4 F | SYSTOLIC BLOOD PRESSURE: 110 MMHG | DIASTOLIC BLOOD PRESSURE: 70 MMHG

## 2024-01-19 DIAGNOSIS — M48.02 CERVICAL STENOSIS OF SPINAL CANAL: ICD-10-CM

## 2024-01-19 DIAGNOSIS — Z79.899 HIGH RISK MEDICATION USE: ICD-10-CM

## 2024-01-19 PROCEDURE — 3074F SYST BP LT 130 MM HG: CPT

## 2024-01-19 PROCEDURE — 3078F DIAST BP <80 MM HG: CPT

## 2024-01-19 PROCEDURE — 36415 COLL VENOUS BLD VENIPUNCTURE: CPT

## 2024-01-19 PROCEDURE — 99212 OFFICE O/P EST SF 10 MIN: CPT

## 2024-01-19 PROCEDURE — 99214 OFFICE O/P EST MOD 30 MIN: CPT

## 2024-01-19 PROCEDURE — 80175 DRUG SCREEN QUAN LAMOTRIGINE: CPT

## 2024-01-19 RX ORDER — TRAMADOL HYDROCHLORIDE 50 MG/1
50 TABLET ORAL EVERY 8 HOURS PRN
Qty: 40 TABLET | Refills: 0 | Status: SHIPPED | OUTPATIENT
Start: 2024-01-19 | End: 2024-02-16 | Stop reason: SDUPTHER

## 2024-01-19 ASSESSMENT — FIBROSIS 4 INDEX: FIB4 SCORE: 0.73

## 2024-01-19 NOTE — PROGRESS NOTES
"Subjective:     CC: Medication refill    HPI:   Noy presents today with    Cervical stenosis of spinal canal  Patient was seen by neurology. The are going to do a C3-T1 fusion [facility]14/[facility]. She reports that the Tramadol is working well for her pain control currently and she needs a refill. She has stopped taking the Valium. She reports that she did  her Valium because she did not have any of her Tramadol. However she reports that she is not taking the Valium. She is requesting enough tramadol to make it to her surgery as its the only thing that helps with her pain.          ROS:  - CONSTITUTIONAL: Denies weight loss, fever and chills.  - HEENT: Denies changes in vision and hearing.  - RESPIRATORY: Denies SOB and cough.  - CV: Denies palpitations and CP.  - GI: Denies abdominal pain, nausea, vomiting and diarrhea.  - : Denies dysuria and urinary frequency.  - MSK: Denies myalgia and joint pain.  - SKIN: Denies rash and pruritus.  - NEUROLOGICAL: Denies headache and syncope. Endorses sever neck pain.  - PSYCHIATRIC: Denies recent changes in mood. Denies anxiety and depression.    Please see HPI for additional ROS.       Objective:     Exam:  /70 (BP Location: Left arm, Patient Position: Sitting, BP Cuff Size: Adult)   Pulse 92   Temp 36.3 °C (97.4 °F) (Temporal)   Resp 16   Ht 1.626 m (5' 4\")   Wt 88.5 kg (195 lb)   SpO2 100%   BMI 33.47 kg/m²  Body mass index is 33.47 kg/m².    Physical Exam:  Constitutional: Alert, no distress, well-groomed.  Skin: Warm, dry, good turgor, no rashes in visible areas.  Eye: Equal, round and reactive, conjunctiva clear, lids normal.  ENMT: Lips without lesions, good dentition, moist mucous membranes.  Neck: Trachea midline, no masses, no thyromegaly.  Respiratory: Unlabored respiratory effort, no cough.  Abd: soft, non tender, non distended, normal BS  MSK: Normal gait, moves all extremities.  Neuro: Neuropathy of bilateral lower " "extremities.    Psych: Alert and oriented x3, normal affect and mood.    Labs: Reviewed    Assessment & Plan:     39 y.o. female with the following -   1. Cervical stenosis of spinal canal  Chronic, improving. Patient understands this prescription is a controlled substance which is potentially habit-forming and its use is regulated by the [place]. We also discussed the new \"black box\" warning regarding the lethal combination of opioids and benzodiazepines. Refills are subject to terms of a controlled substance agreement and patient has an updated one on file. Most recent UDS is with expected finding of valuim. PDMP reviewed it was noted that she refilled her Valium while she was in between prescriptions.  However she reports that she has not taking the Valium. Any refill requires an office visit. Narcotics have may adverse effects and the risks of addiction, accidental overdose and death were emphasized. Provided prescription for the next 1 month.   - traMADol (ULTRAM) 50 MG Tab; Take 1 Tablet by mouth every 8 hours as needed for Moderate Pain or Severe Pain for up to 30 days.  Dispense: 40 Tablet; Refill: 0    2. High risk medication use  Patient reports that previously she experienced negative side effects when her Lamictal levels were too high.  She is recently lost weight and is concerned that her Lamictal levels are beginning to rise again.  Currently she denies any negative side effects however we will follow-up with a Lamictal level.  - LAMOTRIGINE; Future      Return in about 6 weeks (around 3/1/2024), or Surgery follow up.    Please note that this dictation was created using voice recognition software. I have made every reasonable attempt to correct obvious errors, but I expect that there are errors of grammar and possibly content that I did not discover before finalizing the note.        "

## 2024-01-19 NOTE — PROGRESS NOTES
Tramadol written x 30 days #40 tabs. Insurance will only cover 7 days.   Verbal given to dispense 7 days #21  Further rx will need to come from PCP.

## 2024-01-21 LAB — LAMOTRIGINE SERPL-MCNC: 7.8 UG/ML (ref 3–15)

## 2024-01-22 ENCOUNTER — TELEPHONE (OUTPATIENT)
Dept: NEUROLOGY | Facility: MEDICAL CENTER | Age: 39
End: 2024-01-22
Payer: MEDICAID

## 2024-01-22 NOTE — TELEPHONE ENCOUNTER
I  talked to this PT in regards to switching providers as  feels she would benefit more being seen by . Pt is okay with the change and was also told about our provider policy. Pt stated understanding that  will now be her primary neurologist instead of . Pt is scheduled with  on 4/1/24.  has talked with  about this change as well. Jessica Baron, Med Ass't

## 2024-01-24 DIAGNOSIS — T78.3XXA ANGIOEDEMA, INITIAL ENCOUNTER: ICD-10-CM

## 2024-01-24 DIAGNOSIS — L50.9 HIVES: ICD-10-CM

## 2024-01-25 ENCOUNTER — HOSPITAL ENCOUNTER (OUTPATIENT)
Facility: MEDICAL CENTER | Age: 39
DRG: 472 | End: 2024-01-25
Attending: NEUROLOGICAL SURGERY | Admitting: NEUROLOGICAL SURGERY
Payer: MEDICAID

## 2024-01-25 DIAGNOSIS — G89.18 POSTOPERATIVE PAIN: ICD-10-CM

## 2024-01-25 RX ORDER — FAMOTIDINE 40 MG/1
40 TABLET, FILM COATED ORAL DAILY
Qty: 30 TABLET | Refills: 0 | Status: SHIPPED | OUTPATIENT
Start: 2024-01-25

## 2024-01-25 NOTE — TELEPHONE ENCOUNTER
Received request via: Pharmacy    Was the patient seen in the last year in this department? Yes    Does the patient have an active prescription (recently filled or refills available) for medication(s) requested? No    Pharmacy Name: [facility]    Does the patient have [facility] and need 100 day supply (blood pressure, diabetes and cholesterol meds only)? Patient does not have SCP    Future Appointments         Provider Department Center    4/1/2024 10:40 AM Porsche Raymundo M.D. [facility]

## 2024-01-26 ENCOUNTER — APPOINTMENT (OUTPATIENT)
Dept: ADMISSIONS | Facility: MEDICAL CENTER | Age: 39
DRG: 472 | End: 2024-01-26
Attending: NEUROLOGICAL SURGERY
Payer: MEDICAID

## 2024-02-05 ENCOUNTER — PRE-ADMISSION TESTING (OUTPATIENT)
Dept: ADMISSIONS | Facility: MEDICAL CENTER | Age: 39
DRG: 472 | End: 2024-02-05
Attending: NEUROLOGICAL SURGERY
Payer: MEDICAID

## 2024-02-05 RX ORDER — DIAZEPAM 5 MG/1
5 TABLET ORAL EVERY 6 HOURS PRN
Status: ON HOLD | COMMUNITY
End: 2024-04-09

## 2024-02-12 ENCOUNTER — PATIENT MESSAGE (OUTPATIENT)
Dept: MEDICAL GROUP | Facility: MEDICAL CENTER | Age: 39
End: 2024-02-12
Payer: MEDICAID

## 2024-02-12 DIAGNOSIS — M48.02 CERVICAL STENOSIS OF SPINAL CANAL: ICD-10-CM

## 2024-02-12 DIAGNOSIS — M51.34 DEGENERATIVE DISC DISEASE, THORACIC: ICD-10-CM

## 2024-02-12 RX ORDER — CYCLOBENZAPRINE HCL 5 MG
5-10 TABLET ORAL 3 TIMES DAILY PRN
Qty: 30 TABLET | Refills: 0 | Status: SHIPPED | OUTPATIENT
Start: 2024-02-12 | End: 2024-03-05

## 2024-02-14 ENCOUNTER — APPOINTMENT (OUTPATIENT)
Dept: ADMISSIONS | Facility: MEDICAL CENTER | Age: 39
DRG: 472 | End: 2024-02-14
Attending: NEUROLOGICAL SURGERY
Payer: MEDICAID

## 2024-02-16 ENCOUNTER — OFFICE VISIT (OUTPATIENT)
Dept: URGENT CARE | Facility: PHYSICIAN GROUP | Age: 39
End: 2024-02-16
Payer: MEDICAID

## 2024-02-16 VITALS
SYSTOLIC BLOOD PRESSURE: 110 MMHG | TEMPERATURE: 97.6 F | OXYGEN SATURATION: 97 % | DIASTOLIC BLOOD PRESSURE: 70 MMHG | WEIGHT: 195 LBS | HEART RATE: 85 BPM | RESPIRATION RATE: 16 BRPM | BODY MASS INDEX: 33.47 KG/M2

## 2024-02-16 DIAGNOSIS — M48.02 CERVICAL STENOSIS OF SPINAL CANAL: ICD-10-CM

## 2024-02-16 PROCEDURE — 3074F SYST BP LT 130 MM HG: CPT | Performed by: NURSE PRACTITIONER

## 2024-02-16 PROCEDURE — 3078F DIAST BP <80 MM HG: CPT | Performed by: NURSE PRACTITIONER

## 2024-02-16 PROCEDURE — 99214 OFFICE O/P EST MOD 30 MIN: CPT | Performed by: NURSE PRACTITIONER

## 2024-02-16 RX ORDER — TRAMADOL HYDROCHLORIDE 50 MG/1
50 TABLET ORAL EVERY 8 HOURS PRN
Qty: 12 TABLET | Refills: 0 | Status: SHIPPED | OUTPATIENT
Start: 2024-02-16 | End: 2024-02-20

## 2024-02-16 ASSESSMENT — FIBROSIS 4 INDEX: FIB4 SCORE: 0.73

## 2024-02-16 ASSESSMENT — PAIN SCALES - GENERAL: PAINLEVEL: 10=SEVERE PAIN

## 2024-02-16 NOTE — PROGRESS NOTES
Subjective:   Noy Rose is a 39 y.o. female who presents for Pain (Back pain, neck pain, shoulder. Severe spinal stenosis. )      Patient is a 39-year-old female who presents clinic today requesting refill of her tramadol due to chronic cervical spinal pain and thoracic pain secondary to spinal stenosis.  Her pain management is currently managed by her primary care provider however she is currently out of the office and was unable to provide a refill.  Patient states today that she had surgery scheduled for February 14 however her doctor, Dr. Firero with [facility] neurosurgery had to cancel due to family emergency.  Patient reports she had a meth tramadol to get her through until surgery however was anticipating pain medications being prescribed after surgery and did not have any additional pain medication.  Today she states that she takes 2 to 50 mg tramadol per day for pain control.  She denies any exacerbation of her symptoms.   She states her pain is exacerbated with certain movements of her throat local spine and thoracic spine.  She does get intermittent numbness and tingling in her upper extremities however this is unchanged.  She does experience intermittent headaches.        Medications, Allergies, and current problem list reviewed today in Epic.     Objective:     /70   Pulse 85   Temp 36.4 °C (97.6 °F) (Temporal)   Resp 16   Wt 88.5 kg (195 lb)   SpO2 97%     Physical Exam  Vitals reviewed.   Constitutional:       Appearance: Normal appearance.   HENT:      Head: Normocephalic.      Nose: Nose normal.      Mouth/Throat:      Mouth: Mucous membranes are moist.   Eyes:      Extraocular Movements: Extraocular movements intact.      Conjunctiva/sclera: Conjunctivae normal.      Pupils: Pupils are equal, round, and reactive to light.   Cardiovascular:      Rate and Rhythm: Normal rate.   Pulmonary:      Effort: Pulmonary effort is normal.   Musculoskeletal:      Cervical back: Neck supple.  Tenderness and bony tenderness present. No swelling, deformity, signs of trauma, rigidity, spasms or torticollis. Pain with movement present. Decreased range of motion.      Thoracic back: Spasms, tenderness and bony tenderness present. No swelling, edema, deformity, signs of trauma or lacerations. Decreased range of motion.   Skin:     General: Skin is warm and dry.   Neurological:      Mental Status: She is alert and oriented to person, place, and time.   Psychiatric:         Mood and Affect: Mood normal.         Behavior: Behavior normal.         Thought Content: Thought content normal.         Judgment: Judgment normal.         Assessment/Plan:     Diagnosis and associated orders:     1. Cervical stenosis of spinal canal  traMADol (ULTRAM) 50 MG Tab    Consent for Opiate Prescription         Comments/MDM:     This is a chronic condition.  Patient is currently under pain management contract with her primary care provider however she is currently out of the office.  Patient is requesting refill of her controlled substance tramadol for moderate pain secondary to cervical spinal stenosis.  Patient was unable to get surgery performed due to surgeon having family emergency.  .  Discussed with patient at length the risks associated with habit-forming medications and that this is regulated by the [employer].  Reviewed most recent UDS which did have Valium, patient reports she is no longer taking Valium.  Reviewed PDMP which does not show any refills of her Valium and does show appropriate use of tramadol and appropriate refills.  Discussed with patient side effects to medication.  Last refill of tramadol was given on 1/19/2024 for a total of 30 days.  Did discuss with patient that it is slightly early for refill however after review of previous prescription by primary care provider it does indicate take 1 tablet by mouth every 8 hours as needed for moderate or severe pain for up to 30 days however only 40 tablets were  dispensed. Discussed with patient at length that the urgent care setting we do not refill chronic pain medication however due to the circumstances we will go ahead and give short course refill of tramadol 50 mg tablet for 4 days with a total of 12 tablets given.  Controlled substance contract was signed.  Discussed with patient that no further refills will be provided in the urgent care as she will need to follow-up with her primary care provider.  Patient was involved with shared decision-making throughout the exam today and verbalizes understanding regards to plan of care, discharge instructions, and follow-up         Differential diagnosis, natural history, supportive care, and indications for immediate follow-up discussed.    Advised the patient to follow-up with the primary care physician for recheck, reevaluation, and consideration of further management.    I personally reviewed prior external notes and test results pertinent to today's visit as well as additional imaging and testing completed in clinic today.     Please note that this dictation was created using voice recognition software. I have made a reasonable attempt to correct obvious errors, but I expect that there are errors of grammar and possibly content that I did not discover before finalizing the note.

## 2024-02-27 DIAGNOSIS — I10 ESSENTIAL HYPERTENSION: ICD-10-CM

## 2024-02-27 RX ORDER — LOSARTAN POTASSIUM 25 MG/1
25 TABLET ORAL DAILY
Qty: 90 TABLET | Refills: 2 | Status: SHIPPED | OUTPATIENT
Start: 2024-02-27

## 2024-02-28 ENCOUNTER — HOSPITAL ENCOUNTER (OUTPATIENT)
Facility: MEDICAL CENTER | Age: 39
End: 2024-02-28
Payer: MEDICAID

## 2024-02-28 ENCOUNTER — OFFICE VISIT (OUTPATIENT)
Dept: MEDICAL GROUP | Facility: MEDICAL CENTER | Age: 39
End: 2024-02-28
Payer: MEDICAID

## 2024-02-28 VITALS
HEART RATE: 83 BPM | SYSTOLIC BLOOD PRESSURE: 112 MMHG | OXYGEN SATURATION: 99 % | BODY MASS INDEX: 32.56 KG/M2 | RESPIRATION RATE: 16 BRPM | WEIGHT: 190.7 LBS | TEMPERATURE: 97.7 F | DIASTOLIC BLOOD PRESSURE: 70 MMHG | HEIGHT: 64 IN

## 2024-02-28 DIAGNOSIS — Z79.899 HIGH RISK MEDICATION USE: ICD-10-CM

## 2024-02-28 DIAGNOSIS — M48.02 CERVICAL STENOSIS OF SPINAL CANAL: ICD-10-CM

## 2024-02-28 DIAGNOSIS — M51.34 DEGENERATIVE DISC DISEASE, THORACIC: ICD-10-CM

## 2024-02-28 DIAGNOSIS — E11.9 TYPE 2 DIABETES MELLITUS WITHOUT COMPLICATION, WITHOUT LONG-TERM CURRENT USE OF INSULIN (HCC): ICD-10-CM

## 2024-02-28 LAB
HBA1C MFR BLD: 5.1 % (ref ?–5.8)
POCT INT CON NEG: NEGATIVE
POCT INT CON POS: POSITIVE

## 2024-02-28 PROCEDURE — 3078F DIAST BP <80 MM HG: CPT

## 2024-02-28 PROCEDURE — 99214 OFFICE O/P EST MOD 30 MIN: CPT

## 2024-02-28 PROCEDURE — 80307 DRUG TEST PRSMV CHEM ANLYZR: CPT

## 2024-02-28 PROCEDURE — G0480 DRUG TEST DEF 1-7 CLASSES: HCPCS

## 2024-02-28 PROCEDURE — 83036 HEMOGLOBIN GLYCOSYLATED A1C: CPT

## 2024-02-28 PROCEDURE — 99213 OFFICE O/P EST LOW 20 MIN: CPT

## 2024-02-28 PROCEDURE — 3074F SYST BP LT 130 MM HG: CPT

## 2024-02-28 RX ORDER — TRAMADOL HYDROCHLORIDE 50 MG/1
50 TABLET ORAL EVERY 8 HOURS
Qty: 21 TABLET | Refills: 0 | Status: SHIPPED | OUTPATIENT
Start: 2024-02-28 | End: 2024-03-06

## 2024-02-28 ASSESSMENT — FIBROSIS 4 INDEX: FIB4 SCORE: 0.73

## 2024-02-28 NOTE — TELEPHONE ENCOUNTER
Pharmacy is requesting a 90 day supply per insurance    Received request via: Pharmacy    Was the patient seen in the last year in this department? Yes    Does the patient have an active prescription (recently filled or refills available) for medication(s) requested? No    [facility]    Does the patient have [facility] and need 100 day supply (blood pressure, diabetes and cholesterol meds only)? Patient does not have SCP    Future Appointments         Provider Department Center    2/28/2024 4:40 PM (Arrive by 4:25 PM) [facility]    4/1/2024 10:40 AM Porsche Raymundo M.D. [facility]

## 2024-02-29 NOTE — ASSESSMENT & PLAN NOTE
Patient here for refill of her tramadol.  Upon review of the PDMP it was found that she refilled her Valium 2 days ago.  She reports that her  picked up her prescriptions for her and she did not tell him to  or not  any of them.  She is aware that based on our previous conversations multiple controlled substances are against the controlled substance agreement.  Her previous urine drug screen was positive for Valium at that point she said she was going to stop taking it.  She does report that tramadol is the only thing that helps with her pain and she is not taking the Valium anymore.

## 2024-02-29 NOTE — PROGRESS NOTES
"Subjective:     CC: medication refill    HPI:   Noy presents today with    Cervical stenosis of spinal canal  Patient here for refill of her tramadol.  Upon review of the PDMP it was found that she refilled her Valium 2 days ago.  She reports that her  picked up her prescriptions for her and she did not tell him to  or not  any of them.  She is aware that based on our previous conversations multiple controlled substances are against the controlled substance agreement.  Her previous urine drug screen was positive for Valium at that point she said she was going to stop taking it.  She does report that tramadol is the only thing that helps with her pain and she is not taking the Valium anymore.         ROS:  - CONSTITUTIONAL: Denies weight loss, fever and chills.  - HEENT: Denies changes in vision and hearing.  - RESPIRATORY: Denies SOB and cough.  - CV: Denies palpitations and CP.  - GI: Denies abdominal pain, nausea, vomiting and diarrhea.  - : Denies dysuria and urinary frequency.  - MSK: Denies myalgia and joint pain.  - SKIN: Denies rash and pruritus.  - NEUROLOGICAL: Denies headache and syncope.  - PSYCHIATRIC: Denies recent changes in mood. Denies anxiety and depression.    Please see HPI for additional ROS.       Objective:     Exam:  /70 (BP Location: Right arm, Patient Position: Sitting, BP Cuff Size: Large adult)   Pulse 83   Temp 36.5 °C (97.7 °F) (Temporal)   Resp 16   Ht 1.626 m (5' 4\")   Wt 86.5 kg (190 lb 11.2 oz)   SpO2 99%   BMI 32.73 kg/m²  Body mass index is 32.73 kg/m².    Physical Exam:  Constitutional: Alert, no distress, well-groomed.  Skin: Warm, dry, good turgor, no rashes in visible areas.  Eye: Equal, round and reactive, conjunctiva clear, lids normal.  ENMT: Lips without lesions, good dentition, moist mucous membranes.  Neck: Trachea midline, no masses, no thyromegaly.  Respiratory: Unlabored respiratory effort, no cough.  Abd: soft, non tender, non " distended, normal BS  MSK: Normal gait, moves all extremities.  Neuro: Grossly non-focal.   Psych: Alert and oriented x3, normal affect and mood.    Opioid Risk Score: 2    Interpretation of Opioid Risk Score   Score 0-3 = Low risk of abuse. Do UDS at least once per year.  Score 4-7 = Moderate risk of abuse. Do UDS 1-4 times per year.  Score 8+ = High risk of abuse. Refer to specialist.     Labs: Reviewed    Assessment & Plan:     39 y.o. female with the following -     1. Cervical stenosis of spinal canal  2. Degenerative disc disease, thoracic  Chronic, stable with tramadol use.  Patient surgery with Dr. Fierro was pushed out to April due to family emergencies on his behalf.  Given the results of her PDMP we will recheck her drug screen for Valium.  I will send her a 7-day prescription of tramadol while we wait for her drug screen results.  We did discuss that if her urine drug screen is positive for Valium I will be unable to provide her with tramadol.  She understands that taking Valium is outside of the parameters of our controlled substance agreement.  Controlled substance agreement up-to-date and on file.  - traMADol (ULTRAM) 50 MG Tab; Take 1 Tablet by mouth every 8 hours for 7 days.  Dispense: 21 Tablet; Refill: 0      3. Type 2 diabetes mellitus without complication, without long-term current use of insulin (HCC)  Chronic, controlled.  Hemoglobin A1c is under 7.0%.  She is up-to-date with all diabetic screenings, on an ARB for renal protection.  We will continue the current regimen.  - POCT Hemoglobin A1C  - MICROALBUMIN CREAT RATIO URINE; Future    4. High risk medication use  - PAIN MANAGEMENT SCRN, W/ RFLX TO QNT; Future        Return in about 1 week (around 3/6/2024).    Please note that this dictation was created using voice recognition software. I have made every reasonable attempt to correct obvious errors, but I expect that there are errors of grammar and possibly content that I did not discover  before finalizing the note.

## 2024-03-01 LAB
AMPHET CTO UR CFM-MCNC: NEGATIVE NG/ML
BARBITURATES CTO UR CFM-MCNC: NEGATIVE NG/ML
BENZODIAZ CTO UR CFM-MCNC: NORMAL NG/ML
BUPRENORPHINE UR-MCNC: NEGATIVE NG/ML
CANNABINOIDS CTO UR CFM-MCNC: NEGATIVE NG/ML
CARISOPRODOL UR-MCNC: NEGATIVE NG/ML
COCAINE CTO UR CFM-MCNC: NEGATIVE NG/ML
CREAT UR-MCNC: 161 MG/DL (ref 20–400)
DRUG SCREEN COMMENT UR-IMP: NORMAL
ETHYL GLUCURONIDE UR QL SCN: NEGATIVE NG/ML
FENTANYL UR-MCNC: NEGATIVE NG/ML
MEPERIDINE CTO UR SCN-MCNC: NEGATIVE NG/ML
METHADONE CTO UR CFM-MCNC: NEGATIVE NG/ML
OPIATES UR QL SCN: NEGATIVE NG/ML
OXYCDOXYM URSCRN 2005102: NEGATIVE NG/ML
PCP CTO UR CFM-MCNC: NEGATIVE NG/ML
PROPOXYPH CTO UR CFM-MCNC: NEGATIVE NG/ML
TAPENTADOL UR-MCNC: NEGATIVE NG/ML
TRAMADOL CTO UR SCN-MCNC: NORMAL NG/ML
ZOLPIDEM UR-MCNC: NEGATIVE NG/ML

## 2024-03-02 DIAGNOSIS — R20.2 PARESTHESIA OF BOTH FEET: ICD-10-CM

## 2024-03-04 DIAGNOSIS — M51.34 DEGENERATIVE DISC DISEASE, THORACIC: ICD-10-CM

## 2024-03-04 DIAGNOSIS — M48.02 CERVICAL STENOSIS OF SPINAL CANAL: ICD-10-CM

## 2024-03-04 LAB
NORTRAMADOL UR-MCNC: 195 NG/ML
TRAMADOL UR-MCNC: 978 NG/ML

## 2024-03-04 NOTE — TELEPHONE ENCOUNTER
Received request via: Pharmacy    Was the patient seen in the last year in this department? Yes    Does the patient have an active prescription (recently filled or refills available) for medication(s) requested? No    Ph[facility] Name: [facility]    Does the patient have snf Plus and need 100 day supply (blood pressure, diabetes and cholesterol meds only)? Patient does not have SCP    Future Appointments         Provider Department Center    4/1/2024 10:40 AM Porsche Raymundo M.D. [facility] Neurology

## 2024-03-04 NOTE — TELEPHONE ENCOUNTER
Received request via: Pharmacy    Was the patient seen in the last year in this department? Yes    Does the patient have an active prescription (recently filled or refills available) for medication(s) requested? No    Pharmacy Name: [facility]    Does the patient have snf Plus and need 100 day supply (blood pressure, diabetes and cholesterol meds only)? Patient does not have SCP

## 2024-03-05 RX ORDER — GABAPENTIN 300 MG/1
300 CAPSULE ORAL 3 TIMES DAILY
Qty: 180 CAPSULE | Refills: 0 | Status: SHIPPED | OUTPATIENT
Start: 2024-03-05

## 2024-03-05 RX ORDER — CYCLOBENZAPRINE HCL 5 MG
TABLET ORAL
Qty: 30 TABLET | Refills: 0 | Status: SHIPPED | OUTPATIENT
Start: 2024-03-05

## 2024-03-06 LAB
1OH-MIDAZOLAM UR CFM-MCNC: <20 NG/ML
7AMINOCLONAZEPAM UR CFM-MCNC: <5 NG/ML
A-OH ALPRAZ UR CFM-MCNC: <5 NG/ML
ALPRAZ UR CFM-MCNC: <5 NG/ML
CHLORDIAZEP UR CFM-MCNC: <20 NG/ML
CLONAZEPAM UR CFM-MCNC: <5 NG/ML
DIAZEPAM UR CFM-MCNC: <20 NG/ML
LORAZEPAM UR CFM-MCNC: <20 NG/ML
MIDAZOLAM UR CFM-MCNC: <20 NG/ML
NORDIAZEPAM UR CFM-MCNC: 54 NG/ML
OXAZEPAM UR CFM-MCNC: 256 NG/ML
TEMAZEPAM UR CFM-MCNC: 125 NG/ML

## 2024-03-06 PROCEDURE — G0480 DRUG TEST DEF 1-7 CLASSES: HCPCS

## 2024-03-25 ENCOUNTER — TELEPHONE (OUTPATIENT)
Dept: NEUROLOGY | Facility: MEDICAL CENTER | Age: 39
End: 2024-03-25
Payer: MEDICAID

## 2024-03-25 NOTE — TELEPHONE ENCOUNTER
NEUROLOGY PATIENT PRE-VISIT PLANNING     Patient was NOT contacted to complete PVP.    Patient Appointment is scheduled as: Established Patient     Is visit type and length scheduled correctly? No    [employer] Patient is checked in Patient Demographics? Yes    3.   Is referral attached to visit? Yes    4. Were records received from referring provider? Yes, referring provider is a [facility] provider so records are in Epic    4. Patient was NOT contacted to have someone accompany them to visit.     5. Is this appointment scheduled as a Hospital Follow-Up?  No    6. Does the patient require any pre procedure or post procedure follow up? No    7. If any orders were placed at last visit or intended to be done for this visit do we have Results/Consult Notes? Yes  Labs - Labs were not ordered at last office visit.  Imaging - Imaging was not ordered at last office visit.  Referrals - No referrals were ordered at last office visit.    8. If patient appointment is for Botox - is order pended for provider? N/A    9. Was Plan Assessment from last Neurology Office Visit Reviewed?  Yes

## 2024-03-28 ENCOUNTER — TELEPHONE (OUTPATIENT)
Dept: NEUROLOGY | Facility: MEDICAL CENTER | Age: 39
End: 2024-03-28
Payer: MEDICAID

## 2024-03-28 NOTE — TELEPHONE ENCOUNTER
NEUROLOGY PATIENT PRE-VISIT PLANNING     Patient was NOT contacted to complete PVP.    Patient Appointment is scheduled as: New Patient     Is visit type and length scheduled correctly? Yes    [employer] Patient is checked in Patient Demographics? Yes    3.   Is referral attached to visit? Yes    4. Were records received from referring provider? Yes, referring provider is a [facility] provider so records are in Epic    4. Patient was not contacted to have someone accompany them to visit.     5. Is this appointment scheduled as a Hospital Follow-Up?  No    6. Does the patient require any pre procedure or post procedure follow up? No    7. If any orders were placed at last visit or intended to be done for this visit do we have Results/Consult Notes? Yes  Labs - Labs were not ordered at last office visit.  Imaging - Imaging was not ordered at last office visit.  Referrals - No referrals were ordered at last office visit.    8. If patient appointment is for Botox - is order pended for provider? N/A    9. Was Plan Assessment from last Neurology Office Visit Reviewed?  Yes

## 2024-04-01 ENCOUNTER — APPOINTMENT (OUTPATIENT)
Dept: NEUROLOGY | Facility: MEDICAL CENTER | Age: 39
End: 2024-04-01
Attending: STUDENT IN AN ORGANIZED HEALTH CARE EDUCATION/TRAINING PROGRAM
Payer: MEDICAID

## 2024-04-01 NOTE — PROGRESS NOTES
"[facility]  Transfer of Care    Patient name: Noy Rose  YOB: 1985  MRN: [id number]  Date of visit: 4/1/2024     CC: Seizure disorder      HPI:    Noy Rose is a 39 y.o. woman who is being seen in transfer of care for seizures, localization-related epilepsy with complex partial seizures. Previously following with Dr. Medina, last visit 6/15/2023.    She is accompanied to clinic by her mom who supplements the history.     She had an ED visit for episodic diplopia, speech was off September 2023.    Mom notes that she had an abnormal EEG at age 3 which showed generalized abnormalities. Her grandmother noted that she would \"dart her eyes\" which seemed abnormal to her. She continued to have these episodes but they were never more pronounced or severe, eventually stopped. At age 10 she started having focal seizures. Seizures were well controlled during teenage years.     Onset: age 10  Semiology:   1) Complex partial seizures: Aura of ursh-vu with heart racing, sounds are bothersome, unable to speak or respond. Legs sometimes twitch.   2) GTCs (+) urinary incontinence (+) tongue biting  Frequency:   Duration of spells: 30 seconds-4 minutes   Triggers: none identified, possibly stress  Current treatment: Lamictal 300 mg BID decreased 400 mg BID after developing side effects with supratherapeutic level. Also taking gabapentin 300 mg TID for neuropathic symptoms.  Previous treatments: Tegretol - often refused to take it as a child. Keppra was then started but she had mood disturbance.  Status Epilepticus: no  Clustering: yes, had 9 in one day last year at which time the Lamictal dose was increased.     Last seizure: last GTC 9 years ago, most recent complex partial seizure 6-7 months ago when she had 9 in one day.     Around September 2023 she had a seizure that was of different semiology. States it felt like she was about to have a focal seizure but it started similar " to a panic attack, was crying but then started twitching and shaking everywhere, able to speak throughout. No loss of consciousness. She slept all day afterwards.     Mood: dealing with a lot of stress recently. She sees a psychiatry/psychology NP through telehealth in [place]. Also seeing a therapist for anxiety. No history of suicidal ideation.    Side effects: none currently    Barriers to taking medication appropriately: never forgets    Driving: rarely, has not driven far distances in 2 years due to neuropathy in the feet    Vitamin D: no longer taking    Women's issues in epilepsy: s/p hysterectomy    Risk factors for epileptic seizures:  Normal birth history, no complications, born at term.   No history of significant head trauma.   No history of stroke or meningitis.  (+) family history of epilepsy - father with history of childhood-onset generalized tonic-clonic seizures. Great grandmother on maternal side. 10 siblings none of whom have seizures.    No febrile seizures.     Drinks alcohol twice per year. Quit smoking in February 2023.     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       Past Surgical History:   Past Surgical History:   Procedure Laterality Date    PRIMARY C SECTION  2006, 2011    TUBAL COAGULATION LAPAROSCOPIC BILATERAL         Social History:   Social History     Socioeconomic History    Marital status:      Spouse name: Not on file    Number of children: Not on file    Years of education: Not on file    Highest education level: Some college, no degree   Occupational History    Not on file   Tobacco Use    Smoking status: Former     Current packs/day: 0.00     Average packs/day: 0.7  packs/day for 30.0 years (20.0 ttl pk-yrs)     Types: Cigarettes, Electronic Cigarettes     Quit date: 2023     Years since quittin.1    Smokeless tobacco: Never    Tobacco comments:     has quit on and off.    Vaping Use    Vaping Use: Never used   Substance and Sexual Activity    Alcohol use: No     Comment: occ    Drug use: No    Sexual activity: Yes     Partners: Male     Birth control/protection: Abstinence   Other Topics Concern    Not on file   Social History Narrative    Not on file     Social Determinants of Health     Financial Resource Strain: Low Risk  (2021)    Overall Financial Resource Strain (CARDIA)     Difficulty of Paying Living Expenses: Not hard at all   Food Insecurity: No Food Insecurity (2021)    Hunger Vital Sign     Worried About Running Out of Food in the Last Year: Never true     Ran Out of Food in the Last Year: Never true   Transportation Needs: No Transportation Needs (2021)    PRAPARE - Transportation     Lack of Transportation (Medical): No     Lack of Transportation (Non-Medical): No   Physical Activity: Inactive (2021)    Exercise Vital Sign     Days of Exercise per Week: 0 days     Minutes of Exercise per Session: 0 min   Stress: Stress Concern Present (2021)    [employer] - Occupational Stress Questionnaire     Feeling of Stress : To some extent   Social Connections: Moderately Isolated (2021)    Social Connection and Isolation Panel [NHANES]     Frequency of Communication with Friends and Family: More than three times a week     Frequency of Social Gatherings with Friends and Family: Never     Attends Scientologist Services: Never     Active Member of Clubs or Organizations: No     Attends Club or Organization Meetings: Never     Marital Status:    Intimate Partner Violence: Not on file   Housing Stability: Unknown (2021)    Housing Stability Vital Sign     Unable to Pay for Housing in the Last Year: No      Number of Places Lived in the Last Year: Not on file     Unstable Housing in the Last Year: No       Family Hx:   Family History   Problem Relation Age of Onset    Diabetes Mother     Hyperlipidemia Maternal Uncle     Hypertension Maternal Uncle     Diabetes Maternal Uncle     Cancer Maternal Grandmother         breast    Cancer Paternal Grandfather        Current Medications:   Current Outpatient Medications:     gabapentin (NEURONTIN) 300 MG Cap, TAKE 1 CAPSULE BY MOUTH THREE TIMES DAILY, Disp: 180 Capsule, Rfl: 0    cyclobenzaprine (FLEXERIL) 5 mg tablet, TAKE 1 TO 2 TABLETS BY MOUTH THREE TIMES DAILY AS NEEDED FOR  MODERATE  PAIN  OR  MUSCLE  SPASMS, Disp: 30 Tablet, Rfl: 0    losartan (COZAAR) 25 MG Tab, Take 1 Tablet by mouth every day., Disp: 90 Tablet, Rfl: 2    diazePAM (VALIUM) 5 MG Tab, Take 5 mg by mouth every 6 hours as needed for Anxiety., Disp: , Rfl:     famotidine (PEPCID) 40 MG Tab, Take 1 tablet by mouth once daily, Disp: 30 Tablet, Rfl: 0    cetirizine (ZYRTEC) 10 MG Tab, Take 1 Tablet by mouth 2 times a day., Disp: 60 Tablet, Rfl: 5    OZEMPIC, 1 MG/DOSE, 4 MG/3ML Solution Pen-injector, Inject 1 mg under the skin every 7 days., Disp: , Rfl:     omeprazole (PRILOSEC) 20 MG delayed-release capsule, TAKE 1 CAPSULE BY MOUTH EVERY DAY, Disp: 90 Capsule, Rfl: 1    Blood Glucose Test Strips, 1 Strip by Other route in the morning, at noon, and at bedtime. Use one test strip to test blood sugar three times daily., Disp: 300 Strip, Rfl: 0    Lancets, Use one lancet to test blood sugar three times daily., Disp: 300 Each, Rfl: 0    EPINEPHrine (EPIPEN) 0.3 MG/0.3ML Solution Auto-injector solution for injection, Inject 0.3 mg into the shoulder, thigh, or buttocks as needed., Disp: , Rfl:     metFORMIN ER (GLUCOPHAGE XR) 500 MG TABLET SR 24 HR, TAKE 1 TABLET BY MOUTH TWICE A DAY, Disp: 180 Tablet, Rfl: 1    lamotrigine (LAMICTAL) 200 MG tablet, Take 1.5 Tablets by mouth 2 times a day., Disp: 90 Tablet,  Rfl: 0    PARoxetine (PAXIL) 10 MG Tab, Take 20 mg by mouth every evening., Disp: , Rfl:     Allergies:   Allergies   Allergen Reactions    Hydrocodone-Acetaminophen Anxiety, Itching, Nausea, Palpitations and Shortness of Breath    Codeine Shortness of Breath, Itching, Nausea and Unspecified     Rash, itching         Physical Exam:   Ambulatory Vitals  Vitals:    04/02/24 1018   BP: 124/70   Pulse: 82   Temp: 36.1 °C (96.9 °F)   SpO2: 95%       Constitutional: Well-developed, well-nourished, good hygiene. Appears stated age.  Respiratory: Normal respiratory effort  Skin: Warm, dry, intact. No rashes observed.  Neurologic:   Mental Status: Awake, alert, oriented x 3.   Speech: Fluent with normal prosody.   Memory: Able to recall recent and remote events accurately.    Concentration: Attentive. Able to focus on history and follow multi-step commands.   Fund of Knowledge: Appropriate.   Cranial Nerves:    CN II: PERRL     CN III, IV, VI: EOMI without nystagmus    CN V: Facial sensation intact and symmetric in all 3 trigeminal distributions    CN VII: No facial asymmetry    CN VIII: Hearing intact to voice    CN IX and X: Palate elevates symmetrically, gag reflex not tested    CN XI: Symmetric shoulder shrug     CN XII: Tongue midline   Motor: 5/5 in upper and lower extremities bilaterally   Sensory: Intact light touch diffusely    Coordination: No evidence of past-pointing on finger to nose testing, no dysdiadochokinesia. Heel to shin intact.    DTR's: 2+ brisk throughout    Gait: ambulates steadily without assistive device.  Not to perform tandem gait due to balance difficulty.   Movements: No resting tremors or abnormal movements observed.   Musculoskeletal:    Strength: as above   Tone: Normal bulk and tone   Joints: No swelling    Studies:      Labs reviewed:  Component  Ref Range & Units 2 mo ago 6 mo ago 2 yr ago 4 yr ago   Lamotrigine  3.0 - 15.0 ug/mL 7.8 18.9 High  CM 9.1 R, CM 7.4 R, CM       Imaging:   MRI  "brain 9/28/2023:  IMPRESSION:     Unremarkable noncontrast MR examination of the brain.    MRI cervical and thoracic spine with and without contrast 9/28/2023:  IMPRESSION:     1.  Multifocal degenerative disease in the cervical spine as described above.  2.  Severe central canal stenosis at C6-7 and C7-T1.  3.  Moderate to severe central canal stenosis at C5-6.  4.  Moderate central canal stenosis at C5-6 and C6-7.  5.  Multifocal neural foraminal stenosis as described above.    IMPRESSION:     Multifocal degenerative disease in the thoracic spine as described above.    EEG Results:     Previous EEG results reviewed.  Normal EEGs in awake, drowsy, and sleep states September 27 and 29, 2023 as well as March 24, 2023.      Routine EEG 10/1/2019:  INTERPRETATION:  This is an abnormal video EEG recording in the awake and drowsy/sleep state(s).  Clinical correlation is recommended.  Findings are consistent with a right mid and posterior temporal region dysfunction with rhythmic discharge consistent with possible underlying epileptogenic potential, no clear epileptiform/seizure activity is seen.     Note: A normal EEG does not rule out epilepsy.  If the clinical suspicion remains high for seizures, a prolonged recording to capture clinical or subclinical events may be helpfu      Assessment/Plan:   Noy Rose is a 39 y.o. woman with a history of probable localization-related epilepsy being seen in follow up/transfer of care.     Seizure frequency has been stable on current regimen of Lamictal 300 mg BID. Refills provided.     For mood disorder follows with behavioral health, taking Paxil.    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     Counseling provided as below in \"patient instructions\".        Patient Instructions:    Please let our office know if you have any changes in your seizure frequency and/characteristics. Otherwise, please keep the diary of your events and bring it with you at " the time of your next follow up visit with our office.     Please take vitamin D3 7024-7480 international units daily.     Please take folic acid 1 mg daily. This is an over-the-counter supplement that is recommended to prevent certain developmental problems in your baby, in case you become pregnant in the future.    Please let our office know as soon as you become pregnant or plan to become pregnant.    If you are caring for a baby/young child, please make sure to be sitting on a soft surface while holding your baby/young child, so in case you have a seizure, your baby/young child is not injured due to fall.     Please let us know if you observe that your baby is excessively sleepy/has other changes and the pediatrician feels that there are no other explanations except possible adverse effects of antiseizure medication(s) your are taking while nursing your baby.     Please note that the following might precipitate seizures: missed doses of antiseizure medications, being sick with fever, stress, fatigue, sleep deprivation, not eating regularly, not drinking enough water, drinking too much alcohol, stopping alcohol suddenly if you are currently using it on a regular/daily basis, and/or using recreational drugs, among others.    Please note that the following might lead to an injury, potentially a life-threatening injury, in case you have a seizure and/or lose awareness while:   - being in a large body of water by yourself, such as bath, pool, lake, ocean, among others (risk of drowning)   - being on unprotected heights (risk of fall)   - being around and/or operating heavy machinery (risk of injury)   - being around open fire/hot surfaces (risk of burns)   - any other activities/circumstances, in which if you lose awareness, you might injure yourself and/or others.    Please call for help (crisis line and/or 911) in case you have thoughts of harming yourself and/or others.    Please abstain from driving until further  notice.    ------------------------------------------------------------------------------------------  Instructions for your family/caregivers:  Please call 911 if the patient has a seizure longer than 2-3 minutes, if seizures are back to back without her recovering to her baseline, or she does not start recovering within 5-10 minutes after the seizure stops. During the seizure - please turn her on her side, please make sure her head is protected (for example, you should put a pillow under her head, if one is available), and please do not put anything in her mouth.   -------------------------------------------------------------------------------------------    It is important that your seizures are well controlled and you have none or have them rarely. In addition to avoiding injury related to breakthrough seizures, frequent seizures increase risk of SUDEP (sudden unexpected death in epilepsy), where a person goes into a seizure and then never wakes up - this is a rare complication of seizure disorder; one of the best ways to prevent it is to control your seizures well.     Due to the high volume of patients we are trying to help, your physician will not be able to respond by phone or in [employer] to your routine concerns between appointments.  This does not reflect a lack of interest or concern for you or your diagnosis.  Please bring these questions and concerns to your appointment where your physician can answer.  Please relay more pressing concerns to our office, either via [employer], or by phone; if not able to reach us please visit nearby Urgent Care Center or Emergency Department.  If any emergent medical needs, please seek emergent medical help and/or call 911.    Please note that we are not able to fill out paperwork that might be related to your work, utility company, disability, and/or driving, among others, in between the visits.  Please schedule a dedicated appointment to address your paperwork, so we can  do that in a timely manner.  This is not due to lack of concern or interest for your disease-related work/administrative problems, but to make sure that we provide the best possible care and to fill out your paperwork in a correct and timely manner.    Thank you for entrusting your neurological care to [facility] and we look forward to continuing to serve you.       Follow up in approximately 3 months.       Porsche Raymundo M.D.   Diplomate, Neurology with Special Qualification in Epilepsy, American Board of Psychiatry and Neurology   of Clinical Neurology, [facility] of Medicine  Level III Epilepsy Center, Department of Neurology at [facility]   4/1/2024      During today's encounter we discussed available treatment options and their individual side effect profiles. Total encounter time caring for patient today 51 minutes.

## 2024-04-02 ENCOUNTER — APPOINTMENT (OUTPATIENT)
Dept: NEUROLOGY | Facility: MEDICAL CENTER | Age: 39
End: 2024-04-02
Attending: STUDENT IN AN ORGANIZED HEALTH CARE EDUCATION/TRAINING PROGRAM
Payer: MEDICAID

## 2024-04-02 VITALS
SYSTOLIC BLOOD PRESSURE: 124 MMHG | HEIGHT: 64 IN | BODY MASS INDEX: 33.5 KG/M2 | OXYGEN SATURATION: 95 % | WEIGHT: 196.21 LBS | TEMPERATURE: 96.9 F | DIASTOLIC BLOOD PRESSURE: 70 MMHG | HEART RATE: 82 BPM

## 2024-04-02 DIAGNOSIS — R56.9 SEIZURE-LIKE ACTIVITY (HCC): ICD-10-CM

## 2024-04-02 DIAGNOSIS — E11.9 TYPE 2 DIABETES MELLITUS WITHOUT COMPLICATION, WITHOUT LONG-TERM CURRENT USE OF INSULIN (HCC): ICD-10-CM

## 2024-04-02 DIAGNOSIS — R20.2 PARESTHESIA OF BOTH FEET: ICD-10-CM

## 2024-04-02 DIAGNOSIS — G40.209 LOCALIZATION-RELATED FOCAL EPILEPSY WITH COMPLEX PARTIAL SEIZURES (HCC): Primary | ICD-10-CM

## 2024-04-02 PROCEDURE — 3078F DIAST BP <80 MM HG: CPT | Performed by: STUDENT IN AN ORGANIZED HEALTH CARE EDUCATION/TRAINING PROGRAM

## 2024-04-02 PROCEDURE — 3074F SYST BP LT 130 MM HG: CPT | Performed by: STUDENT IN AN ORGANIZED HEALTH CARE EDUCATION/TRAINING PROGRAM

## 2024-04-02 PROCEDURE — 99215 OFFICE O/P EST HI 40 MIN: CPT | Performed by: STUDENT IN AN ORGANIZED HEALTH CARE EDUCATION/TRAINING PROGRAM

## 2024-04-02 RX ORDER — MULTIVIT-MIN/IRON/FOLIC ACID/K 18-600-40
2000 CAPSULE ORAL DAILY
Qty: 90 CAPSULE | Refills: 3 | Status: ON HOLD | OUTPATIENT
Start: 2024-04-02 | End: 2024-04-09

## 2024-04-02 RX ORDER — LAMOTRIGINE 200 MG/1
300 TABLET ORAL 2 TIMES DAILY
Qty: 270 TABLET | Refills: 1 | Status: SHIPPED | OUTPATIENT
Start: 2024-04-02 | End: 2024-09-29

## 2024-04-02 ASSESSMENT — FIBROSIS 4 INDEX: FIB4 SCORE: 0.73

## 2024-04-02 NOTE — PATIENT INSTRUCTIONS
Please let our office know if you have any changes in your seizure frequency and/characteristics. Otherwise, please keep the diary of your events and bring it with you at the time of your next follow up visit with our office.     Please take vitamin D3 [id number] internation units daily.     Please take folic acid 1 mg daily. This is an over-the-counter supplement that is recommended to prevent certain developmental problems in your baby, in case you become pregnant in the future.    Please let our office know as soon as you become pregnant or plan to become pregnant.    If you are caring for a baby/young child, please make sure to be sitting on a soft surface while holding your baby/young child, so in case you have a seizure, your baby/young child is not injured due to fall.     Please let us know if you observe that your baby is excessively sleepy/has other changes and the pediatrician feels that there are no other explanations except possible adverse effects of antiseizure medication(s) your are taking while nursing your baby.     Please note that the following might precipitate seizures: missed doses of antiseizure medications, being sick with fever, stress, fatigue, sleep deprivation, not eating regularly, not drinking enough water, drinking too much alcohol, stopping alcohol suddenly if you are currently using it on a regular/daily basis, and/or using recreational drugs, among others.    Please note that the following might lead to an injury, potentially a life-threatening injury, in case you have a seizure and/or lose awareness while:   - being in a large body of water by yourself, such as bath, pool, lake, ocean, among others (risk of drowning)   - being on unprotected heights (risk of fall)   - being around and/or operating heavy machinery (risk of injury)   - being around open fire/hot surfaces (risk of burns)   - any other activities/circumstances, in which if you lose awareness, you might injure  yourself and/or others.    Please call for help (crisis line and/or 911) in case you have thoughts of harming yourself and/or others.    Please abstain from driving until further notice.    ------------------------------------------------------------------------------------------  Instructions for your family/caregivers:  Please call 911 if the patient has a seizure longer than 2-3 minutes, if seizures are back to back without her recovering to her baseline, or she does not start recovering within 5-10 minutes after the seizure stops. During the seizure - please turn her on her side, please make sure her head is protected (for example, you should put a pillow under her head, if one is available), and please do not put anything in her mouth.   -------------------------------------------------------------------------------------------    It is important that your seizures are well controlled and you have none or have them rarely. In addition to avoiding injury related to breakthrough seizures, frequent seizures increase risk of SUDEP (sudden unexpected death in epilepsy), where a person goes into a seizure and then never wakes up - this is a rare complication of seizure disorder; one of the best ways to prevent it is to control your seizures well.     Due to the high volume of patients we are trying to help, your physician will not be able to respond by phone or in [employer] to your routine concerns between appointments.  This does not reflect a lack of interest or concern for you or your diagnosis.  Please bring these questions and concerns to your appointment where your physician can answer.  Please relay more pressing concerns to our office, either via Com[employer]ihart, or by phone; if not able to reach us please visit nearby Urgent Care Center or Emergency Department.  If any emergent medical needs, please seek emergent medical help and/or call 911.    Please note that we are not able to fill out paperwork that might be  related to your work, utility company, disability, and/or driving, among others, in between the visits.  Please schedule a dedicated appointment to address your paperwork, so we can do that in a timely manner.  This is not due to lack of concern or interest for your disease-related work/administrative problems, but to make sure that we provide the best possible care and to fill out your paperwork in a correct and timely manner.    Thank you for entrusting your neurological care to [facility] and we look forward to continuing to serve you.

## 2024-04-03 ENCOUNTER — ANESTHESIA EVENT (OUTPATIENT)
Dept: SURGERY | Facility: MEDICAL CENTER | Age: 39
DRG: 472 | End: 2024-04-03
Payer: MEDICAID

## 2024-04-03 ENCOUNTER — APPOINTMENT (OUTPATIENT)
Dept: RADIOLOGY | Facility: MEDICAL CENTER | Age: 39
DRG: 472 | End: 2024-04-03
Attending: NEUROLOGICAL SURGERY
Payer: MEDICAID

## 2024-04-03 ENCOUNTER — ANESTHESIA (OUTPATIENT)
Dept: SURGERY | Facility: MEDICAL CENTER | Age: 39
DRG: 472 | End: 2024-04-03
Payer: MEDICAID

## 2024-04-03 LAB
ABO + RH BLD: NORMAL
ABO GROUP BLD: NORMAL
BLD GP AB SCN SERPL QL: NORMAL
GLUCOSE BLD STRIP.AUTO-MCNC: 81 MG/DL (ref 65–99)
RH BLD: NORMAL

## 2024-04-03 PROCEDURE — 700111 HCHG RX REV CODE 636 W/ 250 OVERRIDE (IP): Performed by: PHYSICIAN ASSISTANT

## 2024-04-03 PROCEDURE — 700101 HCHG RX REV CODE 250: Performed by: ANESTHESIOLOGY

## 2024-04-03 PROCEDURE — 700111 HCHG RX REV CODE 636 W/ 250 OVERRIDE (IP): Performed by: ANESTHESIOLOGY

## 2024-04-03 PROCEDURE — C1713 ANCHOR/SCREW BN/BN,TIS/BN: HCPCS | Performed by: NEUROLOGICAL SURGERY

## 2024-04-03 PROCEDURE — A9270 NON-COVERED ITEM OR SERVICE: HCPCS | Performed by: ANESTHESIOLOGY

## 2024-04-03 PROCEDURE — 110371 HCHG SHELL REV 272: Performed by: NEUROLOGICAL SURGERY

## 2024-04-03 PROCEDURE — 110454 HCHG SHELL REV 250: Performed by: NEUROLOGICAL SURGERY

## 2024-04-03 PROCEDURE — 160035 HCHG PACU - 1ST 60 MINS PHASE I: Performed by: NEUROLOGICAL SURGERY

## 2024-04-03 PROCEDURE — 160031 HCHG SURGERY MINUTES - 1ST 30 MINS LEVEL 5: Performed by: NEUROLOGICAL SURGERY

## 2024-04-03 PROCEDURE — 700102 HCHG RX REV CODE 250 W/ 637 OVERRIDE(OP): Performed by: NEUROLOGICAL SURGERY

## 2024-04-03 PROCEDURE — 160036 HCHG PACU - EA ADDL 30 MINS PHASE I: Performed by: NEUROLOGICAL SURGERY

## 2024-04-03 PROCEDURE — 95937 NEUROMUSCULAR JUNCTION TEST: CPT | Performed by: NEUROLOGICAL SURGERY

## 2024-04-03 PROCEDURE — 95938 SOMATOSENSORY TESTING: CPT | Performed by: NEUROLOGICAL SURGERY

## 2024-04-03 PROCEDURE — 160009 HCHG ANES TIME/MIN: Performed by: NEUROLOGICAL SURGERY

## 2024-04-03 PROCEDURE — A9270 NON-COVERED ITEM OR SERVICE: HCPCS | Performed by: PHYSICIAN ASSISTANT

## 2024-04-03 PROCEDURE — 72020 X-RAY EXAM OF SPINE 1 VIEW: CPT

## 2024-04-03 PROCEDURE — 160048 HCHG OR STATISTICAL LEVEL 1-5: Performed by: NEUROLOGICAL SURGERY

## 2024-04-03 PROCEDURE — 86850 RBC ANTIBODY SCREEN: CPT

## 2024-04-03 PROCEDURE — 700101 HCHG RX REV CODE 250: Performed by: NEUROLOGICAL SURGERY

## 2024-04-03 PROCEDURE — 86900 BLOOD TYPING SEROLOGIC ABO: CPT

## 2024-04-03 PROCEDURE — 86901 BLOOD TYPING SEROLOGIC RH(D): CPT

## 2024-04-03 PROCEDURE — 700105 HCHG RX REV CODE 258: Performed by: NEUROLOGICAL SURGERY

## 2024-04-03 PROCEDURE — 770001 HCHG ROOM/CARE - MED/SURG/GYN PRIV*

## 2024-04-03 PROCEDURE — A9270 NON-COVERED ITEM OR SERVICE: HCPCS | Performed by: NEUROLOGICAL SURGERY

## 2024-04-03 PROCEDURE — 95861 NEEDLE EMG 2 EXTREMITIES: CPT | Performed by: NEUROLOGICAL SURGERY

## 2024-04-03 PROCEDURE — 700102 HCHG RX REV CODE 250 W/ 637 OVERRIDE(OP): Performed by: ANESTHESIOLOGY

## 2024-04-03 PROCEDURE — 36415 COLL VENOUS BLD VENIPUNCTURE: CPT

## 2024-04-03 PROCEDURE — 700111 HCHG RX REV CODE 636 W/ 250 OVERRIDE (IP): Mod: JZ,UD | Performed by: NEUROLOGICAL SURGERY

## 2024-04-03 PROCEDURE — 95940 IONM IN OPERATNG ROOM 15 MIN: CPT | Performed by: NEUROLOGICAL SURGERY

## 2024-04-03 PROCEDURE — 502000 HCHG MISC OR IMPLANTS RC 0278: Performed by: NEUROLOGICAL SURGERY

## 2024-04-03 PROCEDURE — 160042 HCHG SURGERY MINUTES - EA ADDL 1 MIN LEVEL 5: Performed by: NEUROLOGICAL SURGERY

## 2024-04-03 PROCEDURE — 95939 C MOTOR EVOKED UPR&LWR LIMBS: CPT | Performed by: NEUROLOGICAL SURGERY

## 2024-04-03 PROCEDURE — 160002 HCHG RECOVERY MINUTES (STAT): Performed by: NEUROLOGICAL SURGERY

## 2024-04-03 PROCEDURE — 700101 HCHG RX REV CODE 250: Performed by: PHYSICIAN ASSISTANT

## 2024-04-03 PROCEDURE — 82962 GLUCOSE BLOOD TEST: CPT

## 2024-04-03 PROCEDURE — 700102 HCHG RX REV CODE 250 W/ 637 OVERRIDE(OP): Performed by: PHYSICIAN ASSISTANT

## 2024-04-03 DEVICE — GRAPH BONE KIT INFUSE MEDIUM: Type: IMPLANTABLE DEVICE | Site: SPINE CERVICAL | Status: FUNCTIONAL

## 2024-04-03 DEVICE — BEADS STIMULAN CALCIUM SULFATE: Type: IMPLANTABLE DEVICE | Site: SPINE CERVICAL | Status: FUNCTIONAL

## 2024-04-03 DEVICE — GRAFT BONE PASTE GRAFTON PLUS 10CC (1EA): Type: IMPLANTABLE DEVICE | Site: SPINE CERVICAL | Status: FUNCTIONAL

## 2024-04-03 DEVICE — SCREW SET INFINITY (1EA): Type: IMPLANTABLE DEVICE | Site: SPINE CERVICAL | Status: FUNCTIONAL

## 2024-04-03 DEVICE — IMPLANTABLE DEVICE: Type: IMPLANTABLE DEVICE | Site: SPINE CERVICAL | Status: FUNCTIONAL

## 2024-04-03 DEVICE — SCREW BONE INFINITY 3.5 X 14MM (1EA): Type: IMPLANTABLE DEVICE | Site: SPINE CERVICAL | Status: FUNCTIONAL

## 2024-04-03 RX ORDER — MORPHINE SULFATE 4 MG/ML
4 INJECTION INTRAVENOUS ONCE
Status: COMPLETED | OUTPATIENT
Start: 2024-04-03 | End: 2024-04-03

## 2024-04-03 RX ORDER — FAMOTIDINE 40 MG/1
40 TABLET, FILM COATED ORAL 2 TIMES DAILY
COMMUNITY

## 2024-04-03 RX ORDER — DIPHENHYDRAMINE HYDROCHLORIDE 50 MG/ML
25 INJECTION INTRAMUSCULAR; INTRAVENOUS EVERY 6 HOURS PRN
Status: DISCONTINUED | OUTPATIENT
Start: 2024-04-03 | End: 2024-04-09 | Stop reason: HOSPADM

## 2024-04-03 RX ORDER — METFORMIN HYDROCHLORIDE 500 MG/1
500 TABLET, EXTENDED RELEASE ORAL 2 TIMES DAILY
Status: DISCONTINUED | OUTPATIENT
Start: 2024-04-03 | End: 2024-04-04

## 2024-04-03 RX ORDER — CEFAZOLIN SODIUM 1 G/3ML
INJECTION, POWDER, FOR SOLUTION INTRAMUSCULAR; INTRAVENOUS
Status: DISCONTINUED | OUTPATIENT
Start: 2024-04-03 | End: 2024-04-03 | Stop reason: HOSPADM

## 2024-04-03 RX ORDER — DEXAMETHASONE SODIUM PHOSPHATE 4 MG/ML
INJECTION, SOLUTION INTRA-ARTICULAR; INTRALESIONAL; INTRAMUSCULAR; INTRAVENOUS; SOFT TISSUE PRN
Status: DISCONTINUED | OUTPATIENT
Start: 2024-04-03 | End: 2024-04-03 | Stop reason: SURG

## 2024-04-03 RX ORDER — CYCLOBENZAPRINE HCL 10 MG
10 TABLET ORAL EVERY 8 HOURS PRN
Status: DISCONTINUED | OUTPATIENT
Start: 2024-04-03 | End: 2024-04-06

## 2024-04-03 RX ORDER — HYDROMORPHONE HYDROCHLORIDE 1 MG/ML
0.5 INJECTION, SOLUTION INTRAMUSCULAR; INTRAVENOUS; SUBCUTANEOUS
Status: DISCONTINUED | OUTPATIENT
Start: 2024-04-03 | End: 2024-04-03 | Stop reason: HOSPADM

## 2024-04-03 RX ORDER — PROCHLORPERAZINE EDISYLATE 5 MG/ML
5-10 INJECTION INTRAMUSCULAR; INTRAVENOUS EVERY 4 HOURS PRN
Status: DISCONTINUED | OUTPATIENT
Start: 2024-04-03 | End: 2024-04-09 | Stop reason: HOSPADM

## 2024-04-03 RX ORDER — LABETALOL HYDROCHLORIDE 5 MG/ML
10 INJECTION, SOLUTION INTRAVENOUS
Status: DISCONTINUED | OUTPATIENT
Start: 2024-04-03 | End: 2024-04-09 | Stop reason: HOSPADM

## 2024-04-03 RX ORDER — DOCUSATE SODIUM 100 MG/1
100 CAPSULE, LIQUID FILLED ORAL 2 TIMES DAILY
Status: DISCONTINUED | OUTPATIENT
Start: 2024-04-03 | End: 2024-04-09 | Stop reason: HOSPADM

## 2024-04-03 RX ORDER — LIDOCAINE HYDROCHLORIDE 20 MG/ML
INJECTION, SOLUTION EPIDURAL; INFILTRATION; INTRACAUDAL; PERINEURAL PRN
Status: DISCONTINUED | OUTPATIENT
Start: 2024-04-03 | End: 2024-04-03 | Stop reason: HOSPADM

## 2024-04-03 RX ORDER — HYDROMORPHONE HYDROCHLORIDE 1 MG/ML
0.1 INJECTION, SOLUTION INTRAMUSCULAR; INTRAVENOUS; SUBCUTANEOUS
Status: DISCONTINUED | OUTPATIENT
Start: 2024-04-03 | End: 2024-04-03 | Stop reason: HOSPADM

## 2024-04-03 RX ORDER — MEPERIDINE HYDROCHLORIDE 25 MG/ML
25 INJECTION INTRAMUSCULAR; INTRAVENOUS; SUBCUTANEOUS
Status: DISCONTINUED | OUTPATIENT
Start: 2024-04-03 | End: 2024-04-03 | Stop reason: HOSPADM

## 2024-04-03 RX ORDER — OXYCODONE HCL 5 MG/5 ML
5 SOLUTION, ORAL ORAL
Status: COMPLETED | OUTPATIENT
Start: 2024-04-03 | End: 2024-04-03

## 2024-04-03 RX ORDER — ACETAMINOPHEN 500 MG
1000 TABLET ORAL 2 TIMES DAILY PRN
COMMUNITY

## 2024-04-03 RX ORDER — CEFAZOLIN SODIUM 1 G/3ML
INJECTION, POWDER, FOR SOLUTION INTRAMUSCULAR; INTRAVENOUS PRN
Status: DISCONTINUED | OUTPATIENT
Start: 2024-04-03 | End: 2024-04-03 | Stop reason: SURG

## 2024-04-03 RX ORDER — GABAPENTIN 300 MG/1
300 CAPSULE ORAL 3 TIMES DAILY
Status: DISCONTINUED | OUTPATIENT
Start: 2024-04-03 | End: 2024-04-09 | Stop reason: HOSPADM

## 2024-04-03 RX ORDER — ONDANSETRON 4 MG/1
4 TABLET, ORALLY DISINTEGRATING ORAL EVERY 4 HOURS PRN
Status: DISCONTINUED | OUTPATIENT
Start: 2024-04-03 | End: 2024-04-09 | Stop reason: HOSPADM

## 2024-04-03 RX ORDER — ACETAMINOPHEN 500 MG
1000 TABLET ORAL EVERY 6 HOURS PRN
Status: DISCONTINUED | OUTPATIENT
Start: 2024-04-08 | End: 2024-04-09 | Stop reason: HOSPADM

## 2024-04-03 RX ORDER — ONDANSETRON 2 MG/ML
4 INJECTION INTRAMUSCULAR; INTRAVENOUS EVERY 4 HOURS PRN
Status: DISCONTINUED | OUTPATIENT
Start: 2024-04-03 | End: 2024-04-09 | Stop reason: HOSPADM

## 2024-04-03 RX ORDER — FAMOTIDINE 20 MG/1
40 TABLET, FILM COATED ORAL 2 TIMES DAILY
Status: DISCONTINUED | OUTPATIENT
Start: 2024-04-03 | End: 2024-04-09 | Stop reason: HOSPADM

## 2024-04-03 RX ORDER — ENEMA 19; 7 G/133ML; G/133ML
1 ENEMA RECTAL
Status: DISCONTINUED | OUTPATIENT
Start: 2024-04-03 | End: 2024-04-09 | Stop reason: HOSPADM

## 2024-04-03 RX ORDER — BISACODYL 10 MG
10 SUPPOSITORY, RECTAL RECTAL
Status: DISCONTINUED | OUTPATIENT
Start: 2024-04-03 | End: 2024-04-09 | Stop reason: HOSPADM

## 2024-04-03 RX ORDER — DIAZEPAM 5 MG/1
5 TABLET ORAL EVERY 4 HOURS PRN
Status: DISCONTINUED | OUTPATIENT
Start: 2024-04-03 | End: 2024-04-04

## 2024-04-03 RX ORDER — POLYETHYLENE GLYCOL 3350 17 G/17G
1 POWDER, FOR SOLUTION ORAL 2 TIMES DAILY PRN
Status: DISCONTINUED | OUTPATIENT
Start: 2024-04-03 | End: 2024-04-09 | Stop reason: HOSPADM

## 2024-04-03 RX ORDER — VANCOMYCIN HYDROCHLORIDE 500 MG/10ML
INJECTION, POWDER, LYOPHILIZED, FOR SOLUTION INTRAVENOUS
Status: DISCONTINUED | OUTPATIENT
Start: 2024-04-03 | End: 2024-04-03 | Stop reason: HOSPADM

## 2024-04-03 RX ORDER — MIDAZOLAM HYDROCHLORIDE 1 MG/ML
1 INJECTION INTRAMUSCULAR; INTRAVENOUS
Status: DISCONTINUED | OUTPATIENT
Start: 2024-04-03 | End: 2024-04-03 | Stop reason: HOSPADM

## 2024-04-03 RX ORDER — DEXTROSE MONOHYDRATE, SODIUM CHLORIDE, AND POTASSIUM CHLORIDE 50; 1.49; 9 G/1000ML; G/1000ML; G/1000ML
INJECTION, SOLUTION INTRAVENOUS CONTINUOUS
Status: DISCONTINUED | OUTPATIENT
Start: 2024-04-03 | End: 2024-04-09 | Stop reason: HOSPADM

## 2024-04-03 RX ORDER — MIDAZOLAM HYDROCHLORIDE 1 MG/ML
INJECTION INTRAMUSCULAR; INTRAVENOUS PRN
Status: DISCONTINUED | OUTPATIENT
Start: 2024-04-03 | End: 2024-04-03 | Stop reason: SURG

## 2024-04-03 RX ORDER — ATROPINE SULFATE 0.4 MG/ML
INJECTION, SOLUTION INTRAVENOUS PRN
Status: DISCONTINUED | OUTPATIENT
Start: 2024-04-03 | End: 2024-04-03 | Stop reason: SURG

## 2024-04-03 RX ORDER — SODIUM CHLORIDE, SODIUM LACTATE, POTASSIUM CHLORIDE, CALCIUM CHLORIDE 600; 310; 30; 20 MG/100ML; MG/100ML; MG/100ML; MG/100ML
INJECTION, SOLUTION INTRAVENOUS CONTINUOUS
Status: DISCONTINUED | OUTPATIENT
Start: 2024-04-03 | End: 2024-04-03 | Stop reason: HOSPADM

## 2024-04-03 RX ORDER — CETIRIZINE HYDROCHLORIDE 10 MG/1
10 TABLET ORAL 2 TIMES DAILY
Status: DISCONTINUED | OUTPATIENT
Start: 2024-04-03 | End: 2024-04-09 | Stop reason: HOSPADM

## 2024-04-03 RX ORDER — LAMOTRIGINE 100 MG/1
300 TABLET ORAL 2 TIMES DAILY
Status: DISCONTINUED | OUTPATIENT
Start: 2024-04-03 | End: 2024-04-09 | Stop reason: HOSPADM

## 2024-04-03 RX ORDER — BACITRACIN ZINC 500 [USP'U]/G
OINTMENT TOPICAL
Status: DISCONTINUED | OUTPATIENT
Start: 2024-04-03 | End: 2024-04-03 | Stop reason: HOSPADM

## 2024-04-03 RX ORDER — HYDROMORPHONE HYDROCHLORIDE 1 MG/ML
0.2 INJECTION, SOLUTION INTRAMUSCULAR; INTRAVENOUS; SUBCUTANEOUS
Status: DISCONTINUED | OUTPATIENT
Start: 2024-04-03 | End: 2024-04-03 | Stop reason: HOSPADM

## 2024-04-03 RX ORDER — PROMETHAZINE HYDROCHLORIDE 25 MG/1
12.5-25 TABLET ORAL EVERY 4 HOURS PRN
Status: DISCONTINUED | OUTPATIENT
Start: 2024-04-03 | End: 2024-04-09 | Stop reason: HOSPADM

## 2024-04-03 RX ORDER — OXYCODONE HCL 5 MG/5 ML
10 SOLUTION, ORAL ORAL
Status: COMPLETED | OUTPATIENT
Start: 2024-04-03 | End: 2024-04-03

## 2024-04-03 RX ORDER — PAROXETINE HYDROCHLORIDE 20 MG/1
20 TABLET, FILM COATED ORAL EVERY EVENING
Status: DISCONTINUED | OUTPATIENT
Start: 2024-04-03 | End: 2024-04-09 | Stop reason: HOSPADM

## 2024-04-03 RX ORDER — IPRATROPIUM BROMIDE AND ALBUTEROL SULFATE 2.5; .5 MG/3ML; MG/3ML
3 SOLUTION RESPIRATORY (INHALATION)
Status: DISCONTINUED | OUTPATIENT
Start: 2024-04-03 | End: 2024-04-03 | Stop reason: HOSPADM

## 2024-04-03 RX ORDER — SODIUM CHLORIDE, SODIUM LACTATE, POTASSIUM CHLORIDE, CALCIUM CHLORIDE 600; 310; 30; 20 MG/100ML; MG/100ML; MG/100ML; MG/100ML
INJECTION, SOLUTION INTRAVENOUS CONTINUOUS
Status: ACTIVE | OUTPATIENT
Start: 2024-04-03 | End: 2024-04-03

## 2024-04-03 RX ORDER — BUPIVACAINE HYDROCHLORIDE AND EPINEPHRINE 5; 5 MG/ML; UG/ML
INJECTION, SOLUTION EPIDURAL; INTRACAUDAL; PERINEURAL
Status: DISCONTINUED | OUTPATIENT
Start: 2024-04-03 | End: 2024-04-03 | Stop reason: HOSPADM

## 2024-04-03 RX ORDER — METHOCARBAMOL 750 MG/1
750 TABLET, FILM COATED ORAL EVERY 8 HOURS PRN
Status: DISCONTINUED | OUTPATIENT
Start: 2024-04-03 | End: 2024-04-06

## 2024-04-03 RX ORDER — CALCIUM CARBONATE 500 MG/1
500 TABLET, CHEWABLE ORAL 2 TIMES DAILY
Status: DISCONTINUED | OUTPATIENT
Start: 2024-04-03 | End: 2024-04-09 | Stop reason: HOSPADM

## 2024-04-03 RX ORDER — LOSARTAN POTASSIUM 25 MG/1
25 TABLET ORAL DAILY
Status: DISCONTINUED | OUTPATIENT
Start: 2024-04-04 | End: 2024-04-09 | Stop reason: HOSPADM

## 2024-04-03 RX ORDER — AMOXICILLIN 250 MG
1 CAPSULE ORAL
Status: DISCONTINUED | OUTPATIENT
Start: 2024-04-03 | End: 2024-04-09 | Stop reason: HOSPADM

## 2024-04-03 RX ORDER — PROMETHAZINE HYDROCHLORIDE 25 MG/1
12.5-25 SUPPOSITORY RECTAL EVERY 4 HOURS PRN
Status: DISCONTINUED | OUTPATIENT
Start: 2024-04-03 | End: 2024-04-09 | Stop reason: HOSPADM

## 2024-04-03 RX ORDER — ACETAMINOPHEN 500 MG
1000 TABLET ORAL EVERY 6 HOURS
Status: DISPENSED | OUTPATIENT
Start: 2024-04-03 | End: 2024-04-08

## 2024-04-03 RX ORDER — ONDANSETRON 2 MG/ML
4 INJECTION INTRAMUSCULAR; INTRAVENOUS
Status: DISCONTINUED | OUTPATIENT
Start: 2024-04-03 | End: 2024-04-03 | Stop reason: HOSPADM

## 2024-04-03 RX ORDER — MORPHINE SULFATE 4 MG/ML
4 INJECTION INTRAVENOUS
Status: DISCONTINUED | OUTPATIENT
Start: 2024-04-03 | End: 2024-04-04

## 2024-04-03 RX ORDER — LABETALOL HYDROCHLORIDE 5 MG/ML
INJECTION, SOLUTION INTRAVENOUS PRN
Status: DISCONTINUED | OUTPATIENT
Start: 2024-04-03 | End: 2024-04-03 | Stop reason: SURG

## 2024-04-03 RX ORDER — DIPHENHYDRAMINE HYDROCHLORIDE 50 MG/ML
12.5 INJECTION INTRAMUSCULAR; INTRAVENOUS
Status: DISCONTINUED | OUTPATIENT
Start: 2024-04-03 | End: 2024-04-03 | Stop reason: HOSPADM

## 2024-04-03 RX ORDER — DIPHENHYDRAMINE HCL 25 MG
25 TABLET ORAL EVERY 6 HOURS PRN
Status: DISCONTINUED | OUTPATIENT
Start: 2024-04-03 | End: 2024-04-09 | Stop reason: HOSPADM

## 2024-04-03 RX ORDER — AMOXICILLIN 250 MG
1 CAPSULE ORAL NIGHTLY
Status: DISCONTINUED | OUTPATIENT
Start: 2024-04-03 | End: 2024-04-09 | Stop reason: HOSPADM

## 2024-04-03 RX ORDER — ONDANSETRON 2 MG/ML
INJECTION INTRAMUSCULAR; INTRAVENOUS PRN
Status: DISCONTINUED | OUTPATIENT
Start: 2024-04-03 | End: 2024-04-03 | Stop reason: SURG

## 2024-04-03 RX ORDER — OMEPRAZOLE 20 MG/1
20 CAPSULE, DELAYED RELEASE ORAL
Status: DISCONTINUED | OUTPATIENT
Start: 2024-04-04 | End: 2024-04-09 | Stop reason: HOSPADM

## 2024-04-03 RX ADMIN — LIDOCAINE HYDROCHLORIDE 60 MG: 20 INJECTION, SOLUTION EPIDURAL; INFILTRATION; INTRACAUDAL at 13:04

## 2024-04-03 RX ADMIN — FAMOTIDINE 40 MG: 20 TABLET, FILM COATED ORAL at 21:12

## 2024-04-03 RX ADMIN — OXYCODONE HYDROCHLORIDE 10 MG: 5 SOLUTION ORAL at 18:00

## 2024-04-03 RX ADMIN — CEFAZOLIN 2 G: 1 INJECTION, POWDER, FOR SOLUTION INTRAMUSCULAR; INTRAVENOUS at 13:05

## 2024-04-03 RX ADMIN — ONDANSETRON 4 MG: 2 INJECTION INTRAMUSCULAR; INTRAVENOUS at 13:38

## 2024-04-03 RX ADMIN — FENTANYL CITRATE 50 MCG: 50 INJECTION, SOLUTION INTRAMUSCULAR; INTRAVENOUS at 18:15

## 2024-04-03 RX ADMIN — FENTANYL CITRATE 50 MCG: 50 INJECTION, SOLUTION INTRAMUSCULAR; INTRAVENOUS at 17:13

## 2024-04-03 RX ADMIN — ACETAMINOPHEN 1000 MG: 500 TABLET, FILM COATED ORAL at 20:51

## 2024-04-03 RX ADMIN — CYCLOBENZAPRINE 10 MG: 10 TABLET, FILM COATED ORAL at 22:57

## 2024-04-03 RX ADMIN — HYDROMORPHONE HYDROCHLORIDE 0.5 MG: 1 INJECTION, SOLUTION INTRAMUSCULAR; INTRAVENOUS; SUBCUTANEOUS at 18:25

## 2024-04-03 RX ADMIN — ROCURONIUM BROMIDE 50 MG: 10 INJECTION, SOLUTION INTRAVENOUS at 13:04

## 2024-04-03 RX ADMIN — FENTANYL CITRATE 50 MCG: 50 INJECTION, SOLUTION INTRAMUSCULAR; INTRAVENOUS at 18:04

## 2024-04-03 RX ADMIN — FENTANYL CITRATE 100 MCG: 50 INJECTION, SOLUTION INTRAMUSCULAR; INTRAVENOUS at 14:03

## 2024-04-03 RX ADMIN — FENTANYL CITRATE 100 MCG: 50 INJECTION, SOLUTION INTRAMUSCULAR; INTRAVENOUS at 14:59

## 2024-04-03 RX ADMIN — Medication: at 21:18

## 2024-04-03 RX ADMIN — CETIRIZINE HYDROCHLORIDE 10 MG: 10 TABLET, FILM COATED ORAL at 21:11

## 2024-04-03 RX ADMIN — PROPOFOL 200 MG: 10 INJECTION, EMULSION INTRAVENOUS at 13:04

## 2024-04-03 RX ADMIN — MEPERIDINE HYDROCHLORIDE 25 MG: 25 INJECTION INTRAMUSCULAR; INTRAVENOUS; SUBCUTANEOUS at 18:00

## 2024-04-03 RX ADMIN — DOCUSATE SODIUM 50 MG AND SENNOSIDES 8.6 MG 1 TABLET: 8.6; 5 TABLET, FILM COATED ORAL at 20:50

## 2024-04-03 RX ADMIN — FENTANYL CITRATE 100 MCG: 50 INJECTION, SOLUTION INTRAMUSCULAR; INTRAVENOUS at 13:04

## 2024-04-03 RX ADMIN — MIDAZOLAM HYDROCHLORIDE 2 MG: 1 INJECTION, SOLUTION INTRAMUSCULAR; INTRAVENOUS at 13:30

## 2024-04-03 RX ADMIN — GABAPENTIN 300 MG: 300 CAPSULE ORAL at 20:52

## 2024-04-03 RX ADMIN — FENTANYL CITRATE 100 MCG: 50 INJECTION, SOLUTION INTRAMUSCULAR; INTRAVENOUS at 16:30

## 2024-04-03 RX ADMIN — DOCUSATE SODIUM 100 MG: 100 CAPSULE, LIQUID FILLED ORAL at 20:51

## 2024-04-03 RX ADMIN — METFORMIN HYDROCHLORIDE 500 MG: 500 TABLET, EXTENDED RELEASE ORAL at 20:51

## 2024-04-03 RX ADMIN — HYDROMORPHONE HYDROCHLORIDE 0.2 MG: 1 INJECTION, SOLUTION INTRAMUSCULAR; INTRAVENOUS; SUBCUTANEOUS at 18:40

## 2024-04-03 RX ADMIN — ANTACID TABLETS 500 MG: 500 TABLET, CHEWABLE ORAL at 20:54

## 2024-04-03 RX ADMIN — POTASSIUM CHLORIDE, DEXTROSE MONOHYDRATE AND SODIUM CHLORIDE: 150; 5; 900 INJECTION, SOLUTION INTRAVENOUS at 21:18

## 2024-04-03 RX ADMIN — DEXAMETHASONE SODIUM PHOSPHATE 8 MG: 4 INJECTION INTRA-ARTICULAR; INTRALESIONAL; INTRAMUSCULAR; INTRAVENOUS; SOFT TISSUE at 13:38

## 2024-04-03 RX ADMIN — MIDAZOLAM HYDROCHLORIDE 2 MG: 1 INJECTION, SOLUTION INTRAMUSCULAR; INTRAVENOUS at 12:59

## 2024-04-03 RX ADMIN — METHOCARBAMOL TABLETS 750 MG: 750 TABLET, COATED ORAL at 20:50

## 2024-04-03 RX ADMIN — SODIUM CHLORIDE, POTASSIUM CHLORIDE, SODIUM LACTATE AND CALCIUM CHLORIDE: 600; 310; 30; 20 INJECTION, SOLUTION INTRAVENOUS at 12:59

## 2024-04-03 RX ADMIN — LABETALOL HYDROCHLORIDE 15 MG: 5 INJECTION, SOLUTION INTRAVENOUS at 13:30

## 2024-04-03 RX ADMIN — FENTANYL CITRATE 100 MCG: 50 INJECTION, SOLUTION INTRAMUSCULAR; INTRAVENOUS at 14:30

## 2024-04-03 RX ADMIN — FENTANYL CITRATE 100 MCG: 50 INJECTION, SOLUTION INTRAMUSCULAR; INTRAVENOUS at 13:35

## 2024-04-03 RX ADMIN — LABETALOL HYDROCHLORIDE 20 MG: 5 INJECTION, SOLUTION INTRAVENOUS at 13:45

## 2024-04-03 RX ADMIN — PROPOFOL 200 MCG/KG/MIN: 10 INJECTION, EMULSION INTRAVENOUS at 13:05

## 2024-04-03 RX ADMIN — ACETAMINOPHEN 1000 MG: 500 TABLET, FILM COATED ORAL at 23:33

## 2024-04-03 RX ADMIN — MORPHINE SULFATE 4 MG: 4 INJECTION, SOLUTION INTRAMUSCULAR; INTRAVENOUS at 21:12

## 2024-04-03 RX ADMIN — ATROPINE SULFATE 0.8 MG: 0.4 INJECTION, SOLUTION INTRAVENOUS at 14:20

## 2024-04-03 RX ADMIN — LAMOTRIGINE 300 MG: 100 TABLET ORAL at 20:54

## 2024-04-03 RX ADMIN — FENTANYL CITRATE 100 MCG: 50 INJECTION, SOLUTION INTRAMUSCULAR; INTRAVENOUS at 15:28

## 2024-04-03 RX ADMIN — CEFAZOLIN 2 G: 1 INJECTION, POWDER, FOR SOLUTION INTRAMUSCULAR; INTRAVENOUS at 17:00

## 2024-04-03 ASSESSMENT — PATIENT HEALTH QUESTIONNAIRE - PHQ9
2. FEELING DOWN, DEPRESSED, IRRITABLE, OR HOPELESS: NOT AT ALL
1. LITTLE INTEREST OR PLEASURE IN DOING THINGS: NOT AT ALL
SUM OF ALL RESPONSES TO PHQ9 QUESTIONS 1 AND 2: 0

## 2024-04-03 ASSESSMENT — PAIN DESCRIPTION - PAIN TYPE
TYPE: SURGICAL PAIN

## 2024-04-03 ASSESSMENT — PAIN SCALES - GENERAL: PAIN_LEVEL: 5

## 2024-04-03 ASSESSMENT — FIBROSIS 4 INDEX: FIB4 SCORE: 0.73

## 2024-04-03 NOTE — ANESTHESIA PROCEDURE NOTES
Airway    Date/Time: 4/3/2024 1:05 PM    Performed by: Raji Myrick M.D.  Authorized by: Raji Myrick M.D.    Location:  OR  Urgency:  Elective  Indications for Airway Management:  Anesthesia      Spontaneous Ventilation: absent    Sedation Level:  Deep  Preoxygenated: Yes    Patient Position:  Sniffing  Final Airway Type:  Endotracheal airway  Final Endotracheal Airway:  ETT  Cuffed: Yes    Technique Used for Successful ETT Placement:  Direct laryngoscopy    Insertion Site:  Oral  Blade Type:  Saray  Laryngoscope Blade/Videolaryngoscope Blade Size:  3  ETT Size (mm):  7.0  Measured from:  Teeth  ETT to Teeth (cm):  22  Placement Verified by: auscultation and capnometry    Cormack-Lehane Classification:  Grade I - full view of glottis  Number of Attempts at Approach:  1

## 2024-04-03 NOTE — ANESTHESIA PREPROCEDURE EVALUATION
Case: [id number] Date/Time: 04/03/24 1245    Procedures:       POSTERIOR C3-7 LAMINECTOMY, WITH C3-T4 LATERAL MASS PEDICLE SCREW FIXATION FUSION, CADAVERIC BONE GRAFT      LAMINECTOMY, SPINE, CERVICAL, POSTERIOR APPROACH      BONE GRAFT    Pre-op diagnosis: SPONDYLOSIS WITHOUT MYELOPATHY    Location: [facility] OR 05 / SURGERY [facility]    Surgeons: James Fierro M.D.            Relevant Problems   NEURO   (positive) Localization-related focal epilepsy with complex partial seizures (HCC)      CARDIAC   (positive) Essential hypertension   (positive) Varicose veins of both lower extremities with pain      ENDO   (positive) Type 2 diabetes mellitus without complication, without long-term current use of insulin (HCC)       Physical Exam    Airway   Mallampati: II  TM distance: >3 FB  Neck ROM: full       Cardiovascular - normal exam  Rhythm: regular  Rate: normal  (-) murmur     Dental - normal exam           Pulmonary - normal exam  Breath sounds clear to auscultation     Abdominal    Neurological - normal exam                   Anesthesia Plan    ASA 3       Plan - general       Airway plan will be ETT          Induction: intravenous    Postoperative Plan: Postoperative administration of opioids is intended.    Pertinent diagnostic labs and testing reviewed    Informed Consent:    Anesthetic plan and risks discussed with patient.    Use of blood products discussed with: patient whom consented to blood products.

## 2024-04-03 NOTE — ANESTHESIA PROCEDURE NOTES
Arterial Line    Performed by: Raji Myrick M.D.  Authorized by: Raji Myrick M.D.    Start Time:  4/3/2024 1:06 PM  End Time:  4/3/2024 1:13 PM  Localization: surface landmarks    Patient Location:  OR  Indication: continuous blood pressure monitoring        Catheter Size:  20 G  Seldinger Technique?: Yes    Laterality:  Right  Site:  Radial artery  Line Secured:  Tape, antimicrobial disc and transparent dressing  Events: patient tolerated procedure well with no complications

## 2024-04-03 NOTE — PROGRESS NOTES
Medication history reviewed with PT at bedside    [facility] rec is complete per PT reporting    Allergies reviewed.     Patient denies any outpatient antibiotics in the last 30 days.     Patient is not taking anticoagulants.    Preferred pharmacy for this visit - [facility] in [place] ([phone])

## 2024-04-03 NOTE — DISCHARGE PLANNING
[facility] Transitional Care Coordination    Referral from: SNEHA Rodriguez  Insurance Provider on Facesheet: Medicaid  Potential Rehab Diagnosis: Ortho    Chart review indicates patient may have on going medical management and may have therapy needs to possibly meet inpatient rehab facility criteria with the goal of returning to community.    D/C support: TBD     Physiatry consultation pended per protocol.     Pt currently in surgery for cervical lami and fusion, TCC will follow.     Thank you for the referral.

## 2024-04-04 LAB
ANION GAP SERPL CALC-SCNC: 12 MMOL/L (ref 7–16)
BUN SERPL-MCNC: 5 MG/DL (ref 8–22)
CALCIUM SERPL-MCNC: 8.6 MG/DL (ref 8.5–10.5)
CHLORIDE SERPL-SCNC: 107 MMOL/L (ref 96–112)
CO2 SERPL-SCNC: 21 MMOL/L (ref 20–33)
CREAT SERPL-MCNC: 0.6 MG/DL (ref 0.5–1.4)
GFR SERPLBLD CREATININE-BSD FMLA CKD-EPI: 116 ML/MIN/1.73 M 2
GLUCOSE SERPL-MCNC: 164 MG/DL (ref 65–99)
HGB BLD-MCNC: 10.8 G/DL (ref 12–16)
POTASSIUM SERPL-SCNC: 3.9 MMOL/L (ref 3.6–5.5)
SODIUM SERPL-SCNC: 140 MMOL/L (ref 135–145)

## 2024-04-04 PROCEDURE — 700101 HCHG RX REV CODE 250: Performed by: PHYSICIAN ASSISTANT

## 2024-04-04 PROCEDURE — 3E01329 INTRODUCTION OF OTHER ANTI-INFECTIVE INTO SUBCUTANEOUS TISSUE, PERCUTANEOUS APPROACH: ICD-10-PCS | Performed by: NEUROLOGICAL SURGERY

## 2024-04-04 PROCEDURE — 700102 HCHG RX REV CODE 250 W/ 637 OVERRIDE(OP): Performed by: NEUROLOGICAL SURGERY

## 2024-04-04 PROCEDURE — 85018 HEMOGLOBIN: CPT

## 2024-04-04 PROCEDURE — 4A11X4G MONITORING OF PERIPHERAL NERVOUS ELECTRICAL ACTIVITY, INTRAOPERATIVE, EXTERNAL APPROACH: ICD-10-PCS | Performed by: NEUROLOGICAL SURGERY

## 2024-04-04 PROCEDURE — 700102 HCHG RX REV CODE 250 W/ 637 OVERRIDE(OP): Performed by: PHYSICIAN ASSISTANT

## 2024-04-04 PROCEDURE — 700111 HCHG RX REV CODE 636 W/ 250 OVERRIDE (IP): Performed by: PHYSICIAN ASSISTANT

## 2024-04-04 PROCEDURE — 80048 BASIC METABOLIC PNL TOTAL CA: CPT

## 2024-04-04 PROCEDURE — 770001 HCHG ROOM/CARE - MED/SURG/GYN PRIV*

## 2024-04-04 PROCEDURE — 700111 HCHG RX REV CODE 636 W/ 250 OVERRIDE (IP): Mod: JZ | Performed by: NEUROLOGICAL SURGERY

## 2024-04-04 PROCEDURE — A9270 NON-COVERED ITEM OR SERVICE: HCPCS | Performed by: NEUROLOGICAL SURGERY

## 2024-04-04 PROCEDURE — 00NW0ZZ RELEASE CERVICAL SPINAL CORD, OPEN APPROACH: ICD-10-PCS | Performed by: NEUROLOGICAL SURGERY

## 2024-04-04 PROCEDURE — 97163 PT EVAL HIGH COMPLEX 45 MIN: CPT

## 2024-04-04 PROCEDURE — 36415 COLL VENOUS BLD VENIPUNCTURE: CPT

## 2024-04-04 PROCEDURE — 97535 SELF CARE MNGMENT TRAINING: CPT

## 2024-04-04 PROCEDURE — 0RG40K1 FUSION OF CERVICOTHORACIC VERTEBRAL JOINT WITH NONAUTOLOGOUS TISSUE SUBSTITUTE, POSTERIOR APPROACH, POSTERIOR COLUMN, OPEN APPROACH: ICD-10-PCS | Performed by: NEUROLOGICAL SURGERY

## 2024-04-04 PROCEDURE — 700105 HCHG RX REV CODE 258: Performed by: PHYSICIAN ASSISTANT

## 2024-04-04 PROCEDURE — 97166 OT EVAL MOD COMPLEX 45 MIN: CPT

## 2024-04-04 PROCEDURE — 3E0U0GB INTRODUCTION OF RECOMBINANT BONE MORPHOGENETIC PROTEIN INTO JOINTS, OPEN APPROACH: ICD-10-PCS | Performed by: NEUROLOGICAL SURGERY

## 2024-04-04 PROCEDURE — A9270 NON-COVERED ITEM OR SERVICE: HCPCS | Performed by: PHYSICIAN ASSISTANT

## 2024-04-04 PROCEDURE — 8E0WXBF COMPUTER ASSISTED PROCEDURE OF TRUNK REGION, WITH FLUOROSCOPY: ICD-10-PCS | Performed by: NEUROLOGICAL SURGERY

## 2024-04-04 RX ORDER — DIAZEPAM 5 MG/ML
5 INJECTION, SOLUTION INTRAMUSCULAR; INTRAVENOUS EVERY 4 HOURS PRN
Status: DISCONTINUED | OUTPATIENT
Start: 2024-04-04 | End: 2024-04-05

## 2024-04-04 RX ORDER — OXYCODONE HYDROCHLORIDE 5 MG/1
5 TABLET ORAL EVERY 4 HOURS PRN
Status: DISCONTINUED | OUTPATIENT
Start: 2024-04-04 | End: 2024-04-09

## 2024-04-04 RX ORDER — OXYCODONE HYDROCHLORIDE 10 MG/1
10 TABLET ORAL EVERY 4 HOURS PRN
Status: DISCONTINUED | OUTPATIENT
Start: 2024-04-04 | End: 2024-04-09

## 2024-04-04 RX ORDER — HYDROMORPHONE HYDROCHLORIDE 1 MG/ML
0.5 INJECTION, SOLUTION INTRAMUSCULAR; INTRAVENOUS; SUBCUTANEOUS
Status: DISCONTINUED | OUTPATIENT
Start: 2024-04-04 | End: 2024-04-09

## 2024-04-04 RX ORDER — MORPHINE SULFATE 15 MG/1
15 TABLET, FILM COATED, EXTENDED RELEASE ORAL EVERY 12 HOURS
Status: DISCONTINUED | OUTPATIENT
Start: 2024-04-04 | End: 2024-04-05

## 2024-04-04 RX ORDER — METFORMIN HYDROCHLORIDE 500 MG/1
500 TABLET, EXTENDED RELEASE ORAL 2 TIMES DAILY WITH MEALS
Status: DISCONTINUED | OUTPATIENT
Start: 2024-04-04 | End: 2024-04-09 | Stop reason: HOSPADM

## 2024-04-04 RX ADMIN — POLYETHYLENE GLYCOL 3350 1 PACKET: 17 POWDER, FOR SOLUTION ORAL at 04:33

## 2024-04-04 RX ADMIN — FAMOTIDINE 40 MG: 20 TABLET, FILM COATED ORAL at 20:33

## 2024-04-04 RX ADMIN — ACETAMINOPHEN 1000 MG: 500 TABLET, FILM COATED ORAL at 23:59

## 2024-04-04 RX ADMIN — OMEPRAZOLE 20 MG: 20 CAPSULE, DELAYED RELEASE ORAL at 05:29

## 2024-04-04 RX ADMIN — PAROXETINE HYDROCHLORIDE 20 MG: 20 TABLET, FILM COATED ORAL at 18:02

## 2024-04-04 RX ADMIN — CEFAZOLIN 2 G: 2 INJECTION, POWDER, FOR SOLUTION INTRAMUSCULAR; INTRAVENOUS at 09:36

## 2024-04-04 RX ADMIN — LAMOTRIGINE 300 MG: 100 TABLET ORAL at 09:21

## 2024-04-04 RX ADMIN — OXYCODONE HYDROCHLORIDE 10 MG: 10 TABLET ORAL at 21:17

## 2024-04-04 RX ADMIN — DOCUSATE SODIUM 100 MG: 100 CAPSULE, LIQUID FILLED ORAL at 04:34

## 2024-04-04 RX ADMIN — ANTACID TABLETS 500 MG: 500 TABLET, CHEWABLE ORAL at 18:02

## 2024-04-04 RX ADMIN — PAROXETINE HYDROCHLORIDE 20 MG: 20 TABLET, FILM COATED ORAL at 00:42

## 2024-04-04 RX ADMIN — HYDROMORPHONE HYDROCHLORIDE 0.5 MG: 1 INJECTION, SOLUTION INTRAMUSCULAR; INTRAVENOUS; SUBCUTANEOUS at 14:08

## 2024-04-04 RX ADMIN — FAMOTIDINE 40 MG: 20 TABLET, FILM COATED ORAL at 09:21

## 2024-04-04 RX ADMIN — CEFAZOLIN 2 G: 2 INJECTION, POWDER, FOR SOLUTION INTRAMUSCULAR; INTRAVENOUS at 00:47

## 2024-04-04 RX ADMIN — LAMOTRIGINE 300 MG: 100 TABLET ORAL at 20:34

## 2024-04-04 RX ADMIN — Medication 1 APPLICATOR: at 18:02

## 2024-04-04 RX ADMIN — MORPHINE SULFATE 15 MG: 15 TABLET, FILM COATED, EXTENDED RELEASE ORAL at 12:38

## 2024-04-04 RX ADMIN — CYCLOBENZAPRINE 10 MG: 10 TABLET, FILM COATED ORAL at 23:59

## 2024-04-04 RX ADMIN — DIAZEPAM 5 MG: 10 INJECTION, SOLUTION INTRAMUSCULAR; INTRAVENOUS at 09:20

## 2024-04-04 RX ADMIN — HYDROMORPHONE HYDROCHLORIDE 0.5 MG: 1 INJECTION, SOLUTION INTRAMUSCULAR; INTRAVENOUS; SUBCUTANEOUS at 22:12

## 2024-04-04 RX ADMIN — OXYCODONE HYDROCHLORIDE 10 MG: 10 TABLET ORAL at 12:39

## 2024-04-04 RX ADMIN — OXYCODONE HYDROCHLORIDE 10 MG: 10 TABLET ORAL at 17:09

## 2024-04-04 RX ADMIN — DIAZEPAM 5 MG: 10 INJECTION, SOLUTION INTRAMUSCULAR; INTRAVENOUS at 20:31

## 2024-04-04 RX ADMIN — Medication: at 07:45

## 2024-04-04 RX ADMIN — ACETAMINOPHEN 1000 MG: 500 TABLET, FILM COATED ORAL at 04:34

## 2024-04-04 RX ADMIN — DOCUSATE SODIUM 50 MG AND SENNOSIDES 8.6 MG 1 TABLET: 8.6; 5 TABLET, FILM COATED ORAL at 20:34

## 2024-04-04 RX ADMIN — HYDROMORPHONE HYDROCHLORIDE 0.5 MG: 1 INJECTION, SOLUTION INTRAMUSCULAR; INTRAVENOUS; SUBCUTANEOUS at 18:45

## 2024-04-04 RX ADMIN — GABAPENTIN 300 MG: 300 CAPSULE ORAL at 18:02

## 2024-04-04 RX ADMIN — ACETAMINOPHEN 1000 MG: 500 TABLET, FILM COATED ORAL at 11:44

## 2024-04-04 RX ADMIN — ANTACID TABLETS 500 MG: 500 TABLET, CHEWABLE ORAL at 04:35

## 2024-04-04 RX ADMIN — GABAPENTIN 300 MG: 300 CAPSULE ORAL at 11:44

## 2024-04-04 RX ADMIN — DIAZEPAM 5 MG: 5 TABLET ORAL at 01:13

## 2024-04-04 RX ADMIN — MORPHINE SULFATE 15 MG: 15 TABLET, FILM COATED, EXTENDED RELEASE ORAL at 19:00

## 2024-04-04 RX ADMIN — GABAPENTIN 300 MG: 300 CAPSULE ORAL at 04:34

## 2024-04-04 RX ADMIN — CYCLOBENZAPRINE 10 MG: 10 TABLET, FILM COATED ORAL at 15:59

## 2024-04-04 RX ADMIN — CETIRIZINE HYDROCHLORIDE 10 MG: 10 TABLET, FILM COATED ORAL at 09:21

## 2024-04-04 RX ADMIN — CETIRIZINE HYDROCHLORIDE 10 MG: 10 TABLET, FILM COATED ORAL at 20:34

## 2024-04-04 RX ADMIN — METFORMIN HYDROCHLORIDE 500 MG: 500 TABLET, EXTENDED RELEASE ORAL at 18:02

## 2024-04-04 RX ADMIN — POTASSIUM CHLORIDE, DEXTROSE MONOHYDRATE AND SODIUM CHLORIDE: 150; 5; 900 INJECTION, SOLUTION INTRAVENOUS at 08:10

## 2024-04-04 RX ADMIN — Medication 1 APPLICATOR: at 04:36

## 2024-04-04 RX ADMIN — METHOCARBAMOL TABLETS 750 MG: 750 TABLET, COATED ORAL at 11:44

## 2024-04-04 RX ADMIN — DIPHENHYDRAMINE HYDROCHLORIDE 25 MG: 25 TABLET ORAL at 22:42

## 2024-04-04 RX ADMIN — METFORMIN HYDROCHLORIDE 500 MG: 500 TABLET, EXTENDED RELEASE ORAL at 04:34

## 2024-04-04 RX ADMIN — DOCUSATE SODIUM 100 MG: 100 CAPSULE, LIQUID FILLED ORAL at 18:02

## 2024-04-04 ASSESSMENT — COGNITIVE AND FUNCTIONAL STATUS - GENERAL
DRESSING REGULAR LOWER BODY CLOTHING: A LOT
TURNING FROM BACK TO SIDE WHILE IN FLAT BAD: A LITTLE
MOVING TO AND FROM BED TO CHAIR: A LITTLE
SUGGESTED CMS G CODE MODIFIER MOBILITY: CL
DRESSING REGULAR UPPER BODY CLOTHING: A LITTLE
HELP NEEDED FOR BATHING: A LOT
DAILY ACTIVITIY SCORE: 17
STANDING UP FROM CHAIR USING ARMS: A LOT
MOVING FROM LYING ON BACK TO SITTING ON SIDE OF FLAT BED: A LITTLE
MOBILITY SCORE: 14
CLIMB 3 TO 5 STEPS WITH RAILING: TOTAL
SUGGESTED CMS G CODE MODIFIER DAILY ACTIVITY: CK
WALKING IN HOSPITAL ROOM: A LOT
TOILETING: A LOT

## 2024-04-04 ASSESSMENT — LIFESTYLE VARIABLES
TOTAL SCORE: 0
AVERAGE NUMBER OF DAYS PER WEEK YOU HAVE A DRINK CONTAINING ALCOHOL: 1
TOTAL SCORE: 0
TOTAL SCORE: 0
HAVE YOU EVER FELT YOU SHOULD CUT DOWN ON YOUR DRINKING: NO
HAVE PEOPLE ANNOYED YOU BY CRITICIZING YOUR DRINKING: NO
ALCOHOL_USE: YES
EVER FELT BAD OR GUILTY ABOUT YOUR DRINKING: NO
EVER HAD A DRINK FIRST THING IN THE MORNING TO STEADY YOUR NERVES TO GET RID OF A HANGOVER: NO
HOW MANY TIMES IN THE PAST YEAR HAVE YOU HAD 5 OR MORE DRINKS IN A DAY: 0
ON A TYPICAL DAY WHEN YOU DRINK ALCOHOL HOW MANY DRINKS DO YOU HAVE: 1
CONSUMPTION TOTAL: NEGATIVE

## 2024-04-04 ASSESSMENT — ACTIVITIES OF DAILY LIVING (ADL): TOILETING: INDEPENDENT

## 2024-04-04 ASSESSMENT — GAIT ASSESSMENTS: GAIT LEVEL OF ASSIST: UNABLE TO PARTICIPATE

## 2024-04-04 ASSESSMENT — PAIN DESCRIPTION - PAIN TYPE
TYPE: SURGICAL PAIN
TYPE: ACUTE PAIN
TYPE: ACUTE PAIN

## 2024-04-04 NOTE — THERAPY
"Physical Therapy   Initial Evaluation     Patient Name: Noy Rose  Age:  39 y.o., Sex:  female  Medical Record #: [id number]  Today's Date: 4/4/2024     Precautions  Precautions: Fall Risk;Spinal / Back Precautions   Comments: No brace per chart, pt reports she was told she would have a collar post-op    Assessment  Patient is 39 y.o. female who was admitted s/p posterior C3-T4 decompression and fusion. PMH significant for PCOS, HTN, anxiety, diabetes, epilepsy.    Pt received in bed and agreeable to PT evaluation. Pt provided with post-op cervical spine handout and educated on spinal precautions as they pertain to mobility and log roll technique for sup<>sit. Further education was limited by pt's significant pain levels upon sitting up at EOB. Pt required min A overall for sup<>sit, STS, and taking small side steps at EOB. However, pt unable to mobilize further due to pain in her neck muscles and at the incision site despite pre-medication with all available pain control per RN. Anticipate pt will be able to return home with with HHPT versus outpatient once pain is more controlled. Also discussed with RN that pt reports she was told she would have a collar. Surgeon and PA not available in Voalte when attempted to contact. Will continue to follow for acute PT.    Plan    Physical Therapy Initial Treatment Plan   Treatment Plan : Bed Mobility, Equipment, Gait Training, Neuro Re-Education / Balance, Self Care / Home Evaluation, Stair Training, Therapeutic Activities, Therapeutic Exercise  Treatment Frequency: 5 Times per Week  Duration: Until Therapy Goals Met    DC Equipment Recommendations: Front-Wheel Walker  Discharge Recommendations: Recommend home health for continued physical therapy services (once pain is controlled and pt ambulating)     Subjective    \"They told me I would have a collar after the surgery, and told my mom too\"     Objective       04/04/24 1441   Precautions   Precautions Fall " Risk;Spinal / Back Precautions    Comments No brace per chart, pt reports she was told she would have a collar post-op   Vitals   Vitals Comments Denied dizziness or lightheadedness upon sitting   Pain 0 - 10 Group   Therapist Pain Assessment During Activity;Nurse Notified;9  (visibly shaking, turned, red, crying due to neck pain)   Prior Living Situation   Prior Services Home-Independent   Housing / Facility 1 Story House   Steps Into Home 3   Steps In Home 0   Rail Both Rail (Steps into Home)   Equipment Owned None   Lives with - Patient's Self Care Capacity Spouse;Child Less than 18 Years of Age   Comments Pt lives with her  and 2 teenage kids. Pt's mother is in town to help as needed.   Prior Level of Functional Mobility   Bed Mobility Independent   Transfer Status Independent   Ambulation Independent   Ambulation Distance limited by neck pain   Assistive Devices Used None   Stairs Independent   History of Falls   History of Falls No   Cognition    Level of Consciousness Alert   Comments Pleasant and cooperative. Limited by significant pain   Passive ROM Lower Body   Passive ROM Lower Body WDL   Active ROM Lower Body    Active ROM Lower Body  WDL   Strength Lower Body   Comments BLE 4/5 throughout, no buckling in standing   Sensation Lower Body   Comments Reports pre-op neuropathy has subsided in BLE   Lower Body Muscle Tone   Lower Body Muscle Tone  WDL   Coordination Lower Body    Coordination Lower Body  WDL   Balance Assessment   Sitting Balance (Static) Fair -   Sitting Balance (Dynamic) Fair -   Standing Balance (Static) Fair -   Standing Balance (Dynamic) Fair -   Weight Shift Sitting Fair   Weight Shift Standing Poor   Comments with FWW in standing, limited scooting due to pain   Bed Mobility    Supine to Sit Minimal Assist   Sit to Supine Minimal Assist   Scooting Minimal Assist   Rolling Minimal Assist to Rt.   Comments via log roll, HOB slightly elevated, used rail   Gait Analysis   Gait Level  Of Assist Unable to Participate   Comments small side steps at EOB   Functional Mobility   Sit to Stand Minimal Assist   Bed, Chair, Wheelchair Transfer Unable to Participate   Comments Unable to tolerate transfer due to neck pain   6 Clicks Assessment - How much HELP from from another person do you currently need... (If the patient hasn't done an activity recently, how much help from another person do you think he/she would need if he/she tried?)   Turning from your back to your side while in a flat bed without using bedrails? 3   Moving from lying on your back to sitting on the side of a flat bed without using bedrails? 3   Moving to and from a bed to a chair (including a wheelchair)? 3   Standing up from a chair using your arms (e.g., wheelchair, or bedside chair)? 2   Walking in hospital room? 2   Climbing 3-5 steps with a railing? 1   6 clicks Mobility Score 14   Short Term Goals    Short Term Goal # 1 Pt will perform bed mobility via log roll from a flat bed to progress to home in 6 visits.   Short Term Goal # 2 Pt will transfer with FWW and supervision to progress function for return home in 6 visits.   Short Term Goal # 3 Pt will ambulate 100ft with FWW and supervision for short household distances in 6 visits.   Short Term Goal # 4 Pt will negotiate 3 steps with B handrails and min A for home entry in 6 visits.   Education Group   Education Provided Role of Physical Therapist;Cervical Precautions   Cervical Precautions Patient Response Patient;Family;Acceptance;Explanation;Handout;Verbal Demonstration;Reinforcement Needed   Role of Physical Therapist Patient Response Patient;Family;Acceptance;Explanation;Verbal Demonstration   Physical Therapy Initial Treatment Plan    Treatment Plan  Bed Mobility;Equipment;Gait Training;Neuro Re-Education / Balance;Self Care / Home Evaluation;Stair Training;Therapeutic Activities;Therapeutic Exercise   Treatment Frequency 5 Times per Week   Duration Until Therapy Goals Met    Problem List    Problems Pain   Anticipated Discharge Equipment and Recommendations   DC Equipment Recommendations Front-Wheel Walker   Discharge Recommendations Recommend home health for continued physical therapy services  (once pain is controlled and pt ambulating)   Interdisciplinary Plan of Care Collaboration   IDT Collaboration with  Nursing;Occupational Therapist;Family / Caregiver   Patient Position at End of Therapy In Bed;Call Light within Reach;Tray Table within Reach;Phone within Reach;Family / Friend in Room   Collaboration Comments RN updated. Collaborated with OT due to pt pain levels and inability to tolerate 2 sessions as first planned          detailed exam

## 2024-04-04 NOTE — CARE PLAN
The patient is Stable - Low risk of patient condition declining or worsening    Shift Goals  Clinical Goals: pain control, safety and comfort  Patient Goals: pain control  Family Goals: pain control    Progress made toward(s) clinical / shift goals:    Problem: Pain - Standard  Goal: Alleviation of pain or a reduction in pain to the patient’s comfort goal  Outcome: Progressing     Problem: Knowledge Deficit - Standard  Goal: Patient and family/care givers will demonstrate understanding of plan of care, disease process/condition, diagnostic tests and medications  Outcome: Progressing     Patient is alert and oriented, on 2LNC.   Fall protocol in effect. Bed in lowest position. Brakes and bed alarm on.   Maintained a clutter free environment. Skid socks on. Call light and personal belongings within reach.   SCD's on.   Patient c/o of pain, treated per MAR. Educated on pain scale.   Patient educated on POC. Encouraged verbalize of feelings.   All questions were answered.      Patient is not progressing towards the following goals:

## 2024-04-04 NOTE — ANESTHESIA POSTPROCEDURE EVALUATION
Patient: Noy Rose    Procedure Summary       Date: 04/03/24 Room / Location: [facility] 05 / SURGERY [facility]    Anesthesia Start: 1259 Anesthesia Stop: 1756    Procedures:       POSTERIOR C3-7 LAMINECTOMY, WITH C3-T4 LATERAL MASS PEDICLE SCREW FIXATION FUSION, CADAVERIC BONE GRAFT (Spine Cervical)      LAMINECTOMY, SPINE, CERVICAL, POSTERIOR APPROACH (Spine Cervical)      BONE GRAFT (Spine Cervical) Diagnosis: (SPONDYLOSIS WITHOUT MYELOPATHY)    Surgeons: James Fierro M.D. Responsible Provider: Iván Moffett M.D.    Anesthesia Type: general ASA Status: 3            Final Anesthesia Type: general  Last vitals  BP   Blood Pressure: 114/71    Temp   36.3 °C (97.3 °F)    Pulse   (!) 102   Resp   18    SpO2   99 %      Anesthesia Post Evaluation    Patient location during evaluation: PACU  Patient participation: complete - patient participated  Level of consciousness: sleepy but conscious  Pain score: 5    Airway patency: patent  Anesthetic complications: no  Cardiovascular status: hemodynamically stable  Respiratory status: acceptable  Hydration status: euvolemic    PONV: none          Encounter Notable Events   Notable Event Outcome Phase Comment   Dysrhythmia  Intraprocedure bradycardia at 1418- return to normal after a pause in surgery, continuing to monitor

## 2024-04-04 NOTE — THERAPY
Occupational Therapy Contact Note    Patient Name: Noy Rose  Age:  39 y.o., Sex:  female  Medical Record #: [id number]  Today's Date: 4/4/2024    Discussed missed therapy with RN.       04/04/24 1137   Interdisciplinary Plan of Care Collaboration   Collaboration Comments OT referral received. Attempted OT eval. Pt reports poorly controlled pain at rest. Refusing mobility until pain meds are changed. Will monitor pain via RN and attempt again.

## 2024-04-04 NOTE — ANESTHESIA TIME REPORT
Anesthesia Start and Stop Event Times       Date Time Event    4/3/2024 1126 Ready for Procedure     1259 Anesthesia Start     1756 Anesthesia Stop          Responsible Staff  04/03/24      Name Role Begin End    Raji Myrick M.D. Anesth 1259 1733    Iván Moffett M.D. Anesth 1735 1754          Overtime Reason:  no overtime (within assigned shift)    Comments:

## 2024-04-04 NOTE — EEG PROGRESS NOTE
Neurosurgery Progress Note    Subjective:  Poor pain control since surgery  +larios    Exam:  A&O  Tearful  Strength intact x4  Hvac 270/8hr  Dressing CDI    BP  Min: 94/65  Max: 141/71  Pulse  Av.4  Min: 91  Max: 110  Resp  Av.7  Min: 12  Max: 18  Temp  Av.5 °C (97.7 °F)  Min: 36.3 °C (97.3 °F)  Max: 36.7 °C (98 °F)  SpO2  Av.3 %  Min: 94 %  Max: 100 %    No data recorded    Recent Labs     24  0421   HEMOGLOBIN 10.8*                   Intake/Output                         24 - 24 0659 24 - 24 0659     1058-7008 7225-3257 Total 2089-6046 0743-0396 Total                 Intake    I.V.  2800  -- 2800  47.7  -- 47.7    PCA End of Shift Total Volume (ml) -- -- -- 47.7 -- 47.7    Volume (mL) (lactated ringers infusion) 2800 -- 2800 -- -- --    Total Intake 2800 -- 2800 47.7 -- 47.7       Output    Urine  --  3000 3000  --  -- --    Output (mL) (Urethral Catheter Non-latex;Temperature probe 16 Fr.) -- 3000 3000 -- -- --    Drains  --  270 270  --  -- --    Output (mL) (Closed/Suction Drain 1 Posterior Neck Hemovac) -- 270 270 -- -- --    Blood  250  -- 250  --  -- --    Est. Blood Loss 250 -- 250 -- -- --    Total Output 250 3270 3520 -- -- --       Net I/O     2550 -3270 -720 47.7 -- 47.7              Intake/Output Summary (Last 24 hours) at 2024 1118  Last data filed at 2024 0700  Gross per 24 hour   Intake 2847.7 ml   Output 3520 ml   Net -672.3 ml             diazePAM  5 mg Q4HRS PRN    metFORMIN ER  500 mg BID WITH MEALS    cetirizine  10 mg BID    famotidine  40 mg BID    gabapentin  300 mg TID    lamotrigine  300 mg BID    losartan  25 mg DAILY    omeprazole  20 mg QDAY    PARoxetine  20 mg Q EVENING    Pharmacy Consult Request  1 Each PHARMACY TO DOSE    MD ALERT...DO NOT ADMINISTER NSAIDS or ASPIRIN unless ORDERED By Neurosurgery  1 Each PRN    docusate sodium  100 mg BID    senna-docusate  1 Tablet Nightly    senna-docusate  1 Tablet Q24HRS PRN     polyethylene glycol/lytes  1 Packet BID PRN    magnesium hydroxide  30 mL QDAY PRN    bisacodyl  10 mg Q24HRS PRN    sodium phosphate  1 Each Once PRN    dextrose 5 % and 0.9 % NaCl with KCl 20 mEq   Continuous    acetaminophen  1,000 mg Q6HRS    Followed by    [START ON 4/8/2024] acetaminophen  1,000 mg Q6HRS PRN    morphine   Continuous    morphine injection  4 mg Once PRN    diphenhydrAMINE  25 mg Q6HRS PRN    Or    diphenhydrAMINE  25 mg Q6HRS PRN    ondansetron  4 mg Q4HRS PRN    ondansetron  4 mg Q4HRS PRN    promethazine  12.5-25 mg Q4HRS PRN    promethazine  12.5-25 mg Q4HRS PRN    prochlorperazine  5-10 mg Q4HRS PRN    methocarbamol  750 mg Q8HRS PRN    Or    cyclobenzaprine  10 mg Q8HRS PRN    labetalol  10 mg Q HOUR PRN    benzocaine-menthol  1 Lozenge Q2HRS PRN    calcium carbonate  500 mg BID    Nozin nasal  swab  1 Applicator BID       Assessment and Plan:  Hospital day #2  POD #1 post cervical lami fusion  Prophylactic anticoagulation: no         Start date/time: can start lovenox 24hr after drain out     Brain Compression: No    Neuro exam stable  Poor pain control. Transition from PCA to MS contin with PO oxy or IV dilaudid for breakthrough  Utilize multimodals  Continue hvac  DC larios when ambulatory today  PT/OT

## 2024-04-04 NOTE — OP REPORT
NEUROSURGERY OPERATIVE NOTE  DATE:  11:15 AM    PATIENT NAME:  Noy Rose   1985 MRN [id number]      PROCEDURE:  1.  Cervical 3, 4, 5, 6, 7 laminectomy foraminotomies  2.  Lateral mass screw placement bilateral cervical 3, 4, 5, 6; bilateral pedicle screw placement cervical 7, thoracic 1, 2, 3, 4 (Infinity, Medtronic)  3.  Bone autograft obtained from same incision  4.  Bone allograft placement (BMP/graft on DBM, Medtronic, I factor, [employer])  5.  Stealth intraoperative navigation/O-arm for lateral mass, pedicle screw placement  6.  Neurophysiologic monitoring      Surgeon:  James Fierro MD, PhD  Assistant: SNEHA Dangelo.  Utilization assistant was critical for the performance of this procedure.  She provided suction/irrigation tissue retraction on clear visualization underlying neurologic structures.    Anesthesia:  GETA    Diagnosis:  Lumbar stenosis, neurogenic claudication    Indication: 39-year-old female with progressive severe neck pain, arm numbness and tingling.  She has failed physical therapy and injections.  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  Given this, posterior stabilization and decompression is planned.    Procedure:  The patient was identified in the holding area, and the surgery site marked, consent was obtained.  The patient was brought back to the operating room and intubated by anesthesia service.  2 grams Ancef was administered intravenously.  Neurophysiologic monitoring leads were placed by the neurophysiology technician.  Good baseline signals were obtained.  Her head was secured in Banerjee Lavon pin head oseguera, and she was transferred to the OSI she was transferred to the operating room table in a prone manner and; pressure points well padded.  Their cervical thoracic region was prepped with hair clipping, chlorhexidine and betadine scrub, and Chloroprep.  Sterile drapes  were applied including a layer of Ioban.  The correct vertebral levels were identified flouroscopically.     The planned midline incision was infiltrated with 0.5% Marcaine/epinephrine.  The skin was incised sharply and dissection carried deeply using monopolar cautery.  The cervical 2 through thoracic 5 spinous processes and transverse processes/lamina were exposed in this manner.  Correct levels were verified fluoroscopically.  The Apisphere registration frame was secured to the thoracic 6 spinous process, and her cervical thoracic vertebral column registered the Stealth intraoperative navigation system using O-arm; good registration was verified.  Lateral mass screws were placed in the cervical 3 through 6 levels bilaterally.  The entry point was approximately 1 mm medial to the midpoint of the transverse process/facet, and approximately 30 degrees superior and laterally.  The cortex was breached using a navigated drill using Stealth, and hand tapped using a navigated drill.  Bony walls were verified with palpation.  The under Stealth navigation.  Pcrews were placed cervical 3 bilateral 3.5 x 12 mm.  Cervical 4 bilateral 3.5 x 12 mm.  Cervical 5 left 3.5 x 14 right 3.5 x 12.  Cervical 6 bilateral 3.5 x 14 mm.  Pedicle screws were then placed in the bilateral cervical 7, thoracic 1, 2, 3, 4 levels.  The entry point was again identified using Stealth, the cortex breached using a navigated drill and the pedicle and entered in like manner.  The trajectory was tapped, and pedicle screws placed after bony walls verified by palpation.  Cervical 7 left 3.5 x 20 mm right 3.5 x 24 mm.  Thoracic 1 bilateral 4.0 x 24 mm.  Thoracic to 4.5 x 24 mm.  Thoracic three 4.0 x 26 mm.  Thoracic four 3.5 x 28 mm.  O-arm spin was performed demonstrating good position of all screws and instrumentation.    Troughs were then created along the lateral aspect of the cervical 3 through 7 lamina using the high-speed drill under navigation.  The  spinous processes and lamina were removed en bloc.  Good decompression was noted.  Foraminotomies were performed using Kerrison rongeurs.  The bilateral cervical 3/4, 4/5, 5/6, 6/7 facet joints were decorticated and the other joint space using the high-speed drill under navigation.  I factor bone graft (therapeutics) was placed into the disc space.  The bilateral cervical 3 through thoracic 4 lateral masses were decorticated using the high-speed drill.  BMP followed by autologous bone obtained from the laminectomy and graft on demineralized bone putty was then placed over the decorticated bone.    Surgiflo was placed along the lateral gutters of the laminectomy site.  Gelfoam was placed over the exposed thecal sac.  Antibiotic beads coated with vancomycin were then placed in the surgical site.  An appropriately sized amanda was placed and custom bent spanning the screw heads, setscrews were placed and final tightened.    A medium Hemovac drain was placed, brought through a separate incision and secured to the skin using 2-0 Vicryl suture.  The operative site had been copiously irrigated numerous times during the procedure with Ancef normal saline irrigation.  The cervical and thoracic muscle/fascia were approximated a single layer using 1-0 Vicryl suture in interrupted manner.  Antibiotic beads were then placed in the suprafascial layer after the again been copiously irrigated with Ancef normal saline irrigation.  The adipose level was closed in like manner.  The dermis was reapproximated using 2-0 Vicryl suture in interrupted inverted manner.  The skin was reapproximated using staples.  A self-retaining dressing was placed.    Final counts were correct.  Neurophysiologic monitoring signals remained stable throughout the procedure.    FINDINGS:  Significant central canal stenosis, good decompression obtained with stable neurophysiology signal following laminectomy.  Good instrumentation placement on O-arm  spin.  SPECIMEN:  None  DRAINS: Medium Hemovac  EBL:  50 CC  COMPLICATIONS:  None apparent at end of procedure.

## 2024-04-04 NOTE — PROGRESS NOTES
4 Eyes Skin Assessment Completed by DANIEL Dominguez and DANIEL Myers.    Head WDL  Ears WDL  Nose WDL  Mouth WDL  Neck WDL  Breast/Chest WDL  Shoulder Blades Redness  Spine surgery site with dressing in placed, with hemovac.   (R) Arm/Elbow/Hand Redness, Blanching, and Scab  (L) Arm/Elbow/Hand Redness  Abdomen WDL  Groin WDL  Scrotum/Coccyx/Buttocks WDL  (R) Leg Redness and Blanching  (L) Leg WDL  (R) Heel/Foot/Toe Edema  (L) Heel/Foot/Toe WDL          Devices In Places Blood Pressure Cuff, Pulse Ox, Simmons, SCD's, and Nasal Cannula      Interventions In Place Gray Ear Foams and Pillows    Possible Skin Injury No    Pictures Uploaded Into Epic N/A  Wound Consult Placed N/A  RN Wound Prevention Protocol Ordered No

## 2024-04-04 NOTE — OR NURSING
1753: Pt arrived via bed with anesthesia and RN. Report received. See flowsheet for VS. Dressing CDI. Hemovac in place. Orders reviewed and initiated.   1950: Report called to DANIEL Dominguez. All questions answered. VSS. Dressing CDI. Hemovac in place. No patient distress. Alert and oriented x4. Pt transported to room in stable condition on 2L NC with all personal belongings. Family updated.

## 2024-04-04 NOTE — PROGRESS NOTES
AA&Ox4. Denies CP/SOB.  Reporting 9/10 pain. PCA in place.  Educated patient regarding pharmacologic and non pharmacologic modalities for pain management.  Skin per flowsheet.  Tolerating regular diet. Denies N/V.  + void via larios. Last BM PTA.  Pt ambulates x1 w/fww.  All needs met at this time. Call light within reach. Pt calls appropriately. Bed low and locked, non skid socks in place. Hourly rounding in place.

## 2024-04-05 ENCOUNTER — HOSPITAL ENCOUNTER (INPATIENT)
Facility: REHABILITATION | Age: 39
End: 2024-04-05
Attending: PHYSICAL MEDICINE & REHABILITATION | Admitting: PHYSICAL MEDICINE & REHABILITATION
Payer: MEDICAID

## 2024-04-05 LAB — HGB BLD-MCNC: 10.4 G/DL (ref 12–16)

## 2024-04-05 PROCEDURE — 97530 THERAPEUTIC ACTIVITIES: CPT | Mod: CQ

## 2024-04-05 PROCEDURE — 36415 COLL VENOUS BLD VENIPUNCTURE: CPT

## 2024-04-05 PROCEDURE — A9270 NON-COVERED ITEM OR SERVICE: HCPCS | Performed by: NEUROLOGICAL SURGERY

## 2024-04-05 PROCEDURE — 700111 HCHG RX REV CODE 636 W/ 250 OVERRIDE (IP): Mod: JZ | Performed by: PHYSICIAN ASSISTANT

## 2024-04-05 PROCEDURE — 700102 HCHG RX REV CODE 250 W/ 637 OVERRIDE(OP): Performed by: PHYSICIAN ASSISTANT

## 2024-04-05 PROCEDURE — 770001 HCHG ROOM/CARE - MED/SURG/GYN PRIV*

## 2024-04-05 PROCEDURE — A9270 NON-COVERED ITEM OR SERVICE: HCPCS | Performed by: PHYSICAL MEDICINE & REHABILITATION

## 2024-04-05 PROCEDURE — 700111 HCHG RX REV CODE 636 W/ 250 OVERRIDE (IP): Performed by: NEUROLOGICAL SURGERY

## 2024-04-05 PROCEDURE — 85018 HEMOGLOBIN: CPT

## 2024-04-05 PROCEDURE — 700102 HCHG RX REV CODE 250 W/ 637 OVERRIDE(OP): Performed by: PHYSICAL MEDICINE & REHABILITATION

## 2024-04-05 PROCEDURE — 700102 HCHG RX REV CODE 250 W/ 637 OVERRIDE(OP): Performed by: NEUROLOGICAL SURGERY

## 2024-04-05 PROCEDURE — 99255 IP/OBS CONSLTJ NEW/EST HI 80: CPT | Performed by: PHYSICAL MEDICINE & REHABILITATION

## 2024-04-05 PROCEDURE — A9270 NON-COVERED ITEM OR SERVICE: HCPCS | Performed by: PHYSICIAN ASSISTANT

## 2024-04-05 RX ORDER — OXYCODONE HCL 20 MG/1
20 TABLET, FILM COATED, EXTENDED RELEASE ORAL EVERY 12 HOURS
Status: DISCONTINUED | OUTPATIENT
Start: 2024-04-05 | End: 2024-04-09

## 2024-04-05 RX ADMIN — MORPHINE SULFATE 15 MG: 15 TABLET, FILM COATED, EXTENDED RELEASE ORAL at 06:34

## 2024-04-05 RX ADMIN — FAMOTIDINE 40 MG: 20 TABLET, FILM COATED ORAL at 10:10

## 2024-04-05 RX ADMIN — HYDROMORPHONE HYDROCHLORIDE 0.5 MG: 1 INJECTION, SOLUTION INTRAMUSCULAR; INTRAVENOUS; SUBCUTANEOUS at 12:29

## 2024-04-05 RX ADMIN — LAMOTRIGINE 300 MG: 100 TABLET ORAL at 08:47

## 2024-04-05 RX ADMIN — METHOCARBAMOL TABLETS 750 MG: 750 TABLET, COATED ORAL at 17:57

## 2024-04-05 RX ADMIN — ONDANSETRON 4 MG: 2 INJECTION INTRAMUSCULAR; INTRAVENOUS at 16:35

## 2024-04-05 RX ADMIN — DOCUSATE SODIUM 100 MG: 100 CAPSULE, LIQUID FILLED ORAL at 17:59

## 2024-04-05 RX ADMIN — LOSARTAN POTASSIUM 25 MG: 25 TABLET, FILM COATED ORAL at 04:35

## 2024-04-05 RX ADMIN — CETIRIZINE HYDROCHLORIDE 10 MG: 10 TABLET, FILM COATED ORAL at 08:47

## 2024-04-05 RX ADMIN — GABAPENTIN 300 MG: 300 CAPSULE ORAL at 12:23

## 2024-04-05 RX ADMIN — DIAZEPAM 5 MG: 10 INJECTION, SOLUTION INTRAMUSCULAR; INTRAVENOUS at 00:49

## 2024-04-05 RX ADMIN — GABAPENTIN 300 MG: 300 CAPSULE ORAL at 04:34

## 2024-04-05 RX ADMIN — OMEPRAZOLE 20 MG: 20 CAPSULE, DELAYED RELEASE ORAL at 05:27

## 2024-04-05 RX ADMIN — OXYCODONE HYDROCHLORIDE 10 MG: 10 TABLET ORAL at 10:34

## 2024-04-05 RX ADMIN — CETIRIZINE HYDROCHLORIDE 10 MG: 10 TABLET, FILM COATED ORAL at 20:11

## 2024-04-05 RX ADMIN — OXYCODONE HYDROCHLORIDE 10 MG: 10 TABLET ORAL at 06:34

## 2024-04-05 RX ADMIN — ANTACID TABLETS 500 MG: 500 TABLET, CHEWABLE ORAL at 17:59

## 2024-04-05 RX ADMIN — PAROXETINE HYDROCHLORIDE 20 MG: 20 TABLET, FILM COATED ORAL at 17:58

## 2024-04-05 RX ADMIN — DOCUSATE SODIUM 50 MG AND SENNOSIDES 8.6 MG 1 TABLET: 8.6; 5 TABLET, FILM COATED ORAL at 20:15

## 2024-04-05 RX ADMIN — OXYCODONE HYDROCHLORIDE 10 MG: 10 TABLET ORAL at 20:11

## 2024-04-05 RX ADMIN — GABAPENTIN 300 MG: 300 CAPSULE ORAL at 17:59

## 2024-04-05 RX ADMIN — ANTACID TABLETS 500 MG: 500 TABLET, CHEWABLE ORAL at 04:35

## 2024-04-05 RX ADMIN — HYDROMORPHONE HYDROCHLORIDE 0.5 MG: 1 INJECTION, SOLUTION INTRAMUSCULAR; INTRAVENOUS; SUBCUTANEOUS at 05:26

## 2024-04-05 RX ADMIN — FAMOTIDINE 40 MG: 20 TABLET, FILM COATED ORAL at 20:11

## 2024-04-05 RX ADMIN — OXYCODONE HYDROCHLORIDE 10 MG: 10 TABLET ORAL at 14:42

## 2024-04-05 RX ADMIN — ACETAMINOPHEN 1000 MG: 500 TABLET, FILM COATED ORAL at 17:57

## 2024-04-05 RX ADMIN — OXYCODONE HYDROCHLORIDE 20 MG: 20 TABLET, FILM COATED, EXTENDED RELEASE ORAL at 17:58

## 2024-04-05 RX ADMIN — METFORMIN HYDROCHLORIDE 500 MG: 500 TABLET, EXTENDED RELEASE ORAL at 17:59

## 2024-04-05 RX ADMIN — METFORMIN HYDROCHLORIDE 500 MG: 500 TABLET, EXTENDED RELEASE ORAL at 10:10

## 2024-04-05 RX ADMIN — HYDROMORPHONE HYDROCHLORIDE 0.5 MG: 1 INJECTION, SOLUTION INTRAMUSCULAR; INTRAVENOUS; SUBCUTANEOUS at 08:45

## 2024-04-05 RX ADMIN — LAMOTRIGINE 300 MG: 100 TABLET ORAL at 20:10

## 2024-04-05 RX ADMIN — ACETAMINOPHEN 1000 MG: 500 TABLET, FILM COATED ORAL at 12:23

## 2024-04-05 RX ADMIN — ACETAMINOPHEN 1000 MG: 500 TABLET, FILM COATED ORAL at 04:34

## 2024-04-05 RX ADMIN — OXYCODONE HYDROCHLORIDE 10 MG: 10 TABLET ORAL at 01:46

## 2024-04-05 ASSESSMENT — GAIT ASSESSMENTS
DEVIATION: BRADYKINETIC;SHUFFLED GAIT
ASSISTIVE DEVICE: FRONT WHEEL WALKER
DISTANCE (FEET): 2
GAIT LEVEL OF ASSIST: CONTACT GUARD ASSIST

## 2024-04-05 ASSESSMENT — PAIN DESCRIPTION - PAIN TYPE
TYPE: ACUTE PAIN
TYPE: ACUTE PAIN;SURGICAL PAIN
TYPE: ACUTE PAIN
TYPE: ACUTE PAIN
TYPE: ACUTE PAIN;SURGICAL PAIN
TYPE: ACUTE PAIN
TYPE: ACUTE PAIN
TYPE: ACUTE PAIN;SURGICAL PAIN
TYPE: ACUTE PAIN;SURGICAL PAIN
TYPE: ACUTE PAIN

## 2024-04-05 ASSESSMENT — COGNITIVE AND FUNCTIONAL STATUS - GENERAL
MOVING TO AND FROM BED TO CHAIR: A LITTLE
TURNING FROM BACK TO SIDE WHILE IN FLAT BAD: A LITTLE
MOBILITY SCORE: 14
MOVING FROM LYING ON BACK TO SITTING ON SIDE OF FLAT BED: A LOT
SUGGESTED CMS G CODE MODIFIER MOBILITY: CL
WALKING IN HOSPITAL ROOM: A LOT
CLIMB 3 TO 5 STEPS WITH RAILING: TOTAL
STANDING UP FROM CHAIR USING ARMS: A LITTLE

## 2024-04-05 NOTE — THERAPY
"Physical Therapy   Daily Treatment     Patient Name: Noy Rose  Age:  39 y.o., Sex:  female  Medical Record #: [id number]  Today's Date: 4/5/2024     Precautions  Precautions: Fall Risk;Spinal / Back Precautions   Comments: No brace per chart, pt reports she was told she would have a collar post-op, 3in soft collar in room, pt poorly tolerating, declined to keep on after transfering to chair    Assessment    Pt greeted and seen for PT treatment. Pt continues to be limited by 10/10 pain, pt rec'd IV pain meds at beginning of session, reported 10/10 pain at end of session. Pt req'd ModA for bed mobility, CGA to stand w/ FWW and take steps to transfer to chair. Pt declined to keep soft collar donned while in chair, pt positioned with ice and pillows. Pt currently limited by impaired balance and pain which negatively impacts functional mobility. Pt will continue to benefit from skilled PT to address deficits.       Plan    Treatment Plan Status: Continue Current Treatment Plan  Type of Treatment: Bed Mobility, Equipment, Gait Training, Neuro Re-Education / Balance, Self Care / Home Evaluation, Stair Training, Therapeutic Activities, Therapeutic Exercise  Treatment Frequency: 5 Times per Week  Treatment Duration: Until Therapy Goals Met    DC Equipment Recommendations: Front-Wheel Walker  Discharge Recommendations: Recommend home health for continued physical therapy services (once pain is controlled and pt ambulating)      Subjective    \"I'ts going to hurt no matter what\"        04/05/24 0900   Pain 0 - 10 Group   Therapist Pain Assessment Nurse Notified;Post Activity Pain Same as Prior to Activity;10  (pt medicated during session)   Cognition    Comments limited by pain, cooperative   Balance   Sitting Balance (Static) Fair -   Sitting Balance (Dynamic) Fair -   Standing Balance (Static) Fair -   Standing Balance (Dynamic) Fair -   Weight Shift Sitting Fair   Weight Shift Standing Fair   Skilled Intervention " "Verbal Cuing;Tactile Cuing;Sequencing;Facilitation   Comments w/ FWW   Bed Mobility    Supine to Sit Moderate Assist   Scooting Minimal Assist   Rolling Minimum Assist to Lt.   Skilled Intervention Verbal Cuing;Tactile Cuing;Sequencing;Compensatory Strategies;Facilitation   Comments log roll. HOB raised. No recall of spinal prx, pt ed given. Assist to upright trunk, decreased tolerance to mobility due to pain   Gait Analysis   Gait Level Of Assist Contact Guard Assist   Assistive Device Front Wheel Walker   Distance (Feet) 2   # of Times Distance was Traveled 1   Deviation Bradykinetic;Shuffled Gait   Weight Bearing Status no restrictions   Skilled Intervention Verbal Cuing;Sequencing   Comments w/ FWW   Functional Mobility   Sit to Stand Contact Guard Assist   Bed, Chair, Wheelchair Transfer Contact Guard Assist   Transfer Method Stand Step   Mobility bed>chair   Skilled Intervention Verbal Cuing;Sequencing   Comments w/ FWW. Encouraged to remain in chair, pt with \"cruddy\" cough when supine in bed and more so sitting EOB.   Short Term Goals    Short Term Goal # 1 Pt will perform bed mobility via log roll from a flat bed to progress to home in 6 visits.   Goal Outcome # 1 goal not met   Short Term Goal # 2 Pt will transfer with FWW and supervision to progress function for return home in 6 visits.   Goal Outcome # 2 Progressing as expected   Short Term Goal # 3 Pt will ambulate 100ft with FWW and supervision for short household distances in 6 visits.   Goal Outcome # 3 Goal not met   Short Term Goal # 4 Pt will negotiate 3 steps with B handrails and min A for home entry in 6 visits.   Goal Outcome # 4 Goal not met   Supervising Physical Therapist (PTA Treatments Only)   Supervising Physical Therapist Lois Lim           "

## 2024-04-05 NOTE — THERAPY
"Occupational Therapy   Initial Evaluation     Patient Name: Noy Rose  Age:  39 y.o., Sex:  female  Medical Record #: [id number]  Today's Date: 4/4/2024     Precautions: Fall Risk, Spinal / Back Precautions   Comments: No brace per chart, pt reports she was told she would have a collar post-op    Assessment    Patient is 39 y.o. female admitted for elective C3-C7 lami, C3-C4 fusion. Pt seen for OT eval and treatment. See grid below for details including education. Pt participated in log roll to EOB, STS x 1 with few side steps. She c/o significant increase in pain to posterior neck at surgical site and to surrounding musculature. Pt tearful and clearly very uncomfortable. Unable to tolerate upright position or attempt further mobility. Pt is significantly limited by pain this session. RN aware. Will benefit from ongoing acute OT.     Plan    Occupational Therapy Initial Treatment Plan   Treatment Interventions: Self Care / Activities of Daily Living, Adaptive Equipment, Neuro Re-Education / Balance, Therapeutic Activity  Treatment Frequency: 4 Times per Week  Duration: Until Therapy Goals Met    DC Equipment Recommendations: Unable to determine at this time  Discharge Recommendations: Anticipate that the patient will have no further occupational therapy needs after discharge from the hospital (provided pain control significantly improves)     Subjective    \"The sharp pains down my arms are gone. But my neck is so painful.\"      Objective       04/04/24 1439   Charge Group   OT Evaluation OT Evaluation Mod   OT Self Care / ADL (Units) 1    Services   Is patient using  services for this encounter? No   Initial Contact Note    Initial Contact Note Order Received and Verified, Occupational Therapy Evaluation in Progress with Full Report to Follow.   Prior Living Situation   Prior Services None;Home-Independent   Housing / Facility 1 Story House   Steps Into Home 3   Steps In Home 0   Rail " Both Rail (Steps into Home)   Bathroom Set up Bathtub / Shower Combination;Grab Bars  (plans to obtain shower chair)   Equipment Owned None   Lives with - Patient's Self Care Capacity Spouse;Child Less than 18 Years of Age;Adult Children  (13 and 18 y.o. children, 17 y.o. foster child)   Comments Pt reports her mom will stay with her as long as needed as spouse works   Prior Level of ADL Function   Self Feeding Independent   Grooming / Hygiene Independent   Bathing Independent   Dressing Independent   Toileting Independent   Prior Level of IADL Function   Medication Management Independent   Laundry Independent  (with difficulty)   Kitchen Mobility Independent   Finances Independent   Home Management Independent  (with difficulty)   Shopping Independent   Prior Level Of Mobility Independent Without Device in Community;Independent Without Device in Home  (limited distances)   Driving / Transportation Driving Independent;Relatives / Others Provide Transportation  (only drives short distances due to B foot neuropathy)   Occupation (Pre-Hospital Vocational) Homemaker   Precautions   Precautions Spinal / Back Precautions    Comments No brace per orders/chart notes, but pt reports her surgeon told her she would have one   Pain   Pain Scales 0 to 10 Scale    Pain 0 - 10 Group   Location Neck   Location Orientation Posterior;Mid   Therapist Pain Assessment During Activity;Nurse Notified;10  (reports >10/10)   Non Verbal Descriptors   Non Verbal Scale  Crying;Tense Body Language  (pt appears very uncomfortable)   Cognition    Cognition / Consciousness WDL   Comments pleasant & cooperative, receptive to education   Active ROM Upper Body   Active ROM Upper Body  WDL   Strength Upper Body   Upper Body Strength  X   Comments distal strength intact B; very minimal proximal resistance with testing due to ongoing neck and shoulder pain   Sensation Upper Body   Upper Extremity Sensation  WDL   Comments denies radicular symptoms  "  Upper Body Muscle Tone   Upper Body Muscle Tone  WDL   Coordination Upper Body   Coordination X   Gross Motor Coordination limited overhead reach due to neck and proximal shoulder pain   Balance Assessment   Sitting Balance (Static) Fair -   Sitting Balance (Dynamic) Fair -   Standing Balance (Static) Fair -   Standing Balance (Dynamic) Fair -   Weight Shift Sitting Fair   Weight Shift Standing Poor   Comments FWW for standing   Bed Mobility    Supine to Sit Minimal Assist   Sit to Supine Minimal Assist   Scooting Minimal Assist   Rolling Minimal Assist to Rt.   Comments trained on log roll technique; good initial demo   ADL Assessment   Grooming Modified Independent  (bed level oral care)   Upper Body Dressing Moderate Assist  (gown change)   Lower Body Dressing Total Assist  (don socks; assist due to neck pain)   Toileting   (NT; Simmons, no BM)   How much help from another person does the patient currently need...   Putting on and taking off regular lower body clothing? 2   Bathing (including washing, rinsing, and drying)? 2   Toileting, which includes using a toilet, bedpan, or urinal? 2   Putting on and taking off regular upper body clothing? 3   Taking care of personal grooming such as brushing teeth? 4   Eating meals? 4   6 Clicks Daily Activity Score 17   Functional Mobility   Sit to Stand Minimal Assist   Bed, Chair, Wheelchair Transfer Unable to Participate  (due to increasing neck pain)   Mobility Supine > < EOB, STS with few side steps   Visual Perception   Visual Perception  Not Tested   Edema / Skin Assessment   Edema / Skin  Not Assessed   Activity Tolerance   Sitting in Chair unable   Sitting Edge of Bed 5 min   Standing 2 min   Comments OOB limited by increasing neck pain   Patient / Family Goals   Patient / Family Goal #1 \"Cooking\"   Short Term Goals   Short Term Goal # 1 Pt will complete ADL transfers with supv   Short Term Goal # 2 Pt will complete toileting with supv   Short Term Goal # 3 Pt " will complete standing grooming with supv   Short Term Goal # 4 Pt will complete LB/UB dressing with supv   Education Group   Education Provided Spinal Precautions;Role of Occupational Therapist;Home Safety   Role of Occupational Therapist Patient Response Patient;Acceptance;Explanation;Verbal Demonstration;Family   Spinal Precautions Patient Response Patient;Acceptance;Explanation;Demonstration;Handout;Verbal Demonstration;Action Demonstration;Family;Reinforcement Needed  (neutral spine, lifting restriction)   Home Safety Patient Response Patient;Acceptance;Explanation;Verbal Demonstration;Family  (use of shower chair for fall reduction)   Occupational Therapy Initial Treatment Plan    Treatment Interventions Self Care / Activities of Daily Living;Adaptive Equipment;Neuro Re-Education / Balance;Therapeutic Activity   Treatment Frequency 4 Times per Week   Duration Until Therapy Goals Met   Problem List   Problem List Decreased Active Daily Living Skills;Decreased Homemaking Skills;Decreased Functional Mobility;Decreased Activity Tolerance;Impaired Postural Control / Balance;Limited Knowledge of Post Op Precautions   Anticipated Discharge Equipment and Recommendations   DC Equipment Recommendations Unable to determine at this time   Discharge Recommendations Anticipate that the patient will have no further occupational therapy needs after discharge from the hospital  (provided pain control significantly improves)   Interdisciplinary Plan of Care Collaboration   IDT Collaboration with  Nursing;Family / Caregiver;Physical Therapist   Patient Position at End of Therapy In Bed;Call Light within Reach;Tray Table within Reach;Phone within Reach;Family / Friend in Room   Collaboration Comments OT results and recs   Session Information   Date / Session Number  4/4 #1 (1/4, 4/10)

## 2024-04-05 NOTE — DISCHARGE PLANNING
Physiatry to consult. Per therapy evals anticipate dc home with HHC services once pain controlled. TCC will follow.

## 2024-04-05 NOTE — CARE PLAN
The patient is Stable - Low risk of patient condition declining or worsening    Shift Goals  Clinical Goals: pain control, comfort, rest  Patient Goals: pain control, comfort, rest  Family Goals: n/a    Progress made toward(s) clinical / shift goals:  yes        Problem: Pain - Standard  Goal: Alleviation of pain or a reduction in pain to the patient’s comfort goal  Outcome: Progressing     Problem: Knowledge Deficit - Standard  Goal: Patient and family/care givers will demonstrate understanding of plan of care, disease process/condition, diagnostic tests and medications  Outcome: Progressing     Problem: Skin Integrity  Goal: Skin integrity is maintained or improved  Outcome: Progressing     Problem: Fall Risk  Goal: Patient will remain free from falls  Outcome: Progressing

## 2024-04-05 NOTE — CARE PLAN
The patient is Stable - Low risk of patient condition declining or worsening    Shift Goals  Clinical Goals: mobility; pain control  Patient Goals: pain control; comfort  Family Goals: not present    Progress made toward(s) clinical / shift goals:    Problem: Pain - Standard  Goal: Alleviation of pain or a reduction in pain to the patient’s comfort goal  Outcome: Progressing     Problem: Knowledge Deficit - Standard  Goal: Patient and family/care givers will demonstrate understanding of plan of care, disease process/condition, diagnostic tests and medications  Outcome: Progressing     Problem: Skin Integrity  Goal: Skin integrity is maintained or improved  Outcome: Progressing     Problem: Fall Risk  Goal: Patient will remain free from falls  Outcome: Progressing       Patient is not progressing towards the following goals:

## 2024-04-05 NOTE — CONSULTS
Physical Medicine and Rehabilitation Consultation          Date of initial consultation: 4/5/2024  Consulting provider: Jazzmine Rodriguez P.A.-C.   Reason for consultation: assess for acute inpatient rehab appropriateness  LOS: 2 Day(s)    Chief complaint: Neck pain    HPI: The patient is a 39 y.o. right hand dominant female with a past medical history of severe progressive neck pain, numbness and tingling, lumbar stenosis, neurogenic claudication, PCOS, hypertension, anxiety, diabetes, epilepsy;  who presented on 4/3/2024  9:57 AM for planned procedure with Dr. James Fierro MD who performed C3-T4 decompression and fusion.  Current labs reflect slight anemia with hemoglobin 10.4, hyperglycemia.  Patient is on gabapentin for neuropathic pain.    The patient currently reports neck pain, back pain, incisional pain.  Patient is crippled by this pain and unable to complete mobility and ADL tasks.  She reports she has taken tramadol in the past and had a pain contract for that.  Patient took 303 morphine equivalents on 4/3, 105 on 4/4.  She is not using PCA.    ROS  Pertinent positives are mentioned in the HPI, all others reviewed and are negative.    Social Hx:  1 SH  3 ROMELIA  With: Spouse, children    THERAPY:  Restrictions: Fall risk, spinal/back precautions  PT: Functional mobility   4/4: Unable to participate in gait, min assist sit to stand    OT: ADLs  4/4: Total assist lower body dressing, mod assist upper body dressing grooming    SLP:   None    IMAGING:  No advanced diagnostic imaging done on this admission    PROCEDURES:  James Fierro MD 4/4/2024  1.  Cervical 3, 4, 5, 6, 7 laminectomy foraminotomies  2.  Lateral mass screw placement bilateral cervical 3, 4, 5, 6; bilateral pedicle screw placement cervical 7, thoracic 1, 2, 3, 4 (Infinity, Medtronic)  3.  Bone autograft obtained from same incision  4.  Bone allograft placement (BMP/graft on DBM, Medtronic, I factor, [employer])  5.  Stealth  intraoperative navigation/O-arm for lateral mass, pedicle screw placement  6.  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       PSH:  Past Surgical History:   Procedure Laterality Date    POSTERIOR CERVICAL FUSION O-ARM  4/3/2024    Procedure: POSTERIOR C3-7 LAMINECTOMY, WITH C3-T4 LATERAL MASS PEDICLE SCREW FIXATION FUSION, CADAVERIC BONE GRAFT;  Surgeon: James Fierro M.D.;  Location: SURGERY [facility];  Service: Neurosurgery    CERVICAL LAMINECTOMY POSTERIOR  4/3/2024    Procedure: LAMINECTOMY, SPINE, CERVICAL, POSTERIOR APPROACH;  Surgeon: James Fierro M.D.;  Location: SURGERY [facility];  Service: Neurosurgery    BONE GRAFT  4/3/2024    Procedure: BONE GRAFT;  Surgeon: James Fierro M.D.;  Location: SURGERY [facility];  Service: Neurosurgery    HYSTERECTOMY LAPAROSCOPY N/A 07/25/2023    PRIMARY C SECTION  2006, 2011    TUBAL COAGULATION LAPAROSCOPIC BILATERAL         FHX:  Family History   Problem Relation Age of Onset    Diabetes Mother     Hyperlipidemia Maternal Uncle     Hypertension Maternal Uncle     Diabetes Maternal Uncle     Cancer Maternal Grandmother         breast    Cancer Paternal Grandfather        Medications:  Current Facility-Administered Medications   Medication Dose    diazePAM (Valium) injection 5 mg  5 mg    metFORMIN ER (Glucophage XR) tablet 500 mg  500 mg    morphine ER (Ms Contin) tablet 15 mg  15 mg    oxyCODONE immediate-release (Roxicodone) tablet 5 mg  5 mg    Or    oxyCODONE immediate release (Roxicodone) tablet 10 mg  10 mg    HYDROmorphone (Dilaudid) injection 0.5 mg  0.5 mg    cetirizine (ZyrTEC) tablet 10 mg  10 mg    famotidine (Pepcid) tablet 40 mg  40  mg    gabapentin (Neurontin) capsule 300 mg  300 mg    lamoTRIgine (LaMICtal) tablet 300 mg  300 mg    losartan (Cozaar) tablet 25 mg  25 mg    omeprazole (PriLOSEC) capsule 20 mg  20 mg    PARoxetine (Paxil) tablet 20 mg  20 mg    Pharmacy Consult Request ...Pain Management Review 1 Each  1 Each    MD ALERT...DO NOT ADMINISTER NSAIDS or ASPIRIN unless ORDERED By Neurosurgery 1 Each  1 Each    docusate sodium (Colace) capsule 100 mg  100 mg    senna-docusate (Pericolace Or Senokot S) 8.6-50 MG per tablet 1 Tablet  1 Tablet    senna-docusate (Pericolace Or Senokot S) 8.6-50 MG per tablet 1 Tablet  1 Tablet    polyethylene glycol/lytes (Miralax) Packet 1 Packet  1 Packet    magnesium hydroxide (Milk Of Magnesia) suspension 30 mL  30 mL    bisacodyl (Dulcolax) suppository 10 mg  10 mg    sodium phosphate (Fleet) enema 133 mL  1 Each    dextrose 5 % and 0.9 % NaCl with KCl 20 mEq infusion      acetaminophen (Tylenol) tablet 1,000 mg  1,000 mg    Followed by    [START ON 4/8/2024] acetaminophen (Tylenol) tablet 1,000 mg  1,000 mg    diphenhydrAMINE (Benadryl) tablet/capsule 25 mg  25 mg    Or    diphenhydrAMINE (Benadryl) injection 25 mg  25 mg    ondansetron (Zofran) syringe/vial injection 4 mg  4 mg    ondansetron (Zofran ODT) dispertab 4 mg  4 mg    promethazine (Phenergan) tablet 12.5-25 mg  12.5-25 mg    promethazine (Phenergan) suppository 12.5-25 mg  12.5-25 mg    prochlorperazine (Compazine) injection 5-10 mg  5-10 mg    methocarbamol (Robaxin) tablet 750 mg  750 mg    Or    cyclobenzaprine (Flexeril) tablet 10 mg  10 mg    labetalol (Normodyne/Trandate) injection 10 mg  10 mg    benzocaine-menthol (Cepacol) lozenge 1 Lozenge  1 Lozenge    calcium carbonate (Tums) chewable tab 500 mg  500 mg       Allergies:  Allergies   Allergen Reactions    Codeine Shortness of Breath, Itching, Nausea and Unspecified     Rash, itching    Hydrocodone Itching    Nsaids Hives     Physical Exam:  Vitals: /81   Pulse (!) 101   " Temp 36.5 °C (97.7 °F) (Temporal)   Resp 16   Ht 1.626 m (5' 4\")   Wt 88.5 kg (195 lb 1.7 oz)   SpO2 90%   Gen: NAD  Head: NC/AT  Eyes/ Nose/ Mouth: PERRLA, moist mucous membranes  Cardio: RRR, good distal perfusion, warm extremities  Pulm: normal respiratory effort, no cyanosis   Abd: Soft NTND, negative borborygmi   Ext: No peripheral edema. No calf tenderness. No clubbing.    Mental status:  A&Ox4 (person, place, date, situation) answers questions appropriately follows commands  Speech: fluent, no aphasia or dysarthria    Motor:      Upper Extremity  Myotome R L   Shoulder flexion C5 4/5 4/5   Elbow flexion C5 4/5 4/5   Wrist extension C6 4/5 4/5   Elbow extension C7 4/5 4/5   Finger flexion C8 4/5 4/5   Finger abduction T1 4/5 4/5     Lower Extremity Myotome R L   Hip flexion L2 5 5   Knee extension L3 5 5   Ankle dorsiflexion L4 5 5   Toe extension L5 5 5   Ankle plantarflexion S1 5 5     Sensory:   intact to light touch through out    Labs: Reviewed and significant for   Recent Labs     04/04/24  0421 04/05/24  0412   HEMOGLOBIN 10.8* 10.4*     Recent Labs     04/04/24 2010   SODIUM 140   POTASSIUM 3.9   CHLORIDE 107   CO2 21   GLUCOSE 164*   BUN 5*   CREATININE 0.60   CALCIUM 8.6     Recent Results (from the past 24 hour(s))   Basic Metabolic Panel    Collection Time: 04/04/24  8:10 PM   Result Value Ref Range    Sodium 140 135 - 145 mmol/L    Potassium 3.9 3.6 - 5.5 mmol/L    Chloride 107 96 - 112 mmol/L    Co2 21 20 - 33 mmol/L    Glucose 164 (H) 65 - 99 mg/dL    Bun 5 (L) 8 - 22 mg/dL    Creatinine 0.60 0.50 - 1.40 mg/dL    Calcium 8.6 8.5 - 10.5 mg/dL    Anion Gap 12.0 7.0 - 16.0   ESTIMATED GFR    Collection Time: 04/04/24  8:10 PM   Result Value Ref Range    GFR (CKD-EPI) 116 >60 mL/min/1.73 m 2   Hemoglobin    Collection Time: 04/05/24  4:12 AM   Result Value Ref Range    Hemoglobin 10.4 (L) 12.0 - 16.0 g/dL         ASSESSMENT:  Patient is a 39 y.o. female admitted with neurogenic claudication " now s/p C3-T4 decompression and fusion    Rehabilitation: Impaired ADLs and mobility  Barriers to transfer include: Insurance authorization, TCCs to verify disposition, medical clearance and bed availability. All cases are subject to administrative review.     [facility] Code / Diagnosis to Support: 0008.9 - Orthopaedic Disorders: Other Orthopaedic    Disposition recommendations:  -PMR consult for medical management, pain control.  Patient is anticipated to be able to discharge directly home when pain is controlled.    Medical Complexity:    Neurogenic claudication  -Status post C3-T4 decompression and fusion by [employer] FOWLER on 4/3/2024  -Neurologically intact  -Continue PT OT while in-house    Pain control  -Continue: Tylenol 1000 mg every 6 hours  -Continue:  Methocarbamol 750 mg 3 times daily as needed OR Cyclobenzaprine 10 mg 3 times daily as needed  -Continue: Gabapentin 300 mg 3 times daily  -Continue: Dilaudid injection 0.5 mg every 3 hours as needed  -Continue: Roxicodone 5-10 mg every 4 hours as needed    -Discontinue: Diazepam injection 5 mg every 4 hours as needed  -Discontinue: MS Contin 15 mg twice daily    - Starting Oxycontin 20 mg BID    Morphine Eq  4/3: 305 (not functional)   4/4: 105 (not functional)  4/5: med change     - Patient will need to follow up with her PCP to continue pain medication as an outpatient with a pain contract which she had in place as of last month, per patient.   - High risk medications in use with gabapentin and opioids. Monitoring labs closely.   - PMR will continue to follow for pain management        DVT PPX: SCDs      Thank you for allowing us to participate in the care of this patient.     Patient was seen for >80 minutes on unit/floor of which > 50% of time was spent on counseling and coordination of care regarding the above, including prognosis, risk reduction, benefits of treatment, and options for next stage of care.    Arnold Zuñiga, DO   Physical Medicine and  Rehabilitation     Please note that this dictation was created using voice recognition software. I have made every reasonable attempt to correct obvious errors, but there may be errors of grammar and possibly content that I did not discover before finalizing the note.
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
## 2024-04-06 PROCEDURE — A9270 NON-COVERED ITEM OR SERVICE: HCPCS

## 2024-04-06 PROCEDURE — 700111 HCHG RX REV CODE 636 W/ 250 OVERRIDE (IP): Performed by: PHYSICIAN ASSISTANT

## 2024-04-06 PROCEDURE — 700102 HCHG RX REV CODE 250 W/ 637 OVERRIDE(OP)

## 2024-04-06 PROCEDURE — A9270 NON-COVERED ITEM OR SERVICE: HCPCS | Performed by: NEUROLOGICAL SURGERY

## 2024-04-06 PROCEDURE — 99232 SBSQ HOSP IP/OBS MODERATE 35: CPT | Performed by: PHYSICAL MEDICINE & REHABILITATION

## 2024-04-06 PROCEDURE — 770001 HCHG ROOM/CARE - MED/SURG/GYN PRIV*

## 2024-04-06 PROCEDURE — 700102 HCHG RX REV CODE 250 W/ 637 OVERRIDE(OP): Performed by: PHYSICAL MEDICINE & REHABILITATION

## 2024-04-06 PROCEDURE — A9270 NON-COVERED ITEM OR SERVICE: HCPCS | Performed by: PHYSICAL MEDICINE & REHABILITATION

## 2024-04-06 PROCEDURE — 700102 HCHG RX REV CODE 250 W/ 637 OVERRIDE(OP): Performed by: PHYSICIAN ASSISTANT

## 2024-04-06 PROCEDURE — A9270 NON-COVERED ITEM OR SERVICE: HCPCS | Performed by: PHYSICIAN ASSISTANT

## 2024-04-06 PROCEDURE — 700102 HCHG RX REV CODE 250 W/ 637 OVERRIDE(OP): Performed by: NEUROLOGICAL SURGERY

## 2024-04-06 RX ORDER — CYCLOBENZAPRINE HCL 10 MG
10 TABLET ORAL EVERY 8 HOURS
Status: DISCONTINUED | OUTPATIENT
Start: 2024-04-06 | End: 2024-04-09 | Stop reason: HOSPADM

## 2024-04-06 RX ADMIN — CYCLOBENZAPRINE 10 MG: 10 TABLET, FILM COATED ORAL at 18:31

## 2024-04-06 RX ADMIN — HYDROMORPHONE HYDROCHLORIDE 0.5 MG: 1 INJECTION, SOLUTION INTRAMUSCULAR; INTRAVENOUS; SUBCUTANEOUS at 21:17

## 2024-04-06 RX ADMIN — Medication 1 APPLICATOR: at 04:21

## 2024-04-06 RX ADMIN — ONDANSETRON 4 MG: 2 INJECTION INTRAMUSCULAR; INTRAVENOUS at 10:41

## 2024-04-06 RX ADMIN — DIPHENHYDRAMINE HYDROCHLORIDE 25 MG: 25 TABLET ORAL at 12:54

## 2024-04-06 RX ADMIN — GABAPENTIN 300 MG: 300 CAPSULE ORAL at 11:22

## 2024-04-06 RX ADMIN — OXYCODONE HYDROCHLORIDE 10 MG: 10 TABLET ORAL at 19:35

## 2024-04-06 RX ADMIN — ACETAMINOPHEN 1000 MG: 500 TABLET, FILM COATED ORAL at 18:23

## 2024-04-06 RX ADMIN — OXYCODONE HYDROCHLORIDE 10 MG: 10 TABLET ORAL at 11:33

## 2024-04-06 RX ADMIN — ANTACID TABLETS 500 MG: 500 TABLET, CHEWABLE ORAL at 04:21

## 2024-04-06 RX ADMIN — METFORMIN HYDROCHLORIDE 500 MG: 500 TABLET, EXTENDED RELEASE ORAL at 10:00

## 2024-04-06 RX ADMIN — HYDROMORPHONE HYDROCHLORIDE 0.5 MG: 1 INJECTION, SOLUTION INTRAMUSCULAR; INTRAVENOUS; SUBCUTANEOUS at 09:37

## 2024-04-06 RX ADMIN — OXYCODONE HYDROCHLORIDE 20 MG: 20 TABLET, FILM COATED, EXTENDED RELEASE ORAL at 18:24

## 2024-04-06 RX ADMIN — FAMOTIDINE 40 MG: 20 TABLET, FILM COATED ORAL at 09:38

## 2024-04-06 RX ADMIN — OXYCODONE HYDROCHLORIDE 20 MG: 20 TABLET, FILM COATED, EXTENDED RELEASE ORAL at 05:22

## 2024-04-06 RX ADMIN — FAMOTIDINE 40 MG: 20 TABLET, FILM COATED ORAL at 19:37

## 2024-04-06 RX ADMIN — DOCUSATE SODIUM 50 MG AND SENNOSIDES 8.6 MG 1 TABLET: 8.6; 5 TABLET, FILM COATED ORAL at 19:36

## 2024-04-06 RX ADMIN — LAMOTRIGINE 300 MG: 100 TABLET ORAL at 19:37

## 2024-04-06 RX ADMIN — ANTACID TABLETS 500 MG: 500 TABLET, CHEWABLE ORAL at 18:24

## 2024-04-06 RX ADMIN — LOSARTAN POTASSIUM 25 MG: 25 TABLET, FILM COATED ORAL at 04:22

## 2024-04-06 RX ADMIN — ACETAMINOPHEN 1000 MG: 500 TABLET, FILM COATED ORAL at 04:21

## 2024-04-06 RX ADMIN — METFORMIN HYDROCHLORIDE 500 MG: 500 TABLET, EXTENDED RELEASE ORAL at 18:24

## 2024-04-06 RX ADMIN — ACETAMINOPHEN 1000 MG: 500 TABLET, FILM COATED ORAL at 00:48

## 2024-04-06 RX ADMIN — OXYCODONE HYDROCHLORIDE 10 MG: 10 TABLET ORAL at 15:34

## 2024-04-06 RX ADMIN — DOCUSATE SODIUM 100 MG: 100 CAPSULE, LIQUID FILLED ORAL at 18:24

## 2024-04-06 RX ADMIN — CYCLOBENZAPRINE 10 MG: 10 TABLET, FILM COATED ORAL at 02:29

## 2024-04-06 RX ADMIN — OXYCODONE HYDROCHLORIDE 10 MG: 10 TABLET ORAL at 07:30

## 2024-04-06 RX ADMIN — DOCUSATE SODIUM 100 MG: 100 CAPSULE, LIQUID FILLED ORAL at 04:21

## 2024-04-06 RX ADMIN — LAMOTRIGINE 300 MG: 100 TABLET ORAL at 09:38

## 2024-04-06 RX ADMIN — GABAPENTIN 300 MG: 300 CAPSULE ORAL at 18:24

## 2024-04-06 RX ADMIN — PAROXETINE HYDROCHLORIDE 20 MG: 20 TABLET, FILM COATED ORAL at 18:24

## 2024-04-06 RX ADMIN — CYCLOBENZAPRINE 10 MG: 10 TABLET, FILM COATED ORAL at 11:22

## 2024-04-06 RX ADMIN — POLYETHYLENE GLYCOL 3350 1 PACKET: 17 POWDER, FOR SOLUTION ORAL at 21:36

## 2024-04-06 RX ADMIN — HYDROMORPHONE HYDROCHLORIDE 0.5 MG: 1 INJECTION, SOLUTION INTRAMUSCULAR; INTRAVENOUS; SUBCUTANEOUS at 03:37

## 2024-04-06 RX ADMIN — GABAPENTIN 300 MG: 300 CAPSULE ORAL at 04:21

## 2024-04-06 RX ADMIN — OMEPRAZOLE 20 MG: 20 CAPSULE, DELAYED RELEASE ORAL at 04:20

## 2024-04-06 RX ADMIN — OXYCODONE HYDROCHLORIDE 10 MG: 10 TABLET ORAL at 00:48

## 2024-04-06 RX ADMIN — CETIRIZINE HYDROCHLORIDE 10 MG: 10 TABLET, FILM COATED ORAL at 09:38

## 2024-04-06 RX ADMIN — HYDROMORPHONE HYDROCHLORIDE 0.5 MG: 1 INJECTION, SOLUTION INTRAMUSCULAR; INTRAVENOUS; SUBCUTANEOUS at 14:28

## 2024-04-06 RX ADMIN — CETIRIZINE HYDROCHLORIDE 10 MG: 10 TABLET, FILM COATED ORAL at 19:36

## 2024-04-06 ASSESSMENT — PAIN DESCRIPTION - PAIN TYPE
TYPE: ACUTE PAIN
TYPE: ACUTE PAIN;SURGICAL PAIN
TYPE: ACUTE PAIN
TYPE: ACUTE PAIN;SURGICAL PAIN
TYPE: ACUTE PAIN

## 2024-04-06 NOTE — CARE PLAN
The patient is Stable - Low risk of patient condition declining or worsening    Shift Goals  Clinical Goals: pain control, rest, comfort  Patient Goals: pain control, comfort  Family Goals: comfort    Progress made toward(s) clinical / shift goals:  yes      Problem: Pain - Standard  Goal: Alleviation of pain or a reduction in pain to the patient’s comfort goal  Outcome: Progressing     Problem: Knowledge Deficit - Standard  Goal: Patient and family/care givers will demonstrate understanding of plan of care, disease process/condition, diagnostic tests and medications  Outcome: Progressing     Problem: Fall Risk  Goal: Patient will remain free from falls  Outcome: Progressing

## 2024-04-06 NOTE — DISCHARGE PLANNING
Received choice form @: 1112  Agency/Facility name: [facility]  Sent referral per choice form @: 1130    1134  Received choice form @: 1116  Agency/Facility name: [facility]  Sent referral per choice form @: 1137

## 2024-04-06 NOTE — DISCHARGE PLANNING
RN CM obtained choice for HH and DME.   DME choice form signed for 1. [facility].   HH choice form signed for 1. [facility] .   Signed choice forms faxed to [facility] to upload into media and send referrals via Epic.   RN CM reached out to bedside RN to speak to provider about HH and FWW orders as attending is not on Vo[employer]. Also escalated to charge to ensure orders are placed.     Case Management Discharge Planning    Admission Date: 4/3/2024  GMLOS: 2.6  ALOS: 3    6-Clicks ADL Score: 17  6-Clicks Mobility Score: 14  PT and/or OT Eval ordered: Yes  Post-acute Referrals Ordered: Yes  Post-acute Choice Obtained: Yes  Has referral(s) been sent to post-acute provider:  Yes      Anticipated Discharge Dispo: Discharge Disposition: Discharged to home/self care (01)  Discharge Address: [address] [place] [address]  Discharge Contact Phone Number: [phone]    DME Needed: Yes    DME Ordered: Yes    Action(s) Taken: Patient Conference, Choice obtained, and Referral(s) sent    Escalations Completed: Provider    Medically Clear: No    Next Steps: Pending HH and FWW order from provider.     Barriers to Discharge: Medical clearance    Is the patient up for discharge tomorrow: Potentially

## 2024-04-06 NOTE — PROGRESS NOTES
Received care of pt from NOC RN this am. Pt is A+Ox4. Pt requesting pain medication. Pt medicated for pain control.

## 2024-04-06 NOTE — PROGRESS NOTES
Neurosurgery Progress Note    Subjective:  No events overnight  Poor pain control, still having incisional and muscle pain  Denies n/t    Exam:  A&O  PERRL  DELA CRUZ with good strength when prompted, initial weakness with fear of pain with movement    Hvac 60mL/8hr  Dressing CDI    BP  Min: 95/59  Max: 127/82  Pulse  Av.2  Min: 87  Max: 103  Resp  Av.2  Min: 15  Max: 18  Temp  Av.4 °C (97.6 °F)  Min: 36.2 °C (97.2 °F)  Max: 36.6 °C (97.9 °F)  SpO2  Av.4 %  Min: 88 %  Max: 93 %    No data recorded    Recent Labs     24  0421 24  0412   HEMOGLOBIN 10.8* 10.4*     Recent Labs     24   SODIUM 140   POTASSIUM 3.9   CHLORIDE 107   CO2 21   GLUCOSE 164*   BUN 5*   CREATININE 0.60   CALCIUM 8.6               Intake/Output                         24 - 24 0659 24 - 24 0659      2934-7702 Total  4272-5668 Total                 Intake    P.O.  --  240 240  --  -- --    P.O. -- 240 240 -- -- --    Total Intake -- 240 240 -- -- --       Output    Urine  --  -- --  --  -- --    Number of Times Voided -- 1 x 1 x -- -- --    Drains  160  120 280  25  -- 25    Output (mL) ([REMOVED] Closed/Suction Drain 1 Posterior Neck Hemovac 24 1328) 160 120 280 25 -- 25    Total Output 160 120 280 25 -- 25       Net I/O     -160 120 -40 -25 -- -25              Intake/Output Summary (Last 24 hours) at 2024 1356  Last data filed at 2024 1245  Gross per 24 hour   Intake 240 ml   Output 305 ml   Net -65 ml             Nozin nasal  swab  1 Applicator BID    oxyCODONE CR  20 mg Q12HRS    metFORMIN ER  500 mg BID WITH MEALS    oxyCODONE immediate-release  5 mg Q4HRS PRN    Or    oxyCODONE immediate-release  10 mg Q4HRS PRN    HYDROmorphone  0.5 mg Q3HRS PRN    cetirizine  10 mg BID    famotidine  40 mg BID    gabapentin  300 mg TID    lamotrigine  300 mg BID    losartan  25 mg DAILY    omeprazole  20 mg QDAY    PARoxetine  20 mg Q EVENING     Pharmacy Consult Request  1 Each PHARMACY TO DOSE    MD ALERT...DO NOT ADMINISTER NSAIDS or ASPIRIN unless ORDERED By Neurosurgery  1 Each PRN    docusate sodium  100 mg BID    senna-docusate  1 Tablet Nightly    senna-docusate  1 Tablet Q24HRS PRN    polyethylene glycol/lytes  1 Packet BID PRN    magnesium hydroxide  30 mL QDAY PRN    bisacodyl  10 mg Q24HRS PRN    sodium phosphate  1 Each Once PRN    dextrose 5 % and 0.9 % NaCl with KCl 20 mEq   Continuous    acetaminophen  1,000 mg Q6HRS    Followed by    [START ON 4/8/2024] acetaminophen  1,000 mg Q6HRS PRN    diphenhydrAMINE  25 mg Q6HRS PRN    Or    diphenhydrAMINE  25 mg Q6HRS PRN    ondansetron  4 mg Q4HRS PRN    ondansetron  4 mg Q4HRS PRN    promethazine  12.5-25 mg Q4HRS PRN    promethazine  12.5-25 mg Q4HRS PRN    prochlorperazine  5-10 mg Q4HRS PRN    methocarbamol  750 mg Q8HRS PRN    Or    cyclobenzaprine  10 mg Q8HRS PRN    labetalol  10 mg Q HOUR PRN    benzocaine-menthol  1 Lozenge Q2HRS PRN    calcium carbonate  500 mg BID       Assessment and Plan:  Hospital day #3  POD #2 post cervical lami fusion  Prophylactic anticoagulation: no         Start date/time: can start lovenox 24hr after drain out     Brain Compression: No    Neuro exam stable  Poor pain control, will adjust muscle relaxer  Continue MS contin with PO oxy or IV dilaudid for breakthrough  Utilize multimodals  Continue hvac, can remove when output less than 30mL/8hr  Work with PT/OT and OOB as much as tolerated.    Kirstie George

## 2024-04-06 NOTE — CARE PLAN
The patient is Watcher - Medium risk of patient condition declining or worsening Discussed pt's care / pain control with Kirstie DENISE today.     Shift Goals  Clinical Goals: pain control  Patient Goals: pain control  Family Goals: comfort    Progress made toward(s) clinical / shift goals:  receives medication for pain control, see doc flow sheets. / no neuro changes today.       Problem: Pain - Standard  Goal: Alleviation of pain or a reduction in pain to the patient’s comfort goal  Outcome: Progressing  Note: Receives medication for pain control - see mar     Problem: Knowledge Deficit - Standard  Goal: Patient and family/care givers will demonstrate understanding of plan of care, disease process/condition, diagnostic tests and medications  Outcome: Progressing  Note: Discussed plan of care for today w/ pt this am, she is A+Ox4, she verbalizes understanding of her plan of care, no questions. Emotional support given. Reinforcing education as necessary.

## 2024-04-07 PROCEDURE — 99232 SBSQ HOSP IP/OBS MODERATE 35: CPT | Performed by: PHYSICAL MEDICINE & REHABILITATION

## 2024-04-07 PROCEDURE — A9270 NON-COVERED ITEM OR SERVICE: HCPCS | Performed by: PHYSICIAN ASSISTANT

## 2024-04-07 PROCEDURE — 97530 THERAPEUTIC ACTIVITIES: CPT | Mod: CQ

## 2024-04-07 PROCEDURE — 700102 HCHG RX REV CODE 250 W/ 637 OVERRIDE(OP): Performed by: PHYSICIAN ASSISTANT

## 2024-04-07 PROCEDURE — 700102 HCHG RX REV CODE 250 W/ 637 OVERRIDE(OP)

## 2024-04-07 PROCEDURE — 770001 HCHG ROOM/CARE - MED/SURG/GYN PRIV*

## 2024-04-07 PROCEDURE — 700111 HCHG RX REV CODE 636 W/ 250 OVERRIDE (IP): Performed by: PHYSICIAN ASSISTANT

## 2024-04-07 PROCEDURE — A9270 NON-COVERED ITEM OR SERVICE: HCPCS | Performed by: NEUROLOGICAL SURGERY

## 2024-04-07 PROCEDURE — 700102 HCHG RX REV CODE 250 W/ 637 OVERRIDE(OP): Performed by: NEUROLOGICAL SURGERY

## 2024-04-07 PROCEDURE — 97116 GAIT TRAINING THERAPY: CPT | Mod: CQ

## 2024-04-07 PROCEDURE — 700101 HCHG RX REV CODE 250: Performed by: PHYSICAL MEDICINE & REHABILITATION

## 2024-04-07 PROCEDURE — A9270 NON-COVERED ITEM OR SERVICE: HCPCS | Performed by: PHYSICAL MEDICINE & REHABILITATION

## 2024-04-07 PROCEDURE — 700102 HCHG RX REV CODE 250 W/ 637 OVERRIDE(OP): Performed by: PHYSICAL MEDICINE & REHABILITATION

## 2024-04-07 PROCEDURE — A9270 NON-COVERED ITEM OR SERVICE: HCPCS

## 2024-04-07 RX ORDER — LIDOCAINE 4 G/G
1 PATCH TOPICAL EVERY 24 HOURS
Status: DISCONTINUED | OUTPATIENT
Start: 2024-04-07 | End: 2024-04-09 | Stop reason: HOSPADM

## 2024-04-07 RX ADMIN — FAMOTIDINE 40 MG: 20 TABLET, FILM COATED ORAL at 09:13

## 2024-04-07 RX ADMIN — CETIRIZINE HYDROCHLORIDE 10 MG: 10 TABLET, FILM COATED ORAL at 09:13

## 2024-04-07 RX ADMIN — HYDROMORPHONE HYDROCHLORIDE 0.5 MG: 1 INJECTION, SOLUTION INTRAMUSCULAR; INTRAVENOUS; SUBCUTANEOUS at 15:08

## 2024-04-07 RX ADMIN — DIPHENHYDRAMINE HYDROCHLORIDE 25 MG: 25 TABLET ORAL at 20:00

## 2024-04-07 RX ADMIN — OXYCODONE HYDROCHLORIDE 10 MG: 10 TABLET ORAL at 00:34

## 2024-04-07 RX ADMIN — ANTACID TABLETS 500 MG: 500 TABLET, CHEWABLE ORAL at 05:41

## 2024-04-07 RX ADMIN — ACETAMINOPHEN 1000 MG: 500 TABLET, FILM COATED ORAL at 18:12

## 2024-04-07 RX ADMIN — POLYETHYLENE GLYCOL 3350 1 PACKET: 17 POWDER, FOR SOLUTION ORAL at 20:01

## 2024-04-07 RX ADMIN — OXYCODONE HYDROCHLORIDE 10 MG: 10 TABLET ORAL at 12:03

## 2024-04-07 RX ADMIN — HYDROMORPHONE HYDROCHLORIDE 0.5 MG: 1 INJECTION, SOLUTION INTRAMUSCULAR; INTRAVENOUS; SUBCUTANEOUS at 23:29

## 2024-04-07 RX ADMIN — OMEPRAZOLE 20 MG: 20 CAPSULE, DELAYED RELEASE ORAL at 05:41

## 2024-04-07 RX ADMIN — DOCUSATE SODIUM 100 MG: 100 CAPSULE, LIQUID FILLED ORAL at 16:58

## 2024-04-07 RX ADMIN — PAROXETINE HYDROCHLORIDE 20 MG: 20 TABLET, FILM COATED ORAL at 16:58

## 2024-04-07 RX ADMIN — GABAPENTIN 300 MG: 300 CAPSULE ORAL at 12:03

## 2024-04-07 RX ADMIN — GABAPENTIN 300 MG: 300 CAPSULE ORAL at 16:58

## 2024-04-07 RX ADMIN — ACETAMINOPHEN 1000 MG: 500 TABLET, FILM COATED ORAL at 05:41

## 2024-04-07 RX ADMIN — HYDROMORPHONE HYDROCHLORIDE 0.5 MG: 1 INJECTION, SOLUTION INTRAMUSCULAR; INTRAVENOUS; SUBCUTANEOUS at 09:11

## 2024-04-07 RX ADMIN — CYCLOBENZAPRINE 10 MG: 10 TABLET, FILM COATED ORAL at 12:02

## 2024-04-07 RX ADMIN — LOSARTAN POTASSIUM 25 MG: 25 TABLET, FILM COATED ORAL at 05:41

## 2024-04-07 RX ADMIN — MAGNESIUM HYDROXIDE 30 ML: 1200 LIQUID ORAL at 00:35

## 2024-04-07 RX ADMIN — OXYCODONE HYDROCHLORIDE 10 MG: 10 TABLET ORAL at 18:12

## 2024-04-07 RX ADMIN — ACETAMINOPHEN 1000 MG: 500 TABLET, FILM COATED ORAL at 00:34

## 2024-04-07 RX ADMIN — METFORMIN HYDROCHLORIDE 500 MG: 500 TABLET, EXTENDED RELEASE ORAL at 12:02

## 2024-04-07 RX ADMIN — ACETAMINOPHEN 1000 MG: 500 TABLET, FILM COATED ORAL at 12:03

## 2024-04-07 RX ADMIN — OXYCODONE HYDROCHLORIDE 20 MG: 20 TABLET, FILM COATED, EXTENDED RELEASE ORAL at 05:41

## 2024-04-07 RX ADMIN — LAMOTRIGINE 300 MG: 100 TABLET ORAL at 20:01

## 2024-04-07 RX ADMIN — FAMOTIDINE 40 MG: 20 TABLET, FILM COATED ORAL at 20:01

## 2024-04-07 RX ADMIN — LIDOCAINE 1 PATCH: 4 PATCH TOPICAL at 12:07

## 2024-04-07 RX ADMIN — GABAPENTIN 300 MG: 300 CAPSULE ORAL at 05:41

## 2024-04-07 RX ADMIN — CETIRIZINE HYDROCHLORIDE 10 MG: 10 TABLET, FILM COATED ORAL at 20:00

## 2024-04-07 RX ADMIN — LAMOTRIGINE 300 MG: 100 TABLET ORAL at 09:13

## 2024-04-07 RX ADMIN — DOCUSATE SODIUM 100 MG: 100 CAPSULE, LIQUID FILLED ORAL at 05:41

## 2024-04-07 RX ADMIN — DOCUSATE SODIUM 50 MG AND SENNOSIDES 8.6 MG 1 TABLET: 8.6; 5 TABLET, FILM COATED ORAL at 20:01

## 2024-04-07 RX ADMIN — CYCLOBENZAPRINE 10 MG: 10 TABLET, FILM COATED ORAL at 20:00

## 2024-04-07 RX ADMIN — OXYCODONE HYDROCHLORIDE 10 MG: 10 TABLET ORAL at 07:18

## 2024-04-07 RX ADMIN — OXYCODONE HYDROCHLORIDE 20 MG: 20 TABLET, FILM COATED, EXTENDED RELEASE ORAL at 16:58

## 2024-04-07 RX ADMIN — OXYCODONE HYDROCHLORIDE 10 MG: 10 TABLET ORAL at 22:29

## 2024-04-07 RX ADMIN — CYCLOBENZAPRINE 10 MG: 10 TABLET, FILM COATED ORAL at 04:10

## 2024-04-07 RX ADMIN — ANTACID TABLETS 500 MG: 500 TABLET, CHEWABLE ORAL at 16:58

## 2024-04-07 RX ADMIN — METFORMIN HYDROCHLORIDE 500 MG: 500 TABLET, EXTENDED RELEASE ORAL at 16:58

## 2024-04-07 ASSESSMENT — COGNITIVE AND FUNCTIONAL STATUS - GENERAL
STANDING UP FROM CHAIR USING ARMS: A LITTLE
CLIMB 3 TO 5 STEPS WITH RAILING: A LITTLE
TURNING FROM BACK TO SIDE WHILE IN FLAT BAD: A LITTLE
MOVING TO AND FROM BED TO CHAIR: A LITTLE
WALKING IN HOSPITAL ROOM: A LITTLE
MOVING FROM LYING ON BACK TO SITTING ON SIDE OF FLAT BED: A LITTLE
SUGGESTED CMS G CODE MODIFIER MOBILITY: CK
MOBILITY SCORE: 18

## 2024-04-07 ASSESSMENT — PAIN DESCRIPTION - PAIN TYPE
TYPE: ACUTE PAIN
TYPE: SURGICAL PAIN
TYPE: ACUTE PAIN
TYPE: SURGICAL PAIN

## 2024-04-07 ASSESSMENT — GAIT ASSESSMENTS
DISTANCE (FEET): 65
GAIT LEVEL OF ASSIST: STANDBY ASSIST
DEVIATION: BRADYKINETIC;SHUFFLED GAIT
ASSISTIVE DEVICE: FRONT WHEEL WALKER

## 2024-04-07 NOTE — PROGRESS NOTES
Assumed care of patient at bedside report from Day RN. Updated on POC. Patient currently A & O x 4; on room air, up 1 assist with a walker; with complaints of acute pain, medicated per MAR. Assessment completed, Call light within reach. Whiteboard updated. Fall precautions in place. Bed locked and in lowest position. All questions answered. No other needs indicated at this time.

## 2024-04-07 NOTE — CARE PLAN
The patient is Stable - Low risk of patient condition declining or worsening    Shift Goals  Clinical Goals: pain control  Patient Goals: pain control  Family Goals: comfort      Problem: Knowledge Deficit - Standard  Goal: Patient and family/care givers will demonstrate understanding of plan of care, disease process/condition, diagnostic tests and medications  Outcome: Progressing  Note: Patient will demonstrate understanding of medication prior to administration.     Problem: Pain - Standard  Goal: Alleviation of pain or a reduction in pain to the patient’s comfort goal  Outcome: Progressing  Flowsheets (Taken 4/7/2024 0235)  Non Verbal Scale:   Calm   Sleeping  Note: Patient will sleep comfortably throughout the shift.

## 2024-04-07 NOTE — THERAPY
Physical Therapy   Daily Treatment     Patient Name: Noy Rose  Age:  39 y.o., Sex:  female  Medical Record #: [id number]  Today's Date: 4/7/2024     Precautions  Precautions: (P) Fall Risk;Spinal / Back Precautions   Comments: (P) Pt does have soft collar BS she stated causes her more pain.    Assessment    The pt was willing to participate w/PT, she did show improvement from previous PT session. The pt conts to struggles w/CS and head control when unsupported. The pt stated the soft collar did not provide support and caused more pain. Functionally the pt was SBA w/sup<->sit transitions w/HOB flat and no railing. Once seated, no assist w/balance. SBA w/STS and transfers w/FWW and amb hallway distances w/FWW. Distance limited by difficulty holding her head /CS pain. The pt declined steps today, willing to demo tomorrow before possible d/c.   The pt's FWW has been delivered BS, she is going home w/mom who is going to be available 24/7 and better chair options. The pt educated on importance of mobilization for strength and lung integrity post sx.  PT will cont to follow.     Plan    Treatment Plan Status: (P) Continue Current Treatment Plan  Type of Treatment: Bed Mobility, Equipment, Gait Training, Neuro Re-Education / Balance, Self Care / Home Evaluation, Stair Training, Therapeutic Activities, Therapeutic Exercise  Treatment Frequency: 5 Times per Week  Treatment Duration: Until Therapy Goals Met    DC Equipment Recommendations: (P)  (FWW delivered BS)  Discharge Recommendations: (P) Recommend home health for continued physical therapy services     Objective       04/07/24 1311   Charge Group   PT Gait Training (Units) 1   PT Therapeutic Activities (Units) 2   Total Time Spent   PT Gait Training Time Spent (Mins) 8   PT Therapeutic Activities Time Spent (Mins) 30   Precautions   Precautions Fall Risk;Spinal / Back Precautions    Comments Pt does have soft collar BS she stated causes her more pain.   Pain 0 -  10 Group   Location Neck;Back   Location Orientation Right;Posterior   Pain Rating Scale (NPRS) 6   Therapist Pain Assessment During Activity;Nurse Notified   Other Treatments   Other Treatments Provided Pt instructed on laying supine w/no pillow underneath her head which she demonstrated. Pt educated on the importance of use of IS and OOB. The pt struggles w/CS pain/head control when upright, stated the soft collar causes her more discomfort. Ice packs provided following PT session.   Balance   Sitting Balance (Static) Good   Sitting Balance (Dynamic) Fair +   Standing Balance (Static) Fair   Standing Balance (Dynamic) Fair -   Weight Shift Sitting Fair   Weight Shift Standing Fair   Comments standing w/FWW   Bed Mobility    Supine to Sit Standby Assist   Sit to Supine Standby Assist   Scooting Supervised  (seated)   Rolling Supervised   Skilled Intervention Verbal Cuing   Comments HOB flat and no railing. Pt demo'd log rolling technique to maintain NSP.   Gait Analysis   Gait Level Of Assist Standby Assist   Assistive Device Front Wheel Walker   Distance (Feet) 65   # of Times Distance was Traveled 1   Deviation Bradykinetic;Shuffled Gait   Skilled Intervention Verbal Cuing   Comments Improvement w/pt's amb efforts from last PT session. Pt managed hallway amb of 65', distance limited by CS/Rt upper trap pain. Pt declined stairs, willing to try tomorrow.   Functional Mobility   Sit to Stand Standby Assist   Bed, Chair, Wheelchair Transfer Standby Assist   Toilet Transfers Standby Assist   Skilled Intervention Verbal Cuing   Comments The pt struggles w/pain seated in chairs.   6 Clicks Assessment - How much HELP from from another person do you currently need... (If the patient hasn't done an activity recently, how much help from another person do you think he/she would need if he/she tried?)   Turning from your back to your side while in a flat bed without using bedrails? 3   Moving from lying on your back to  sitting on the side of a flat bed without using bedrails? 3   Moving to and from a bed to a chair (including a wheelchair)? 3   Standing up from a chair using your arms (e.g., wheelchair, or bedside chair)? 3   Walking in hospital room? 3   Climbing 3-5 steps with a railing? 3   6 clicks Mobility Score 18   Short Term Goals    Short Term Goal # 1 Pt will perform bed mobility via log roll from a flat bed to progress to home in 6 visits.   Goal Outcome # 1 Goal met   Short Term Goal # 2 Pt will transfer with FWW and supervision to progress function for return home in 6 visits.   Goal Outcome # 2 Goal not met   Short Term Goal # 3 Pt will ambulate 100ft with FWW and supervision for short household distances in 6 visits.   Goal Outcome # 3 Goal not met   Short Term Goal # 4 Pt will negotiate 3 steps with B handrails and min A for home entry in 6 visits.   Goal Outcome # 4 Goal not met   Education Group   Cervical Precautions Patient Response Patient;Acceptance;Explanation;Reinforcement Needed   Physical Therapy Treatment Plan   Physical Therapy Treatment Plan Continue Current Treatment Plan   Anticipated Discharge Equipment and Recommendations   DC Equipment Recommendations   (FWW delivered BS)   Discharge Recommendations Recommend home health for continued physical therapy services   Interdisciplinary Plan of Care Collaboration   IDT Collaboration with  Nursing   Patient Position at End of Therapy In Bed;Call Light within Reach;Tray Table within Reach;Phone within Reach   Collaboration Comments Nrsg notified of pts tx efforts   Session Information   Date / Session Number  4/7--3 (3/5, 4/10) PTA/2  (Latanya to see @ 9:30)   Supervising Physical Therapist (PTA Treatments Only)   Supervising Physical Therapist [employer]

## 2024-04-07 NOTE — PROGRESS NOTES
Neurosurgery Progress Note    Subjective:  No events overnight  C/o post neck pain  No radiculopathy  Voiding, no bm yet     Exam:  A&O x4  PERRL  UE / LE str 5/  Inc c/d/I with dressing, hvac out       BP  Min: 111/67  Max: 132/72  Pulse  Av.8  Min: 87  Max: 113  Resp  Av.1  Min: 16  Max: 20  Temp  Av.4 °C (97.5 °F)  Min: 35.7 °C (96.3 °F)  Max: 36.7 °C (98.1 °F)  SpO2  Av.6 %  Min: 92 %  Max: 96 %    No data recorded    Recent Labs     242   HEMOGLOBIN 10.4*     Recent Labs     24   SODIUM 140   POTASSIUM 3.9   CHLORIDE 107   CO2 21   GLUCOSE 164*   BUN 5*   CREATININE 0.60   CALCIUM 8.6               Intake/Output                         24 07 - 2459 24 - 24 1534-6580 Total 9626-1150 9902-3504 Total                 Intake    P.O.  200  240 440  --  -- --    P.O. 200 240 440 -- -- --    Total Intake 200 240 440 -- -- --       Output    Urine  --  -- --  --  -- --    Number of Times Voided 1 x -- 1 x -- -- --    Drains  25  -- 25  --  -- --    Output (mL) ([REMOVED] Closed/Suction Drain 1 Posterior Neck Hemovac 24 1328) 25 -- 25 -- -- --    Total Output 25 -- 25 -- -- --       Net I/O     175 240 415 -- -- --              Intake/Output Summary (Last 24 hours) at 2024 0931  Last data filed at 2024 2100  Gross per 24 hour   Intake 440 ml   Output 25 ml   Net 415 ml             cyclobenzaprine  10 mg Q8HR    oxyCODONE CR  20 mg Q12HRS    metFORMIN ER  500 mg BID WITH MEALS    oxyCODONE immediate-release  5 mg Q4HRS PRN    Or    oxyCODONE immediate-release  10 mg Q4HRS PRN    HYDROmorphone  0.5 mg Q3HRS PRN    cetirizine  10 mg BID    famotidine  40 mg BID    gabapentin  300 mg TID    lamotrigine  300 mg BID    losartan  25 mg DAILY    omeprazole  20 mg QDAY    PARoxetine  20 mg Q EVENING    Pharmacy Consult Request  1 Each PHARMACY TO DOSE    MD ALERT...DO NOT ADMINISTER NSAIDS or ASPIRIN unless ORDERED By  Neurosurgery  1 Each PRN    docusate sodium  100 mg BID    senna-docusate  1 Tablet Nightly    senna-docusate  1 Tablet Q24HRS PRN    polyethylene glycol/lytes  1 Packet BID PRN    magnesium hydroxide  30 mL QDAY PRN    bisacodyl  10 mg Q24HRS PRN    sodium phosphate  1 Each Once PRN    dextrose 5 % and 0.9 % NaCl with KCl 20 mEq   Continuous    acetaminophen  1,000 mg Q6HRS    Followed by    [START ON 4/8/2024] acetaminophen  1,000 mg Q6HRS PRN    diphenhydrAMINE  25 mg Q6HRS PRN    Or    diphenhydrAMINE  25 mg Q6HRS PRN    ondansetron  4 mg Q4HRS PRN    ondansetron  4 mg Q4HRS PRN    promethazine  12.5-25 mg Q4HRS PRN    promethazine  12.5-25 mg Q4HRS PRN    prochlorperazine  5-10 mg Q4HRS PRN    labetalol  10 mg Q HOUR PRN    benzocaine-menthol  1 Lozenge Q2HRS PRN    calcium carbonate  500 mg BID       Assessment and Plan:  Hospital day #5  POD #4 post cervical lami fusion  Prophylactic anticoagulation: no         Start date/time: can start lovenox 24hr after drain out  Bowel protocol - give supp today   Appreciate Dr. Zuñiga's assistance with pain management   Utilize multimodals  Work with PT/OT - oob for meals, oob for voiding / bowel movement   ? Home next 1-2 days      GEORGE Tello.

## 2024-04-07 NOTE — PROGRESS NOTES
Physical Medicine and Rehabilitation         Date of initial consultation: 4/5/2024  LOS: 3 Day(s)    Chief complaint: Neck pain    HPI: The patient is a 39 y.o. right hand dominant female with a past medical history of severe progressive neck pain, numbness and tingling, lumbar stenosis, neurogenic claudication, PCOS, hypertension, anxiety, diabetes, epilepsy;  who presented on 4/3/2024  9:57 AM for planned procedure with Dr. James Fierro MD who performed C3-T4 decompression and fusion.  Current labs reflect slight anemia with hemoglobin 10.4, hyperglycemia.  Patient is on gabapentin for neuropathic pain.    The patient currently reports neck pain, back pain, incisional pain.  Patient is crippled by this pain and unable to complete mobility and ADL tasks.  She reports she has taken tramadol in the past and had a pain contract for that.  Patient took 303 morphine equivalents on 4/3, 105 on 4/4.  She is not using PCA.    4/6/2024  Patient appears more tired today.  She reports no improvement in her pain.  Patient does not appear to be in distress.  I am concerned that I am not getting accurate feedback about her pain.  Plan to hold here and monitor morphine equivalents for a day.    ROS  Pertinent positives are mentioned in the HPI, all others reviewed and are negative.    Social Hx:  1 SH  3 ROMELIA  With: Spouse, children    THERAPY:  Restrictions: Fall risk, spinal/back precautions  PT: Functional mobility   4/4: Unable to participate in gait, min assist sit to stand    OT: ADLs  4/4: Total assist lower body dressing, mod assist upper body dressing grooming    SLP:   None    IMAGING:  No advanced diagnostic imaging done on this admission    PROCEDURES:  James Fierro MD 4/4/2024  1.  Cervical 3, 4, 5, 6, 7 laminectomy foraminotomies  2.  Lateral mass screw placement bilateral cervical 3, 4, 5, 6; bilateral pedicle screw placement cervical 7, thoracic 1, 2, 3, 4 ([employer], ZIRX)  3.  Bone autograft  obtained from same incision  4.  Bone allograft placement (BMP/graft on DBM, [employer], I factor, [employer])  5.  Stealth intraoperative navigation/O-arm for lateral mass, pedicle screw placement  6.  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       PSH:  Past Surgical History:   Procedure Laterality Date    POSTERIOR CERVICAL FUSION O-ARM  4/3/2024    Procedure: POSTERIOR C3-7 LAMINECTOMY, WITH C3-T4 LATERAL MASS PEDICLE SCREW FIXATION FUSION, CADAVERIC BONE GRAFT;  Surgeon: James Fierro M.D.;  Location: SURGERY [facility];  Service: Neurosurgery    CERVICAL LAMINECTOMY POSTERIOR  4/3/2024    Procedure: LAMINECTOMY, SPINE, CERVICAL, POSTERIOR APPROACH;  Surgeon: James Fierro M.D.;  Location: SURGERY [facility];  Service: Neurosurgery    BONE GRAFT  4/3/2024    Procedure: BONE GRAFT;  Surgeon: James Fierro M.D.;  Location: SURGERY [facility];  Service: Neurosurgery    HYSTERECTOMY LAPAROSCOPY N/A 07/25/2023    PRIMARY C SECTION  2006, 2011    TUBAL COAGULATION LAPAROSCOPIC BILATERAL         FHX:  Family History   Problem Relation Age of Onset    Diabetes Mother     Hyperlipidemia Maternal Uncle     Hypertension Maternal Uncle     Diabetes Maternal Uncle     Cancer Maternal Grandmother         breast    Cancer Paternal Grandfather        Medications:  Current Facility-Administered Medications   Medication Dose    Nozin nasal  swab  1 Applicator    cyclobenzaprine (Flexeril) tablet 10 mg  10 mg    oxyCODONE CR (OxyCONTIN) tablet 20 mg  20 mg    metFORMIN ER (Glucophage XR) tablet 500 mg  500 mg    oxyCODONE immediate-release (Roxicodone) tablet 5 mg  5 mg    Or    oxyCODONE immediate  release (Roxicodone) tablet 10 mg  10 mg    HYDROmorphone (Dilaudid) injection 0.5 mg  0.5 mg    cetirizine (ZyrTEC) tablet 10 mg  10 mg    famotidine (Pepcid) tablet 40 mg  40 mg    gabapentin (Neurontin) capsule 300 mg  300 mg    lamoTRIgine (LaMICtal) tablet 300 mg  300 mg    losartan (Cozaar) tablet 25 mg  25 mg    omeprazole (PriLOSEC) capsule 20 mg  20 mg    PARoxetine (Paxil) tablet 20 mg  20 mg    Pharmacy Consult Request ...Pain Management Review 1 Each  1 Each    MD ALERT...DO NOT ADMINISTER NSAIDS or ASPIRIN unless ORDERED By Neurosurgery 1 Each  1 Each    docusate sodium (Colace) capsule 100 mg  100 mg    senna-docusate (Pericolace Or Senokot S) 8.6-50 MG per tablet 1 Tablet  1 Tablet    senna-docusate (Pericolace Or Senokot S) 8.6-50 MG per tablet 1 Tablet  1 Tablet    polyethylene glycol/lytes (Miralax) Packet 1 Packet  1 Packet    magnesium hydroxide (Milk Of Magnesia) suspension 30 mL  30 mL    bisacodyl (Dulcolax) suppository 10 mg  10 mg    sodium phosphate (Fleet) enema 133 mL  1 Each    dextrose 5 % and 0.9 % NaCl with KCl 20 mEq infusion      acetaminophen (Tylenol) tablet 1,000 mg  1,000 mg    Followed by    [START ON 4/8/2024] acetaminophen (Tylenol) tablet 1,000 mg  1,000 mg    diphenhydrAMINE (Benadryl) tablet/capsule 25 mg  25 mg    Or    diphenhydrAMINE (Benadryl) injection 25 mg  25 mg    ondansetron (Zofran) syringe/vial injection 4 mg  4 mg    ondansetron (Zofran ODT) dispertab 4 mg  4 mg    promethazine (Phenergan) tablet 12.5-25 mg  12.5-25 mg    promethazine (Phenergan) suppository 12.5-25 mg  12.5-25 mg    prochlorperazine (Compazine) injection 5-10 mg  5-10 mg    labetalol (Normodyne/Trandate) injection 10 mg  10 mg    benzocaine-menthol (Cepacol) lozenge 1 Lozenge  1 Lozenge    calcium carbonate (Tums) chewable tab 500 mg  500 mg       Allergies:  Allergies   Allergen Reactions    Codeine Shortness of Breath, Itching, Nausea and Unspecified     Rash, itching    Hydrocodone Itching  "   Nsaids Hives     Physical Exam:  Vitals: /66   Pulse (!) 105   Temp 36.6 °C (97.9 °F) (Temporal)   Resp 18   Ht 1.626 m (5' 4\")   Wt 88.5 kg (195 lb 1.7 oz)   SpO2 92%   Gen: NAD  Head: NC/AT  Eyes/ Nose/ Mouth: PERRLA, moist mucous membranes  Cardio: RRR, good distal perfusion, warm extremities  Pulm: normal respiratory effort, no cyanosis   Abd: Soft NTND, negative borborygmi   Ext: No peripheral edema. No calf tenderness. No clubbing.    Mental status:  A&Ox4 (person, place, date, situation) answers questions appropriately follows commands  Speech: fluent, no aphasia or dysarthria    Motor:      Upper Extremity  Myotome R L   Shoulder flexion C5 4/5 4/5   Elbow flexion C5 4/5 4/5   Wrist extension C6 4/5 4/5   Elbow extension C7 4/5 4/5   Finger flexion C8 4/5 4/5   Finger abduction T1 4/5 4/5     Lower Extremity Myotome R L   Hip flexion L2 5 5   Knee extension L3 5 5   Ankle dorsiflexion L4 5 5   Toe extension L5 5 5   Ankle plantarflexion S1 5 5     Sensory:   intact to light touch through out    Labs: Reviewed and significant for   Recent Labs     04/04/24  0421 04/05/24  0412   HEMOGLOBIN 10.8* 10.4*     Recent Labs     04/04/24 2010   SODIUM 140   POTASSIUM 3.9   CHLORIDE 107   CO2 21   GLUCOSE 164*   BUN 5*   CREATININE 0.60   CALCIUM 8.6     No results found for this or any previous visit (from the past 24 hour(s)).        ASSESSMENT:  Patient is a 39 y.o. female admitted with neurogenic claudication now s/p C3-T4 decompression and fusion    Rehabilitation: Impaired ADLs and mobility  Barriers to transfer include: Insurance authorization, TCCs to verify disposition, medical clearance and bed availability. All cases are subject to administrative review.     [facility] Code / Diagnosis to Support: 0008.9 - Orthopaedic Disorders: Other Orthopaedic    Disposition recommendations:  -PMR consult for medical management, pain control.  Patient is anticipated to be able to discharge directly home when " pain is controlled.    Medical Complexity:    Neurogenic claudication  -Status post C3-T4 decompression and fusion by [employer] on 4/3/2024  -Neurologically intact  -Continue PT OT while in-house    Pain control  -Continue: Tylenol 1000 mg every 6 hours  -Continue:  Methocarbamol 750 mg 3 times daily as needed   - Cyclobenzaprine changed to 10 mg 3 times scheduled  -Continue: Gabapentin 300 mg 3 times daily  -Continue: Dilaudid injection 0.5 mg every 3 hours as needed  -Continue: Roxicodone 5-10 mg every 4 hours as needed  - Continue Oxycontin 20 mg BID    Morphine Eq  4/3: 305 (not functional)   4/4: 105 (not functional)  4/5: 150 (reports no improvement     Case discussed with neurosurgery     - Patient will need to follow up with her PCP to continue pain medication as an outpatient with a pain contract which she had in place as of last month, per patient.   - High risk medications in use with gabapentin and opioids. Monitoring labs closely.   - PMR will continue to follow for pain management        DVT PPX: SCDs      Thank you for allowing us to participate in the care of this patient.     Patient was seen for >35 minutes on unit/floor of which > 50% of time was spent on counseling and coordination of care regarding the above, including prognosis, risk reduction, benefits of treatment, and options for next stage of care.    Arnold Zuñiga, DO   Physical Medicine and Rehabilitation     Please note that this dictation was created using voice recognition software. I have made every reasonable attempt to correct obvious errors, but there may be errors of grammar and possibly content that I did not discover before finalizing the note.

## 2024-04-08 LAB
ANION GAP SERPL CALC-SCNC: 9 MMOL/L (ref 7–16)
BUN SERPL-MCNC: 7 MG/DL (ref 8–22)
CALCIUM SERPL-MCNC: 8.6 MG/DL (ref 8.5–10.5)
CHLORIDE SERPL-SCNC: 102 MMOL/L (ref 96–112)
CO2 SERPL-SCNC: 28 MMOL/L (ref 20–33)
CREAT SERPL-MCNC: 0.58 MG/DL (ref 0.5–1.4)
ERYTHROCYTE [DISTWIDTH] IN BLOOD BY AUTOMATED COUNT: 54.2 FL (ref 35.9–50)
GFR SERPLBLD CREATININE-BSD FMLA CKD-EPI: 117 ML/MIN/1.73 M 2
GLUCOSE SERPL-MCNC: 117 MG/DL (ref 65–99)
HCT VFR BLD AUTO: 30.7 % (ref 37–47)
HGB BLD-MCNC: 9.9 G/DL (ref 12–16)
MCH RBC QN AUTO: 30.3 PG (ref 27–33)
MCHC RBC AUTO-ENTMCNC: 32.2 G/DL (ref 32.2–35.5)
MCV RBC AUTO: 93.9 FL (ref 81.4–97.8)
PLATELET # BLD AUTO: 256 K/UL (ref 164–446)
PMV BLD AUTO: 9.2 FL (ref 9–12.9)
POTASSIUM SERPL-SCNC: 3.8 MMOL/L (ref 3.6–5.5)
RBC # BLD AUTO: 3.27 M/UL (ref 4.2–5.4)
SODIUM SERPL-SCNC: 139 MMOL/L (ref 135–145)
WBC # BLD AUTO: 7.9 K/UL (ref 4.8–10.8)

## 2024-04-08 PROCEDURE — 770001 HCHG ROOM/CARE - MED/SURG/GYN PRIV*

## 2024-04-08 PROCEDURE — 80048 BASIC METABOLIC PNL TOTAL CA: CPT

## 2024-04-08 PROCEDURE — 700102 HCHG RX REV CODE 250 W/ 637 OVERRIDE(OP): Performed by: NEUROLOGICAL SURGERY

## 2024-04-08 PROCEDURE — A9270 NON-COVERED ITEM OR SERVICE: HCPCS | Performed by: PHYSICAL MEDICINE & REHABILITATION

## 2024-04-08 PROCEDURE — 97530 THERAPEUTIC ACTIVITIES: CPT | Mod: CQ

## 2024-04-08 PROCEDURE — A9270 NON-COVERED ITEM OR SERVICE: HCPCS

## 2024-04-08 PROCEDURE — 700102 HCHG RX REV CODE 250 W/ 637 OVERRIDE(OP): Performed by: PHYSICAL MEDICINE & REHABILITATION

## 2024-04-08 PROCEDURE — 700101 HCHG RX REV CODE 250: Performed by: PHYSICAL MEDICINE & REHABILITATION

## 2024-04-08 PROCEDURE — 36415 COLL VENOUS BLD VENIPUNCTURE: CPT

## 2024-04-08 PROCEDURE — 700111 HCHG RX REV CODE 636 W/ 250 OVERRIDE (IP): Mod: JZ | Performed by: PHYSICIAN ASSISTANT

## 2024-04-08 PROCEDURE — A9270 NON-COVERED ITEM OR SERVICE: HCPCS | Performed by: NEUROLOGICAL SURGERY

## 2024-04-08 PROCEDURE — 700102 HCHG RX REV CODE 250 W/ 637 OVERRIDE(OP)

## 2024-04-08 PROCEDURE — A9270 NON-COVERED ITEM OR SERVICE: HCPCS | Performed by: PHYSICIAN ASSISTANT

## 2024-04-08 PROCEDURE — 97116 GAIT TRAINING THERAPY: CPT | Mod: CQ

## 2024-04-08 PROCEDURE — 85027 COMPLETE CBC AUTOMATED: CPT

## 2024-04-08 PROCEDURE — 700102 HCHG RX REV CODE 250 W/ 637 OVERRIDE(OP): Performed by: PHYSICIAN ASSISTANT

## 2024-04-08 RX ADMIN — OXYCODONE HYDROCHLORIDE 20 MG: 20 TABLET, FILM COATED, EXTENDED RELEASE ORAL at 18:02

## 2024-04-08 RX ADMIN — ACETAMINOPHEN 1000 MG: 500 TABLET, FILM COATED ORAL at 11:38

## 2024-04-08 RX ADMIN — LAMOTRIGINE 300 MG: 100 TABLET ORAL at 09:28

## 2024-04-08 RX ADMIN — OXYCODONE HYDROCHLORIDE 10 MG: 10 TABLET ORAL at 09:27

## 2024-04-08 RX ADMIN — GABAPENTIN 300 MG: 300 CAPSULE ORAL at 04:21

## 2024-04-08 RX ADMIN — METFORMIN HYDROCHLORIDE 500 MG: 500 TABLET, EXTENDED RELEASE ORAL at 18:01

## 2024-04-08 RX ADMIN — DOCUSATE SODIUM 50 MG AND SENNOSIDES 8.6 MG 1 TABLET: 8.6; 5 TABLET, FILM COATED ORAL at 20:53

## 2024-04-08 RX ADMIN — HYDROMORPHONE HYDROCHLORIDE 0.5 MG: 1 INJECTION, SOLUTION INTRAMUSCULAR; INTRAVENOUS; SUBCUTANEOUS at 22:02

## 2024-04-08 RX ADMIN — ACETAMINOPHEN 1000 MG: 500 TABLET, FILM COATED ORAL at 05:27

## 2024-04-08 RX ADMIN — MAGNESIUM HYDROXIDE 30 ML: 1200 LIQUID ORAL at 02:32

## 2024-04-08 RX ADMIN — ACETAMINOPHEN 1000 MG: 500 TABLET, FILM COATED ORAL at 00:27

## 2024-04-08 RX ADMIN — CYCLOBENZAPRINE 10 MG: 10 TABLET, FILM COATED ORAL at 02:33

## 2024-04-08 RX ADMIN — ANTACID TABLETS 500 MG: 500 TABLET, CHEWABLE ORAL at 04:23

## 2024-04-08 RX ADMIN — LAMOTRIGINE 300 MG: 100 TABLET ORAL at 20:53

## 2024-04-08 RX ADMIN — PAROXETINE HYDROCHLORIDE 20 MG: 20 TABLET, FILM COATED ORAL at 18:01

## 2024-04-08 RX ADMIN — DOCUSATE SODIUM 100 MG: 100 CAPSULE, LIQUID FILLED ORAL at 04:22

## 2024-04-08 RX ADMIN — OXYCODONE HYDROCHLORIDE 10 MG: 10 TABLET ORAL at 20:27

## 2024-04-08 RX ADMIN — CETIRIZINE HYDROCHLORIDE 10 MG: 10 TABLET, FILM COATED ORAL at 20:53

## 2024-04-08 RX ADMIN — OXYCODONE HYDROCHLORIDE 20 MG: 20 TABLET, FILM COATED, EXTENDED RELEASE ORAL at 04:22

## 2024-04-08 RX ADMIN — LOSARTAN POTASSIUM 25 MG: 25 TABLET, FILM COATED ORAL at 04:22

## 2024-04-08 RX ADMIN — FAMOTIDINE 40 MG: 20 TABLET, FILM COATED ORAL at 09:28

## 2024-04-08 RX ADMIN — METFORMIN HYDROCHLORIDE 500 MG: 500 TABLET, EXTENDED RELEASE ORAL at 09:28

## 2024-04-08 RX ADMIN — OMEPRAZOLE 20 MG: 20 CAPSULE, DELAYED RELEASE ORAL at 04:22

## 2024-04-08 RX ADMIN — FAMOTIDINE 40 MG: 20 TABLET, FILM COATED ORAL at 20:53

## 2024-04-08 RX ADMIN — CYCLOBENZAPRINE 10 MG: 10 TABLET, FILM COATED ORAL at 11:39

## 2024-04-08 RX ADMIN — LIDOCAINE 1 PATCH: 4 PATCH TOPICAL at 11:42

## 2024-04-08 RX ADMIN — ONDANSETRON 4 MG: 2 INJECTION INTRAMUSCULAR; INTRAVENOUS at 20:50

## 2024-04-08 RX ADMIN — DOCUSATE SODIUM 100 MG: 100 CAPSULE, LIQUID FILLED ORAL at 18:01

## 2024-04-08 RX ADMIN — GABAPENTIN 300 MG: 300 CAPSULE ORAL at 11:39

## 2024-04-08 RX ADMIN — CYCLOBENZAPRINE 10 MG: 10 TABLET, FILM COATED ORAL at 18:44

## 2024-04-08 RX ADMIN — ACETAMINOPHEN 1000 MG: 500 TABLET, FILM COATED ORAL at 18:02

## 2024-04-08 RX ADMIN — ANTACID TABLETS 500 MG: 500 TABLET, CHEWABLE ORAL at 18:01

## 2024-04-08 RX ADMIN — OXYCODONE 5 MG: 5 TABLET ORAL at 02:32

## 2024-04-08 RX ADMIN — BISACODYL 10 MG: 10 SUPPOSITORY RECTAL at 13:26

## 2024-04-08 RX ADMIN — OXYCODONE HYDROCHLORIDE 10 MG: 10 TABLET ORAL at 14:23

## 2024-04-08 RX ADMIN — DIPHENHYDRAMINE HYDROCHLORIDE 25 MG: 25 TABLET ORAL at 13:26

## 2024-04-08 RX ADMIN — CETIRIZINE HYDROCHLORIDE 10 MG: 10 TABLET, FILM COATED ORAL at 09:28

## 2024-04-08 RX ADMIN — GABAPENTIN 300 MG: 300 CAPSULE ORAL at 18:01

## 2024-04-08 ASSESSMENT — COGNITIVE AND FUNCTIONAL STATUS - GENERAL
WALKING IN HOSPITAL ROOM: A LITTLE
CLIMB 3 TO 5 STEPS WITH RAILING: A LITTLE
MOVING FROM LYING ON BACK TO SITTING ON SIDE OF FLAT BED: A LITTLE
SUGGESTED CMS G CODE MODIFIER MOBILITY: CK
MOVING TO AND FROM BED TO CHAIR: A LITTLE
STANDING UP FROM CHAIR USING ARMS: A LITTLE
MOBILITY SCORE: 18
TURNING FROM BACK TO SIDE WHILE IN FLAT BAD: A LITTLE

## 2024-04-08 ASSESSMENT — GAIT ASSESSMENTS
GAIT LEVEL OF ASSIST: SUPERVISED
DEVIATION: BRADYKINETIC
DISTANCE (FEET): 150
ASSISTIVE DEVICE: FRONT WHEEL WALKER

## 2024-04-08 ASSESSMENT — PAIN DESCRIPTION - PAIN TYPE
TYPE: SURGICAL PAIN

## 2024-04-08 NOTE — CARE PLAN
The patient is Stable - Low risk of patient condition declining or worsening    Shift Goals  Clinical Goals: pain control  Patient Goals: pain control  Family Goals: comfort    Progress made toward(s) clinical / shift goals:    Problem: Pain - Standard  Goal: Alleviation of pain or a reduction in pain to the patient’s comfort goal  Outcome: Progressing     Problem: Knowledge Deficit - Standard  Goal: Patient and family/care givers will demonstrate understanding of plan of care, disease process/condition, diagnostic tests and medications  Outcome: Progressing     Problem: Skin Integrity  Goal: Skin integrity is maintained or improved  Outcome: Progressing     Problem: Fall Risk  Goal: Patient will remain free from falls  Outcome: Progressing       Patient is not progressing towards the following goals:

## 2024-04-08 NOTE — CARE PLAN
The patient is Stable - Low risk of patient condition declining or worsening    Shift Goals  Clinical Goals: safety; mobillity  Patient Goals: mobility; comfort; dc plan  Family Goals: safety; comfort    Progress made toward(s) clinical / shift goals:    Problem: Pain - Standard  Goal: Alleviation of pain or a reduction in pain to the patient’s comfort goal  Outcome: Progressing     Problem: Knowledge Deficit - Standard  Goal: Patient and family/care givers will demonstrate understanding of plan of care, disease process/condition, diagnostic tests and medications  Outcome: Progressing     Problem: Skin Integrity  Goal: Skin integrity is maintained or improved  Outcome: Progressing     Problem: Fall Risk  Goal: Patient will remain free from falls  Outcome: Progressing       Patient is not progressing towards the following goals:

## 2024-04-08 NOTE — THERAPY
Physical Therapy   Discharge     Patient Name: Noy Rose  Age:  39 y.o., Sex:  female  Medical Record #: [id number]  Today's Date: 4/8/2024     Precautions  Precautions: Fall Risk;Spinal / Back Precautions   Comments: soft collar PRN    Assessment    The pt is doing better today from yesterdays tx session, mom BS helping as needed. Functionally the pt is now @ supervision level w/bed mobility from flat surface, STS/transfers w/FWW, and amb w/FWW. Today the pt amb 150' w/FWW and demonstrated 5 steps w/single rail. The pt conts to struggle w/holding head in upright position once in a unsupported position.   The pt is dc'd from PT services @ this time, has met all PT goals.  Recommend home w/outpt follow up once cleared by neurosx, PT will no longer be following.     Plan    Reason for Discharge From Therapy: Discharge Secondary to Goals Met    DC Equipment Recommendations: None  Discharge Recommendations: Recommend outpatient physical therapy services to address higher level deficits (once cleared by neurosx)    Objective       04/08/24 1412   Charge Group   PT Gait Training (Units) 1   PT Therapeutic Activities (Units) 1   Total Time Spent   PT Gait Training Time Spent (Mins) 15   PT Therapeutic Activities Time Spent (Mins) 10   PT Total Time Spent (Calculated) 25   Precautions   Precautions Fall Risk;Spinal / Back Precautions    Comments soft collar PRN   Pain 0 - 10 Group   Location Neck   Location Orientation Posterior   Pain Rating Scale (NPRS) 5   Therapist Pain Assessment During Activity;Nurse Notified   Other Treatments   Other Treatments Provided Educated mom and pt on mobilizing when home, to be OOB more than in bed, and cont to use her IS.   Balance   Sitting Balance (Static) Good   Sitting Balance (Dynamic) Good   Standing Balance (Static) Fair +   Standing Balance (Dynamic) Fair   Weight Shift Sitting Good   Weight Shift Standing Fair   Comments standing w/FWW   Bed Mobility    Supine to Sit  Supervised   Sit to Supine Supervised   Scooting Supervised   Rolling Supervised   Skilled Intervention Verbal Cuing   Comments ongoing cueing for log rolling technique to maintain her NSP   Gait Analysis   Gait Level Of Assist Supervised   Assistive Device Front Wheel Walker   Distance (Feet) 150   # of Times Distance was Traveled 1   Deviation Bradykinetic   # of Stairs Climbed 5  (w/single rail)   Level of Assist with Stairs Supervised   Skilled Intervention Verbal Cuing;Postural Facilitation;Compensatory Strategies   Comments The pt amb 150' w/FWW and demonstrated 5 steps w/single rail @ supervision level.   Functional Mobility   Sit to Stand Supervised   Bed, Chair, Wheelchair Transfer Supervised   Toilet Transfers Supervised   6 Clicks Assessment - How much HELP from from another person do you currently need... (If the patient hasn't done an activity recently, how much help from another person do you think he/she would need if he/she tried?)   Turning from your back to your side while in a flat bed without using bedrails? 3   Moving from lying on your back to sitting on the side of a flat bed without using bedrails? 3   Moving to and from a bed to a chair (including a wheelchair)? 3   Standing up from a chair using your arms (e.g., wheelchair, or bedside chair)? 3   Walking in hospital room? 3   Climbing 3-5 steps with a railing? 3   6 clicks Mobility Score 18   Short Term Goals    Short Term Goal # 2 Pt will transfer with FWW and supervision to progress function for return home in 6 visits.   Goal Outcome # 2 Goal met   Short Term Goal # 3 Pt will ambulate 100ft with FWW and supervision for short household distances in 6 visits.   Goal Outcome # 3 Goal met   Short Term Goal # 4 Pt will negotiate 3 steps with B handrails and min A for home entry in 6 visits.   Goal Outcome # 4 Goal met   Education Group   Role of Physical Therapist Patient Response Patient;Acceptance;Explanation;Reinforcement Needed   Physical  Therapy Treatment Plan   Reason For Discharge Discharge Secondary to Goals Met   Anticipated Discharge Equipment and Recommendations   DC Equipment Recommendations None   Discharge Recommendations Recommend outpatient physical therapy services to address higher level deficits  (once cleared by neurosx)   Interdisciplinary Plan of Care Collaboration   IDT Collaboration with  Nursing;Family / Caregiver   Patient Position at End of Therapy In Bed;Call Light within Reach;Tray Table within Reach;Phone within Reach;Family / Friend in Room   Collaboration Comments Nrsg notified of pts tx efforts   Session Information   Date / Session Number  Dc;d--4/8   Supervising Physical Therapist (PTA Treatments Only)   Supervising Physical Therapist Lois Lim

## 2024-04-08 NOTE — CARE PLAN
The patient is Stable - Low risk of patient condition declining or worsening    Shift Goals  Clinical Goals: pain control and mobility  Patient Goals: pain control  Family Goals: comfort      Problem: Knowledge Deficit - Standard  Goal: Patient and family/care givers will demonstrate understanding of plan of care, disease process/condition, diagnostic tests and medications  Outcome: Progressing  Note: Patient and RN will discuss POC and pain management options during the shift.         Problem: Pain - Standard  Goal: Alleviation of pain or a reduction in pain to the patient’s comfort goal  Outcome: Not Progressing  Flowsheets (Taken 4/8/2024 0129)  Pain Rating Scale (NPRS): 10  Note: Patient states that pain is not being managed. Goal is for patient to be able to rest comfortably throughout the night.

## 2024-04-08 NOTE — PROGRESS NOTES
Aspen universal size fit cervical collar has been applied and fitted to patient. Patient is tolerating fitting of cervical collar well at this time.

## 2024-04-08 NOTE — PROGRESS NOTES
Neurosurgery Progress Note    Subjective:  States neck and shoulder pain.  Eating, min ambulation, voiding, no BM  Some pain control issues    Exam:  A&O x4  PERRL  UE 5/5 DF/PF intact   Inc c/d/I with dressing, hvac out       BP  Min: 109/64  Max: 129/57  Pulse  Av.2  Min: 83  Max: 101  Resp  Av.5  Min: 16  Max: 18  Temp  Av.3 °C (97.4 °F)  Min: 36.3 °C (97.3 °F)  Max: 36.4 °C (97.5 °F)  Monitored Temp 2  Av.3 °C (97.4 °F)  Min: 36.3 °C (97.3 °F)  Max: 36.4 °C (97.5 °F)  SpO2  Av.5 %  Min: 95 %  Max: 100 %    No data recorded    Recent Labs     24  0724   WBC 7.9   RBC 3.27*   HEMOGLOBIN 9.9*   HEMATOCRIT 30.7*   MCV 93.9   MCH 30.3   MCHC 32.2   RDW 54.2*   PLATELETCT 256   MPV 9.2     Recent Labs     24  0724   SODIUM 139   POTASSIUM 3.8   CHLORIDE 102   CO2 28   GLUCOSE 117*   BUN 7*   CREATININE 0.58   CALCIUM 8.6               Intake/Output       None            No intake or output data in the 24 hours ending 24 1427            lidocaine  1 Patch Q24HR    cyclobenzaprine  10 mg Q8HR    oxyCODONE CR  20 mg Q12HRS    metFORMIN ER  500 mg BID WITH MEALS    oxyCODONE immediate-release  5 mg Q4HRS PRN    Or    oxyCODONE immediate-release  10 mg Q4HRS PRN    HYDROmorphone  0.5 mg Q3HRS PRN    cetirizine  10 mg BID    famotidine  40 mg BID    gabapentin  300 mg TID    lamotrigine  300 mg BID    losartan  25 mg DAILY    omeprazole  20 mg QDAY    PARoxetine  20 mg Q EVENING    Pharmacy Consult Request  1 Each PHARMACY TO DOSE    MD ALERT...DO NOT ADMINISTER NSAIDS or ASPIRIN unless ORDERED By Neurosurgery  1 Each PRN    docusate sodium  100 mg BID    senna-docusate  1 Tablet Nightly    senna-docusate  1 Tablet Q24HRS PRN    polyethylene glycol/lytes  1 Packet BID PRN    magnesium hydroxide  30 mL QDAY PRN    bisacodyl  10 mg Q24HRS PRN    sodium phosphate  1 Each Once PRN    dextrose 5 % and 0.9 % NaCl with KCl 20 mEq   Continuous    acetaminophen  1,000 mg Q6HRS    Followed  by    acetaminophen  1,000 mg Q6HRS PRN    diphenhydrAMINE  25 mg Q6HRS PRN    Or    diphenhydrAMINE  25 mg Q6HRS PRN    ondansetron  4 mg Q4HRS PRN    ondansetron  4 mg Q4HRS PRN    promethazine  12.5-25 mg Q4HRS PRN    promethazine  12.5-25 mg Q4HRS PRN    prochlorperazine  5-10 mg Q4HRS PRN    labetalol  10 mg Q HOUR PRN    benzocaine-menthol  1 Lozenge Q2HRS PRN    calcium carbonate  500 mg BID       Assessment and Plan:    POD #5 post cervical lami fusion  Prophylactic anticoagulation: no         Start date/time: can start lovenox 24hr after drain     Bowel protocol   Utilize multimodals  Continue pain control  Work with PT/OT - oob for meals, oob for voiding / bowel movement   ? Home next 1-2 days- with GEORGE Lam.

## 2024-04-08 NOTE — PROGRESS NOTES
Assumed care of patient at bedside report from Day RN. Updated on POC. Patient currently A & O x 4; on 2L nasal cannula; up standby with a front wheel walker; with complaints of acute pain, mediated per orders.Assessment completed, Call light within reach. Whiteboard updated. Fall precautions in place. Bed locked and in lowest position. All questions answered. No other needs indicated at this time.

## 2024-04-08 NOTE — PROGRESS NOTES
Patient requesting additional pain medication to be ordered from the doctor. Patient communicated she feels that her pain is not being well managed with the current medication she is receiving, and would like more frequent doses of medication than what is ordered. RN educated patient about medications currently available to manage pain and provided therapeutic communication as well as non pharmacological comfort measures.

## 2024-04-08 NOTE — THERAPY
Occupational Therapy Contact Note    Patient Name: Noy Rose  Age:  39 y.o., Sex:  female  Medical Record #: [id number]  Today's Date: 4/8/2024    OT tx attempted. Pt declined due to fatigue from poor nights sleep and recent activity with staff. Will re-attempt OT tx when appropriate/able.

## 2024-04-09 ENCOUNTER — PHARMACY VISIT (OUTPATIENT)
Dept: PHARMACY | Facility: MEDICAL CENTER | Age: 39
End: 2024-04-09
Payer: COMMERCIAL

## 2024-04-09 VITALS
TEMPERATURE: 97.2 F | SYSTOLIC BLOOD PRESSURE: 137 MMHG | HEART RATE: 80 BPM | HEIGHT: 64 IN | RESPIRATION RATE: 17 BRPM | BODY MASS INDEX: 33.31 KG/M2 | DIASTOLIC BLOOD PRESSURE: 71 MMHG | WEIGHT: 195.11 LBS | OXYGEN SATURATION: 95 %

## 2024-04-09 PROCEDURE — A9270 NON-COVERED ITEM OR SERVICE: HCPCS | Performed by: NEUROLOGICAL SURGERY

## 2024-04-09 PROCEDURE — A9270 NON-COVERED ITEM OR SERVICE: HCPCS

## 2024-04-09 PROCEDURE — 700102 HCHG RX REV CODE 250 W/ 637 OVERRIDE(OP): Performed by: PHYSICAL MEDICINE & REHABILITATION

## 2024-04-09 PROCEDURE — A9270 NON-COVERED ITEM OR SERVICE: HCPCS | Performed by: PHYSICAL MEDICINE & REHABILITATION

## 2024-04-09 PROCEDURE — 97535 SELF CARE MNGMENT TRAINING: CPT

## 2024-04-09 PROCEDURE — 700101 HCHG RX REV CODE 250: Performed by: PHYSICAL MEDICINE & REHABILITATION

## 2024-04-09 PROCEDURE — A9270 NON-COVERED ITEM OR SERVICE: HCPCS | Performed by: NURSE PRACTITIONER

## 2024-04-09 PROCEDURE — 700111 HCHG RX REV CODE 636 W/ 250 OVERRIDE (IP): Performed by: PHYSICIAN ASSISTANT

## 2024-04-09 PROCEDURE — 700102 HCHG RX REV CODE 250 W/ 637 OVERRIDE(OP): Performed by: NURSE PRACTITIONER

## 2024-04-09 PROCEDURE — RXMED WILLOW AMBULATORY MEDICATION CHARGE: Performed by: NURSE PRACTITIONER

## 2024-04-09 PROCEDURE — A9270 NON-COVERED ITEM OR SERVICE: HCPCS | Performed by: PHYSICIAN ASSISTANT

## 2024-04-09 PROCEDURE — 700102 HCHG RX REV CODE 250 W/ 637 OVERRIDE(OP): Performed by: NEUROLOGICAL SURGERY

## 2024-04-09 PROCEDURE — 700102 HCHG RX REV CODE 250 W/ 637 OVERRIDE(OP)

## 2024-04-09 PROCEDURE — 700102 HCHG RX REV CODE 250 W/ 637 OVERRIDE(OP): Performed by: PHYSICIAN ASSISTANT

## 2024-04-09 PROCEDURE — 97530 THERAPEUTIC ACTIVITIES: CPT

## 2024-04-09 RX ORDER — OXYCODONE AND ACETAMINOPHEN 10; 325 MG/1; MG/1
1 TABLET ORAL
Status: DISCONTINUED | OUTPATIENT
Start: 2024-04-09 | End: 2024-04-09 | Stop reason: HOSPADM

## 2024-04-09 RX ORDER — OXYCODONE AND ACETAMINOPHEN 10; 325 MG/1; MG/1
TABLET ORAL
Qty: 42 TABLET | Refills: 0 | OUTPATIENT
Start: 2024-04-09

## 2024-04-09 RX ORDER — MORPHINE SULFATE 15 MG/1
15 TABLET, FILM COATED, EXTENDED RELEASE ORAL EVERY 8 HOURS
Status: DISCONTINUED | OUTPATIENT
Start: 2024-04-09 | End: 2024-04-09 | Stop reason: HOSPADM

## 2024-04-09 RX ORDER — MORPHINE SULFATE 15 MG/1
TABLET, FILM COATED, EXTENDED RELEASE ORAL
Qty: 21 TABLET | Refills: 0 | OUTPATIENT
Start: 2024-04-09 | End: 2024-04-22

## 2024-04-09 RX ADMIN — ANTACID TABLETS 500 MG: 500 TABLET, CHEWABLE ORAL at 16:04

## 2024-04-09 RX ADMIN — LOSARTAN POTASSIUM 25 MG: 25 TABLET, FILM COATED ORAL at 05:51

## 2024-04-09 RX ADMIN — GABAPENTIN 300 MG: 300 CAPSULE ORAL at 05:51

## 2024-04-09 RX ADMIN — GABAPENTIN 300 MG: 300 CAPSULE ORAL at 16:08

## 2024-04-09 RX ADMIN — METFORMIN HYDROCHLORIDE 500 MG: 500 TABLET, EXTENDED RELEASE ORAL at 09:23

## 2024-04-09 RX ADMIN — FAMOTIDINE 40 MG: 20 TABLET, FILM COATED ORAL at 09:23

## 2024-04-09 RX ADMIN — METFORMIN HYDROCHLORIDE 500 MG: 500 TABLET, EXTENDED RELEASE ORAL at 16:04

## 2024-04-09 RX ADMIN — CYCLOBENZAPRINE 10 MG: 10 TABLET, FILM COATED ORAL at 03:38

## 2024-04-09 RX ADMIN — OXYCODONE AND ACETAMINOPHEN 1 TABLET: 10; 325 TABLET ORAL at 10:50

## 2024-04-09 RX ADMIN — MORPHINE SULFATE 15 MG: 15 TABLET, FILM COATED, EXTENDED RELEASE ORAL at 10:32

## 2024-04-09 RX ADMIN — LAMOTRIGINE 300 MG: 100 TABLET ORAL at 09:23

## 2024-04-09 RX ADMIN — CETIRIZINE HYDROCHLORIDE 10 MG: 10 TABLET, FILM COATED ORAL at 09:23

## 2024-04-09 RX ADMIN — OXYCODONE AND ACETAMINOPHEN 1 TABLET: 10; 325 TABLET ORAL at 13:38

## 2024-04-09 RX ADMIN — DOCUSATE SODIUM 100 MG: 100 CAPSULE, LIQUID FILLED ORAL at 16:04

## 2024-04-09 RX ADMIN — ONDANSETRON 4 MG: 2 INJECTION INTRAMUSCULAR; INTRAVENOUS at 12:27

## 2024-04-09 RX ADMIN — OMEPRAZOLE 20 MG: 20 CAPSULE, DELAYED RELEASE ORAL at 05:51

## 2024-04-09 RX ADMIN — ANTACID TABLETS 500 MG: 500 TABLET, CHEWABLE ORAL at 05:51

## 2024-04-09 RX ADMIN — OXYCODONE HYDROCHLORIDE 20 MG: 20 TABLET, FILM COATED, EXTENDED RELEASE ORAL at 05:51

## 2024-04-09 RX ADMIN — LIDOCAINE 1 PATCH: 4 PATCH TOPICAL at 13:38

## 2024-04-09 RX ADMIN — DIPHENHYDRAMINE HYDROCHLORIDE 25 MG: 25 TABLET ORAL at 00:19

## 2024-04-09 RX ADMIN — HYDROMORPHONE HYDROCHLORIDE 0.5 MG: 1 INJECTION, SOLUTION INTRAMUSCULAR; INTRAVENOUS; SUBCUTANEOUS at 01:27

## 2024-04-09 RX ADMIN — OXYCODONE AND ACETAMINOPHEN 1 TABLET: 10; 325 TABLET ORAL at 16:04

## 2024-04-09 RX ADMIN — OXYCODONE HYDROCHLORIDE 10 MG: 10 TABLET ORAL at 00:18

## 2024-04-09 RX ADMIN — PAROXETINE HYDROCHLORIDE 20 MG: 20 TABLET, FILM COATED ORAL at 16:04

## 2024-04-09 RX ADMIN — CYCLOBENZAPRINE 10 MG: 10 TABLET, FILM COATED ORAL at 10:32

## 2024-04-09 RX ADMIN — DOCUSATE SODIUM 100 MG: 100 CAPSULE, LIQUID FILLED ORAL at 05:51

## 2024-04-09 RX ADMIN — GABAPENTIN 300 MG: 300 CAPSULE ORAL at 12:28

## 2024-04-09 ASSESSMENT — PAIN DESCRIPTION - PAIN TYPE: TYPE: ACUTE PAIN;SURGICAL PAIN

## 2024-04-09 ASSESSMENT — COGNITIVE AND FUNCTIONAL STATUS - GENERAL
PERSONAL GROOMING: A LITTLE
SUGGESTED CMS G CODE MODIFIER DAILY ACTIVITY: CK
DRESSING REGULAR UPPER BODY CLOTHING: A LITTLE
DAILY ACTIVITIY SCORE: 19
HELP NEEDED FOR BATHING: A LOT
EATING MEALS: A LITTLE

## 2024-04-09 ASSESSMENT — GAIT ASSESSMENTS: DISTANCE (FEET): 15

## 2024-04-09 NOTE — CARE PLAN
The patient is Stable - Low risk of patient condition declining or worsening    Shift Goals  Clinical Goals: pain control, mobility  Patient Goals: pain control, comfort  Family Goals: pain control, comfort      Problem: Pain - Standard  Goal: Alleviation of pain or a reduction in pain to the patient’s comfort goal  Outcome: Progressing     Problem: Skin Integrity  Goal: Skin integrity is maintained or improved  4/9/2024 0307 by Gabbi Alexander RPriyaN.  Outcome: Progressing  4/9/2024 0306 by Gabbi Alexander R.N.  Outcome: Progressing     Problem: Fall Risk  Goal: Patient will remain free from falls  Outcome: Progressing

## 2024-04-09 NOTE — DISCHARGE SUMMARY
DATE OF ADMISSION:  04/03/2024   DATE OF DISCHARGE:  04/09/2024     OPERATION PERFORMED:  Posterior cervical fusion, C3 through C7 with Dr. Fierro on 04/03/2024.     HOSPITAL COURSE:  On date of admit, operation was performed.  Postoperatively,   the patient does state that her upper extremities are some improved, however,   has struggled with neck and bilateral shoulder pain.  On date of discharge,   the patient is eating, ambulating and voiding.  She has had a bowel movement.    We have adjusted her pain medications and if pain controlled, she will   discharge home in stable condition.     Bilateral upper extremity strength is grossly intact including , biceps,   triceps and deltoids.  Her dressing is clean, dry and intact per nursing.     DISCHARGE INSTRUCTIONS:  Given to the patient in paper format.     DISCHARGE MEDICATIONS:    1.  MS Contin 15 mg p.o. t.i.d. scheduled for 1 week.  2.  Percocet 10/____ one p.o. every 4 hours p.r.n. pain, #42, no refills.  3.  Flexeril 10 mg 1 p.o. t.i.d. p.r.n. spasm, #60, no refills.     Informed consent and medication agreement done in the office in preop.     ASSESSMENT AND PLAN:    1.  Status post above delineated surgery with Dr. Fierro on 04/03/2024.  2.  The patient will avoid all NSAIDs until cleared by Dr. Fierro.  3.  Hard collar at all times.  Okay to remove to shower.  4.  The patient will follow up at 2 and 6 weeks' postoperatively.     Should the patient have further questions or concerns, they will not hesitate   to give our office a call.        ______________________________  MICHAEL Husain        ______________________________  MD RYAN Woodward/NANY/JOANN    DD:  04/09/2024 10:21  DT:  04/09/2024 10:35    Job#:  [id number]

## 2024-04-09 NOTE — PROGRESS NOTES
Neurosurgery Progress Note    Subjective:  States neck and shoulder pain.  Eating, min ambulation, voiding, BM  Some pain control issues    Exam:  A&O x4  PERRL  UE 5/5 DF/PF intact   Inc c/d/I with dressing - Per RN      BP  Min: 115/67  Max: 139/67  Pulse  Av  Min: 80  Max: 108  Resp  Av.3  Min: 17  Max: 18  Temp  Av.6 °C (36.6 °F)  Min: 2.6 °C (36.6 °F)  Max: 2.6 °C (36.6 °F)  Monitored Temp 2  Av.4 °C (97.5 °F)  Min: 36.2 °C (97.2 °F)  Max: 36.6 °C (97.9 °F)  SpO2  Av.2 %  Min: 94 %  Max: 96 %    No data recorded    Recent Labs     24  0724   WBC 7.9   RBC 3.27*   HEMOGLOBIN 9.9*   HEMATOCRIT 30.7*   MCV 93.9   MCH 30.3   MCHC 32.2   RDW 54.2*   PLATELETCT 256   MPV 9.2     Recent Labs     24  0724   SODIUM 139   POTASSIUM 3.8   CHLORIDE 102   CO2 28   GLUCOSE 117*   BUN 7*   CREATININE 0.58   CALCIUM 8.6               Intake/Output                         24 - 24 0659 24 07 - 04/10/24 0659     [id number] [id number] Total [id number] 0558-1972 Total                 Intake    Total Intake -- -- -- -- -- --       Output    Stool  --  -- --  --  -- --    Number of Times Stooled -- 0 x 0 x -- -- --    Total Output -- -- -- -- -- --       Net I/O     -- -- -- -- -- --            No intake or output data in the 24 hours ending 24 0956            morphine ER  15 mg TID    oxyCODONE-acetaminophen  1 Tablet Q4HRS    lidocaine  1 Patch Q24HR    cyclobenzaprine  10 mg Q8HR    metFORMIN ER  500 mg BID WITH MEALS    cetirizine  10 mg BID    famotidine  40 mg BID    gabapentin  300 mg TID    lamotrigine  300 mg BID    losartan  25 mg DAILY    omeprazole  20 mg QDAY    PARoxetine  20 mg Q EVENING    Pharmacy Consult Request  1 Each PHARMACY TO DOSE    MD ALERT...DO NOT ADMINISTER NSAIDS or ASPIRIN unless ORDERED By Neurosurgery  1 Each PRN    docusate sodium  100 mg BID    senna-docusate  1 Tablet Nightly    senna-docusate  1 Tablet Q24HRS PRN    polyethylene  glycol/lytes  1 Packet BID PRN    magnesium hydroxide  30 mL QDAY PRN    bisacodyl  10 mg Q24HRS PRN    sodium phosphate  1 Each Once PRN    dextrose 5 % and 0.9 % NaCl with KCl 20 mEq   Continuous    acetaminophen  1,000 mg Q6HRS PRN    diphenhydrAMINE  25 mg Q6HRS PRN    Or    diphenhydrAMINE  25 mg Q6HRS PRN    ondansetron  4 mg Q4HRS PRN    ondansetron  4 mg Q4HRS PRN    promethazine  12.5-25 mg Q4HRS PRN    promethazine  12.5-25 mg Q4HRS PRN    prochlorperazine  5-10 mg Q4HRS PRN    labetalol  10 mg Q HOUR PRN    benzocaine-menthol  1 Lozenge Q2HRS PRN    calcium carbonate  500 mg BID       Assessment and Plan:    POD #6 post cervical lami fusion  Changed pain medication - MS Contin and scheduled Oxycodone  RN to contact at 1600 for possible DC today      Noy Paz, DARRON.P.N.

## 2024-04-09 NOTE — PROGRESS NOTES
Assumed care of patient and received report from Anu SANCHEZ. Assessment completed.Pt A&Ox 4. Respirations are even and unlabored on room air. Pt reports 10/10 pain in  neck. Medication per MAR. Medical pt, call light and belongings are within reach. POC updated. Mother at bedside.Pt educated on room and call light, pt verbalized understanding. Needs met.

## 2024-04-10 ENCOUNTER — TELEPHONE (OUTPATIENT)
Dept: HEALTH INFORMATION MANAGEMENT | Facility: OTHER | Age: 39
End: 2024-04-10
Payer: MEDICAID

## 2024-04-10 NOTE — THERAPY
"Occupational Therapy  Daily Treatment     Patient Name: Noy Rose  Age:  39 y.o., Sex:  female  Medical Record #: [id number]  Today's Date: 4/9/2024     Precautions: Fall Risk, Cervical Collar  , Spinal / Back Precautions   Comments: c-collar AAT except showering    Assessment    Pt seen for OT tx to include: bed mobility, transfer training, LB dressing, toileting, ongoing education with pt and her mom on spine precautions and c-collar management. Pt continues to c/o high pain, but demonstrates improved pain control functionally. Pt has shower chair in place at home and mom agrees to supv/assist as needed. Pt is progressing towards OT goals. If pt remains in-house, will continue to follow for acute OT to maximize functional independence and safety.     Plan    Treatment Plan Status: Continue Current Treatment Plan  Type of Treatment: Self Care / Activities of Daily Living, Adaptive Equipment, Neuro Re-Education / Balance, Therapeutic Activity  Treatment Frequency: 4 Times per Week  Treatment Duration: Until Therapy Goals Met    DC Equipment Recommendations: Reacher  Discharge Recommendations: Anticipate that the patient will have no further occupational therapy needs after discharge from the hospital    Subjective    \"I'm still having a lot of pain. The collar hurts.\"   \"I had a shower today. My mom and sister-in-law did my hair.\"      Objective       04/09/24 1649   Treatment Charges   Charges Yes   OT Self Care / ADL (Units) 1   OT Therapy Activity (Units) 1   Total Time Spent   OT Time Spent Yes   OT Self Care / ADL (Minutes) 19   OT Therapy Activity (Minutes) 12   OT Total Time Spent (Calculated) 31    Services   Is patient using  services for this encounter? No   Precautions   Precautions Fall Risk;Cervical Collar  ;Spinal / Back Precautions    Comments c-collar AAT except showering   Vitals   O2 (LPM) 0   O2 Delivery Device None - Room Air   Pain   Pain Scales 0 to 10 Scale    Pain " 0 - 10 Group   Location Neck;Shoulder   Location Orientation Posterior   Therapist Pain Assessment Post Activity;Nurse Notified;9   Non Verbal Descriptors   Non Verbal Scale  Calm;Unlabored Breathing   Cognition    Cognition / Consciousness WDL   Level of Consciousness Alert   Comments cooperative and receptive to education   Active ROM Upper Body   Active ROM Upper Body  X   Rt Shoulder Flexion Degrees 130   Lt Shoulder Flexion Degrees 120   Strength Upper Body   Upper Body Strength  X   Comments distal strength intact; proximal resistance deferred due to pain   Sensation Upper Body   Upper Extremity Sensation  WDL   Other Treatments   Other Treatments Provided Extensive education with pt and her mom on compensatory ADL, home safety, management of c-collar, spine precautions   Balance   Sitting Balance (Static) Good   Sitting Balance (Dynamic) Good   Standing Balance (Static) Fair +   Standing Balance (Dynamic) Fair   Weight Shift Sitting Good   Weight Shift Standing Fair   Skilled Intervention Compensatory Strategies;Postural Facilitation;Verbal Cuing   Comments FWW for standing   Bed Mobility    Supine to Sit Supervised  (flat bed, no rail)   Sit to Supine   (up to chair post)   Scooting Supervised  (seated)   Rolling Supervised   Skilled Intervention Compensatory Strategies;Postural Facilitation;Tactile Cuing;Verbal Cuing   Activities of Daily Living   Lower Body Dressing Supervision  (doff/don B socks in tailor sit)   Toileting Supervision  (urination on toilet; able to manage underpants and hygiene)   Skilled Intervention Compensatory Strategies;Verbal Cuing;Postural Facilitation;Tactile Cuing   How much help from another person does the patient currently need...   Putting on and taking off regular lower body clothing? 4   Bathing (including washing, rinsing, and drying)? 2  (for hair washing)   Toileting, which includes using a toilet, bedpan, or urinal? 4   Putting on and taking off regular upper body  "clothing? 3   Taking care of personal grooming such as brushing teeth? 3  (hair styling)   Eating meals? 3   6 Clicks Daily Activity Score 19   Functional Mobility   Sit to Stand Supervised   Bed, Chair, Wheelchair Transfer Supervised   Toilet Transfers Supervised   Transfer Method Stand Step  (FWW)   Mobility Supine > EOB, short gait and transfers with FWW   Distance (Feet) 15   # of Times Distance was Traveled 2   Skilled Intervention Compensatory Strategies;Postural Facilitation;Tactile Cuing;Verbal Cuing   Activity Tolerance   Sitting in Chair >10 min; up post   Sitting Edge of Bed 5 min   Standing 10 min   Patient / Family Goals   Patient / Family Goal #1 \"Cooking\"   Short Term Goals   Short Term Goal # 1 Pt will complete ADL transfers with supv   Goal Outcome # 1 Goal met   Short Term Goal # 2 Pt will complete toileting with supv   Goal Outcome # 2 Goal met   Short Term Goal # 3 Pt will complete standing grooming with supv   Goal Outcome # 3   (NT this session; mom helped with hair styling following shower)   Short Term Goal # 4 Pt will complete LB/UB dressing with supv   Goal Outcome # 4 Progressing as expected  (LB supv, UB NT this session)   Education Group   Education Provided Spinal Precautions;Home Safety;Adaptive Equipment;Activities of Daily Living;Brace Wear and Care   Spinal Precautions Patient Response Patient;Family;Acceptance;Explanation;Demonstration;Handout;Verbal Demonstration;Action Demonstration  (neutral spine, lifting restriction, safe body mechanics)   Brace Wear & Care Patient Response Patient;Family;Acceptance;Explanation;Demonstration;Handout;Verbal Demonstration;Action Demonstration  (doff/don/adjust c-collar)   Home Safety Patient Response Patient;Family;Acceptance;Explanation;Verbal Demonstration  (fall reduction to include shower chair use, safety with night toileting)   ADL Patient Response Patient;Acceptance;Explanation;Verbal Demonstration  (compensatory self-dressing)   Adaptive " Equipment Patient Response Patient;Acceptance;Explanation;Verbal Demonstration;Family  (use of reacher for object retrieval)   Occupational Therapy Treatment Plan    O.T. Treatment Plan Continue Current Treatment Plan   Anticipated Discharge Equipment and Recommendations   DC Equipment Recommendations Reacher   Discharge Recommendations Anticipate that the patient will have no further occupational therapy needs after discharge from the hospital   Interdisciplinary Plan of Care Collaboration   IDT Collaboration with  Nursing;Family / Caregiver   Patient Position at End of Therapy Seated;Call Light within Reach;Tray Table within Reach;Phone within Reach;Family / Friend in Room   Collaboration Comments OT results and recs   Session Information   Date / Session Number  4/9 #2 (2/4, 4/10)  (attempted 4/8)

## 2024-04-10 NOTE — PROGRESS NOTES
Patient ready for discharge, updated neurosurgery APRN. Verbal order received for discharge. Meds to beds delivered, discharge paperwork provided. Patient has all belongings.

## 2024-04-12 ENCOUNTER — APPOINTMENT (OUTPATIENT)
Dept: MEDICAL GROUP | Facility: MEDICAL CENTER | Age: 39
End: 2024-04-12
Payer: MEDICAID

## 2024-04-22 ENCOUNTER — OFFICE VISIT (OUTPATIENT)
Dept: URGENT CARE | Facility: PHYSICIAN GROUP | Age: 39
End: 2024-04-22
Payer: MEDICAID

## 2024-04-22 VITALS
WEIGHT: 199 LBS | SYSTOLIC BLOOD PRESSURE: 122 MMHG | HEART RATE: 87 BPM | TEMPERATURE: 97.8 F | HEIGHT: 64 IN | OXYGEN SATURATION: 96 % | BODY MASS INDEX: 33.97 KG/M2 | RESPIRATION RATE: 16 BRPM | DIASTOLIC BLOOD PRESSURE: 72 MMHG

## 2024-04-22 DIAGNOSIS — N30.90 CYSTITIS: ICD-10-CM

## 2024-04-22 DIAGNOSIS — R30.0 DYSURIA: ICD-10-CM

## 2024-04-22 LAB
APPEARANCE UR: CLEAR
BILIRUB UR STRIP-MCNC: NEGATIVE MG/DL
COLOR UR AUTO: NORMAL
GLUCOSE UR STRIP.AUTO-MCNC: NEGATIVE MG/DL
KETONES UR STRIP.AUTO-MCNC: NEGATIVE MG/DL
LEUKOCYTE ESTERASE UR QL STRIP.AUTO: NEGATIVE
NITRITE UR QL STRIP.AUTO: NEGATIVE
PH UR STRIP.AUTO: 6 [PH] (ref 5–8)
PROT UR QL STRIP: NEGATIVE MG/DL
RBC UR QL AUTO: NEGATIVE
SP GR UR STRIP.AUTO: 1.02
UROBILINOGEN UR STRIP-MCNC: 0.2 MG/DL

## 2024-04-22 PROCEDURE — 3074F SYST BP LT 130 MM HG: CPT | Performed by: NURSE PRACTITIONER

## 2024-04-22 PROCEDURE — 3078F DIAST BP <80 MM HG: CPT | Performed by: NURSE PRACTITIONER

## 2024-04-22 PROCEDURE — 99213 OFFICE O/P EST LOW 20 MIN: CPT | Mod: 25 | Performed by: NURSE PRACTITIONER

## 2024-04-22 PROCEDURE — 81002 URINALYSIS NONAUTO W/O SCOPE: CPT | Performed by: NURSE PRACTITIONER

## 2024-04-22 RX ORDER — PHENAZOPYRIDINE HYDROCHLORIDE 200 MG/1
200 TABLET, FILM COATED ORAL 3 TIMES DAILY PRN
Qty: 6 TABLET | Refills: 0 | Status: SHIPPED | OUTPATIENT
Start: 2024-04-22

## 2024-04-22 RX ORDER — NITROFURANTOIN 25; 75 MG/1; MG/1
100 CAPSULE ORAL 2 TIMES DAILY
Qty: 10 CAPSULE | Refills: 0 | Status: SHIPPED | OUTPATIENT
Start: 2024-04-22 | End: 2024-04-27

## 2024-04-22 ASSESSMENT — FIBROSIS 4 INDEX: FIB4 SCORE: 0.67

## 2024-04-22 NOTE — PROGRESS NOTES
"Subjective:   Noy Rose is a 39 y.o. female who presents for Dysuria (X2 days UTI, bladder pain, burning and stinging, swelling on Feet )      Patient is a 39-year-old female presents clinic today reporting 2-day history of pain with urination, bladder pain and spasms, and decreased urine output.  Patient has not had any fevers, hematuria, flank pain, nausea, or vomiting.  On 4/3/2024 patient did have C3-C7 laminectomy with fusion and bone graft.  Patient states that she did have a urinary catheter in place and was hospitalized for several days.  Since catheter placement she has had off and on burning sensation at her urethra.  Patient is also had bilateral lower leg swelling that has been waxing and waning since surgery.  She has been more sedentary.  She does use a recliner which has been helping.  Patient is ambulating with walker and does have a neck brace present.    Medications, Allergies, and current problem list reviewed today in Epic.     Objective:     /72   Pulse 87   Temp 36.6 °C (97.8 °F) (Temporal)   Resp 16   Ht 1.626 m (5' 4\")   Wt 90.3 kg (199 lb)   SpO2 96%     Physical Exam  Vitals reviewed.   Constitutional:       General: She is not in acute distress.     Appearance: Normal appearance. She is not ill-appearing or toxic-appearing.   HENT:      Head: Normocephalic.      Nose: Nose normal.      Mouth/Throat:      Mouth: Mucous membranes are moist.   Eyes:      Extraocular Movements: Extraocular movements intact.      Conjunctiva/sclera: Conjunctivae normal.      Pupils: Pupils are equal, round, and reactive to light.   Neck:      Comments: Cervical neck brace  Cardiovascular:      Rate and Rhythm: Normal rate.   Pulmonary:      Effort: Pulmonary effort is normal.   Abdominal:      General: Abdomen is flat. Bowel sounds are normal. There is no distension.      Palpations: Abdomen is soft. There is no mass.      Tenderness: There is no abdominal tenderness. There is no right CVA " tenderness, left CVA tenderness, guarding or rebound.   Musculoskeletal:         General: Normal range of motion.      Cervical back: Normal range of motion and neck supple.   Skin:     General: Skin is warm and dry.   Neurological:      Mental Status: She is alert and oriented to person, place, and time.   Psychiatric:         Mood and Affect: Mood normal.         Behavior: Behavior normal.         Thought Content: Thought content normal.         Judgment: Judgment normal.         Assessment/Plan:     Diagnosis and associated orders:     1. Dysuria  nitrofurantoin (MACROBID) 100 MG Cap    phenazopyridine (PYRIDIUM) 200 MG Tab      2. Cystitis  nitrofurantoin (MACROBID) 100 MG Cap    phenazopyridine (PYRIDIUM) 200 MG Tab         Comments/MDM:     This acute condition.  POCT urinalysis was unremarkable we will go ahead and treat with nitrofurantoin due to cystitis with known recent urinary catheter.  Stressed with patient the importance of pushing fluids.  Pyridium was also prescribed.  Side effects medication were discussed.  Discussed with patient that lower leg edema after spinal surgery is common.  No signs of DVT.  Did educate patient to monitor for signs and symptoms to watch for.  Stressed with patient the importance of doing gluteal squeezes, ambulating at least once per hour, and decreasing sodium in her diet.  Recommended elevation of legs as well as compression socks.  Follow-up with surgeon or primary care provider as needed.  ER precautions were discussed.  Patient was involved with shared decision-making throughout the exam today and verbalizes understanding regards to plan of care, discharge instructions, and follow-up         Differential diagnosis, natural history, supportive care, and indications for immediate follow-up discussed.    Advised the patient to follow-up with the primary care physician for recheck, reevaluation, and consideration of further management.    I personally reviewed prior  external notes and test results pertinent to today's visit as well as additional imaging and testing completed in clinic today.     Please note that this dictation was created using voice recognition software. I have made a reasonable attempt to correct obvious errors, but I expect that there are errors of grammar and possibly content that I did not discover before finalizing the note.

## 2024-04-26 ENCOUNTER — OFFICE VISIT (OUTPATIENT)
Dept: MEDICAL GROUP | Facility: MEDICAL CENTER | Age: 39
End: 2024-04-26
Payer: MEDICAID

## 2024-04-26 VITALS
RESPIRATION RATE: 16 BRPM | DIASTOLIC BLOOD PRESSURE: 68 MMHG | BODY MASS INDEX: 34.15 KG/M2 | HEIGHT: 64 IN | SYSTOLIC BLOOD PRESSURE: 98 MMHG | TEMPERATURE: 96.9 F | HEART RATE: 76 BPM | OXYGEN SATURATION: 99 % | WEIGHT: 200 LBS

## 2024-04-26 DIAGNOSIS — Z13.228 SCREENING FOR ENDOCRINE, NUTRITIONAL, METABOLIC AND IMMUNITY DISORDER: ICD-10-CM

## 2024-04-26 DIAGNOSIS — M48.02 CERVICAL STENOSIS OF SPINAL CANAL: ICD-10-CM

## 2024-04-26 DIAGNOSIS — Z13.21 SCREENING FOR ENDOCRINE, NUTRITIONAL, METABOLIC AND IMMUNITY DISORDER: ICD-10-CM

## 2024-04-26 DIAGNOSIS — Z13.0 SCREENING FOR ENDOCRINE, NUTRITIONAL, METABOLIC AND IMMUNITY DISORDER: ICD-10-CM

## 2024-04-26 DIAGNOSIS — E11.9 TYPE 2 DIABETES MELLITUS WITHOUT COMPLICATION, WITHOUT LONG-TERM CURRENT USE OF INSULIN (HCC): ICD-10-CM

## 2024-04-26 DIAGNOSIS — Z13.29 SCREENING FOR ENDOCRINE, NUTRITIONAL, METABOLIC AND IMMUNITY DISORDER: ICD-10-CM

## 2024-04-26 PROCEDURE — 99214 OFFICE O/P EST MOD 30 MIN: CPT

## 2024-04-26 PROCEDURE — 3078F DIAST BP <80 MM HG: CPT

## 2024-04-26 PROCEDURE — 99212 OFFICE O/P EST SF 10 MIN: CPT

## 2024-04-26 PROCEDURE — 3074F SYST BP LT 130 MM HG: CPT

## 2024-04-26 ASSESSMENT — FIBROSIS 4 INDEX: FIB4 SCORE: 0.67

## 2024-04-26 NOTE — PROGRESS NOTES
"Subjective:     CC: Restart Ozempic    HPI:   Noy presents today with    Cervical stenosis of spinal canal  Patient underwent a successful C3-C7 laminectomy with a fusion to C3-T4 with Dr Fierro on 4/3/2024. Pain control was an issue following the procedure.     Type 2 diabetes mellitus without complication, without long-term current use of insulin (HCC)  Patient would like to restart her Ozempic as she discontinued it prior to surgery. She was previously on the 1 mg dose and would like to restart at the 0.25mg dose and then up titrate as she tolerated previously. Last A1C 5.1% with treatment.          ROS:  - CONSTITUTIONAL: Denies weight loss, fever and chills.  - HEENT: Denies changes in vision and hearing.  - RESPIRATORY: Denies SOB and cough.  - CV: Denies palpitations and CP.  - GI: Denies abdominal pain, nausea, vomiting and diarrhea.  - : Denies dysuria and urinary frequency.  - MSK: Endorses myalgia and joint pain.  - SKIN: Denies rash and pruritus.  - NEUROLOGICAL: Denies headache and syncope.  - PSYCHIATRIC: Denies recent changes in mood. Denies anxiety and depression.    Please see HPI for additional ROS.       Objective:     Exam:  BP 98/68 (BP Location: Right arm, Patient Position: Sitting, BP Cuff Size: Adult)   Pulse 76   Temp 36.1 °C (96.9 °F) (Temporal)   Resp 16   Ht 1.626 m (5' 4\")   Wt 90.7 kg (200 lb)   SpO2 99%   BMI 34.33 kg/m²  Body mass index is 34.33 kg/m².    Physical Exam:  Constitutional: Alert, no distress, well-groomed.  Skin: Warm, dry, good turgor, no rashes in visible areas.  Eye: Equal, round and reactive, conjunctiva clear, lids normal.  ENMT: Lips without lesions, good dentition, moist mucous membranes.  Neck: Trachea midline, no masses, no thyromegaly.  Respiratory: Unlabored respiratory effort, no cough.  Abd: soft, non tender, non distended, normal BS  MSK: Normal gait, moves all extremities.  Neuro: Grossly non-focal.   Psych: Alert and oriented x3, normal " affect and mood.    Labs: Reviewed    Assessment & Plan:     39 y.o. female with the following -     1. Cervical stenosis of spinal canal  Chronic improving. Successful Laminectomy with fusion earlier this month. She is following up with her Neurosurgeon for pain control.     2. Type 2 diabetes mellitus without complication, without long-term current use of insulin (HCC)  Chronic, well controlled. Last A1C with Ozempic 5.1% . She is ready to restart her Ozempic following surgery. We discussed starting at  0.25mg and increasing to 0.5mg as tolerated. Follow up precautions given.   - Semaglutide,0.25 or 0.5MG/DOS, 2 MG/1.5ML Solution Pen-injector; Inject 0.25 mg under the skin every 7 days.  Dispense: 1.5 mL; Refill: 5    3. Screening for endocrine, nutritional, metabolic and immunity disorder  - CBC WITHOUT DIFFERENTIAL; Future  - Comp Metabolic Panel; Future  - HEMOGLOBIN A1C; Future  - VITAMIN D,25 HYDROXY (DEFICIENCY); Future       Return in about 3 months (around 7/26/2024) for Diabetes 3 month follow up.    Please note that this dictation was created using voice recognition software. I have made every reasonable attempt to correct obvious errors, but I expect that there are errors of grammar and possibly content that I did not discover before finalizing the note.

## 2024-04-26 NOTE — ASSESSMENT & PLAN NOTE
Patient would like to restart her Ozempic as she discontinued it prior to surgery. She was previously on the 1 mg dose and would like to restart at the 0.25mg dose and then up titrate as she tolerated previously. Last A1C 5.1% with treatment.

## 2024-04-26 NOTE — PROGRESS NOTES
Verbal consent was acquired by the patient to use [employer] ambient listening note generation during this visit     Subjective:     CC:  There were no encounter diagnoses.    HISTORY OF THE PRESENT ILLNESS: Patient is a 39 y.o. female.     History of Present Illness      Health Maintenance: reviewed and completed per patient preference.     ROS:   - CONSTITUTIONAL: Denies weight loss, fever and chills.  - HEENT: Denies changes in vision and hearing.  - RESPIRATORY: Denies SOB and cough.  - CV: Denies palpitations and CP.  - GI: Denies abdominal pain, nausea, vomiting and diarrhea.  - : Denies dysuria and urinary frequency.  - MSK: Denies myalgia and joint pain.  - SKIN: Denies rash and pruritus.  - NEUROLOGICAL: Denies headache and syncope.  - PSYCHIATRIC: Denies recent changes in mood. Denies anxiety and depression.           Social History     Socioeconomic History    Marital status:      Spouse name: Not on file    Number of children: Not on file    Years of education: Not on file    Highest education level: Some college, no degree   Occupational History    Not on file   Tobacco Use    Smoking status: Former     Current packs/day: 0.00     Average packs/day: 0.7 packs/day for 30.0 years (20.0 ttl pk-yrs)     Types: Cigarettes, Electronic Cigarettes     Quit date: 2023     Years since quittin.2    Smokeless tobacco: Never    Tobacco comments:     has quit on and off.    Vaping Use    Vaping Use: Never used   Substance and Sexual Activity    Alcohol use: No     Comment: occ    Drug use: No    Sexual activity: Yes     Partners: Male     Birth control/protection: Abstinence   Other Topics Concern    Not on file   Social History Narrative    Not on file     Social Determinants of Health     Financial Resource Strain: Low Risk  (2021)    Overall Financial Resource Strain (CARDIA)     Difficulty of Paying Living Expenses: Not hard at all   Food Insecurity: No Food Insecurity (2021)    Hunger  Vital Sign     Worried About Running Out of Food in the Last Year: Never true     Ran Out of Food in the Last Year: Never true   Transportation Needs: No Transportation Needs (8/31/2021)    PRAPARE - Transportation     Lack of Transportation (Medical): No     Lack of Transportation (Non-Medical): No   Physical Activity: Inactive (8/31/2021)    Exercise Vital Sign     Days of Exercise per Week: 0 days     Minutes of Exercise per Session: 0 min   Stress: Stress Concern Present (8/31/2021)    [employer] [place] [employer] - Occupational Stress Questionnaire     Feeling of Stress : To some extent   Social Connections: Moderately Isolated (8/31/2021)    Social Connection and Isolation Panel [NHANES]     Frequency of Communication with Friends and Family: More than three times a week     Frequency of Social Gatherings with Friends and Family: Never     Attends Temple Services: Never     Active Member of Clubs or Organizations: No     Attends Club or Organization Meetings: Never     Marital Status:    Intimate Partner Violence: Not on file   Housing Stability: Unknown (8/31/2021)    Housing Stability Vital Sign     Unable to Pay for Housing in the Last Year: No     Number of Places Lived in the Last Year: Not on file     Unstable Housing in the Last Year: No      Allergies   Allergen Reactions    Codeine Shortness of Breath, Itching, Nausea and Unspecified     Rash, itching    Hydrocodone Itching    Nsaids Hives            Current Outpatient Medications:     nitrofurantoin, 100 mg, Oral, BID    phenazopyridine, 200 mg, Oral, TID PRN    oxyCODONE-acetaminophen, Take 1 tablet by mouth every 4 hours as needed for pain for 7 days.    famotidine, 40 mg, Oral, BID    Naloxone, 1 Spray, Nasal, Once    acetaminophen, 1,000 mg, Oral, BID PRN    lamotrigine, 300 mg, Oral, BID    gabapentin, 300 mg, Oral, TID    cyclobenzaprine, TAKE 1 TO 2 TABLETS BY MOUTH THREE TIMES DAILY AS NEEDED FOR  MODERATE  PAIN  OR   MUSCLE  SPASMS (Patient taking differently: 5-10 mg, Oral, 3 TIMES DAILY PRN, Muscle Spasms)    losartan, 25 mg, Oral, DAILY    cetirizine, 10 mg, Oral, BID    omeprazole, TAKE 1 CAPSULE BY MOUTH EVERY DAY    Blood Glucose Test Strips, 1 Strip, Other, TID    Lancets, Use one lancet to test blood sugar three times daily.    EPINEPHrine, 0.3 mg, Intramuscular, PRN    metFORMIN ER, 500 mg, Oral, BID    PARoxetine, 20 mg, Oral, Q EVENING   Objective:     Exam: There were no vitals taken for this visit. There is no height or weight on file to calculate BMI.    Physical Exam:  Constitutional: Alert, no distress, well-groomed.  Skin: Warm, dry, good turgor, no rashes in visible areas.  Eye: Equal, round and reactive, conjunctiva clear, lids normal.  ENMT: Lips without lesions, good dentition, moist mucous membranes.  Neck: Trachea midline, no masses, no thyromegaly.  Respiratory: Unlabored respiratory effort, no cough.  Abd: soft, non tender, non distended, normal BS  MSK: Normal gait, moves all extremities.  Neuro: Grossly non-focal.   Psych: Alert and oriented x3, normal affect and mood.    Labs: Reviewed    Assessment & Plan:   39 y.o. female with the following -    There are no diagnoses linked to this encounter.    Assessment & Plan            No follow-ups on file.    Please note that this dictation was created using voice recognition software. I have made every reasonable attempt to correct obvious errors, but I expect that there are errors of grammar and possibly content that I did not discover before finalizing the note.

## 2024-04-26 NOTE — ASSESSMENT & PLAN NOTE
Patient underwent a successful C3-C7 laminectomy with a fusion to C3-T4 with Dr Fierro on 4/3/2024. Pain control was an issue following the procedure.

## 2024-04-29 ENCOUNTER — HOSPITAL ENCOUNTER (OUTPATIENT)
Dept: LAB | Facility: MEDICAL CENTER | Age: 39
End: 2024-04-29
Payer: MEDICAID

## 2024-04-29 DIAGNOSIS — Z11.3 SCREENING EXAMINATION FOR SEXUALLY TRANSMITTED DISEASE: ICD-10-CM

## 2024-04-29 DIAGNOSIS — Z13.0 SCREENING FOR ENDOCRINE, NUTRITIONAL, METABOLIC AND IMMUNITY DISORDER: ICD-10-CM

## 2024-04-29 DIAGNOSIS — Z13.21 SCREENING FOR ENDOCRINE, NUTRITIONAL, METABOLIC AND IMMUNITY DISORDER: ICD-10-CM

## 2024-04-29 DIAGNOSIS — Z13.228 SCREENING FOR ENDOCRINE, NUTRITIONAL, METABOLIC AND IMMUNITY DISORDER: ICD-10-CM

## 2024-04-29 DIAGNOSIS — E11.9 TYPE 2 DIABETES MELLITUS WITHOUT COMPLICATION, WITHOUT LONG-TERM CURRENT USE OF INSULIN (HCC): ICD-10-CM

## 2024-04-29 DIAGNOSIS — Z13.29 SCREENING FOR ENDOCRINE, NUTRITIONAL, METABOLIC AND IMMUNITY DISORDER: ICD-10-CM

## 2024-04-29 LAB
25(OH)D3 SERPL-MCNC: 34 NG/ML (ref 30–100)
ALBUMIN SERPL BCP-MCNC: 4.1 G/DL (ref 3.2–4.9)
ALBUMIN/GLOB SERPL: 1.5 G/DL
ALP SERPL-CCNC: 84 U/L (ref 30–99)
ALT SERPL-CCNC: 13 U/L (ref 2–50)
ANION GAP SERPL CALC-SCNC: 13 MMOL/L (ref 7–16)
AST SERPL-CCNC: 16 U/L (ref 12–45)
BILIRUB SERPL-MCNC: 0.2 MG/DL (ref 0.1–1.5)
BUN SERPL-MCNC: 9 MG/DL (ref 8–22)
CALCIUM ALBUM COR SERPL-MCNC: 9 MG/DL (ref 8.5–10.5)
CALCIUM SERPL-MCNC: 9.1 MG/DL (ref 8.5–10.5)
CHLORIDE SERPL-SCNC: 103 MMOL/L (ref 96–112)
CHOLEST SERPL-MCNC: 153 MG/DL (ref 100–199)
CO2 SERPL-SCNC: 25 MMOL/L (ref 20–33)
CREAT SERPL-MCNC: 0.6 MG/DL (ref 0.5–1.4)
CREAT UR-MCNC: 133.48 MG/DL
ERYTHROCYTE [DISTWIDTH] IN BLOOD BY AUTOMATED COUNT: 50.8 FL (ref 35.9–50)
EST. AVERAGE GLUCOSE BLD GHB EST-MCNC: 100 MG/DL
FASTING STATUS PATIENT QL REPORTED: NORMAL
GFR SERPLBLD CREATININE-BSD FMLA CKD-EPI: 116 ML/MIN/1.73 M 2
GLOBULIN SER CALC-MCNC: 2.8 G/DL (ref 1.9–3.5)
GLUCOSE SERPL-MCNC: 88 MG/DL (ref 65–99)
HBA1C MFR BLD: 5.1 % (ref 4–5.6)
HCT VFR BLD AUTO: 33.6 % (ref 37–47)
HCV AB SER QL: NORMAL
HDLC SERPL-MCNC: 32 MG/DL
HGB BLD-MCNC: 10.3 G/DL (ref 12–16)
HIV 1+2 AB+HIV1 P24 AG SERPL QL IA: NORMAL
LDLC SERPL CALC-MCNC: 76 MG/DL
MCH RBC QN AUTO: 28.9 PG (ref 27–33)
MCHC RBC AUTO-ENTMCNC: 30.7 G/DL (ref 32.2–35.5)
MCV RBC AUTO: 94.1 FL (ref 81.4–97.8)
MICROALBUMIN UR-MCNC: 2.3 MG/DL
MICROALBUMIN/CREAT UR: 17 MG/G (ref 0–30)
PLATELET # BLD AUTO: 362 K/UL (ref 164–446)
PMV BLD AUTO: 9.8 FL (ref 9–12.9)
POTASSIUM SERPL-SCNC: 4.2 MMOL/L (ref 3.6–5.5)
PROT SERPL-MCNC: 6.9 G/DL (ref 6–8.2)
RBC # BLD AUTO: 3.57 M/UL (ref 4.2–5.4)
SODIUM SERPL-SCNC: 141 MMOL/L (ref 135–145)
TRIGL SERPL-MCNC: 223 MG/DL (ref 0–149)
WBC # BLD AUTO: 12.4 K/UL (ref 4.8–10.8)

## 2024-04-29 PROCEDURE — 82570 ASSAY OF URINE CREATININE: CPT

## 2024-04-29 PROCEDURE — 83036 HEMOGLOBIN GLYCOSYLATED A1C: CPT

## 2024-04-29 PROCEDURE — 80061 LIPID PANEL: CPT

## 2024-04-29 PROCEDURE — 86803 HEPATITIS C AB TEST: CPT

## 2024-04-29 PROCEDURE — 80053 COMPREHEN METABOLIC PANEL: CPT

## 2024-04-29 PROCEDURE — 36415 COLL VENOUS BLD VENIPUNCTURE: CPT

## 2024-04-29 PROCEDURE — 87389 HIV-1 AG W/HIV-1&-2 AB AG IA: CPT

## 2024-04-29 PROCEDURE — 85027 COMPLETE CBC AUTOMATED: CPT

## 2024-04-29 PROCEDURE — 82043 UR ALBUMIN QUANTITATIVE: CPT

## 2024-04-29 PROCEDURE — 82306 VITAMIN D 25 HYDROXY: CPT

## 2024-05-01 DIAGNOSIS — R20.2 PARESTHESIA OF BOTH FEET: ICD-10-CM

## 2024-05-01 RX ORDER — GABAPENTIN 300 MG/1
300 CAPSULE ORAL 3 TIMES DAILY
Qty: 180 CAPSULE | Refills: 0 | Status: SHIPPED | OUTPATIENT
Start: 2024-05-01

## 2024-05-01 NOTE — TELEPHONE ENCOUNTER
Received request via: Pharmacy    Was the patient seen in the last year in this department? Yes    Does the patient have an active prescription (recently filled or refills available) for medication(s) requested? No    Pharmacy Name: [facility].    Does the patient have USP Plus and need 100 day supply (blood pressure, diabetes and cholesterol meds only)? Patient does not have SCP   29-Mar-2024 08:06

## 2024-05-31 ENCOUNTER — OFFICE VISIT (OUTPATIENT)
Dept: MEDICAL GROUP | Facility: MEDICAL CENTER | Age: 39
End: 2024-05-31
Payer: MEDICAID

## 2024-05-31 ENCOUNTER — HOSPITAL ENCOUNTER (OUTPATIENT)
Facility: MEDICAL CENTER | Age: 39
End: 2024-05-31
Payer: MEDICAID

## 2024-05-31 VITALS
WEIGHT: 198.6 LBS | SYSTOLIC BLOOD PRESSURE: 140 MMHG | HEART RATE: 84 BPM | DIASTOLIC BLOOD PRESSURE: 72 MMHG | BODY MASS INDEX: 34.09 KG/M2 | RESPIRATION RATE: 16 BRPM | TEMPERATURE: 97.5 F | OXYGEN SATURATION: 97 %

## 2024-05-31 DIAGNOSIS — Z79.899 HIGH RISK MEDICATION USE: ICD-10-CM

## 2024-05-31 DIAGNOSIS — G89.4 CHRONIC PAIN SYNDROME: ICD-10-CM

## 2024-05-31 PROCEDURE — 80307 DRUG TEST PRSMV CHEM ANLYZR: CPT

## 2024-05-31 PROCEDURE — G0480 DRUG TEST DEF 1-7 CLASSES: HCPCS

## 2024-05-31 ASSESSMENT — FIBROSIS 4 INDEX: FIB4 SCORE: 0.49

## 2024-05-31 NOTE — PROGRESS NOTES
Subjective:     CC: Pain management     HPI:   Noy presents today to follow up with her pain management. She is currently getting her pain medications from the Neurosurgery team. She reports that she has a hard time getting follow up from her medication. She is going to see a pain management specialist on 6/6/2024. She had injections previously but reports that they didn't work well for her. She is currently taking Oxycodone-acetaminophen  every 6 hours. She reports that she is still experiencing neck and shoulder pain that is not improving. She reports little improvement from her surgery.     ROS:  - CONSTITUTIONAL: Denies weight loss, fever and chills.  - HEENT: Denies changes in vision and hearing.  - RESPIRATORY: Denies SOB and cough.  - CV: Denies palpitations and CP.  - GI: Denies abdominal pain, nausea, vomiting and diarrhea.  - : Denies dysuria and urinary frequency.  - MSK: Endorses neck myalgia and joint pain.  - SKIN: Denies rash and pruritus.  - NEUROLOGICAL: Denies headache and syncope.  - PSYCHIATRIC: Denies recent changes in mood. Denies anxiety and depression.    Please see HPI for additional ROS.       Objective:     Exam:  BP (!) 140/72   Pulse 84   Temp 36.4 °C (97.5 °F)   Resp 16   Wt 90.1 kg (198 lb 9.6 oz)   SpO2 97%   BMI 34.09 kg/m²  Body mass index is 34.09 kg/m².    Physical Exam:  Constitutional: Alert, no distress, well-groomed.  Skin: Warm, dry, good turgor, no rashes in visible areas.  Eye: Equal, round and reactive, conjunctiva clear, lids normal.  ENMT: Lips without lesions, good dentition, moist mucous membranes.  Neck: Trachea midline, no masses, no thyromegaly.  Respiratory: Unlabored respiratory effort, no cough.  Abd: soft, non tender, non distended, normal BS  MSK: Normal gait, moves all extremities.  Neuro: Grossly non-focal.   Psych: Alert and oriented x3, normal affect and mood.    Labs: Reviewed    Assessment & Plan:     39 y.o. female with the following -          1. Chronic pain syndrome  2. High risk medication use  Chronic Patient still with ongoing pain. We discussed assuming her pain control medications while she gets more established with pain management. UDS collected today.   - PAIN MANAGEMENT SCRN, W/ RFLX TO QNT; Future      Return in about 1 week (around 6/7/2024).    Please note that this dictation was created using voice recognition software. I have made every reasonable attempt to correct obvious errors, but I expect that there are errors of grammar and possibly content that I did not discover before finalizing the note.

## 2024-06-02 LAB
AMPHET CTO UR CFM-MCNC: NEGATIVE NG/ML
BARBITURATES CTO UR CFM-MCNC: NEGATIVE NG/ML
BENZODIAZ CTO UR CFM-MCNC: NEGATIVE NG/ML
BUPRENORPHINE UR-MCNC: NEGATIVE NG/ML
CANNABINOIDS CTO UR CFM-MCNC: NEGATIVE NG/ML
CARISOPRODOL UR-MCNC: NEGATIVE NG/ML
COCAINE CTO UR CFM-MCNC: NEGATIVE NG/ML
CREAT UR-MCNC: 48.4 MG/DL (ref 20–400)
DRUG SCREEN COMMENT UR-IMP: NORMAL
ETHYL GLUCURONIDE UR QL SCN: NEGATIVE NG/ML
FENTANYL UR-MCNC: NEGATIVE NG/ML
MEPERIDINE CTO UR SCN-MCNC: NEGATIVE NG/ML
METHADONE CTO UR CFM-MCNC: NEGATIVE NG/ML
OPIATES UR QL SCN: NEGATIVE NG/ML
OXYCDOXYM URSCRN 2005102: NORMAL NG/ML
PCP CTO UR CFM-MCNC: NEGATIVE NG/ML
PROPOXYPH CTO UR CFM-MCNC: NEGATIVE NG/ML
TAPENTADOL UR-MCNC: NEGATIVE NG/ML
TRAMADOL CTO UR SCN-MCNC: NEGATIVE NG/ML
ZOLPIDEM UR-MCNC: NEGATIVE NG/ML

## 2024-06-04 DIAGNOSIS — E88.819 INSULIN RESISTANCE: ICD-10-CM

## 2024-06-04 DIAGNOSIS — E28.2 PCOS (POLYCYSTIC OVARIAN SYNDROME): ICD-10-CM

## 2024-06-04 RX ORDER — METFORMIN HYDROCHLORIDE 500 MG/1
500 TABLET, EXTENDED RELEASE ORAL 2 TIMES DAILY
Qty: 360 TABLET | Refills: 0 | Status: SHIPPED | OUTPATIENT
Start: 2024-06-04

## 2024-06-04 NOTE — TELEPHONE ENCOUNTER
Received request via: Pharmacy    Was the patient seen in the last year in this department? Yes    Does the patient have an active prescription (recently filled or refills available) for medication(s) requested? No    Pharmacy Name: [facility].    Does the patient have snf Plus and need 100 day supply (blood pressure, diabetes and cholesterol meds only)? Patient does not have SCP

## 2024-06-05 ENCOUNTER — OFFICE VISIT (OUTPATIENT)
Dept: MEDICAL GROUP | Facility: MEDICAL CENTER | Age: 39
End: 2024-06-05
Payer: MEDICAID

## 2024-06-05 VITALS
SYSTOLIC BLOOD PRESSURE: 112 MMHG | OXYGEN SATURATION: 98 % | TEMPERATURE: 98 F | DIASTOLIC BLOOD PRESSURE: 60 MMHG | HEIGHT: 65 IN | WEIGHT: 195.4 LBS | HEART RATE: 108 BPM | RESPIRATION RATE: 16 BRPM | BODY MASS INDEX: 32.55 KG/M2

## 2024-06-05 DIAGNOSIS — G89.4 CHRONIC PAIN SYNDROME: ICD-10-CM

## 2024-06-05 DIAGNOSIS — F33.2 SEVERE EPISODE OF RECURRENT MAJOR DEPRESSIVE DISORDER, WITHOUT PSYCHOTIC FEATURES (HCC): ICD-10-CM

## 2024-06-05 DIAGNOSIS — M48.02 CERVICAL STENOSIS OF SPINAL CANAL: ICD-10-CM

## 2024-06-05 PROBLEM — F32.9 MAJOR DEPRESSIVE DISORDER: Status: ACTIVE | Noted: 2024-06-05

## 2024-06-05 LAB
6MAM UR CFM-MCNC: <10 NG/ML
CODEINE UR CFM-MCNC: <20 NG/ML
HYDROCODONE UR CFM-MCNC: <20 NG/ML
HYDROMORPHONE UR CFM-MCNC: <20 NG/ML
MORPHINE UR CFM-MCNC: <20 NG/ML
NORHYDROCODONE UR CFM-MCNC: <20 NG/ML
NOROXYCODONE UR CFM-MCNC: 2356 NG/ML
OPIATES UR NOROXYM Q0836: 76 NG/ML
OXYCODONE UR CFM-MCNC: 384 NG/ML
OXYMORPHONE UR CFM-MCNC: <20 NG/ML

## 2024-06-05 PROCEDURE — 99213 OFFICE O/P EST LOW 20 MIN: CPT

## 2024-06-05 PROCEDURE — 3074F SYST BP LT 130 MM HG: CPT

## 2024-06-05 PROCEDURE — 99214 OFFICE O/P EST MOD 30 MIN: CPT

## 2024-06-05 PROCEDURE — 3078F DIAST BP <80 MM HG: CPT

## 2024-06-05 RX ORDER — OXYCODONE AND ACETAMINOPHEN 10; 325 MG/1; MG/1
1 TABLET ORAL EVERY 6 HOURS PRN
Qty: 120 TABLET | Refills: 0 | Status: SHIPPED | OUTPATIENT
Start: 2024-06-05 | End: 2024-06-26 | Stop reason: SDUPTHER

## 2024-06-05 RX ORDER — DULOXETIN HYDROCHLORIDE 30 MG/1
30 CAPSULE, DELAYED RELEASE ORAL DAILY
Qty: 30 CAPSULE | Refills: 2 | Status: SHIPPED | OUTPATIENT
Start: 2024-06-05

## 2024-06-05 ASSESSMENT — FIBROSIS 4 INDEX: FIB4 SCORE: 0.49

## 2024-06-05 ASSESSMENT — PATIENT HEALTH QUESTIONNAIRE - PHQ9
SUM OF ALL RESPONSES TO PHQ QUESTIONS 1-9: 15
CLINICAL INTERPRETATION OF PHQ2 SCORE: 5
5. POOR APPETITE OR OVEREATING: 2 - MORE THAN HALF THE DAYS

## 2024-06-05 NOTE — PROGRESS NOTES
"Subjective:     CC: medication refill     HPI:   Noy presents today to discuss her pain medications.  Currently she is taking Percocet 10 every 6 hours as prescribed by her neurosurgery team since her surgery.  She is requesting that I resume her medication until she is able to follow-up with pain management.  She does have an appointment coming up with them 6/6 and will discuss treatments then.  She reports tolerating the medication well however she is experiencing some fatigue and decreased mood.  She is currently taking Paxil 20 mg but feels that since she started taking that medication and increasing her dose that she feels very labile in her mood.      ROS:  - CONSTITUTIONAL: Denies weight loss, fever and chills.  - HEENT: Denies changes in vision and hearing.  - RESPIRATORY: Denies SOB and cough.  - CV: Denies palpitations and CP.  - GI: Denies abdominal pain, nausea, vomiting and diarrhea.  - : Denies dysuria and urinary frequency.  - MSK: Denies myalgia and joint pain.  - SKIN: Denies rash and pruritus.  - NEUROLOGICAL: Denies headache and syncope.  - PSYCHIATRIC: Denies recent changes in mood.  Endorses anxiety and depression.    Please see HPI for additional ROS.       Objective:     Exam:  /60 (BP Location: Left arm, Patient Position: Sitting, BP Cuff Size: Adult)   Pulse (!) 108   Temp 36.7 °C (98 °F) (Temporal)   Resp 16   Ht 1.638 m (5' 4.5\")   Wt 88.6 kg (195 lb 6.4 oz)   SpO2 98%   BMI 33.02 kg/m²  Body mass index is 33.02 kg/m².    Physical Exam:  Constitutional: Alert, no distress, well-groomed.  Skin: Warm, dry, good turgor, no rashes in visible areas.  Eye: Equal, round and reactive, conjunctiva clear, lids normal.  ENMT: Lips without lesions, good dentition, moist mucous membranes.  Neck: Trachea midline, no masses, no thyromegaly.  Respiratory: Unlabored respiratory effort, no cough.  Abd: soft, non tender, non distended, normal BS  MSK: Normal gait, moves all " "extremities.  Neuro: Grossly non-focal.   Psych: Alert and oriented x3, normal affect and mood.  Depression Screening    Little interest or pleasure in doing things?  3 - nearly every day   Feeling down, depressed , or hopeless? 2 - more than half the days   Trouble falling or staying asleep, or sleeping too much?  3 - nearly every day   Feeling tired or having little energy?  1 - several days   Poor appetite or overeating?  2 - more than half the days   Feeling bad about yourself - or that you are a failure or have let yourself or your family down? 2 - more than half the days   Trouble concentrating on things, such as reading the newspaper or watching television? 2 - more than half the days   Moving or speaking so slowly that other people could have noticed.  Or the opposite - being so fidgety or restless that you have been moving around a lot more than usual?  0 - not at all   Thoughts that you would be better off dead, or of hurting yourself?  0 - not at all   Patient Health Questionnaire Score: 15       If depressive symptoms identified deferred to follow up visit unless specifically addressed in assesment and plan.    Interpretation of PHQ-9 Total Score   Score Severity   1-4 No Depression   5-9 Mild Depression   10-14 Moderate Depression   15-19 Moderately Severe Depression   20-27 Severe Depression     Labs: Reviewed    Assessment & Plan:     39 y.o. female with the following -     1. Chronic pain syndrome  Chronic, stable. Patient understands this prescription is a controlled substance which is potentially habit-forming and its use is regulated by the [facility]. We also discussed the new \"black box\" warning regarding the lethal combination of opioids and benzodiazepines. Refills are subject to terms of a controlled substance agreement and patient has an updated one on file. Most recent UDS is appropriate. PDMP reviewed with no abnormal findings. Any refill requires an office visit. Narcotics have may adverse " effects and the risks of addiction, accidental overdose and death were emphasized. Provided prescriptions for the next 30 days.  She follows up with pain management on June 6.  - oxyCODONE-acetaminophen (PERCOCET)  MG Tab; Take 1 Tablet by mouth every 6 hours as needed for Severe Pain for up to 30 days.  Dispense: 120 Tablet; Refill: 0  - DULoxetine (CYMBALTA) 30 MG Cap DR Particles; Take 1 Capsule by mouth every day.  Dispense: 30 Capsule; Refill: 2    2. Cervical stenosis of spinal canal  - oxyCODONE-acetaminophen (PERCOCET)  MG Tab; Take 1 Tablet by mouth every 6 hours as needed for Severe Pain for up to 30 days.  Dispense: 120 Tablet; Refill: 0    3. Severe episode of recurrent major depressive disorder, without psychotic features (HCC)  Chronic, uncontrolled.  Patient has been on Paxil 20 mg but reports that it is not improving her mood and she feels labile.  We discussed Cymbalta as an alternative as it can also improve her mood and treat her chronic pain.  Given that she has trialed Paxil with no improvement we will send Cymbalta for prior authorization.  Once she receives the Cymbalta she can stop taking the Paxil and start taking the Cymbalta.  Will have her follow-up in 4 weeks.  - Patient has been identified as having a positive depression screening. Appropriate orders and counseling have been given.  - DULoxetine (CYMBALTA) 30 MG Cap DR Particles; Take 1 Capsule by mouth every day.  Dispense: 30 Capsule; Refill: 2        Return in about 4 weeks (around 7/3/2024).    Please note that this dictation was created using voice recognition software. I have made every reasonable attempt to correct obvious errors, but I expect that there are errors of grammar and possibly content that I did not discover before finalizing the note.

## 2024-06-10 ENCOUNTER — TELEPHONE (OUTPATIENT)
Dept: MEDICAL GROUP | Facility: MEDICAL CENTER | Age: 39
End: 2024-06-10
Payer: MEDICAID

## 2024-06-10 ENCOUNTER — TELEPHONE (OUTPATIENT)
Dept: MEDICAL GROUP | Facility: MEDICAL CENTER | Age: 39
End: 2024-06-10

## 2024-06-10 DIAGNOSIS — G89.4 CHRONIC PAIN SYNDROME: ICD-10-CM

## 2024-06-10 DIAGNOSIS — M48.02 CERVICAL STENOSIS OF SPINAL CANAL: ICD-10-CM

## 2024-06-10 NOTE — TELEPHONE ENCOUNTER
DOCUMENTATION OF PAR STATUS:    1. Name of Medication & Dose: Oxycodone-acetaminophen  mg     2. Name of Prescription Coverage Company & phone #: Medicaid FFS    3. Date Prior Auth Submitted: 06/10/2024    4. What information was given to obtain insurance decision? Chart notes, med hx, dx codes    5. Prior Auth Status? Approved    6. Patient Notified: yes

## 2024-06-10 NOTE — TELEPHONE ENCOUNTER
Received request via: Patient, PA has been approved for th 120 tabs. Please send a new rx    Was the patient seen in the last year in this department? Yes    Does the patient have an active prescription (recently filled or refills available) for medication(s) requested? No    Pharm[facility] Name: [facility]    Does the patient have USP Plus and need 100 day supply (blood pressure, diabetes and cholesterol meds only)? Patient does not have SCP

## 2024-06-11 ENCOUNTER — TELEPHONE (OUTPATIENT)
Dept: MEDICAL GROUP | Facility: MEDICAL CENTER | Age: 39
End: 2024-06-11
Payer: MEDICAID

## 2024-06-11 RX ORDER — OXYCODONE AND ACETAMINOPHEN 10; 325 MG/1; MG/1
1 TABLET ORAL EVERY 6 HOURS PRN
Qty: 120 TABLET | Refills: 0 | OUTPATIENT
Start: 2024-06-11 | End: 2024-07-11

## 2024-06-11 NOTE — TELEPHONE ENCOUNTER
FINAL PRIOR AUTHORIZATION STATUS:    1.  Name of Medication & Dose: OXYCODONE 10MG-325MG    2. Prior Auth Status: Approved through 06/10/2025     3. Action Taken: Pharmacy Notified: N\A Patient Notified: yes

## 2024-06-12 ENCOUNTER — TELEPHONE (OUTPATIENT)
Dept: MEDICAL GROUP | Facility: MEDICAL CENTER | Age: 39
End: 2024-06-12
Payer: MEDICAID

## 2024-06-18 DIAGNOSIS — L98.9 SKIN LESION: ICD-10-CM

## 2024-06-26 ENCOUNTER — OFFICE VISIT (OUTPATIENT)
Dept: MEDICAL GROUP | Facility: MEDICAL CENTER | Age: 39
End: 2024-06-26
Payer: MEDICAID

## 2024-06-26 VITALS
HEIGHT: 65 IN | TEMPERATURE: 97 F | WEIGHT: 200.5 LBS | RESPIRATION RATE: 16 BRPM | HEART RATE: 97 BPM | DIASTOLIC BLOOD PRESSURE: 74 MMHG | SYSTOLIC BLOOD PRESSURE: 116 MMHG | BODY MASS INDEX: 33.41 KG/M2 | OXYGEN SATURATION: 97 %

## 2024-06-26 DIAGNOSIS — M48.02 CERVICAL STENOSIS OF SPINAL CANAL: ICD-10-CM

## 2024-06-26 DIAGNOSIS — G89.4 CHRONIC PAIN SYNDROME: ICD-10-CM

## 2024-06-26 PROCEDURE — 3078F DIAST BP <80 MM HG: CPT

## 2024-06-26 PROCEDURE — 99213 OFFICE O/P EST LOW 20 MIN: CPT

## 2024-06-26 PROCEDURE — 3074F SYST BP LT 130 MM HG: CPT

## 2024-06-26 PROCEDURE — 99212 OFFICE O/P EST SF 10 MIN: CPT

## 2024-06-26 RX ORDER — OXYCODONE AND ACETAMINOPHEN 10; 325 MG/1; MG/1
1 TABLET ORAL EVERY 6 HOURS PRN
Qty: 120 TABLET | Refills: 0 | Status: SHIPPED | OUTPATIENT
Start: 2024-07-11 | End: 2024-08-10

## 2024-06-26 RX ORDER — TIZANIDINE 2 MG/1
4 TABLET ORAL 3 TIMES DAILY
COMMUNITY
Start: 2024-06-07

## 2024-06-26 ASSESSMENT — FIBROSIS 4 INDEX: FIB4 SCORE: 0.49

## 2024-06-26 NOTE — PROGRESS NOTES
"Subjective:     CC: Medication follow-up    HPI:   Noy presents today to request a refill of her oxycodone.  She did follow-up with pain management who started her on Lyrica 75 mg 3 times daily.  She reports that this is improving her symptoms and she is tolerating it well.  She denies any negative side effects from the oxycodone or the Lyrica.    ROS:  - CONSTITUTIONAL: Denies weight loss, fever and chills.  - HEENT: Denies changes in vision and hearing.  - RESPIRATORY: Denies SOB and cough.  - CV: Denies palpitations and CP.  - GI: Denies abdominal pain, nausea, vomiting and diarrhea.  - : Denies dysuria and urinary frequency.  - MSK: Denies myalgia and joint pain.  - SKIN: Denies rash and pruritus.  - NEUROLOGICAL: Denies headache and syncope.  - PSYCHIATRIC: Denies recent changes in mood. Denies anxiety and depression.    Please see HPI for additional ROS.       Objective:     Exam:  /74 (BP Location: Left arm, Patient Position: Sitting, BP Cuff Size: Large adult)   Pulse 97   Temp 36.1 °C (97 °F) (Temporal)   Resp 16   Ht 1.638 m (5' 4.5\")   Wt 90.9 kg (200 lb 8 oz)   SpO2 97%   BMI 33.88 kg/m²  Body mass index is 33.88 kg/m².    Physical Exam:  Constitutional: Alert, no distress, well-groomed.  Skin: Warm, dry, good turgor, no rashes in visible areas.  Eye: Equal, round and reactive, conjunctiva clear, lids normal.  ENMT: Lips without lesions, good dentition, moist mucous membranes.  Neck: Trachea midline, no masses, no thyromegaly.  Respiratory: Unlabored respiratory effort, no cough.  Abd: soft, non tender, non distended, normal BS  MSK: Normal gait, moves all extremities.  Neuro: Grossly non-focal.   Psych: Alert and oriented x3, normal affect and mood.    Labs: Reviewed    Assessment & Plan:     39 y.o. female with the following -     1. Chronic pain syndrome  2. Cervical stenosis of spinal canal  Chronic, well-controlled with oxycodone every 6 hours as needed.  She did get in with pain " management however there was some confusion about who would be prescribing her oxycodone.  We did discuss that it is preferred that she follow-up with pain management for her controlled substances and other pain management needs.  She does have an appointment to follow-up with them in a couple of weeks and will address it with them then.  In the meantime I have given her a prescription for the next 30 days while she is waiting to follow-up with them.  PDMP reviewed with no abnormal findings.  UDS within defined limits.  - oxyCODONE-acetaminophen (PERCOCET)  MG Tab; Take 1 Tablet by mouth every 6 hours as needed for Severe Pain for up to 30 days.  Dispense: 120 Tablet; Refill: 0        Return if symptoms worsen or fail to improve.    Please note that this dictation was created using voice recognition software. I have made every reasonable attempt to correct obvious errors, but I expect that there are errors of grammar and possibly content that I did not discover before finalizing the note.

## 2024-07-01 ENCOUNTER — TELEPHONE (OUTPATIENT)
Dept: HEALTH INFORMATION MANAGEMENT | Facility: OTHER | Age: 39
End: 2024-07-01
Payer: MEDICAID

## 2024-07-01 ENCOUNTER — APPOINTMENT (OUTPATIENT)
Dept: NEUROLOGY | Facility: MEDICAL CENTER | Age: 39
End: 2024-07-01
Attending: STUDENT IN AN ORGANIZED HEALTH CARE EDUCATION/TRAINING PROGRAM
Payer: MEDICAID

## 2024-07-11 DIAGNOSIS — K21.9 GASTROESOPHAGEAL REFLUX DISEASE, UNSPECIFIED WHETHER ESOPHAGITIS PRESENT: ICD-10-CM

## 2024-07-12 RX ORDER — OMEPRAZOLE 20 MG/1
20 CAPSULE, DELAYED RELEASE ORAL DAILY
Qty: 150 CAPSULE | Refills: 0 | Status: SHIPPED | OUTPATIENT
Start: 2024-07-12

## 2024-07-30 ENCOUNTER — OFFICE VISIT (OUTPATIENT)
Dept: MEDICAL GROUP | Facility: MEDICAL CENTER | Age: 39
End: 2024-07-30
Payer: MEDICAID

## 2024-07-30 VITALS
WEIGHT: 200.2 LBS | OXYGEN SATURATION: 98 % | TEMPERATURE: 97.8 F | DIASTOLIC BLOOD PRESSURE: 62 MMHG | HEIGHT: 64 IN | RESPIRATION RATE: 16 BRPM | BODY MASS INDEX: 34.18 KG/M2 | SYSTOLIC BLOOD PRESSURE: 120 MMHG | HEART RATE: 81 BPM

## 2024-07-30 DIAGNOSIS — E11.9 TYPE 2 DIABETES MELLITUS WITHOUT COMPLICATION, WITHOUT LONG-TERM CURRENT USE OF INSULIN (HCC): ICD-10-CM

## 2024-07-30 DIAGNOSIS — R22.1 NECK MASS: ICD-10-CM

## 2024-07-30 PROCEDURE — 99213 OFFICE O/P EST LOW 20 MIN: CPT

## 2024-07-30 RX ORDER — SEMAGLUTIDE 0.68 MG/ML
0.5 INJECTION, SOLUTION SUBCUTANEOUS
Qty: 3 ML | Refills: 11 | Status: SHIPPED | OUTPATIENT
Start: 2024-07-30 | End: 2024-07-31

## 2024-07-30 ASSESSMENT — FIBROSIS 4 INDEX: FIB4 SCORE: 0.49

## 2024-07-31 RX ORDER — SEMAGLUTIDE 1.34 MG/ML
1 INJECTION, SOLUTION SUBCUTANEOUS
Qty: 3 ML | Refills: 3 | Status: SHIPPED | OUTPATIENT
Start: 2024-07-31

## 2024-08-02 DIAGNOSIS — G89.4 CHRONIC PAIN SYNDROME: ICD-10-CM

## 2024-08-02 DIAGNOSIS — F33.2 SEVERE EPISODE OF RECURRENT MAJOR DEPRESSIVE DISORDER, WITHOUT PSYCHOTIC FEATURES (HCC): ICD-10-CM

## 2024-08-02 RX ORDER — DULOXETIN HYDROCHLORIDE 30 MG/1
30 CAPSULE, DELAYED RELEASE ORAL DAILY
Qty: 30 CAPSULE | Refills: 5 | Status: SHIPPED | OUTPATIENT
Start: 2024-08-02 | End: 2024-08-22 | Stop reason: SDUPTHER

## 2024-08-02 NOTE — TELEPHONE ENCOUNTER
Received request via: Pharmacy    Was the patient seen in the last year in this department? Yes    Does the patient have an active prescription (recently filled or refills available) for medication(s) requested? No    [facility]    Does the patient have detention Plus and need 100 day supply (blood pressure, diabetes and cholesterol meds only)? Patient does not have SCP    Future Appointments         Provider Department [place]    9/3/2024 2:20 PM (Arrive by 2:05 PM) [facility]

## 2024-08-14 ENCOUNTER — HOSPITAL ENCOUNTER (OUTPATIENT)
Facility: MEDICAL CENTER | Age: 39
End: 2024-08-14
Attending: NEUROLOGICAL SURGERY | Admitting: NEUROLOGICAL SURGERY
Payer: MEDICAID

## 2024-08-19 ENCOUNTER — APPOINTMENT (OUTPATIENT)
Dept: ADMISSIONS | Facility: MEDICAL CENTER | Age: 39
End: 2024-08-19
Attending: NEUROLOGICAL SURGERY
Payer: MEDICAID

## 2024-08-22 ENCOUNTER — TELEMEDICINE (OUTPATIENT)
Dept: MEDICAL GROUP | Facility: MEDICAL CENTER | Age: 39
End: 2024-08-22
Attending: NEUROLOGICAL SURGERY
Payer: MEDICAID

## 2024-08-22 ENCOUNTER — PRE-ADMISSION TESTING (OUTPATIENT)
Dept: ADMISSIONS | Facility: MEDICAL CENTER | Age: 39
End: 2024-08-22
Attending: NEUROLOGICAL SURGERY
Payer: MEDICAID

## 2024-08-22 VITALS — WEIGHT: 200 LBS | BODY MASS INDEX: 34.15 KG/M2 | HEIGHT: 64 IN

## 2024-08-22 DIAGNOSIS — G89.4 CHRONIC PAIN SYNDROME: ICD-10-CM

## 2024-08-22 DIAGNOSIS — F41.9 ANXIETY: ICD-10-CM

## 2024-08-22 DIAGNOSIS — F33.2 SEVERE EPISODE OF RECURRENT MAJOR DEPRESSIVE DISORDER, WITHOUT PSYCHOTIC FEATURES (HCC): ICD-10-CM

## 2024-08-22 RX ORDER — OXYCODONE AND ACETAMINOPHEN 10; 325 MG/1; MG/1
1 TABLET ORAL EVERY 6 HOURS PRN
COMMUNITY
Start: 2024-08-10

## 2024-08-22 RX ORDER — DULOXETIN HYDROCHLORIDE 30 MG/1
30 CAPSULE, DELAYED RELEASE ORAL 2 TIMES DAILY
Qty: 60 CAPSULE | Refills: 2 | Status: SHIPPED | OUTPATIENT
Start: 2024-08-22

## 2024-08-22 ASSESSMENT — ENCOUNTER SYMPTOMS
FEVER: 0
PALPITATIONS: 0
CHILLS: 0
COUGH: 0
NERVOUS/ANXIOUS: 1
SHORTNESS OF BREATH: 0

## 2024-08-22 ASSESSMENT — FIBROSIS 4 INDEX: FIB4 SCORE: 0.49

## 2024-08-22 NOTE — ASSESSMENT & PLAN NOTE
Laly reports that for the last week or so she has been experiencing increased anxiety the moment she wakes up. She feels chest pressure and tightness. She feels like she can't sit still when the episodes happen. She is schedule to have a revision of her neck surgery and a lot of things going on at home that she feels in contributing to her stress. She is taking Cymbalta 30 mg daily and tolerating it well. She is establish with psychologist and has an appointment with her tomorrow.

## 2024-08-22 NOTE — PROGRESS NOTES
Virtual Visit: Established Patient   This visit was conducted via Teams using secure and encrypted videoconferencing technology.   The patient was in their home in the [facility].    The patient's identity was confirmed and verbal consent was obtained for this virtual visit.     Subjective:   CC:   Chief Complaint   Patient presents with    Anxiety       Noy Rose is a 39 y.o. female presenting for evaluation and management of:    Anxiety: Laly reports that for the last week or so she has been experiencing increased anxiety the moment she wakes up. She feels chest pressure and tightness. She feels like she can't sit still when the episodes happen. She is schedule to have a revision of her neck surgery and a lot of things going on at home that she feels in contributing to her stress. She is taking Cymbalta 30 mg daily and tolerating it well. She is establish with psychologist and has an appointment with her tomorrow.     ROS   Review of Systems   Constitutional:  Negative for chills and fever.   Respiratory:  Negative for cough and shortness of breath.    Cardiovascular:  Negative for chest pain and palpitations.   Psychiatric/Behavioral:  The patient is nervous/anxious.          Current medicines (including changes today)  Current Outpatient Medications   Medication Sig Dispense Refill    DULoxetine (CYMBALTA) 30 MG Cap DR Particles Take 1 Capsule by mouth 2 times a day. 60 Capsule 2    oxyCODONE-acetaminophen (PERCOCET-10)  MG Tab     MEDICATION INSTRUCTIONS FOR SURGERY/PROCEDURE SCHEDULED FOR 9/3/24   OK TO CONTINUE TAKING PRIOR TO SURGERY AND DAY OF SURGERY IF NEEDED      Semaglutide, 1 MG/DOSE, (OZEMPIC, 1 MG/DOSE,) 4 MG/3ML Solution Pen-injector Inject 1 mg under the skin every 7 days. (Patient taking differently: Inject 1 mg under the skin every 7 days. Wednesday    MEDICATION INSTRUCTIONS FOR SURGERY/PROCEDURE SCHEDULED FOR 9/3/24   DO NOT TAKE 7 DAYS PRIOR TO SURGERY) 3 mL 3     omeprazole (PRILOSEC) 20 MG delayed-release capsule Take 1 capsule by mouth once daily (Patient taking differently: Take 20 mg by mouth every day.     MEDICATION INSTRUCTIONS FOR SURGERY/PROCEDURE SCHEDULED FOR 9/3/24   OK TO CONTINUE TAKING PRIOR TO SURGERY AND DAY OF SURGERY) 150 Capsule 0    LYRICA 75 MG Cap Take 100 mg by mouth 3 times a day.     MEDICATION INSTRUCTIONS FOR SURGERY/PROCEDURE SCHEDULED FOR 9/3/24   OK TO CONTINUE TAKING PRIOR TO SURGERY AND DAY OF SURGERY      tizanidine (ZANAFLEX) 2 MG tablet Take 4 mg by mouth 3 times a day.     MEDICATION INSTRUCTIONS FOR SURGERY/PROCEDURE SCHEDULED FOR 9/3/24   OK TO CONTINUE TAKING PRIOR TO SURGERY AND DAY OF SURGERY      metFORMIN ER (GLUCOPHAGE XR) 500 MG TABLET SR 24 HR Take 1 tablet by mouth twice daily (Patient taking differently: Take 500 mg by mouth 2 times a day.     MEDICATION INSTRUCTIONS FOR SURGERY/PROCEDURE SCHEDULED FOR 9/3/24   CONTINUE TAKING MED PRIOR TO SURGERY, BUT DO NOT TAKE MORNING OF PROCEDURE) 360 Tablet 0    famotidine (PEPCID) 40 MG Tab Take 40 mg by mouth 2 times a day.     MEDICATION INSTRUCTIONS FOR SURGERY/PROCEDURE SCHEDULED FOR 9/3/24   OK TO CONTINUE TAKING PRIOR TO SURGERY AND DAY OF SURGERY      Naloxone (NARCAN) 4 MG/0.1ML Liquid Administer 1 Spray into affected nostril(S) one time.     DO NOT TAKE FOR 24 HOURS PRIOR TO PROCEDURE OM 9/3/24      acetaminophen (TYLENOL) 500 MG Tab Take 1,000 mg by mouth 2 times a day as needed for Moderate Pain.     MEDICATION INSTRUCTIONS FOR SURGERY/PROCEDURE SCHEDULED FOR 9/3/24   OK TO CONTINUE TAKING PRIOR TO SURGERY AND DAY OF SURGERY IF NEEDED      lamotrigine (LAMICTAL) 200 MG tablet Take 1.5 Tablets by mouth 2 times a day for 180 days. (Patient taking differently: Take 300 mg by mouth 2 times a day.     MEDICATION INSTRUCTIONS FOR SURGERY/PROCEDURE SCHEDULED FOR 9/3/24   OK TO CONTINUE TAKING PRIOR TO SURGERY AND DAY OF SURGERY) 270 Tablet 1    losartan (COZAAR) 25 MG Tab Take 1  Tablet by mouth every day. (Patient taking differently: Take 25 mg by mouth every day.     MEDICATION INSTRUCTIONS FOR SURGERY/PROCEDURE SCHEDULED FOR 9/3/24   CONTINUE TAKING MED PRIOR TO SURGERY, BUT HOLD FOR 24 HOURS PRIOR TO PROCEDURE) 90 Tablet 2    cetirizine (ZYRTEC) 10 MG Tab Take 1 Tablet by mouth 2 times a day. (Patient taking differently: Take 10 mg by mouth 2 times a day.     MEDICATION INSTRUCTIONS FOR SURGERY/PROCEDURE SCHEDULED FOR 9/3/24   OK TO CONTINUE TAKING PRIOR TO SURGERY AND DAY OF SURGERY) 60 Tablet 5    Blood Glucose Test Strips 1 Strip by Other route in the morning, at noon, and at bedtime. Use one test strip to test blood sugar three times daily. 300 Strip 0    Lancets Use one lancet to test blood sugar three times daily. 300 Each 0    EPINEPHrine (EPIPEN) 0.3 MG/0.3ML Solution Auto-injector solution for injection Inject 0.3 mg into the shoulder, thigh, or buttocks as needed.     MEDICATION INSTRUCTIONS FOR SURGERY/PROCEDURE SCHEDULED FOR 9/3/24   OK TO CONTINUE TAKING PRIOR TO SURGERY AND DAY OF SURGERY IF NEEDED       No current facility-administered medications for this visit.       Patient Active Problem List    Diagnosis Date Noted    Major depressive disorder 06/05/2024    Cervical stenosis of spinal canal 12/21/2023    Degenerative disc disease, thoracic 12/21/2023    Skin lesion 11/15/2023    Hives 11/01/2023    Allergies 10/16/2023    Seizure-like activity (HCC) 09/27/2023    Type 2 diabetes mellitus without complication, without long-term current use of insulin (HCC) 09/27/2023    Obesity (BMI 30.0-34.9) 09/27/2023    Anxiety 02/22/2023    Acute pain of right shoulder 12/07/2022    Encounter to establish care 12/07/2022    Essential hypertension 06/06/2022    Generalized anxiety disorder with panic attacks 06/06/2022    Varicose veins of both lower extremities with pain 08/31/2021    Insulin resistance 08/31/2021    Scalp mass 08/31/2021    Encounter for smoking cessation  "counseling 08/31/2021    Paresthesia of both feet 06/14/2021    Polycystic ovary syndrome 06/14/2021    Localization-related focal epilepsy with complex partial seizures (HCC) 06/05/2019        Objective:   Ht 1.626 m (5' 4\")   Wt 90.7 kg (200 lb)   BMI 34.33 kg/m²     Physical Exam:  Constitutional: Alert, no distress, well-groomed.  Skin: No rashes in visible areas.  Eye: Round. Conjunctiva clear, lids normal. No icterus.   ENMT: Lips pink without lesions, good dentition, moist mucous membranes. Phonation normal.  Neck: No masses, no thyromegaly. Moves freely without pain.  Respiratory: Unlabored respiratory effort, no cough or audible wheeze  Psych: Alert and oriented x3, normal affect and mood.     Assessment and Plan:   The following treatment plan was discussed:     1. Chronic pain syndrome  2. Severe episode of recurrent major depressive disorder, without psychotic features (HCC)  Acute issue, ongoing but worsening.  Patient reports having a lot of stress in her life currently and has been waking up feeling anxious and tense.  We discussed different options but ultimately decided to increase her Cymbalta to 30 mg twice daily versus 1 daily.  She is seeing her therapist tomorrow and I have recommended that she go back to weekly visits while she is dealing with increased stressors in her life.  Denies any suicidal or homicidal thoughts.  Will follow-up in 4 weeks for mood check.  Follow-up precautions given.  - DULoxetine (CYMBALTA) 30 MG Cap DR Particles; Take 1 Capsule by mouth 2 times a day.  Dispense: 60 Capsule; Refill: 2      Follow-up: Return in about 4 weeks (around 9/19/2024) for Mood Check.         "

## 2024-08-26 ENCOUNTER — TELEPHONE (OUTPATIENT)
Dept: MEDICAL GROUP | Facility: MEDICAL CENTER | Age: 39
End: 2024-08-26
Payer: MEDICAID

## 2024-08-26 NOTE — TELEPHONE ENCOUNTER
Med:  Semaglutide, 1 MG/DOSE, (OZEMPIC, 1 MG/DOSE,) 4 MG/3ML Solution Pen-injector     Message: insurance wants 3 months refill.

## 2024-08-27 DIAGNOSIS — E11.9 TYPE 2 DIABETES MELLITUS WITHOUT COMPLICATION, WITHOUT LONG-TERM CURRENT USE OF INSULIN (HCC): ICD-10-CM

## 2024-08-27 RX ORDER — SEMAGLUTIDE 1.34 MG/ML
1 INJECTION, SOLUTION SUBCUTANEOUS
Qty: 3 ML | Refills: 5 | Status: SHIPPED | OUTPATIENT
Start: 2024-08-27 | End: 2024-08-28 | Stop reason: SDUPTHER

## 2024-08-28 DIAGNOSIS — E11.9 TYPE 2 DIABETES MELLITUS WITHOUT COMPLICATION, WITHOUT LONG-TERM CURRENT USE OF INSULIN (HCC): ICD-10-CM

## 2024-08-28 RX ORDER — SEMAGLUTIDE 1.34 MG/ML
1 INJECTION, SOLUTION SUBCUTANEOUS
Qty: 3 ML | Refills: 5 | Status: SHIPPED | OUTPATIENT
Start: 2024-08-28

## 2024-08-28 NOTE — TELEPHONE ENCOUNTER
Need 3 months per insurance.    Received request via: Pharmacy    Was the patient seen in the last year in this department? Yes    Does the patient have an active prescription (recently filled or refills available) for medication(s) requested? No    [facility]    Does the patient have care home Plus and need 100-day supply? (This applies to ALL medications) Patient does not have SCP

## 2024-08-30 DIAGNOSIS — T78.40XA ALLERGY, INITIAL ENCOUNTER: ICD-10-CM

## 2024-08-30 NOTE — TELEPHONE ENCOUNTER
Received request via: Pharmacy    Was the patient seen in the last year in this department? Yes    Does the patient have an active prescription (recently filled or refills available) for medication(s) requested? No    Ph[facility] Name: [facility]    Does the patient have longterm Plus and need 100-day supply? (This applies to ALL medications) Patient does not have [facility]    Future Appointments         Provider Department [place]    9/18/2024 3:40 PM (Arrive by 3:25 PM) [facility]            no

## 2024-09-01 RX ORDER — CETIRIZINE HYDROCHLORIDE 10 MG/1
10 TABLET ORAL 2 TIMES DAILY
Qty: 60 TABLET | Refills: 0 | Status: SHIPPED | OUTPATIENT
Start: 2024-09-01

## 2024-09-01 RX ORDER — FAMOTIDINE 40 MG/1
40 TABLET, FILM COATED ORAL 2 TIMES DAILY
Qty: 60 TABLET | Refills: 0 | Status: SHIPPED | OUTPATIENT
Start: 2024-09-01

## 2024-09-03 ENCOUNTER — APPOINTMENT (OUTPATIENT)
Dept: RADIOLOGY | Facility: MEDICAL CENTER | Age: 39
DRG: 472 | End: 2024-09-03
Attending: NEUROLOGICAL SURGERY
Payer: MEDICAID

## 2024-09-03 ENCOUNTER — ANESTHESIA (OUTPATIENT)
Dept: SURGERY | Facility: MEDICAL CENTER | Age: 39
DRG: 472 | End: 2024-09-03
Payer: MEDICAID

## 2024-09-03 ENCOUNTER — ANESTHESIA EVENT (OUTPATIENT)
Dept: SURGERY | Facility: MEDICAL CENTER | Age: 39
DRG: 472 | End: 2024-09-03
Payer: MEDICAID

## 2024-09-03 PROBLEM — G95.9 CERVICAL MYELOPATHY (HCC): Status: ACTIVE | Noted: 2024-09-03

## 2024-09-03 LAB
ABO GROUP BLD: NORMAL
BLD GP AB SCN SERPL QL: NORMAL
GLUCOSE BLD STRIP.AUTO-MCNC: 127 MG/DL (ref 65–99)
RH BLD: NORMAL

## 2024-09-03 PROCEDURE — 0RG40KJ FUSION OF CERVICOTHORACIC VERTEBRAL JOINT WITH NONAUTOLOGOUS TISSUE SUBSTITUTE, POSTERIOR APPROACH, ANTERIOR COLUMN, OPEN APPROACH: ICD-10-PCS | Performed by: NEUROLOGICAL SURGERY

## 2024-09-03 PROCEDURE — 95937 NEUROMUSCULAR JUNCTION TEST: CPT | Performed by: NEUROLOGICAL SURGERY

## 2024-09-03 PROCEDURE — 95939 C MOTOR EVOKED UPR&LWR LIMBS: CPT | Performed by: NEUROLOGICAL SURGERY

## 2024-09-03 PROCEDURE — C1713 ANCHOR/SCREW BN/BN,TIS/BN: HCPCS | Performed by: NEUROLOGICAL SURGERY

## 2024-09-03 PROCEDURE — 36415 COLL VENOUS BLD VENIPUNCTURE: CPT

## 2024-09-03 PROCEDURE — 86850 RBC ANTIBODY SCREEN: CPT

## 2024-09-03 PROCEDURE — A9270 NON-COVERED ITEM OR SERVICE: HCPCS | Performed by: ANESTHESIOLOGY

## 2024-09-03 PROCEDURE — 82962 GLUCOSE BLOOD TEST: CPT

## 2024-09-03 PROCEDURE — 160042 HCHG SURGERY MINUTES - EA ADDL 1 MIN LEVEL 5: Performed by: NEUROLOGICAL SURGERY

## 2024-09-03 PROCEDURE — 160002 HCHG RECOVERY MINUTES (STAT): Performed by: NEUROLOGICAL SURGERY

## 2024-09-03 PROCEDURE — 700105 HCHG RX REV CODE 258: Performed by: ANESTHESIOLOGY

## 2024-09-03 PROCEDURE — 95938 SOMATOSENSORY TESTING: CPT | Performed by: NEUROLOGICAL SURGERY

## 2024-09-03 PROCEDURE — 700101 HCHG RX REV CODE 250: Performed by: NEUROLOGICAL SURGERY

## 2024-09-03 PROCEDURE — 0RG20KJ FUSION OF 2 OR MORE CERVICAL VERTEBRAL JOINTS WITH NONAUTOLOGOUS TISSUE SUBSTITUTE, POSTERIOR APPROACH, ANTERIOR COLUMN, OPEN APPROACH: ICD-10-PCS | Performed by: NEUROLOGICAL SURGERY

## 2024-09-03 PROCEDURE — 700111 HCHG RX REV CODE 636 W/ 250 OVERRIDE (IP): Performed by: NURSE PRACTITIONER

## 2024-09-03 PROCEDURE — 502000 HCHG MISC OR IMPLANTS RC 0278: Performed by: NEUROLOGICAL SURGERY

## 2024-09-03 PROCEDURE — 160036 HCHG PACU - EA ADDL 30 MINS PHASE I: Performed by: NEUROLOGICAL SURGERY

## 2024-09-03 PROCEDURE — 700101 HCHG RX REV CODE 250: Performed by: ANESTHESIOLOGY

## 2024-09-03 PROCEDURE — 86901 BLOOD TYPING SEROLOGIC RH(D): CPT

## 2024-09-03 PROCEDURE — 160009 HCHG ANES TIME/MIN: Performed by: NEUROLOGICAL SURGERY

## 2024-09-03 PROCEDURE — 700106 HCHG RX REV CODE 271: Performed by: NEUROLOGICAL SURGERY

## 2024-09-03 PROCEDURE — 160035 HCHG PACU - 1ST 60 MINS PHASE I: Performed by: NEUROLOGICAL SURGERY

## 2024-09-03 PROCEDURE — A9270 NON-COVERED ITEM OR SERVICE: HCPCS | Performed by: NEUROLOGICAL SURGERY

## 2024-09-03 PROCEDURE — 700102 HCHG RX REV CODE 250 W/ 637 OVERRIDE(OP): Performed by: NEUROLOGICAL SURGERY

## 2024-09-03 PROCEDURE — 700102 HCHG RX REV CODE 250 W/ 637 OVERRIDE(OP): Performed by: ANESTHESIOLOGY

## 2024-09-03 PROCEDURE — 86900 BLOOD TYPING SEROLOGIC ABO: CPT

## 2024-09-03 PROCEDURE — 110371 HCHG SHELL REV 272: Performed by: NEUROLOGICAL SURGERY

## 2024-09-03 PROCEDURE — 95861 NEEDLE EMG 2 EXTREMITIES: CPT | Performed by: NEUROLOGICAL SURGERY

## 2024-09-03 PROCEDURE — 700111 HCHG RX REV CODE 636 W/ 250 OVERRIDE (IP): Performed by: ANESTHESIOLOGY

## 2024-09-03 PROCEDURE — 0PP304Z REMOVAL OF INTERNAL FIXATION DEVICE FROM CERVICAL VERTEBRA, OPEN APPROACH: ICD-10-PCS | Performed by: NEUROLOGICAL SURGERY

## 2024-09-03 PROCEDURE — 110454 HCHG SHELL REV 250: Performed by: NEUROLOGICAL SURGERY

## 2024-09-03 PROCEDURE — 700105 HCHG RX REV CODE 258: Performed by: NEUROLOGICAL SURGERY

## 2024-09-03 PROCEDURE — 770001 HCHG ROOM/CARE - MED/SURG/GYN PRIV*

## 2024-09-03 PROCEDURE — 3E0U0GB INTRODUCTION OF RECOMBINANT BONE MORPHOGENETIC PROTEIN INTO JOINTS, OPEN APPROACH: ICD-10-PCS | Performed by: NEUROLOGICAL SURGERY

## 2024-09-03 PROCEDURE — 160048 HCHG OR STATISTICAL LEVEL 1-5: Performed by: NEUROLOGICAL SURGERY

## 2024-09-03 PROCEDURE — 95940 IONM IN OPERATNG ROOM 15 MIN: CPT | Performed by: NEUROLOGICAL SURGERY

## 2024-09-03 PROCEDURE — 160031 HCHG SURGERY MINUTES - 1ST 30 MINS LEVEL 5: Performed by: NEUROLOGICAL SURGERY

## 2024-09-03 PROCEDURE — 700111 HCHG RX REV CODE 636 W/ 250 OVERRIDE (IP): Mod: JZ | Performed by: ANESTHESIOLOGY

## 2024-09-03 PROCEDURE — 700111 HCHG RX REV CODE 636 W/ 250 OVERRIDE (IP): Performed by: NEUROLOGICAL SURGERY

## 2024-09-03 DEVICE — SCREW SET INFINITY (1EA): Type: IMPLANTABLE DEVICE | Site: SPINE CERVICAL | Status: FUNCTIONAL

## 2024-09-03 DEVICE — GRAFT BONE MAGNIFUSE 1X20CM (1/EA): Type: IMPLANTABLE DEVICE | Site: SPINE CERVICAL | Status: FUNCTIONAL

## 2024-09-03 DEVICE — IMPLANTABLE DEVICE: Type: IMPLANTABLE DEVICE | Site: SPINE CERVICAL | Status: FUNCTIONAL

## 2024-09-03 DEVICE — GRAFT BONE PASTE GRAFTON PLUS 10CC (1EA): Type: IMPLANTABLE DEVICE | Site: SPINE CERVICAL | Status: FUNCTIONAL

## 2024-09-03 DEVICE — GRAPH BONE KIT INFUSE LARGE II: Type: IMPLANTABLE DEVICE | Site: SPINE CERVICAL | Status: FUNCTIONAL

## 2024-09-03 DEVICE — POWDER SURIGICAL 1GM COLLAGEN CELLERATE RX (1EA): Type: IMPLANTABLE DEVICE | Site: SPINE CERVICAL | Status: FUNCTIONAL

## 2024-09-03 RX ORDER — BUPIVACAINE HYDROCHLORIDE AND EPINEPHRINE 5; 5 MG/ML; UG/ML
INJECTION, SOLUTION EPIDURAL; INTRACAUDAL; PERINEURAL
Status: DISCONTINUED | OUTPATIENT
Start: 2024-09-03 | End: 2024-09-03 | Stop reason: HOSPADM

## 2024-09-03 RX ORDER — LABETALOL HYDROCHLORIDE 5 MG/ML
10 INJECTION, SOLUTION INTRAVENOUS
Status: DISCONTINUED | OUTPATIENT
Start: 2024-09-03 | End: 2024-09-11 | Stop reason: HOSPADM

## 2024-09-03 RX ORDER — SODIUM CHLORIDE, SODIUM LACTATE, POTASSIUM CHLORIDE, CALCIUM CHLORIDE 600; 310; 30; 20 MG/100ML; MG/100ML; MG/100ML; MG/100ML
INJECTION, SOLUTION INTRAVENOUS CONTINUOUS
Status: DISCONTINUED | OUTPATIENT
Start: 2024-09-03 | End: 2024-09-03 | Stop reason: HOSPADM

## 2024-09-03 RX ORDER — OXYCODONE HCL 5 MG/5 ML
5 SOLUTION, ORAL ORAL
Status: COMPLETED | OUTPATIENT
Start: 2024-09-03 | End: 2024-09-03

## 2024-09-03 RX ORDER — OXYCODONE HCL 5 MG/5 ML
10 SOLUTION, ORAL ORAL
Status: COMPLETED | OUTPATIENT
Start: 2024-09-03 | End: 2024-09-03

## 2024-09-03 RX ORDER — RIMEGEPANT SULFATE 75 MG/75MG
75 TABLET, ORALLY DISINTEGRATING ORAL
COMMUNITY
Start: 2024-08-24

## 2024-09-03 RX ORDER — HYDROMORPHONE HYDROCHLORIDE 1 MG/ML
0.1 INJECTION, SOLUTION INTRAMUSCULAR; INTRAVENOUS; SUBCUTANEOUS
Status: DISCONTINUED | OUTPATIENT
Start: 2024-09-03 | End: 2024-09-03 | Stop reason: HOSPADM

## 2024-09-03 RX ORDER — ONDANSETRON 2 MG/ML
4 INJECTION INTRAMUSCULAR; INTRAVENOUS
Status: COMPLETED | OUTPATIENT
Start: 2024-09-03 | End: 2024-09-03

## 2024-09-03 RX ORDER — ACETAMINOPHEN 500 MG
1000 TABLET ORAL EVERY 6 HOURS PRN
Status: DISCONTINUED | OUTPATIENT
Start: 2024-09-08 | End: 2024-09-11 | Stop reason: HOSPADM

## 2024-09-03 RX ORDER — CALCIUM CARBONATE 500 MG/1
500 TABLET, CHEWABLE ORAL 2 TIMES DAILY
Status: DISCONTINUED | OUTPATIENT
Start: 2024-09-03 | End: 2024-09-11 | Stop reason: HOSPADM

## 2024-09-03 RX ORDER — BACITRACIN ZINC 500 [USP'U]/G
OINTMENT TOPICAL
Status: DISCONTINUED | OUTPATIENT
Start: 2024-09-03 | End: 2024-09-03 | Stop reason: HOSPADM

## 2024-09-03 RX ORDER — CEFAZOLIN SODIUM 1 G/3ML
INJECTION, POWDER, FOR SOLUTION INTRAMUSCULAR; INTRAVENOUS
Status: DISCONTINUED | OUTPATIENT
Start: 2024-09-03 | End: 2024-09-03 | Stop reason: HOSPADM

## 2024-09-03 RX ORDER — HALOPERIDOL 5 MG/ML
1 INJECTION INTRAMUSCULAR
Status: DISCONTINUED | OUTPATIENT
Start: 2024-09-03 | End: 2024-09-03 | Stop reason: HOSPADM

## 2024-09-03 RX ORDER — AMOXICILLIN 250 MG
1 CAPSULE ORAL NIGHTLY
Status: DISCONTINUED | OUTPATIENT
Start: 2024-09-03 | End: 2024-09-11 | Stop reason: HOSPADM

## 2024-09-03 RX ORDER — BISACODYL 10 MG
10 SUPPOSITORY, RECTAL RECTAL
Status: DISCONTINUED | OUTPATIENT
Start: 2024-09-03 | End: 2024-09-11 | Stop reason: HOSPADM

## 2024-09-03 RX ORDER — SODIUM CHLORIDE, SODIUM LACTATE, POTASSIUM CHLORIDE, CALCIUM CHLORIDE 600; 310; 30; 20 MG/100ML; MG/100ML; MG/100ML; MG/100ML
INJECTION, SOLUTION INTRAVENOUS CONTINUOUS
Status: ACTIVE | OUTPATIENT
Start: 2024-09-03 | End: 2024-09-03

## 2024-09-03 RX ORDER — HYDROMORPHONE HYDROCHLORIDE 1 MG/ML
0.4 INJECTION, SOLUTION INTRAMUSCULAR; INTRAVENOUS; SUBCUTANEOUS
Status: DISCONTINUED | OUTPATIENT
Start: 2024-09-03 | End: 2024-09-03 | Stop reason: HOSPADM

## 2024-09-03 RX ORDER — AMOXICILLIN 250 MG
1 CAPSULE ORAL
Status: DISCONTINUED | OUTPATIENT
Start: 2024-09-03 | End: 2024-09-11 | Stop reason: HOSPADM

## 2024-09-03 RX ORDER — MAGNESIUM HYDROXIDE 1200 MG/15ML
LIQUID ORAL
Status: COMPLETED | OUTPATIENT
Start: 2024-09-03 | End: 2024-09-03

## 2024-09-03 RX ORDER — DOCUSATE SODIUM 100 MG/1
100 CAPSULE, LIQUID FILLED ORAL 2 TIMES DAILY
Status: DISCONTINUED | OUTPATIENT
Start: 2024-09-03 | End: 2024-09-11 | Stop reason: HOSPADM

## 2024-09-03 RX ORDER — CEFAZOLIN SODIUM 1 G/3ML
INJECTION, POWDER, FOR SOLUTION INTRAMUSCULAR; INTRAVENOUS PRN
Status: DISCONTINUED | OUTPATIENT
Start: 2024-09-03 | End: 2024-09-03 | Stop reason: SURG

## 2024-09-03 RX ORDER — PHENYLEPHRINE HYDROCHLORIDE 10 MG/ML
INJECTION, SOLUTION INTRAMUSCULAR; INTRAVENOUS; SUBCUTANEOUS PRN
Status: DISCONTINUED | OUTPATIENT
Start: 2024-09-03 | End: 2024-09-03 | Stop reason: SURG

## 2024-09-03 RX ORDER — SODIUM PHOSPHATE,MONO-DIBASIC 19G-7G/118
1 ENEMA (ML) RECTAL
Status: DISCONTINUED | OUTPATIENT
Start: 2024-09-03 | End: 2024-09-11 | Stop reason: HOSPADM

## 2024-09-03 RX ORDER — ACETAMINOPHEN 500 MG
1000 TABLET ORAL EVERY 6 HOURS
Status: DISPENSED | OUTPATIENT
Start: 2024-09-03 | End: 2024-09-08

## 2024-09-03 RX ORDER — HYDROMORPHONE HYDROCHLORIDE 1 MG/ML
0.2 INJECTION, SOLUTION INTRAMUSCULAR; INTRAVENOUS; SUBCUTANEOUS
Status: DISCONTINUED | OUTPATIENT
Start: 2024-09-03 | End: 2024-09-03 | Stop reason: HOSPADM

## 2024-09-03 RX ORDER — DIPHENHYDRAMINE HYDROCHLORIDE 50 MG/ML
12.5 INJECTION INTRAMUSCULAR; INTRAVENOUS
Status: DISCONTINUED | OUTPATIENT
Start: 2024-09-03 | End: 2024-09-03 | Stop reason: HOSPADM

## 2024-09-03 RX ORDER — MIDAZOLAM HYDROCHLORIDE 1 MG/ML
INJECTION INTRAMUSCULAR; INTRAVENOUS PRN
Status: DISCONTINUED | OUTPATIENT
Start: 2024-09-03 | End: 2024-09-03 | Stop reason: SURG

## 2024-09-03 RX ORDER — MEPERIDINE HYDROCHLORIDE 25 MG/ML
6.25 INJECTION INTRAMUSCULAR; INTRAVENOUS; SUBCUTANEOUS
Status: DISCONTINUED | OUTPATIENT
Start: 2024-09-03 | End: 2024-09-03 | Stop reason: HOSPADM

## 2024-09-03 RX ORDER — SODIUM CHLORIDE, SODIUM LACTATE, POTASSIUM CHLORIDE, AND CALCIUM CHLORIDE .6; .31; .03; .02 G/100ML; G/100ML; G/100ML; G/100ML
IRRIGANT IRRIGATION
Status: DISCONTINUED | OUTPATIENT
Start: 2024-09-03 | End: 2024-09-03 | Stop reason: HOSPADM

## 2024-09-03 RX ORDER — LIDOCAINE HYDROCHLORIDE 20 MG/ML
INJECTION, SOLUTION EPIDURAL; INFILTRATION; INTRACAUDAL; PERINEURAL PRN
Status: DISCONTINUED | OUTPATIENT
Start: 2024-09-03 | End: 2024-09-03 | Stop reason: SURG

## 2024-09-03 RX ORDER — POLYETHYLENE GLYCOL 3350 17 G/17G
1 POWDER, FOR SOLUTION ORAL 2 TIMES DAILY PRN
Status: DISCONTINUED | OUTPATIENT
Start: 2024-09-03 | End: 2024-09-11 | Stop reason: HOSPADM

## 2024-09-03 RX ORDER — DEXAMETHASONE SODIUM PHOSPHATE 4 MG/ML
INJECTION, SOLUTION INTRA-ARTICULAR; INTRALESIONAL; INTRAMUSCULAR; INTRAVENOUS; SOFT TISSUE PRN
Status: DISCONTINUED | OUTPATIENT
Start: 2024-09-03 | End: 2024-09-03 | Stop reason: SURG

## 2024-09-03 RX ORDER — HYDRALAZINE HYDROCHLORIDE 20 MG/ML
10 INJECTION INTRAMUSCULAR; INTRAVENOUS
Status: DISCONTINUED | OUTPATIENT
Start: 2024-09-03 | End: 2024-09-11 | Stop reason: HOSPADM

## 2024-09-03 RX ORDER — SODIUM CHLORIDE AND POTASSIUM CHLORIDE 150; 900 MG/100ML; MG/100ML
INJECTION, SOLUTION INTRAVENOUS CONTINUOUS
Status: DISCONTINUED | OUTPATIENT
Start: 2024-09-03 | End: 2024-09-08

## 2024-09-03 RX ORDER — EPHEDRINE SULFATE 50 MG/ML
INJECTION, SOLUTION INTRAVENOUS PRN
Status: DISCONTINUED | OUTPATIENT
Start: 2024-09-03 | End: 2024-09-03 | Stop reason: SURG

## 2024-09-03 RX ORDER — ONDANSETRON 2 MG/ML
INJECTION INTRAMUSCULAR; INTRAVENOUS PRN
Status: DISCONTINUED | OUTPATIENT
Start: 2024-09-03 | End: 2024-09-03 | Stop reason: SURG

## 2024-09-03 RX ADMIN — MIDAZOLAM HYDROCHLORIDE 2 MG: 2 INJECTION, SOLUTION INTRAMUSCULAR; INTRAVENOUS at 07:46

## 2024-09-03 RX ADMIN — PROPOFOL 140 MCG/KG/MIN: 10 INJECTION, EMULSION INTRAVENOUS at 08:10

## 2024-09-03 RX ADMIN — OXYCODONE HYDROCHLORIDE 10 MG: 5 SOLUTION ORAL at 12:20

## 2024-09-03 RX ADMIN — FENTANYL CITRATE 50 MCG: 50 INJECTION, SOLUTION INTRAMUSCULAR; INTRAVENOUS at 12:25

## 2024-09-03 RX ADMIN — FENTANYL CITRATE 50 MCG: 50 INJECTION, SOLUTION INTRAMUSCULAR; INTRAVENOUS at 12:30

## 2024-09-03 RX ADMIN — CEFAZOLIN 2 G: 1 INJECTION, POWDER, FOR SOLUTION INTRAMUSCULAR; INTRAVENOUS at 08:23

## 2024-09-03 RX ADMIN — METHOCARBAMOL 1000 MG: 100 INJECTION, SOLUTION INTRAMUSCULAR; INTRAVENOUS at 12:45

## 2024-09-03 RX ADMIN — HYDROMORPHONE HYDROCHLORIDE 0.2 MG: 1 INJECTION, SOLUTION INTRAMUSCULAR; INTRAVENOUS; SUBCUTANEOUS at 13:50

## 2024-09-03 RX ADMIN — HYDROMORPHONE HYDROCHLORIDE 0.4 MG: 1 INJECTION, SOLUTION INTRAMUSCULAR; INTRAVENOUS; SUBCUTANEOUS at 12:38

## 2024-09-03 RX ADMIN — PROPOFOL 200 MG: 10 INJECTION, EMULSION INTRAVENOUS at 07:53

## 2024-09-03 RX ADMIN — HYDROMORPHONE HYDROCHLORIDE 0.2 MG: 1 INJECTION, SOLUTION INTRAMUSCULAR; INTRAVENOUS; SUBCUTANEOUS at 15:39

## 2024-09-03 RX ADMIN — POTASSIUM CHLORIDE AND SODIUM CHLORIDE: 900; 150 INJECTION, SOLUTION INTRAVENOUS at 18:00

## 2024-09-03 RX ADMIN — DEXAMETHASONE SODIUM PHOSPHATE 8 MG: 4 INJECTION INTRA-ARTICULAR; INTRALESIONAL; INTRAMUSCULAR; INTRAVENOUS; SOFT TISSUE at 07:53

## 2024-09-03 RX ADMIN — SENNOSIDES AND DOCUSATE SODIUM 1 TABLET: 50; 8.6 TABLET ORAL at 20:11

## 2024-09-03 RX ADMIN — Medication: at 18:02

## 2024-09-03 RX ADMIN — LIDOCAINE HYDROCHLORIDE 100 MG: 20 INJECTION, SOLUTION EPIDURAL; INFILTRATION; INTRACAUDAL at 07:53

## 2024-09-03 RX ADMIN — ACETAMINOPHEN 1000 MG: 500 TABLET ORAL at 23:28

## 2024-09-03 RX ADMIN — ONDANSETRON 4 MG: 2 INJECTION INTRAMUSCULAR; INTRAVENOUS at 12:25

## 2024-09-03 RX ADMIN — FENTANYL CITRATE 150 MCG: 50 INJECTION, SOLUTION INTRAMUSCULAR; INTRAVENOUS at 07:53

## 2024-09-03 RX ADMIN — Medication 100 MG: at 07:53

## 2024-09-03 RX ADMIN — PHENYLEPHRINE HYDROCHLORIDE 200 MCG: 10 INJECTION INTRAVENOUS at 09:05

## 2024-09-03 RX ADMIN — Medication: at 23:31

## 2024-09-03 RX ADMIN — ONDANSETRON 4 MG: 2 INJECTION INTRAMUSCULAR; INTRAVENOUS at 11:22

## 2024-09-03 RX ADMIN — TIZANIDINE 2 MG: 4 TABLET ORAL at 17:46

## 2024-09-03 RX ADMIN — ACETAMINOPHEN 1000 MG: 500 TABLET ORAL at 17:46

## 2024-09-03 RX ADMIN — HYDROMORPHONE HYDROCHLORIDE 0.4 MG: 1 INJECTION, SOLUTION INTRAMUSCULAR; INTRAVENOUS; SUBCUTANEOUS at 16:35

## 2024-09-03 RX ADMIN — SODIUM CHLORIDE, POTASSIUM CHLORIDE, SODIUM LACTATE AND CALCIUM CHLORIDE: 600; 310; 30; 20 INJECTION, SOLUTION INTRAVENOUS at 06:15

## 2024-09-03 RX ADMIN — HYDROMORPHONE HYDROCHLORIDE 0.2 MG: 1 INJECTION, SOLUTION INTRAMUSCULAR; INTRAVENOUS; SUBCUTANEOUS at 13:34

## 2024-09-03 RX ADMIN — ANTACID TABLETS 500 MG: 500 TABLET, CHEWABLE ORAL at 17:46

## 2024-09-03 RX ADMIN — DOCUSATE SODIUM 100 MG: 100 CAPSULE, LIQUID FILLED ORAL at 17:47

## 2024-09-03 RX ADMIN — EPHEDRINE SULFATE 10 MG: 50 INJECTION, SOLUTION INTRAVENOUS at 09:05

## 2024-09-03 RX ADMIN — TIZANIDINE 2 MG: 4 TABLET ORAL at 20:11

## 2024-09-03 ASSESSMENT — PAIN DESCRIPTION - PAIN TYPE
TYPE: SURGICAL PAIN

## 2024-09-03 ASSESSMENT — FIBROSIS 4 INDEX
FIB4 SCORE: 0.49
FIB4 SCORE: 0.49

## 2024-09-03 NOTE — OR NURSING
Mother calls for update. Pt resting. Doing well. Waiting for room T332 to be cleaned and ready. Will call when pt in room

## 2024-09-03 NOTE — ANESTHESIA TIME REPORT
Anesthesia Start and Stop Event Times       Date Time Event    9/3/2024 0734 Ready for Procedure     0746 Anesthesia Start     1202 Anesthesia Stop          Responsible Staff  09/03/24      Name Role Begin End    Anjel Arana M.D. Anesth 0746 1202          Overtime Reason:  no overtime (within assigned shift)    Comments:

## 2024-09-03 NOTE — OP REPORT
NEUROSURGERY OPERATIVE NOTE  DATE:  12:02 PM 2024    PATIENT NAME:  Noy Rose   1985 MRN [id number]      PROCEDURE:  1.  Removal hardware cervical 3-thoracic 2 with replacement  2.  Arthrodesis with bone allograft cervical 3 through thoracic 2 (BMP, Magnifuse, graft on DBM Medtronic)  3.  Neurophysiologic monitoring  4.  Stealth intraoperative navigation for placement of lateral mass screws  5.  Wound vac placement (Provena)  6.  Modifier 22: Significant technical difficulty and increased surgical time given prior surgery with dense scar tissue present.  This resulted in doubling of the typical surgical time.    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  She was doing well following surgery but then developed increasing neck pain.  Imaging demonstrates apparent failure bone fusion in the upper levels, with screw pullout at the cervical 3 and 4 levels.  Given this, reexploration with removal of hardware and placement of new screws was planned.    Procedure:  The patient was identified in the holding area, and the surgery site marked, consent was obtained.  The patient was brought back to the operating room and intubated by anesthesia service.  2 grams Ancef was administered intravenously.  Neurophysiology monitoring leads were placed with good signals.  She was transferred to the operating room table in a prone manner and all pressure points well padded.  Their cervical thoracic region was prepped with chlorhexidine and betadine scrub, and Chloroprep.  Sterile drapes were applied including a layer of Ioban.  The correct vertebral levels were identified flouroscopically. The planned midline incision was infiltrated with 0.5% Marcaine/epinephrine.      The skin was  incised sharply and dissection carried deeply using monopolar cautery.  Dissection was directed laterally, exposing the thoracic tumor screws initially.  Dissection was then carried cranially following the screws and rods until the cervical 3 level was encountered.  Scar tissue was removed from around the screws and rods.  The setscrews were removed, and the amanda removed.  The bilateral cervical 3 and 4 screws were removed, which had very poor purchase.  Her cervical vertebral column was gestured to the Stealth intraoperative navigation system using Stealth; good registration was verified.  New screws were then placed in the cervical 3 level.  The entry site was identified using Stealth, the cortex breach using a navigated drill, and the trajectory drilled under navigation.  A 3.0 mm tap was then used to tap the trajectory under navigation.  Bony walls were verified, a 16 mm screw was placed on the left and a 20 mm screw on the right.  An O-arm spin was then performed demonstrating good screw position.  Given the length of the screw with solid bony purchase, and the cervical 4 entry point would have been immediately adjacent to the cervical 3 and 5 screws, which would not allow any movement of the head, screws were not placed in the cervical 4 level.    The fusion mass was somewhat soft throughout, this was decorticated using a high-speed drill for the AMA drill bit.  BMP and bone allograft (Magnifuse, graft on DBM, Medtronic) was then placed spanning the decorticated bone.  The bilateral cervical 3/4, 4/5 facet spaces had been drilled out prior, this was packed with [place] DBM (Medtronic).  Rods were bent to shape, placed along the screw heads, and setscrews placed.  The setscrews were final tightened.    The operative site was copiously irrigated with antibiotic normal saline irrigation several times during the procedure.  A medium Hemovac drain was placed, brought out through a separate incision and secured to  the skin using 2-0 nylon suture.  The cervical and thoracic muscle and fascia were reapproximated using 1-0 Vicryl suture in interrupted manner.  Cellerate granular collagen was placed over the fascial layer, along the adipose layer.  The dermis was then reapproximated using 2-0 Vicryl suture in interrupted inverted manner.  The skin was reapproximated with staples after Cellerate granular collagen was placed along the edges.  A customizable wound VAC (Prevena) was placed.  Prevena was used due to prior surgery.  Neurophysiologic monitoring signals remained stable throughout the procedure.  Final counts were correct.    FINDINGS: Pseudoarthrosis upper cervical region of prior fusion mass.  Successful replacement cervical 3 screws withArthrodesis using bone autograft.  Stable neurophysiologic signals.  SPECIMEN:  None  DRAINS:  #7 flat to TOMÁS bulb  EBL:  <50 CC  COMPLICATIONS:  None apparent at end of procedure.

## 2024-09-03 NOTE — PROGRESS NOTES
Medication history reviewed with PT at bedside    [facility] rec is complete per PT reporting    Allergies reviewed.     Patient denies any outpatient antibiotics in the last 30 days.     Patient is not taking anticoagulants.    Preferred pharmacy for this visit - [facility] on [address] ([phone])

## 2024-09-03 NOTE — ANESTHESIA PREPROCEDURE EVALUATION
Case: [id number] Date/Time: 09/03/24 0715    Procedures:       REMOVAL OF CERVICAL HARDWARE, REPLACEMENT OF C3-4 SCREWS, POSSIBLE C2 SCREWS, BONE MORPHOGENETIC PROTEIN, CADAVERIC BONE GRAFT, WITH OARM      REMOVAL, HARDWARE      BONE GRAFT    Pre-op diagnosis: PSEUDARTHROSIS AFTER FUSION OR ARTHRODESIS    Location: [facility] OR 05 / SURGERY [facility]    Surgeons: James Fierro M.D.            Relevant Problems   NEURO   (positive) Localization-related focal epilepsy with complex partial seizures (HCC)      CARDIAC   (positive) Essential hypertension   (positive) Varicose veins of both lower extremities with pain      ENDO   (positive) Type 2 diabetes mellitus without complication, without long-term current use of insulin (HCC)       Physical Exam    Airway   Mallampati: II  TM distance: >3 FB  Neck ROM: full       Cardiovascular - normal exam  Rhythm: regular  Rate: normal  (-) murmur     Dental - normal exam           Pulmonary - normal exam  Breath sounds clear to auscultation     Abdominal    Neurological - normal exam                   Anesthesia Plan    ASA 2       Plan - general       Airway plan will be ETT          Induction: intravenous    Postoperative Plan: Postoperative administration of opioids is intended.    Pertinent diagnostic labs and testing reviewed    Informed Consent:    Anesthetic plan and risks discussed with patient.    Use of blood products discussed with: patient whom consented to blood products.

## 2024-09-03 NOTE — PROGRESS NOTES
Attention order for quach miami j cervical collar. At this time we are out of supply of size stout c collars. Miami j universal size fit rigid cervical collar has been delivered to [employer] [facility] OR control desk for patients staff to apply and fit c collar to patient when ready. If still needed to go with stout collar please do not hesitate to contact the ortho tech team. We can seek option of ordering a stout size miami j c collar from another provider if needed.

## 2024-09-03 NOTE — ANESTHESIA POSTPROCEDURE EVALUATION
Patient: Noy Rose    Procedure Summary       Date: 09/03/24 Room / Location: [place] [facility] 05 / SURGERY [facility]    Anesthesia Start: 0746 Anesthesia Stop: 1202    Procedure: REMOVAL OF CERVICAL HARDWARE, REPLACEMENT OF C3-T4 SCREWS, BONE MORPHOGENETIC PROTEIN, CADAVERIC BONE GRAFT, WITH OARM (Spine Cervical) Diagnosis: (PSEUDARTHROSIS AFTER FUSION OR ARTHRODESIS)    Surgeons: James Fierro M.D. Responsible Provider: Anjel Arana M.D.    Anesthesia Type: general ASA Status: 2            Final Anesthesia Type: general  Last vitals  BP   Blood Pressure: 138/69    Temp   36.3 °C (97.4 °F)    Pulse   (!) 101   Resp   12    SpO2   100 %      Anesthesia Post Evaluation    Patient location during evaluation: PACU  Patient participation: complete - patient participated  Level of consciousness: awake and alert    Airway patency: patent  Anesthetic complications: no  Cardiovascular status: hemodynamically stable  Respiratory status: acceptable  Hydration status: euvolemic    PONV: none          No notable events documented.     Nurse Pain Score: 9 (NPRS)

## 2024-09-03 NOTE — ANESTHESIA PROCEDURE NOTES
Airway    Date/Time: 9/3/2024 7:58 AM    Performed by: Anjel Arana M.D.  Authorized by: Anjel Arana M.D.    Location:  OR  Urgency:  Elective  Indications for Airway Management:  Anesthesia      Spontaneous Ventilation: absent    Sedation Level:  Deep  Preoxygenated: Yes    Patient Position:  Sniffing  Final Airway Type:  Endotracheal airway  Final Endotracheal Airway:  ETT  Cuffed: Yes    Technique Used for Successful ETT Placement:  Direct laryngoscopy    Insertion Site:  Oral  Blade Type:  Saray  Laryngoscope Blade/Videolaryngoscope Blade Size:  3  ETT Size (mm):  7.0  Measured from:  Teeth  ETT to Teeth (cm):  18  Placement Verified by: auscultation and capnometry    Cormack-Lehane Classification:  Grade I - full view of glottis  Number of Attempts at Approach:  1

## 2024-09-04 LAB
ANION GAP SERPL CALC-SCNC: 13 MMOL/L (ref 7–16)
BUN SERPL-MCNC: 6 MG/DL (ref 8–22)
CALCIUM SERPL-MCNC: 8.6 MG/DL (ref 8.5–10.5)
CHLORIDE SERPL-SCNC: 105 MMOL/L (ref 96–112)
CO2 SERPL-SCNC: 22 MMOL/L (ref 20–33)
CREAT SERPL-MCNC: 0.51 MG/DL (ref 0.5–1.4)
ERYTHROCYTE [DISTWIDTH] IN BLOOD BY AUTOMATED COUNT: 53.4 FL (ref 35.9–50)
GFR SERPLBLD CREATININE-BSD FMLA CKD-EPI: 120 ML/MIN/1.73 M 2
GLUCOSE SERPL-MCNC: 165 MG/DL (ref 65–99)
HCT VFR BLD AUTO: 33.9 % (ref 37–47)
HGB BLD-MCNC: 10.4 G/DL (ref 12–16)
MCH RBC QN AUTO: 28.2 PG (ref 27–33)
MCHC RBC AUTO-ENTMCNC: 30.7 G/DL (ref 32.2–35.5)
MCV RBC AUTO: 91.9 FL (ref 81.4–97.8)
PLATELET # BLD AUTO: 263 K/UL (ref 164–446)
PMV BLD AUTO: 10 FL (ref 9–12.9)
POTASSIUM SERPL-SCNC: 4.1 MMOL/L (ref 3.6–5.5)
RBC # BLD AUTO: 3.69 M/UL (ref 4.2–5.4)
SODIUM SERPL-SCNC: 140 MMOL/L (ref 135–145)
WBC # BLD AUTO: 18.6 K/UL (ref 4.8–10.8)

## 2024-09-04 PROCEDURE — 700111 HCHG RX REV CODE 636 W/ 250 OVERRIDE (IP): Performed by: NEUROLOGICAL SURGERY

## 2024-09-04 PROCEDURE — A9270 NON-COVERED ITEM OR SERVICE: HCPCS | Performed by: NURSE PRACTITIONER

## 2024-09-04 PROCEDURE — 36415 COLL VENOUS BLD VENIPUNCTURE: CPT

## 2024-09-04 PROCEDURE — 700111 HCHG RX REV CODE 636 W/ 250 OVERRIDE (IP): Performed by: NURSE PRACTITIONER

## 2024-09-04 PROCEDURE — 770001 HCHG ROOM/CARE - MED/SURG/GYN PRIV*

## 2024-09-04 PROCEDURE — 85027 COMPLETE CBC AUTOMATED: CPT

## 2024-09-04 PROCEDURE — 97535 SELF CARE MNGMENT TRAINING: CPT

## 2024-09-04 PROCEDURE — A9270 NON-COVERED ITEM OR SERVICE: HCPCS | Performed by: NEUROLOGICAL SURGERY

## 2024-09-04 PROCEDURE — 700102 HCHG RX REV CODE 250 W/ 637 OVERRIDE(OP): Performed by: NEUROLOGICAL SURGERY

## 2024-09-04 PROCEDURE — 700101 HCHG RX REV CODE 250: Performed by: NEUROLOGICAL SURGERY

## 2024-09-04 PROCEDURE — 700102 HCHG RX REV CODE 250 W/ 637 OVERRIDE(OP): Performed by: NURSE PRACTITIONER

## 2024-09-04 PROCEDURE — 80048 BASIC METABOLIC PNL TOTAL CA: CPT

## 2024-09-04 RX ORDER — OXYCODONE HYDROCHLORIDE 10 MG/1
10 TABLET ORAL
Status: DISCONTINUED | OUTPATIENT
Start: 2024-09-04 | End: 2024-09-05

## 2024-09-04 RX ORDER — PREGABALIN 100 MG/1
100 CAPSULE ORAL 3 TIMES DAILY
Status: DISCONTINUED | OUTPATIENT
Start: 2024-09-04 | End: 2024-09-11 | Stop reason: HOSPADM

## 2024-09-04 RX ORDER — CETIRIZINE HYDROCHLORIDE 10 MG/1
10 TABLET ORAL 2 TIMES DAILY
Status: DISCONTINUED | OUTPATIENT
Start: 2024-09-04 | End: 2024-09-11 | Stop reason: HOSPADM

## 2024-09-04 RX ORDER — DULOXETIN HYDROCHLORIDE 30 MG/1
30 CAPSULE, DELAYED RELEASE ORAL 2 TIMES DAILY
Status: DISCONTINUED | OUTPATIENT
Start: 2024-09-04 | End: 2024-09-11 | Stop reason: HOSPADM

## 2024-09-04 RX ORDER — LAMOTRIGINE 100 MG/1
300 TABLET ORAL 2 TIMES DAILY
Status: DISCONTINUED | OUTPATIENT
Start: 2024-09-04 | End: 2024-09-11 | Stop reason: HOSPADM

## 2024-09-04 RX ORDER — HYDROMORPHONE HYDROCHLORIDE 1 MG/ML
0.5 INJECTION, SOLUTION INTRAMUSCULAR; INTRAVENOUS; SUBCUTANEOUS EVERY 8 HOURS PRN
Status: DISCONTINUED | OUTPATIENT
Start: 2024-09-04 | End: 2024-09-05

## 2024-09-04 RX ORDER — LOSARTAN POTASSIUM 25 MG/1
25 TABLET ORAL DAILY
Status: DISCONTINUED | OUTPATIENT
Start: 2024-09-04 | End: 2024-09-11 | Stop reason: HOSPADM

## 2024-09-04 RX ORDER — MORPHINE SULFATE 15 MG/1
15 TABLET, FILM COATED, EXTENDED RELEASE ORAL EVERY 12 HOURS
Status: DISCONTINUED | OUTPATIENT
Start: 2024-09-04 | End: 2024-09-11 | Stop reason: HOSPADM

## 2024-09-04 RX ORDER — FAMOTIDINE 20 MG/1
40 TABLET, FILM COATED ORAL 2 TIMES DAILY
Status: DISCONTINUED | OUTPATIENT
Start: 2024-09-04 | End: 2024-09-11 | Stop reason: HOSPADM

## 2024-09-04 RX ADMIN — DULOXETINE HYDROCHLORIDE 30 MG: 30 CAPSULE, DELAYED RELEASE ORAL at 11:53

## 2024-09-04 RX ADMIN — LAMOTRIGINE 300 MG: 100 TABLET ORAL at 11:53

## 2024-09-04 RX ADMIN — PREGABALIN 100 MG: 100 CAPSULE ORAL at 15:37

## 2024-09-04 RX ADMIN — FAMOTIDINE 40 MG: 20 TABLET, FILM COATED ORAL at 11:53

## 2024-09-04 RX ADMIN — POTASSIUM CHLORIDE AND SODIUM CHLORIDE 1000 ML: 900; 150 INJECTION, SOLUTION INTRAVENOUS at 23:09

## 2024-09-04 RX ADMIN — ANTACID TABLETS 500 MG: 500 TABLET, CHEWABLE ORAL at 04:07

## 2024-09-04 RX ADMIN — HYDROMORPHONE HYDROCHLORIDE 0.5 MG: 1 INJECTION, SOLUTION INTRAMUSCULAR; INTRAVENOUS; SUBCUTANEOUS at 21:24

## 2024-09-04 RX ADMIN — FAMOTIDINE 40 MG: 20 TABLET, FILM COATED ORAL at 17:31

## 2024-09-04 RX ADMIN — OXYCODONE HYDROCHLORIDE 10 MG: 10 TABLET ORAL at 23:12

## 2024-09-04 RX ADMIN — TIZANIDINE 4 MG: 4 TABLET ORAL at 19:57

## 2024-09-04 RX ADMIN — LAMOTRIGINE 300 MG: 100 TABLET ORAL at 17:31

## 2024-09-04 RX ADMIN — ACETAMINOPHEN 1000 MG: 500 TABLET ORAL at 17:30

## 2024-09-04 RX ADMIN — POTASSIUM CHLORIDE AND SODIUM CHLORIDE: 900; 150 INJECTION, SOLUTION INTRAVENOUS at 13:52

## 2024-09-04 RX ADMIN — Medication: at 01:08

## 2024-09-04 RX ADMIN — ANTACID TABLETS 500 MG: 500 TABLET, CHEWABLE ORAL at 17:31

## 2024-09-04 RX ADMIN — OMEPRAZOLE 20 MG: 20 CAPSULE, DELAYED RELEASE ORAL at 11:53

## 2024-09-04 RX ADMIN — CETIRIZINE HYDROCHLORIDE 10 MG: 10 TABLET, FILM COATED ORAL at 11:53

## 2024-09-04 RX ADMIN — Medication: at 07:22

## 2024-09-04 RX ADMIN — POTASSIUM CHLORIDE AND SODIUM CHLORIDE: 900; 150 INJECTION, SOLUTION INTRAVENOUS at 04:08

## 2024-09-04 RX ADMIN — ACETAMINOPHEN 1000 MG: 500 TABLET ORAL at 11:51

## 2024-09-04 RX ADMIN — LOSARTAN POTASSIUM 25 MG: 25 TABLET, FILM COATED ORAL at 11:58

## 2024-09-04 RX ADMIN — ACETAMINOPHEN 1000 MG: 500 TABLET ORAL at 23:12

## 2024-09-04 RX ADMIN — TIZANIDINE 4 MG: 4 TABLET ORAL at 15:37

## 2024-09-04 RX ADMIN — OXYCODONE HYDROCHLORIDE 10 MG: 10 TABLET ORAL at 18:52

## 2024-09-04 RX ADMIN — TIZANIDINE 2 MG: 4 TABLET ORAL at 07:57

## 2024-09-04 RX ADMIN — MORPHINE SULFATE 15 MG: 15 TABLET, FILM COATED, EXTENDED RELEASE ORAL at 17:28

## 2024-09-04 RX ADMIN — DOCUSATE SODIUM 100 MG: 100 CAPSULE, LIQUID FILLED ORAL at 17:32

## 2024-09-04 RX ADMIN — Medication: at 13:49

## 2024-09-04 RX ADMIN — ACETAMINOPHEN 1000 MG: 500 TABLET ORAL at 04:06

## 2024-09-04 RX ADMIN — PREGABALIN 100 MG: 100 CAPSULE ORAL at 11:53

## 2024-09-04 RX ADMIN — DULOXETINE HYDROCHLORIDE 30 MG: 30 CAPSULE, DELAYED RELEASE ORAL at 17:32

## 2024-09-04 RX ADMIN — Medication: at 15:38

## 2024-09-04 RX ADMIN — PREGABALIN 100 MG: 100 CAPSULE ORAL at 19:57

## 2024-09-04 RX ADMIN — DOCUSATE SODIUM 100 MG: 100 CAPSULE, LIQUID FILLED ORAL at 04:07

## 2024-09-04 RX ADMIN — CETIRIZINE HYDROCHLORIDE 10 MG: 10 TABLET, FILM COATED ORAL at 17:32

## 2024-09-04 ASSESSMENT — LIFESTYLE VARIABLES
DOES PATIENT WANT TO STOP DRINKING: NO
TOTAL SCORE: 0
TOTAL SCORE: 0
HAVE PEOPLE ANNOYED YOU BY CRITICIZING YOUR DRINKING: NO
HAVE YOU EVER FELT YOU SHOULD CUT DOWN ON YOUR DRINKING: NO
ALCOHOL_USE: NO
EVER HAD A DRINK FIRST THING IN THE MORNING TO STEADY YOUR NERVES TO GET RID OF A HANGOVER: NO
CONSUMPTION TOTAL: NEGATIVE
AVERAGE NUMBER OF DAYS PER WEEK YOU HAVE A DRINK CONTAINING ALCOHOL: 0
HOW MANY TIMES IN THE PAST YEAR HAVE YOU HAD 5 OR MORE DRINKS IN A DAY: 0
EVER FELT BAD OR GUILTY ABOUT YOUR DRINKING: NO
ON A TYPICAL DAY WHEN YOU DRINK ALCOHOL HOW MANY DRINKS DO YOU HAVE: 0
TOTAL SCORE: 0

## 2024-09-04 ASSESSMENT — PAIN DESCRIPTION - PAIN TYPE
TYPE: ACUTE PAIN;SURGICAL PAIN
TYPE: SURGICAL PAIN
TYPE: SURGICAL PAIN
TYPE: ACUTE PAIN;SURGICAL PAIN
TYPE: ACUTE PAIN;SURGICAL PAIN
TYPE: SURGICAL PAIN
TYPE: SURGICAL PAIN
TYPE: ACUTE PAIN;SURGICAL PAIN
TYPE: SURGICAL PAIN
TYPE: SURGICAL PAIN
TYPE: ACUTE PAIN;SURGICAL PAIN
TYPE: ACUTE PAIN;SURGICAL PAIN

## 2024-09-04 ASSESSMENT — SOCIAL DETERMINANTS OF HEALTH (SDOH)
WITHIN THE LAST YEAR, HAVE YOU BEEN HUMILIATED OR EMOTIONALLY ABUSED IN OTHER WAYS BY YOUR PARTNER OR EX-PARTNER?: NO
WITHIN THE LAST YEAR, HAVE YOU BEEN KICKED, HIT, SLAPPED, OR OTHERWISE PHYSICALLY HURT BY YOUR PARTNER OR EX-PARTNER?: NO
WITHIN THE LAST YEAR, HAVE TO BEEN RAPED OR FORCED TO HAVE ANY KIND OF SEXUAL ACTIVITY BY YOUR PARTNER OR EX-PARTNER?: NO
WITHIN THE LAST YEAR, HAVE YOU BEEN AFRAID OF YOUR PARTNER OR EX-PARTNER?: NO
WITHIN THE PAST 12 MONTHS, THE FOOD YOU BOUGHT JUST DIDN'T LAST AND YOU DIDN'T HAVE MONEY TO GET MORE: NEVER TRUE
IN THE PAST 12 MONTHS, HAS THE ELECTRIC, GAS, OIL, OR WATER COMPANY THREATENED TO SHUT OFF SERVICE IN YOUR HOME?: NO
WITHIN THE PAST 12 MONTHS, YOU WORRIED THAT YOUR FOOD WOULD RUN OUT BEFORE YOU GOT THE MONEY TO BUY MORE: NEVER TRUE

## 2024-09-04 ASSESSMENT — PATIENT HEALTH QUESTIONNAIRE - PHQ9
1. LITTLE INTEREST OR PLEASURE IN DOING THINGS: NOT AT ALL
SUM OF ALL RESPONSES TO PHQ9 QUESTIONS 1 AND 2: 0
2. FEELING DOWN, DEPRESSED, IRRITABLE, OR HOPELESS: NOT AT ALL

## 2024-09-04 NOTE — PROGRESS NOTES
Patient A&OX4. Patient stated her pain is not under control even if she is on PCA pump. Also, patient stated she has bad migraine. Informed Charla Blandon about it. Charla Blandon stated change basal rate 0 to 1mg/1h, lockout interval 15 minutes to 8 minutes.   Patient stated she takes nurtec, 75mg for migraine. But this hospital does not carry it. Patient stated she will ask her family to bring it tomorrow.  Talked to pharmacy about saline eye drop because patient stated her eyes are irritating. Pharmacy tech stated she can not find saline eye drop, it might have different name. Used individually packaged normal saline to rinse patient's eyes.  Applied ica on sx site.  No D/C order for larios and hemovac.

## 2024-09-04 NOTE — PROGRESS NOTES
Pt arrived from surgery. Report given to NOC RN. Medications given and PCA started. Education provided on POC

## 2024-09-04 NOTE — THERAPY
Physical Therapy Contact Note    Patient Name: Noy Rose  Age:  39 y.o., Sex:  female  Medical Record #: [id number]  Today's Date: 9/4/2024    Attempted to see pt for PT evaluation. Pt having uncontrolled pain and not able to tolerate mobility at this time per RN. Pt briefly educated by OT earlier per discussion. Will hold and follow up as appropriate.

## 2024-09-04 NOTE — DISCHARGE PLANNING
RN JAUN met with patient at bedside.   Patient A&Ox4 and able to verify information on face sheet but she was very upset about having her head partially shaved for surgery.   She lives with her employed/functional  in single story home in [facility].   She is unemployed and receives no SSI/SSD, but is supported by her .   Is insured by Medicaid FFS.   Her mother lives in [facility] but is at bedside and will be able to provide support upon discharge.   She owns FWW which she used post-op in April but does not use at baseline. She has not driven since April 2024. Her mother and  drive her.   Denies substance misuse concerns.   Reports history of anxiety and depression but denies past hospitalizations.   Pending PT/OT and pain control.   Mother at bedside will drive patient home upon discharge.     Case Management Discharge Planning    Admission Date: 9/3/2024  GMLOS: 2.6  ALOS: 1    6-Clicks ADL Score:    6-Clicks Mobility Score:        Anticipated Discharge Dispo: Discharge Disposition: Discharged to home/self care (01)  Discharge Address: [address] [place] [id number]  Discharge Contact Phone Number: [phone]    DME Needed: No    Action(s) Taken: Updated Provider/Nurse on Discharge Plan, Patient Conference, DC Assessment Complete (See below), and Family Conference    Escalations Completed: None    Medically Clear: No    Next Steps: PT/OT evaluation. Discontinuation of IV pain medications    Barriers to Discharge: Medical clearance and Pending PT Evaluation    Is the patient up for discharge tomorrow: Potentially.     Care Transition Team Assessment    Information Source  Orientation Level: Oriented X4  Information Given By: Patient  Informant's Name: Laly  Who is responsible for making decisions for patient? : Patient    Readmission Evaluation  Is this a readmission?: No    Elopement Risk  Legal Hold: No  Elopement Risk: Not at Risk for Elopement    Interdisciplinary Discharge  Planning  Does Admitting Nurse Feel This Could be a Complex Discharge?: No  Primary Care Physician: Cathy DENISE with [facility]  Lives with - Patient's Self Care Capacity: (P) Spouse  Support Systems: (P) Children, Family Member(s), Parent  Housing / Facility: (P) [address]  Patient Prefers to be Discharged to:: (P) Home  Durable Medical Equipment: (P) Unknown    Discharge Preparedness  What is your plan after discharge?: Home with help  What are your discharge supports?: Parent, Spouse  Prior Functional Level: Ambulatory, Independent with Activities of Daily Living, Independent with Medication Management, Uses Walker  Difficulity with ADLs: None  Difficulity with IADLs: Driving  Difficulity with IADL Comments:  and mother drive patient    Functional Assesment  Prior Functional Level: Ambulatory, Independent with Activities of Daily Living, Independent with Medication Management, Uses Walker    Finances  Financial Barriers to Discharge: Yes  Average Monthly Income: 0 $  Source of Income: None  Prescription Coverage: Yes    Vision / Hearing Impairment  Right Eye Vision: Impaired, Wears Glasses  Left Eye Vision: Impaired, Wears Glasses    Values / Beliefs / Concerns  Values / Beliefs Concerns : No    Advance Directive  Advance Directive?: None  Advance Directive offered?: AD Booklet refused    Domestic Abuse  Physical Abuse or Sexual Abuse: No  Verbal Abuse or Emotional Abuse: No    Psychological Assessment  History of Substance Abuse: None  History of Psychiatric Problems: Yes  Non-compliant with Treatment: No  Newly Diagnosed Illness: No    Discharge Risks or Barriers  Discharge risks or barriers?: Complex medical needs  Patient risk factors: Complex medical needs, Mental health    Anticipated Discharge Information  Discharge Disposition: Discharged to home/self care (01)  Discharge Address: [address] [place] [id number]  Discharge Contact Phone Number: [phone]

## 2024-09-04 NOTE — PROGRESS NOTES
4 Eyes Skin Assessment Completed by DANIEL Bacon and DANIEL Perez.    Head WDL  Ears WDL  Nose WDL  Mouth WDL  Neck WDL  Breast/Chest WDL  Shoulder Blades WDL  Spine sx site, hemovac, preverna(wound vac)  (R) Arm/Elbow/Hand WDL  (L) Arm/Elbow/Hand WDL  Abdomen WDL  Groin WDL  Scrotum/Coccyx/Buttocks WDL  (R) Leg WDL  (L) Leg WDL  (R) Heel/Foot/Toe WDL  (L) Heel/Foot/Toe WDL          Devices In Places Blood Pressure Cuff, Pulse Ox, Simmons, SCD's, and ONI's      Interventions In Place Pillows    Possible Skin Injury No    Pictures Uploaded Into Epic N/A  Wound Consult Placed N/A  RN Wound Prevention Protocol Ordered No

## 2024-09-04 NOTE — THERAPY
Occupational Therapy Contact Note    Patient Name: Noy Rose  Age:  39 y.o., Sex:  female  Medical Record #: [id number]  Today's Date: 9/4/2024 09/04/24 1447   Pain 0 - 10 Group   Location Neck;Shoulder   Pain Rating Scale (NPRS) 10   Interdisciplinary Plan of Care Collaboration   Collaboration Comments Attempted pt for evaluation, pt with 10/10 pain, able to demo BUE ROM WFL and reported sensation WFL, provided positional changes for comfort and education on log roll, recommended at least 1x OOB prior to eval. Will re attempt when pain more controlled.

## 2024-09-04 NOTE — PROGRESS NOTES
Neurosurgery Progress Note    Subjective:  PCA working but 4 hour limit to low    Exam:  Bilateral  bicep IP DF 5/5  Sensation intact touch x 4 extremities  + Provena wound vac    HVac 210 cc overnight    BP  Min: 98/56  Max: 133/75  Pulse  Av.4  Min: 73  Max: 107  Resp  Av.6  Min: 10  Max: 18  Temp  Av.6 °C (97.9 °F)  Min: 36.2 °C (97.2 °F)  Max: 37.2 °C (99 °F)  SpO2  Av %  Min: 90 %  Max: 99 %    No data recorded    Recent Labs     24  0619   WBC 18.6*   RBC 3.69*   HEMOGLOBIN 10.4*   HEMATOCRIT 33.9*   MCV 91.9   MCH 28.2   MCHC 30.7*   RDW 53.4*   PLATELETCT 263   MPV 10.0     Recent Labs     24  0619   SODIUM 140   POTASSIUM 4.1   CHLORIDE 105   CO2 22   GLUCOSE 165*   BUN 6*   CREATININE 0.51   CALCIUM 8.6               Intake/Output                         24 07 - 24 0659 24 07 - 24 0659     0667-7911 3456-2178 Total 2217-7532 2080-1156 Total                 Intake    I.V.  1200  -- 1200  93.6  -- 93.6    PCA End of Shift Total Volume (ml) -- -- -- 93.6 -- 93.6    Volume (mL) (lactated ringers infusion) 1200 -- 1200 -- -- --    Total Intake 1200 -- 1200 93.6 -- 93.6       Output    Urine  200  4050 4250  900  -- 900    Urine 200 -- 200 -- -- --    Output (mL) (Urethral Catheter Non-latex 16 Fr.) -- 4050 4050 900 -- 900    Drains  --  210 210  60  -- 60    Output (mL) (Closed/Suction Drain Posterior Neck Hemovac 10 Fr.) -- 210 210 60 -- 60    Blood  200  -- 200  --  -- --    Est. Blood Loss 200 -- 200 -- -- --    Total Output 400 4260 4660 960 -- 960       Net I/O     800 -4260 -3460 -866.4 -- -866.4              Intake/Output Summary (Last 24 hours) at 2024 1454  Last data filed at 2024 1200  Gross per 24 hour   Intake 93.6 ml   Output 5220 ml   Net -5126.4 ml             DULoxetine  30 mg BID    cetirizine  10 mg BID    famotidine  40 mg BID    lamotrigine  300 mg BID    losartan  25 mg DAILY    omeprazole  20 mg DAILY    pregabalin   100 mg TID    morphine ER  15 mg Q12HRS    Pharmacy Consult Request  1 Each PHARMACY TO DOSE    MD ALERT...DO NOT ADMINISTER NSAIDS or ASPIRIN unless ORDERED By Neurosurgery  1 Each PRN    docusate sodium  100 mg BID    senna-docusate  1 Tablet Nightly    senna-docusate  1 Tablet Q24HRS PRN    polyethylene glycol/lytes  1 Packet BID PRN    magnesium hydroxide  30 mL QDAY PRN    bisacodyl  10 mg Q24HRS PRN    sodium phosphate  1 Each Once PRN    0.9 % NaCl with KCl 20 mEq 1,000 mL   Continuous    acetaminophen  1,000 mg Q6HRS    Followed by    [START ON 9/8/2024] acetaminophen  1,000 mg Q6HRS PRN    tizanidine  2 mg TID    labetalol  10 mg Q HOUR PRN    hydrALAZINE  10 mg Q HOUR PRN    benzocaine-menthol  1 Lozenge Q2HRS PRN    calcium carbonate  500 mg BID    morphine   Continuous       Assessment and Plan:  Hospital day #2 cervical OPLL, pseudoarthosis  POD #1 removal hardware with replacement, new C3 screws  Prophylactic anticoagulation: no         Start date/time: tbd     Stable exam  Increase 4 hour PCA limit  Add MS contin 15 mg bid  Continue muscle relaxant scheduled  Ambulate with PT

## 2024-09-04 NOTE — PROGRESS NOTES
Pt requested home medications. Dr. Fierro's office was called and medication list was provided to medical assistant. Awaiting Dr. Fierro's authorization.

## 2024-09-04 NOTE — CARE PLAN
The patient is Stable - Low risk of patient condition declining or worsening    Shift Goals  Clinical Goals: pain managament, safety, comfort  Patient Goals: pain control  Family Goals: safety    Progress made toward(s) clinical / shift goals:    Problem: Pain - Standard  Goal: Alleviation of pain or a reduction in pain to the patient’s comfort goal  Outcome: Progressing     Problem: Knowledge Deficit - Standard  Goal: Patient and family/care givers will demonstrate understanding of plan of care, disease process/condition, diagnostic tests and medications  Outcome: Progressing     Problem: Psychosocial  Goal: Patient's level of anxiety will decrease  Outcome: Progressing  Goal: Patient's ability to verbalize feelings about condition will improve  Outcome: Progressing  Goal: Patient's ability to re-evaluate and adapt role responsibilities will improve  Outcome: Progressing  Goal: Patient and family will demonstrate ability to cope with life altering diagnosis and/or procedure  Outcome: Progressing  Goal: Spiritual and cultural needs incorporated into hospitalization  Outcome: Progressing     Problem: Communication  Goal: The ability to communicate needs accurately and effectively will improve  Outcome: Progressing     Problem: Discharge Barriers/Planning  Goal: Patient's continuum of care needs are met  Outcome: Progressing     Problem: Hemodynamics  Goal: Patient's hemodynamics, fluid balance and neurologic status will be stable or improve  Outcome: Progressing     Problem: Respiratory  Goal: Patient will achieve/maintain optimum respiratory ventilation and gas exchange  Outcome: Progressing     Problem: Chest Tube Management  Goal: Complications related to chest tube will be avoided or minimized  Outcome: Progressing     Problem: Fluid Volume  Goal: Fluid volume balance will be maintained  Outcome: Progressing     Problem: Mechanical Ventilation  Goal: Safe management of artificial airway and ventilation  Outcome:  Progressing  Goal: Successful weaning off mechanical ventilator, spontaneously maintains adequate gas exchange  Outcome: Progressing  Goal: Patient will be able to express needs and understand communication  Outcome: Progressing     Problem: Dysphagia  Goal: Dysphagia will improve  Outcome: Progressing     Problem: Risk for Aspiration  Goal: Patient's risk for aspiration will be absent or decrease  Outcome: Progressing     Problem: Nutrition  Goal: Patient's nutritional and fluid intake will be adequate or improve  Outcome: Progressing  Goal: Enteral nutrition will be maintained or improve  Outcome: Progressing  Goal: Enteral nutrition will be maintained or improve  Outcome: Progressing     Problem: Urinary Elimination  Goal: Establish and maintain regular urinary output  Outcome: Progressing     Problem: Bowel Elimination  Goal: Establish and maintain regular bowel function  Outcome: Progressing     Problem: Gastrointestinal Irritability  Goal: Nausea and vomiting will be absent or improve  Outcome: Progressing  Goal: Diarrhea will be absent or improved  Outcome: Progressing     Problem: Rectal Tube  Goal: Fecal output will be contained and skin will remain free from irritation  Outcome: Progressing     Problem: Mobility  Goal: Patient's capacity to carry out activities will improve  Outcome: Progressing     Problem: Self Care  Goal: Patient will have the ability to perform ADLs independently or with assistance (bathe, groom, dress, toilet and feed)  Outcome: Progressing     Problem: Infection - Standard  Goal: Patient will remain free from infection  Outcome: Progressing     Problem: Wound/ / Incision Healing  Goal: Patient's wound/surgical incision will decrease in size and heals properly  Outcome: Progressing

## 2024-09-04 NOTE — PROGRESS NOTES
Virtual Nurse rounding complete.    Round Needs: Patient hoping to get home meds restarted. Reaching out to physician now.     VRN portion of admit profile completed.

## 2024-09-04 NOTE — PROGRESS NOTES
Verbal order received from Dr. Fierro for Ms contin 15mg PO twice a day scheduled. Verbal order read back to Dr. Fierro. Order placed.

## 2024-09-04 NOTE — PROGRESS NOTES
Patient recovered well post op. A&Ox4. VSS, 1L nc. Surgical sites CDI. Surgical pain managed. Patient able to drink fluids without Nausea and vomiting. PT belongings with her mom. Mother updated and discussed plan of care. Report called to Lois SANCHEZ.

## 2024-09-04 NOTE — PROGRESS NOTES
Second page initiated to Dr. Fierro's office. Spoke to DANIEL Dobbs. Per Dilia, Dr. Fierro provided verbal okay to place orders for patient's home medications. Verbal consent verified with DANIEL Dobbs and orders placed.

## 2024-09-04 NOTE — CARE PLAN
The patient is Stable - Low risk of patient condition declining or worsening    Shift Goals  Clinical Goals: pain control, PT/OT  Patient Goals: pain control  Family Goals: pain control    Progress made toward(s) clinical / shift goals:  yes  Problem: Pain - Standard  Goal: Alleviation of pain or a reduction in pain to the patient’s comfort goal  Outcome: Progressing     Problem: Mobility  Goal: Patient's capacity to carry out activities will improve  Outcome: Progressing     Problem: Self Care  Goal: Patient will have the ability to perform ADLs independently or with assistance (bathe, groom, dress, toilet and feed)  Outcome: Progressing       Patient is not progressing towards the following goals:

## 2024-09-05 LAB
ANION GAP SERPL CALC-SCNC: 12 MMOL/L (ref 7–16)
BUN SERPL-MCNC: 6 MG/DL (ref 8–22)
CALCIUM SERPL-MCNC: 8.6 MG/DL (ref 8.5–10.5)
CHLORIDE SERPL-SCNC: 105 MMOL/L (ref 96–112)
CO2 SERPL-SCNC: 22 MMOL/L (ref 20–33)
CREAT SERPL-MCNC: 0.54 MG/DL (ref 0.5–1.4)
ERYTHROCYTE [DISTWIDTH] IN BLOOD BY AUTOMATED COUNT: 56.4 FL (ref 35.9–50)
GFR SERPLBLD CREATININE-BSD FMLA CKD-EPI: 119 ML/MIN/1.73 M 2
GLUCOSE SERPL-MCNC: 146 MG/DL (ref 65–99)
HCT VFR BLD AUTO: 34 % (ref 37–47)
HGB BLD-MCNC: 10.3 G/DL (ref 12–16)
MCH RBC QN AUTO: 28.6 PG (ref 27–33)
MCHC RBC AUTO-ENTMCNC: 30.3 G/DL (ref 32.2–35.5)
MCV RBC AUTO: 94.4 FL (ref 81.4–97.8)
PLATELET # BLD AUTO: 244 K/UL (ref 164–446)
PMV BLD AUTO: 10.2 FL (ref 9–12.9)
POTASSIUM SERPL-SCNC: 4.1 MMOL/L (ref 3.6–5.5)
RBC # BLD AUTO: 3.6 M/UL (ref 4.2–5.4)
SODIUM SERPL-SCNC: 139 MMOL/L (ref 135–145)
WBC # BLD AUTO: 15.1 K/UL (ref 4.8–10.8)

## 2024-09-05 PROCEDURE — 700102 HCHG RX REV CODE 250 W/ 637 OVERRIDE(OP): Performed by: NURSE PRACTITIONER

## 2024-09-05 PROCEDURE — 770001 HCHG ROOM/CARE - MED/SURG/GYN PRIV*

## 2024-09-05 PROCEDURE — 80048 BASIC METABOLIC PNL TOTAL CA: CPT

## 2024-09-05 PROCEDURE — 700101 HCHG RX REV CODE 250: Performed by: NEUROLOGICAL SURGERY

## 2024-09-05 PROCEDURE — A9270 NON-COVERED ITEM OR SERVICE: HCPCS | Performed by: NEUROLOGICAL SURGERY

## 2024-09-05 PROCEDURE — 700111 HCHG RX REV CODE 636 W/ 250 OVERRIDE (IP): Performed by: NURSE PRACTITIONER

## 2024-09-05 PROCEDURE — 700102 HCHG RX REV CODE 250 W/ 637 OVERRIDE(OP): Performed by: NEUROLOGICAL SURGERY

## 2024-09-05 PROCEDURE — 700111 HCHG RX REV CODE 636 W/ 250 OVERRIDE (IP): Performed by: NEUROLOGICAL SURGERY

## 2024-09-05 PROCEDURE — A9270 NON-COVERED ITEM OR SERVICE: HCPCS | Performed by: NURSE PRACTITIONER

## 2024-09-05 PROCEDURE — 97167 OT EVAL HIGH COMPLEX 60 MIN: CPT

## 2024-09-05 PROCEDURE — 36415 COLL VENOUS BLD VENIPUNCTURE: CPT

## 2024-09-05 PROCEDURE — 85027 COMPLETE CBC AUTOMATED: CPT

## 2024-09-05 RX ORDER — DIAZEPAM 5 MG
5 TABLET ORAL EVERY 6 HOURS
Status: DISCONTINUED | OUTPATIENT
Start: 2024-09-05 | End: 2024-09-05

## 2024-09-05 RX ADMIN — ANTACID TABLETS 500 MG: 500 TABLET, CHEWABLE ORAL at 17:39

## 2024-09-05 RX ADMIN — ACETAMINOPHEN 1000 MG: 500 TABLET ORAL at 17:40

## 2024-09-05 RX ADMIN — DIAZEPAM 5 MG: 5 TABLET ORAL at 18:03

## 2024-09-05 RX ADMIN — DIAZEPAM 5 MG: 5 TABLET ORAL at 12:59

## 2024-09-05 RX ADMIN — ANTACID TABLETS 500 MG: 500 TABLET, CHEWABLE ORAL at 04:33

## 2024-09-05 RX ADMIN — TIZANIDINE 4 MG: 4 TABLET ORAL at 23:27

## 2024-09-05 RX ADMIN — PREGABALIN 100 MG: 100 CAPSULE ORAL at 08:01

## 2024-09-05 RX ADMIN — CETIRIZINE HYDROCHLORIDE 10 MG: 10 TABLET, FILM COATED ORAL at 04:33

## 2024-09-05 RX ADMIN — OXYCODONE HYDROCHLORIDE 10 MG: 10 TABLET ORAL at 08:27

## 2024-09-05 RX ADMIN — Medication: at 20:10

## 2024-09-05 RX ADMIN — FAMOTIDINE 40 MG: 20 TABLET, FILM COATED ORAL at 17:39

## 2024-09-05 RX ADMIN — Medication 1 APPLICATOR: at 17:40

## 2024-09-05 RX ADMIN — MORPHINE SULFATE 15 MG: 15 TABLET, FILM COATED, EXTENDED RELEASE ORAL at 17:40

## 2024-09-05 RX ADMIN — HYDROMORPHONE HYDROCHLORIDE 0.5 MG: 1 INJECTION, SOLUTION INTRAMUSCULAR; INTRAVENOUS; SUBCUTANEOUS at 00:35

## 2024-09-05 RX ADMIN — POTASSIUM CHLORIDE AND SODIUM CHLORIDE: 900; 150 INJECTION, SOLUTION INTRAVENOUS at 08:30

## 2024-09-05 RX ADMIN — CETIRIZINE HYDROCHLORIDE 10 MG: 10 TABLET, FILM COATED ORAL at 17:40

## 2024-09-05 RX ADMIN — HYDROMORPHONE HYDROCHLORIDE 0.5 MG: 1 INJECTION, SOLUTION INTRAMUSCULAR; INTRAVENOUS; SUBCUTANEOUS at 06:31

## 2024-09-05 RX ADMIN — OXYCODONE HYDROCHLORIDE 10 MG: 10 TABLET ORAL at 05:22

## 2024-09-05 RX ADMIN — DULOXETINE HYDROCHLORIDE 30 MG: 30 CAPSULE, DELAYED RELEASE ORAL at 17:39

## 2024-09-05 RX ADMIN — OMEPRAZOLE 20 MG: 20 CAPSULE, DELAYED RELEASE ORAL at 04:32

## 2024-09-05 RX ADMIN — SENNOSIDES AND DOCUSATE SODIUM 1 TABLET: 50; 8.6 TABLET ORAL at 20:28

## 2024-09-05 RX ADMIN — FAMOTIDINE 40 MG: 20 TABLET, FILM COATED ORAL at 04:32

## 2024-09-05 RX ADMIN — OXYCODONE HYDROCHLORIDE 10 MG: 10 TABLET ORAL at 11:36

## 2024-09-05 RX ADMIN — Medication 1 APPLICATOR: at 04:36

## 2024-09-05 RX ADMIN — LAMOTRIGINE 300 MG: 100 TABLET ORAL at 04:32

## 2024-09-05 RX ADMIN — DOCUSATE SODIUM 100 MG: 100 CAPSULE, LIQUID FILLED ORAL at 17:39

## 2024-09-05 RX ADMIN — PREGABALIN 100 MG: 100 CAPSULE ORAL at 20:29

## 2024-09-05 RX ADMIN — POTASSIUM CHLORIDE AND SODIUM CHLORIDE: 900; 150 INJECTION, SOLUTION INTRAVENOUS at 18:10

## 2024-09-05 RX ADMIN — OXYCODONE HYDROCHLORIDE 10 MG: 10 TABLET ORAL at 02:18

## 2024-09-05 RX ADMIN — ACETAMINOPHEN 1000 MG: 500 TABLET ORAL at 04:32

## 2024-09-05 RX ADMIN — Medication: at 12:50

## 2024-09-05 RX ADMIN — DOCUSATE SODIUM 100 MG: 100 CAPSULE, LIQUID FILLED ORAL at 04:33

## 2024-09-05 RX ADMIN — ACETAMINOPHEN 1000 MG: 500 TABLET ORAL at 11:36

## 2024-09-05 RX ADMIN — DULOXETINE HYDROCHLORIDE 30 MG: 30 CAPSULE, DELAYED RELEASE ORAL at 04:33

## 2024-09-05 RX ADMIN — PREGABALIN 100 MG: 100 CAPSULE ORAL at 15:01

## 2024-09-05 RX ADMIN — LAMOTRIGINE 300 MG: 100 TABLET ORAL at 17:40

## 2024-09-05 RX ADMIN — MORPHINE SULFATE 15 MG: 15 TABLET, FILM COATED, EXTENDED RELEASE ORAL at 04:33

## 2024-09-05 RX ADMIN — TIZANIDINE 4 MG: 4 TABLET ORAL at 08:01

## 2024-09-05 RX ADMIN — HYDROMORPHONE HYDROCHLORIDE 0.5 MG: 1 INJECTION, SOLUTION INTRAMUSCULAR; INTRAVENOUS; SUBCUTANEOUS at 03:21

## 2024-09-05 RX ADMIN — ACETAMINOPHEN 1000 MG: 500 TABLET ORAL at 23:27

## 2024-09-05 ASSESSMENT — PAIN DESCRIPTION - PAIN TYPE
TYPE: SURGICAL PAIN
TYPE: ACUTE PAIN;SURGICAL PAIN
TYPE: SURGICAL PAIN

## 2024-09-05 ASSESSMENT — COGNITIVE AND FUNCTIONAL STATUS - GENERAL
DRESSING REGULAR LOWER BODY CLOTHING: A LOT
HELP NEEDED FOR BATHING: A LITTLE
SUGGESTED CMS G CODE MODIFIER DAILY ACTIVITY: CK
PERSONAL GROOMING: A LITTLE
TOILETING: A LOT
DAILY ACTIVITIY SCORE: 16
DRESSING REGULAR UPPER BODY CLOTHING: A LOT

## 2024-09-05 ASSESSMENT — ACTIVITIES OF DAILY LIVING (ADL): TOILETING: INDEPENDENT

## 2024-09-05 NOTE — THERAPY
"Occupational Therapy   Initial Evaluation     Patient Name: Noy Rose  Age:  39 y.o., Sex:  female  Medical Record #: [id number]  Today's Date: 9/5/2024     Precautions: Spinal / Back Precautions , Fall Risk, Cervical Collar    Comments: irene SOTELO on AAT    Assessment  Patient is 39 y.o. female with Dx of OPLL, pseudoarthrosis seen POD #2 removal hardware with replacement with new C3 screws. PMHx includes diabetes, depression, hypertension, anxiety and HCC. Pt in bed upon arrival. Pt was drowsy throughout the session closing her eyes several times. Pt is limited by poor activity tolerance, impaired balance, UE weakness and impaired coordination. Pt was able to demo log roll to sit on EOB. Pt reported feeling \"foggy\" when she sat up, vitals were normal. Pt reported being in a lot of pain when completing a sit to stand but was able to demo side steps with FWW.  Pt educated on [place] and spinal precautions. At this time, recommend post acute care upon discharge. Pt will benefit from skilled OT treatment sessions.     Plan    Occupational Therapy Initial Treatment Plan   Treatment Interventions: Self Care / Activities of Daily Living, Therapeutic Exercises, Therapeutic Activity  Treatment Frequency: 4 Times per Week  Duration: Until Therapy Goals Met    DC Equipment Recommendations: (P) Unable to determine at this time  Discharge Recommendations: (P) Recommend post-acute placement for additional occupational therapy services prior to discharge home        Objective       09/05/24 7948    Services   Is patient using  services for this encounter? No   Prior Living Situation   Prior Services None   Housing / Facility 1 [address] House   Steps Into Home 4   Bathroom Set up Bathtub / Shower Combination;Grab Bars   Equipment Owned Front-Wheel Walker   Lives with - Patient's Self Care Capacity Spouse;Child Less than 18 Years of Age   Comments spouse works full time, pt reports mom can assist   Prior " Level of ADL Function   Self Feeding Independent   Grooming / Hygiene Independent   Bathing Independent   Dressing Independent   Toileting Independent   Comments Reports increase difficulty secondary to pain   Prior Level of IADL Function   Medication Management Independent   Laundry Independent   Kitchen Mobility Independent   Finances Independent   Home Management Independent   Shopping Independent   Prior Level Of Mobility Independent Without Device in Community   Driving / Transportation Driving Independent   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)   History of Falls   History of Falls No   Precautions   Precautions Spinal / Back Precautions ;Fall Risk;Cervical Collar     Comments irene SOTELO on AAT   Vitals   O2 Delivery Device None - Room Air   Vitals Comments pt stable at EOB (121/62)   Pain 0 - 10 Group   Therapist Pain Assessment Post Activity Pain Same as Prior to Activity  (pt reports 10/10 pain pre/post)   Cognition    Cognition / Consciousness X   Level of Consciousness Alert   Comments pt lethargic, minimally communicative, eyes closed for portions of the session   Active ROM Upper Body   Active ROM Upper Body  X  (limited R shoulder internal rotation 2/2 pain, LUE WFL)   Dominant Hand Right   Strength Upper Body   Upper Body Strength  X   Comments 4/5 bilateral UE   Sensation Upper Body   Upper Extremity Sensation  WDL   Upper Body Muscle Tone   Upper Body Muscle Tone  WDL   Neurological Concerns   Neurological Concerns No   Coordination Upper Body   Coordination X   Comments limited by pain   Balance Assessment   Sitting Balance (Static) Fair -   Sitting Balance (Dynamic) Fair -   Standing Balance (Static) Poor +   Standing Balance (Dynamic) Poor   Weight Shift Sitting Fair   Weight Shift Standing Fair   Bed Mobility    Supine to Sit Standby Assist   Sit to Supine Standby Assist   Rolling Standby Assist   ADL Assessment   Upper Body Dressing Maximal  Assist  (donning/doffing/adjusting bace)   Lower Body Dressing Maximal Assist  (donning/doffing socks)   Toileting   (unable to assess)   How much help from another person does the patient currently need...   6 Clicks Daily Activity Score 16   Functional Mobility   Sit to Stand Minimal Assist   Mobility limited by pain   Comments use of FWW   Visual Perception   Visual Perception  WDL   Patient / Family Goals   Patient / Family Goal #1 to get better   Short Term Goals   Short Term Goal # 1 Pt will be demo toilet transfer with supervision   Short Term Goal # 2 Pt will demo LB dressing w SPV   Short Term Goal # 3 Pt will demo grooming and hygiene with supervision   Short Term Goal # 4 Pt will verbalize 3/3 spine precautions without vc   Education Group   Education Provided Spinal Precautions;Brace Wear and Care;Role of Occupational Therapist;Activities of Daily Living   Role of Occupational Therapist Patient Response Patient;Acceptance;Explanation;Verbal Demonstration   Spinal Precautions Patient Response Patient;Acceptance;Explanation;Verbal Demonstration   Brace Wear & Care Patient Response Patient;Acceptance;Explanation;Verbal Demonstration   ADL Patient Response Patient;Acceptance;Explanation;Verbal Demonstration   Occupational Therapy Initial Treatment Plan    Treatment Interventions Self Care / Activities of Daily Living;Therapeutic Exercises;Therapeutic Activity   Treatment Frequency 4 Times per Week   Duration Until Therapy Goals Met   Problem List   Problem List Decreased Active Daily Living Skills;Decreased Functional Mobility;Decreased Activity Tolerance;Impaired Postural Control / Balance;Decreased Upper Extremity Strength Right;Decreased Upper Extremity Strength Left;Decreased Homemaking Skills;Impaired Coordination Right Upper Extremity;Impaired Coordination Left Upper Extremity;Limited Knowledge of Post Op Precautions   Anticipated Discharge Equipment and Recommendations   DC Equipment Recommendations  Unable to determine at this time   Discharge Recommendations Recommend post-acute placement for additional occupational therapy services prior to discharge home   Interdisciplinary Plan of Care Collaboration   IDT Collaboration with  Nursing   Patient Position at End of Therapy Bed Alarm On;Call Light within Reach;Tray Table within Reach;Phone within Reach;Family / Friend in Room   Collaboration Comments RN aware of session.   Session Information   Date / Session Number  9/5 1 (1/4, 9/11)

## 2024-09-05 NOTE — PROGRESS NOTES
Neurosurgery Progress Note    Subjective:  Neck pain persists    Exam:  Bilateral  bicep IP DF 5/5  Sensation intact touch x 4 extremities  + Provena wound vac    HVac 220 cc overnight clearing to serous    BP  Min: 102/54  Max: 116/66  Pulse  Av.8  Min: 73  Max: 86  Resp  Av  Min: 15  Max: 17  Temp  Av.6 °C (97.9 °F)  Min: 36.4 °C (97.5 °F)  Max: 36.8 °C (98.2 °F)  SpO2  Av.2 %  Min: 95 %  Max: 99 %    No data recorded    Recent Labs     24   WBC 18.6* 15.1*   RBC 3.69* 3.60*   HEMOGLOBIN 10.4* 10.3*   HEMATOCRIT 33.9* 34.0*   MCV 91.9 94.4   MCH 28.2 28.6   MCHC 30.7* 30.3*   RDW 53.4* 56.4*   PLATELETCT 263 244   MPV 10.0 10.2     Recent Labs     24   SODIUM 140 139   POTASSIUM 4.1 4.1   CHLORIDE 105 105   CO2 22 22   GLUCOSE 165* 146*   BUN 6* 6*   CREATININE 0.51 0.54   CALCIUM 8.6 8.6               Intake/Output                         24 - 24 -  Total  Total                 Intake    I.V.  93.6  -- 93.6  --  -- --    PCA End of Shift Total Volume (ml) 93.6 -- 93.6 -- -- --    Total Intake 93.6 -- 93.6 -- -- --       Output    Urine  900  1620 2520  --  -- --    Output (mL) (Urethral Catheter Non-latex 16 Fr.) 900 1620 2520 -- -- --    Drains  60  160 220  --  -- --    Output (mL) (Closed/Suction Drain Posterior Neck Hemovac 10 Fr.) 60 160 220 -- -- --    Total Output 960 1780 2740 -- -- --       Net I/O     -866.4 -1780 -2646.4 -- -- --              Intake/Output Summary (Last 24 hours) at 2024 1026  Last data filed at 2024 0422  Gross per 24 hour   Intake --   Output 2740 ml   Net -2740 ml             Nozin nasal  swab  1 Applicator BID    DULoxetine  30 mg BID    cetirizine  10 mg BID    famotidine  40 mg BID    lamotrigine  300 mg BID    losartan  25 mg DAILY    omeprazole  20 mg DAILY    pregabalin  100 mg TID    morphine  ER  15 mg Q12HRS    tizanidine  4 mg TID    oxyCODONE immediate-release  10 mg Q3HRS PRN    HYDROmorphone  0.5 mg Q8HRS PRN    Pharmacy Consult Request  1 Each PHARMACY TO DOSE    MD HUI...DO NOT ADMINISTER NSAIDS or ASPIRIN unless ORDERED By Neurosurgery  1 Each PRN    docusate sodium  100 mg BID    senna-docusate  1 Tablet Nightly    senna-docusate  1 Tablet Q24HRS PRN    polyethylene glycol/lytes  1 Packet BID PRN    magnesium hydroxide  30 mL QDAY PRN    bisacodyl  10 mg Q24HRS PRN    sodium phosphate  1 Each Once PRN    0.9 % NaCl with KCl 20 mEq 1,000 mL   Continuous    acetaminophen  1,000 mg Q6HRS    Followed by    [START ON 9/8/2024] acetaminophen  1,000 mg Q6HRS PRN    labetalol  10 mg Q HOUR PRN    hydrALAZINE  10 mg Q HOUR PRN    benzocaine-menthol  1 Lozenge Q2HRS PRN    calcium carbonate  500 mg BID       Assessment and Plan:  Hospital day #3 cervical OPLL, pseudoarthosis  POD #2 removal hardware with replacement, new C3 screws  Prophylactic anticoagulation: no         Start date/time: tbd     Stable exam  Change to Dilaudid PCA  cpntinue MS contin 15 mg bid  Change to valium muscle relaxant 5 mg q 6 hr scheduled  Ambulate with PT

## 2024-09-05 NOTE — CARE PLAN
Problem: Pain - Standard  Goal: Alleviation of pain or a reduction in pain to the patient’s comfort goal  Outcome: Progressing     Problem: Knowledge Deficit - Standard  Goal: Patient and family/care givers will demonstrate understanding of plan of care, disease process/condition, diagnostic tests and medications  Outcome: Progressing       The patient is Watcher - Medium risk of patient condition declining or worsening    Shift Goals  Clinical Goals: Pain control  Patient Goals: Pain control  Family Goals:     Progress made toward(s) clinical / shift goals:  Taught pt 0-10 pain scale and non-pharmacological method of pain management, encouraged to verbalize when in pain. Administered PRN pain meds as needed. PCA pump in place per doctor's orders.     Patient is not progressing towards the following goals:

## 2024-09-05 NOTE — PROGRESS NOTES
Per patient, pain is not well control with PCA and requesting to switch to PO pain medication. Page initiated to Dr. Fierro's office. Noy Paz updated on patient's request to discontinue PCA and start on oral pain medication. New orders placed by Noy Paz. Patient educated on new pain regimen. All questions answered at this time.

## 2024-09-05 NOTE — PROGRESS NOTES
Virtual Nurse rounding complete.  Rounding Needs: Patient resting comfortably with no needs at this time.

## 2024-09-05 NOTE — CARE PLAN
The patient is Stable - Low risk of patient condition declining or worsening    Shift Goals  Clinical Goals: pain management  Patient Goals: pain management, rest  Family Goals: safety    Progress made toward(s) clinical / shift goals:     Problem: Pain - Standard  Goal: Alleviation of pain or a reduction in pain to the patient’s comfort goal  Description: Target End Date:  Prior to discharge or change in level of care    Document on Vitals flowsheet    1.  Document pain using the appropriate pain scale per order or unit policy  2.  Educate and implement non-pharmacologic comfort measures (i.e. relaxation, distraction, massage, cold/heat therapy, etc.)  3.  Pain management medications as ordered  4.  Reassess pain after pain med administration per policy  5.  If opiods administered assess patient's response to pain medication is appropriate per POSS sedation scale  6.  Follow pain management plan developed in collaboration with patient and interdisciplinary team (including palliative care or pain specialists if applicable)  9/4/2024 2105 by Anil Aguilar R.N.  Outcome: Progressing     Problem: Knowledge Deficit - Standard  Goal: Patient and family/care givers will demonstrate understanding of plan of care, disease process/condition, diagnostic tests and medications  Description: Target End Date:  1-3 days or as soon as patient condition allows    Document in Patient Education    1.  Patient and family/caregiver oriented to unit, equipment, visitation policy and means for communicating concern  2.  Complete/review Learning Assessment  3.  Assess knowledge level of disease process/condition, treatment plan, diagnostic tests and medications  4.  Explain disease process/condition, treatment plan, diagnostic tests and medications  9/4/2024 2105 by Anil Aguilar R.N.  Outcome: Progressing     Problem: Psychosocial  Goal: Patient's level of anxiety will decrease  Description: Target End Date:  1-3 days or  as soon as patient condition allows    1.  Collaborate with patient and family/caregiver to identify triggers and develop strategies to cope with anxiety  2.  Implement stimuli reduction, calming techniques  3.  Pharmacologic management per provider order  4.  Encourage patient/family/care giver participation  5.  Collaborate with interdisciplinary team including Psychologist or Behavioral Health Team as needed  9/4/2024 2105 by Anil Aguilar R.N.  Outcome: Progressing  Goal: Patient's ability to verbalize feelings about condition will improve  Description: Target End Date:  Prior to discharge or change in level of care    1.  Discuss coping with medical condition and its effects  2.  Encourage patient participation in care  3.  Encourage acknowledgement of body changes and accompanying emotions  4.  Perform depression screening  9/4/2024 2105 by Anil Aguilar R.N.  Outcome: Progressing  Goal: Patient and family will demonstrate ability to cope with life altering diagnosis and/or procedure  Description: Target End Date:  1-3 days or as soon as patient condition allows    1. Expect initial shock and disbelief following diagnosis of cancer and traumatizing procedures (disfiguring surgery, colostomy, amputation).  2.  Assess patient and family/caregiver for stage of grief currently being experienced. Explain process as appropriate.  3.  Provide open, nonjudgmental environment. Use therapeutic communication skills of Active-Listening, acknowledgment, and so on.  4.  Encourage verbalization of thoughts or concerns and accept expressions of sadness, anger, rejection. Acknowledge normality of these feelings  5.  Be aware of mood swings, hostility, and other acting-out behavior. Set limits on inappropriate behavior, redirect negative thinking  6.  Be aware of debilitating depression. Ask patient direct questions about state of mind.  7.  Visit frequently and provide physical contact as appropriate, or  provide frequent phone support as appropriate for setting. Arrange for care provider and support person to stay with patient as needed  8.  Reinforce teaching regarding disease process and treatments and provide information as appropriate about dying. Be honest; do not give false hope while providing emotional support  9.  Review past life experiences, role changes, and coping skills. Talk about things that interest the patient  9/4/2024 2105 by Anil Aguilar R.N.  Outcome: Progressing     Problem: Communication  Goal: The ability to communicate needs accurately and effectively will improve  Description: Target End Date:  End of day 1    1.  Assess ability to communicate and understand  2.  Provide augmentative or alternative methods of communication devices  3.  Use /language line as appropriate  4.  Collaborate with Speech Therapy as needed  9/4/2024 2105 by Anil Aguilar R.N.  Outcome: Progressing    Problem: Fluid Volume  Goal: Fluid volume balance will be maintained  Description: Target End Date:  Prior to discharge or change in level of care    Document on I/O flowsheet    1.  Monitor intake and output as ordered  2.  Promote oral intake as appropriate  3.  Report inadequate intake or output to physician  4.  Administer IV therapy as ordered  5.  Weights per provider order  6.  Assess for signs and symptoms of bleeding  7.  Monitor for signs of fluid overload (respiratory changes, edema, weight gain, increased abdominal girth)  8.  Monitor of signs for inadequate fluid volume (poor skin turgor, dry mucous membranes)  9.  Instruct patient on adherence to fluid restrictions  9/4/2024 2105 by Anil Aguilar R.N.  Outcome: Progressing     Problem: Nutrition  Goal: Patient's nutritional and fluid intake will be adequate or improve  Description: Target End Date:  Prior to discharge or change in level of care    Document on I/O flowsheet    1.  Monitor nutritional intake  2.   Monitor weight per provider order  3.  Assess patient's ability to take oral nutrition  4.  Collaborate with Speech Therapy, Dietitian and interdisciplinary team for appropriate feeding and fluid intake  5.  Assist with feeding  9/4/2024 2105 by Anil Aguilar R.N.  Outcome: Progressing     Problem: Self Care  Goal: Patient will have the ability to perform ADLs independently or with assistance (bathe, groom, dress, toilet and feed)  Description: Target End Date:  Prior to discharge or change in level of care    Document on ADL flowsheet    1.  Assess the capability and level of deficiency to perform ADLs  2.  Encourage family/care giver involvement  3.  Provide assistive devices  4.  Consider PT/OT evaluations  5.  Maintain support, give positive feedback, encourage self-care allowing extra time and verbal cuing as needed  6.  Avoid doing something for patients they can do themselves, but provide assistance as needed  7.  Assist in anticipating/planning individual needs  8.  Collaborate with Case Management and  to meet discharge needs  9/4/2024 2105 by Anil Aguilar R.N.  Outcome: Progressing     Problem: Infection - Standard  Goal: Patient will remain free from infection  Description: Target End Date:  Prior to discharge or change in level of care    1.  Utilize Standard Precautions at all times to reduce the risk of transmission of microorganisms from both recognized and  unrecognized sources of infection  2.  Infection prevention handouts provided (general/device/diagnosis specific) and documented in Patient Education  3.  Educate patient and family/caregiver on isolation precautions if applicable  9/4/2024 2105 by Anil Aguilar R.N.  Outcome: Progressing     Problem: Wound/ / Incision Healing  Goal: Patient's wound/surgical incision will decrease in size and heals properly  Description: Target End Date:  Prior to discharge or change in level of care    Document on  LDA    1.  Assess and document surgical incision/wound  2.  Provide incision/wound care per policy and/or provider orders  3.  Manage surgical drains per policy if applicable  4.  Encourage adequate nutrition to promote wound healing  5.  Collaborate with Clinical Dietician  9/4/2024 2103 by Anil Aguilar R.N.  Outcome: Progressing       Patient is not progressing towards the following goals:

## 2024-09-06 PROCEDURE — 700101 HCHG RX REV CODE 250: Performed by: NEUROLOGICAL SURGERY

## 2024-09-06 PROCEDURE — A9270 NON-COVERED ITEM OR SERVICE: HCPCS | Performed by: NEUROLOGICAL SURGERY

## 2024-09-06 PROCEDURE — 770001 HCHG ROOM/CARE - MED/SURG/GYN PRIV*

## 2024-09-06 PROCEDURE — 97535 SELF CARE MNGMENT TRAINING: CPT

## 2024-09-06 PROCEDURE — 700102 HCHG RX REV CODE 250 W/ 637 OVERRIDE(OP): Performed by: NEUROLOGICAL SURGERY

## 2024-09-06 PROCEDURE — 97163 PT EVAL HIGH COMPLEX 45 MIN: CPT

## 2024-09-06 PROCEDURE — 700111 HCHG RX REV CODE 636 W/ 250 OVERRIDE (IP): Performed by: NURSE PRACTITIONER

## 2024-09-06 RX ADMIN — Medication: at 03:31

## 2024-09-06 RX ADMIN — PREGABALIN 100 MG: 100 CAPSULE ORAL at 08:54

## 2024-09-06 RX ADMIN — DULOXETINE HYDROCHLORIDE 30 MG: 30 CAPSULE, DELAYED RELEASE ORAL at 16:12

## 2024-09-06 RX ADMIN — ANTACID TABLETS 500 MG: 500 TABLET, CHEWABLE ORAL at 16:13

## 2024-09-06 RX ADMIN — MORPHINE SULFATE 15 MG: 15 TABLET, FILM COATED, EXTENDED RELEASE ORAL at 17:17

## 2024-09-06 RX ADMIN — ACETAMINOPHEN 1000 MG: 500 TABLET ORAL at 11:15

## 2024-09-06 RX ADMIN — ACETAMINOPHEN 1000 MG: 500 TABLET ORAL at 05:06

## 2024-09-06 RX ADMIN — OMEPRAZOLE 20 MG: 20 CAPSULE, DELAYED RELEASE ORAL at 05:06

## 2024-09-06 RX ADMIN — DULOXETINE HYDROCHLORIDE 30 MG: 30 CAPSULE, DELAYED RELEASE ORAL at 05:06

## 2024-09-06 RX ADMIN — ACETAMINOPHEN 1000 MG: 500 TABLET ORAL at 23:18

## 2024-09-06 RX ADMIN — DOCUSATE SODIUM 100 MG: 100 CAPSULE, LIQUID FILLED ORAL at 05:05

## 2024-09-06 RX ADMIN — Medication: at 15:46

## 2024-09-06 RX ADMIN — FAMOTIDINE 40 MG: 20 TABLET, FILM COATED ORAL at 05:06

## 2024-09-06 RX ADMIN — SENNOSIDES AND DOCUSATE SODIUM 1 TABLET: 50; 8.6 TABLET ORAL at 20:59

## 2024-09-06 RX ADMIN — POTASSIUM CHLORIDE AND SODIUM CHLORIDE: 900; 150 INJECTION, SOLUTION INTRAVENOUS at 22:59

## 2024-09-06 RX ADMIN — ACETAMINOPHEN 1000 MG: 500 TABLET ORAL at 17:17

## 2024-09-06 RX ADMIN — POLYETHYLENE GLYCOL 3350 1 PACKET: 17 POWDER, FOR SOLUTION ORAL at 05:40

## 2024-09-06 RX ADMIN — MAGNESIUM HYDROXIDE 30 ML: 1200 LIQUID ORAL at 05:40

## 2024-09-06 RX ADMIN — LAMOTRIGINE 300 MG: 100 TABLET ORAL at 05:05

## 2024-09-06 RX ADMIN — CETIRIZINE HYDROCHLORIDE 10 MG: 10 TABLET, FILM COATED ORAL at 16:12

## 2024-09-06 RX ADMIN — PREGABALIN 100 MG: 100 CAPSULE ORAL at 14:14

## 2024-09-06 RX ADMIN — TIZANIDINE 4 MG: 4 TABLET ORAL at 14:14

## 2024-09-06 RX ADMIN — Medication 1 APPLICATOR: at 05:05

## 2024-09-06 RX ADMIN — LAMOTRIGINE 300 MG: 100 TABLET ORAL at 16:13

## 2024-09-06 RX ADMIN — FAMOTIDINE 40 MG: 20 TABLET, FILM COATED ORAL at 16:12

## 2024-09-06 RX ADMIN — MORPHINE SULFATE 15 MG: 15 TABLET, FILM COATED, EXTENDED RELEASE ORAL at 05:05

## 2024-09-06 RX ADMIN — DOCUSATE SODIUM 100 MG: 100 CAPSULE, LIQUID FILLED ORAL at 16:13

## 2024-09-06 RX ADMIN — POTASSIUM CHLORIDE AND SODIUM CHLORIDE: 900; 150 INJECTION, SOLUTION INTRAVENOUS at 03:52

## 2024-09-06 RX ADMIN — Medication 1 APPLICATOR: at 16:12

## 2024-09-06 RX ADMIN — POTASSIUM CHLORIDE AND SODIUM CHLORIDE: 900; 150 INJECTION, SOLUTION INTRAVENOUS at 14:21

## 2024-09-06 RX ADMIN — Medication: at 22:56

## 2024-09-06 RX ADMIN — TIZANIDINE 4 MG: 4 TABLET ORAL at 08:54

## 2024-09-06 RX ADMIN — ANTACID TABLETS 500 MG: 500 TABLET, CHEWABLE ORAL at 05:06

## 2024-09-06 RX ADMIN — PREGABALIN 100 MG: 100 CAPSULE ORAL at 20:59

## 2024-09-06 RX ADMIN — CETIRIZINE HYDROCHLORIDE 10 MG: 10 TABLET, FILM COATED ORAL at 05:05

## 2024-09-06 RX ADMIN — TIZANIDINE 4 MG: 4 TABLET ORAL at 20:59

## 2024-09-06 ASSESSMENT — PAIN DESCRIPTION - PAIN TYPE
TYPE: SURGICAL PAIN
TYPE: SURGICAL PAIN
TYPE: ACUTE PAIN;SURGICAL PAIN
TYPE: SURGICAL PAIN
TYPE: SURGICAL PAIN
TYPE: ACUTE PAIN;SURGICAL PAIN

## 2024-09-06 ASSESSMENT — COGNITIVE AND FUNCTIONAL STATUS - GENERAL
MOVING TO AND FROM BED TO CHAIR: A LITTLE
STANDING UP FROM CHAIR USING ARMS: A LITTLE
WALKING IN HOSPITAL ROOM: A LITTLE
MOBILITY SCORE: 17
CLIMB 3 TO 5 STEPS WITH RAILING: A LOT
TURNING FROM BACK TO SIDE WHILE IN FLAT BAD: A LITTLE
SUGGESTED CMS G CODE MODIFIER MOBILITY: CK
MOVING FROM LYING ON BACK TO SITTING ON SIDE OF FLAT BED: A LITTLE

## 2024-09-06 ASSESSMENT — GAIT ASSESSMENTS
DEVIATION: INCREASED BASE OF SUPPORT;BRADYKINETIC
ASSISTIVE DEVICE: FRONT WHEEL WALKER
GAIT LEVEL OF ASSIST: CONTACT GUARD ASSIST
DISTANCE (FEET): 30

## 2024-09-06 NOTE — CARE PLAN
The patient is Stable - Low risk of patient condition declining or worsening    Shift Goals  Clinical Goals: Pain control, safety  Patient Goals: Pain control, comfort  Family Goals: N/A    Progress made toward(s) clinical / shift goals:    Problem: Pain - Standard  Goal: Alleviation of pain or a reduction in pain to the patient’s comfort goal  Outcome: Progressing     Problem: Knowledge Deficit - Standard  Goal: Patient and family/care givers will demonstrate understanding of plan of care, disease process/condition, diagnostic tests and medications  Outcome: Progressing     Problem: Psychosocial  Goal: Patient's level of anxiety will decrease  Outcome: Progressing     Problem: Mobility  Goal: Patient's capacity to carry out activities will improve  Outcome: Progressing       Patient is not progressing towards the following goals:

## 2024-09-06 NOTE — THERAPY
"Physical Therapy   Initial Evaluation     Patient Name: Noy Rose  Age:  39 y.o., Sex:  female  Medical Record #: [id number]  Today's Date: 9/6/2024     Precautions  Precautions: Cervical Collar  ;Spinal / Back Precautions   Comments: Thien RÍOS    Assessment  Patient is 39 y.o. female   Hospital day #3 cervical OPLL, pseudoarthosis. POD #1 removal hardware with replacement, new C3 screws.  Patient presents to PT al with pain, impaired strength and mobility.  She was able to ambulate int he room with CGA and FWW but refused to ambulate outside of the room at this time 2/2 fatigue. Anticipate she will progress to home with HHPT if she continues to participate in daily mobility. Will continue to follow.       Plan    Physical Therapy Initial Treatment Plan   Treatment Plan : Bed Mobility, Equipment, Family / Caregiver Training, Gait Training, Neuro Re-Education / Balance, Self Care / Home Evaluation, Stair Training, Therapeutic Activities, Therapeutic Exercise  Treatment Frequency: 5 Times per Week  Duration: Until Therapy Goals Met    DC Equipment Recommendations: None  Discharge Recommendations: Recommend outpatient physical therapy services to address higher level deficits       Subjective  \"I'm tried, I haven't slept much\"     Objective       09/06/24 1500   Precautions   Precautions Cervical Collar  ;Spinal / Back Precautions    Comments Berwind J AAT   Pain 0 - 10 Group   Location Neck   Location Orientation Posterior   Therapist Pain Assessment Post Activity Pain Same as Prior to Activity;Nurse Notified;7   Prior Living Situation   Prior Services Home-Independent   Housing / Facility 1 Story House   Steps Into Home 4   Steps In Home 0   Bathroom Set up Bathtub / Shower Combination;Grab Bars   Equipment Owned Front-Wheel Walker;4-Wheel Walker   Lives with - Patient's Self Care Capacity Spouse;Child Less than 18 Years of Age   Comments lives with her spouse and her 2 daughters. Her mom will be staying " with them to assist as needed   Prior Level of Functional Mobility   Bed Mobility Independent   Transfer Status Independent   Ambulation Independent   Assistive Devices Used None   Stairs Independent   History of Falls   History of Falls No   Cognition    Cognition / Consciousness WDL   Comments pleasant and cooperative, needs encouragement to mobilize   Strength Upper Body   Upper Body Strength  X   Comments B UE grossly 4/5   Sensation Upper Body   Upper Extremity Sensation  WDL   Strength Lower Body   Lower Body Strength  X   Gross Strength Generalized Weakness, Equal Bilaterally   Sensation Lower Body   Lower Extremity Sensation   WDL   Other Treatments   Other Treatments Provided educated about the pathologies of bedrest. Adjusted C-collar and discussed DC recs and fall prevention   Balance Assessment   Sitting Balance (Static) Fair   Sitting Balance (Dynamic) Fair   Standing Balance (Static) Fair -   Standing Balance (Dynamic) Fair -   Weight Shift Sitting Fair   Weight Shift Standing Fair   Comments w/FWW   Bed Mobility    Supine to Sit Standby Assist   Sit to Supine Standby Assist   Rolling Standby Assist   Gait Analysis   Gait Level Of Assist Contact Guard Assist   Assistive Device Front Wheel Walker   Distance (Feet) 30   # of Times Distance was Traveled 2   Deviation Increased Base Of Support;Bradykinetic   Level of Assist with Stairs Refused   Comments deferred ambulation out of the room 2/2 fatigue   Functional Mobility   Sit to Stand Minimal Assist   Bed, Chair, Wheelchair Transfer Moderate Assist   Toilet Transfers Contact Guard Assist   6 Clicks Assessment - How much HELP from from another person do you currently need... (If the patient hasn't done an activity recently, how much help from another person do you think he/she would need if he/she tried?)   Turning from your back to your side while in a flat bed without using bedrails? 3   Moving from lying on your back to sitting on the side of a flat  bed without using bedrails? 3   Moving to and from a bed to a chair (including a wheelchair)? 3   Standing up from a chair using your arms (e.g., wheelchair, or bedside chair)? 3   Walking in hospital room? 3   Climbing 3-5 steps with a railing? 2   6 clicks Mobility Score 17   Activity Tolerance   Sitting in Chair post session   Sitting Edge of Bed 10 min   Standing 3 min x2   Edema / Skin Assessment   Comments prevena wound vac and hemovacx1   Patient / Family Goals    Patient / Family Goal #1 to rest   Short Term Goals    Short Term Goal # 1 in 6 visits patient will demo all functional transfers with Sup and LRAd for safe DC   Short Term Goal # 2 in 6 visits patient will ambulate 200' with Sup and LRAD for safe DC   Short Term Goal # 3 in 6 visits patient will navigte 4 stiars with Minoo and LRAD for safe DC   Short Term Goal # 4 in 6 visits patient will demo bed mobility indep via log roll from a flat surface for safe DC   Education Group   Education Provided Role of Physical Therapist;Gait Training;Use of Assistive Device;Cervical Precautions;Brace Wear and Care   Cervical Precautions Patient Response Family;Patient;Acceptance;Explanation;Demonstration;Handout;Verbal Demonstration;Action Demonstration   Role of Physical Therapist Patient Response Patient;Acceptance;Explanation;Demonstration;Verbal Demonstration;Action Demonstration   Gait Training Patient Response Patient;Acceptance;Explanation;Demonstration;Verbal Demonstration;Action Demonstration   Use of Assistive Device Patient Response Patient;Acceptance;Explanation;Demonstration;Verbal Demonstration;Action Demonstration   Brace Wear & Care Patient Response Patient;Acceptance;Explanation;Demonstration;Verbal Demonstration   Physical Therapy Initial Treatment Plan    Treatment Plan  Bed Mobility;Equipment;Family / Caregiver Training;Gait Training;Neuro Re-Education / Balance;Self Care / Home Evaluation;Stair Training;Therapeutic Activities;Therapeutic  Exercise   Treatment Frequency 5 Times per Week   Duration Until Therapy Goals Met   Problem List    Problems Pain;Impaired Bed Mobility;Impaired Transfers;Impaired Ambulation;Functional Strength Deficit;Impaired Balance;Decreased Activity Tolerance;Safety Awareness Deficits / Cognition;Limited Knowledge of Post-Op Precautions   Anticipated Discharge Equipment and Recommendations   DC Equipment Recommendations None   Discharge Recommendations Recommend outpatient physical therapy services to address higher level deficits     Quita Torres, PT, DPT, GCS

## 2024-09-06 NOTE — PROGRESS NOTES
Neurosurgery Progress Note    Subjective:  Neck pain better controlled    Exam:  Bilateral  bicep IP DF 5/5  Sensation intact touch x 4 extremities  + Provena wound vac    HVac 140 cc overnight clearing to serous    BP  Min: 108/62  Max: 137/60  Pulse  Av  Min: 80  Max: 90  Resp  Av.2  Min: 16  Max: 20  Temp  Av.8 °C (98.2 °F)  Min: 36.6 °C (97.9 °F)  Max: 36.9 °C (98.4 °F)  SpO2  Av.2 %  Min: 91 %  Max: 96 %    No data recorded    Recent Labs     24   WBC 18.6* 15.1*   RBC 3.69* 3.60*   HEMOGLOBIN 10.4* 10.3*   HEMATOCRIT 33.9* 34.0*   MCV 91.9 94.4   MCH 28.2 28.6   MCHC 30.7* 30.3*   RDW 53.4* 56.4*   PLATELETCT 263 244   MPV 10.0 10.2     Recent Labs     24   SODIUM 140 139   POTASSIUM 4.1 4.1   CHLORIDE 105 105   CO2 22 22   GLUCOSE 165* 146*   BUN 6* 6*   CREATININE 0.51 0.54   CALCIUM 8.6 8.6               Intake/Output                         24 - 24 - 24 Total  Total                 Intake    Total Intake -- -- -- -- -- --       Output    Urine  --  -- --  --  -- --    Number of Times Voided -- 2 x 2 x -- -- --    Drains  50  90 140  --  -- --    Output (mL) (Closed/Suction Drain Posterior Neck Hemovac 10 Fr.) 50 90 140 -- -- --    Total Output 50 90 140 -- -- --       Net I/O     -50 -90 -140 -- -- --              Intake/Output Summary (Last 24 hours) at 2024 0826  Last data filed at 2024 0459  Gross per 24 hour   Intake --   Output 140 ml   Net -140 ml             tizanidine  4 mg TID    Nozin nasal  swab  1 Applicator BID    morphine   Continuous    And    Pharmacy Consult Request  1 Each PHARMACY TO DOSE    DULoxetine  30 mg BID    cetirizine  10 mg BID    famotidine  40 mg BID    lamotrigine  300 mg BID    losartan  25 mg DAILY    omeprazole  20 mg DAILY    pregabalin  100 mg TID    morphine ER  15 mg Q12HRS    MD  ALERT...DO NOT ADMINISTER NSAIDS or ASPIRIN unless ORDERED By Neurosurgery  1 Each PRN    docusate sodium  100 mg BID    senna-docusate  1 Tablet Nightly    senna-docusate  1 Tablet Q24HRS PRN    polyethylene glycol/lytes  1 Packet BID PRN    magnesium hydroxide  30 mL QDAY PRN    bisacodyl  10 mg Q24HRS PRN    sodium phosphate  1 Each Once PRN    0.9 % NaCl with KCl 20 mEq 1,000 mL   Continuous    acetaminophen  1,000 mg Q6HRS    Followed by    [START ON 9/8/2024] acetaminophen  1,000 mg Q6HRS PRN    labetalol  10 mg Q HOUR PRN    hydrALAZINE  10 mg Q HOUR PRN    benzocaine-menthol  1 Lozenge Q2HRS PRN    calcium carbonate  500 mg BID       Assessment and Plan:  Hospital day #4 cervical OPLL, pseudoarthosis  POD #3 removal hardware with replacement, new C3 screws  Prophylactic anticoagulation: no         Start date/time: tbd     Stable exam  Continue Dilaudid PCA  cpntinue MS contin 15 mg bid  Tizantadine 4 mg tid scheduled  Ambulate with PT  Continue HVac  Cervical collar on all times, can take off to clean neck by lying in bed, keeping head still, clean neck, change pads, put collar back on

## 2024-09-06 NOTE — CARE PLAN
Problem: Pain - Standard  Goal: Alleviation of pain or a reduction in pain to the patient’s comfort goal  Outcome: Progressing     Problem: Knowledge Deficit - Standard  Goal: Patient and family/care givers will demonstrate understanding of plan of care, disease process/condition, diagnostic tests and medications  Outcome: Progressing       The patient is Stable - Low risk of patient condition declining or worsening    Shift Goals  Clinical Goals: Pain control, mobility  Patient Goals: Pain control  Family Goals: N/A    Progress made toward(s) clinical / shift goals:  Taught pt 0-10 pain scale and non-pharmacological method of pain management, encouraged to verbalize when in pain. Administered PRN pain meds as needed. Mobilizing x1 assist with FWW, tolerates well.     Patient is not progressing towards the following goals:

## 2024-09-07 ENCOUNTER — APPOINTMENT (OUTPATIENT)
Dept: RADIOLOGY | Facility: MEDICAL CENTER | Age: 39
DRG: 472 | End: 2024-09-07
Attending: NURSE PRACTITIONER
Payer: MEDICAID

## 2024-09-07 PROCEDURE — 72040 X-RAY EXAM NECK SPINE 2-3 VW: CPT

## 2024-09-07 PROCEDURE — A9270 NON-COVERED ITEM OR SERVICE: HCPCS | Performed by: NEUROLOGICAL SURGERY

## 2024-09-07 PROCEDURE — 770001 HCHG ROOM/CARE - MED/SURG/GYN PRIV*

## 2024-09-07 PROCEDURE — 700101 HCHG RX REV CODE 250: Performed by: NEUROLOGICAL SURGERY

## 2024-09-07 PROCEDURE — 97116 GAIT TRAINING THERAPY: CPT

## 2024-09-07 PROCEDURE — 700111 HCHG RX REV CODE 636 W/ 250 OVERRIDE (IP): Performed by: NURSE PRACTITIONER

## 2024-09-07 PROCEDURE — 700111 HCHG RX REV CODE 636 W/ 250 OVERRIDE (IP): Mod: JZ | Performed by: NEUROLOGICAL SURGERY

## 2024-09-07 PROCEDURE — 700102 HCHG RX REV CODE 250 W/ 637 OVERRIDE(OP): Performed by: NEUROLOGICAL SURGERY

## 2024-09-07 PROCEDURE — 72070 X-RAY EXAM THORAC SPINE 2VWS: CPT

## 2024-09-07 RX ORDER — ONDANSETRON 2 MG/ML
4 INJECTION INTRAMUSCULAR; INTRAVENOUS EVERY 4 HOURS PRN
Status: DISCONTINUED | OUTPATIENT
Start: 2024-09-07 | End: 2024-09-11 | Stop reason: HOSPADM

## 2024-09-07 RX ADMIN — Medication: at 06:38

## 2024-09-07 RX ADMIN — PREGABALIN 100 MG: 100 CAPSULE ORAL at 08:25

## 2024-09-07 RX ADMIN — ACETAMINOPHEN 1000 MG: 500 TABLET ORAL at 22:31

## 2024-09-07 RX ADMIN — Medication: at 19:24

## 2024-09-07 RX ADMIN — DULOXETINE HYDROCHLORIDE 30 MG: 30 CAPSULE, DELAYED RELEASE ORAL at 04:59

## 2024-09-07 RX ADMIN — PREGABALIN 100 MG: 100 CAPSULE ORAL at 22:31

## 2024-09-07 RX ADMIN — CETIRIZINE HYDROCHLORIDE 10 MG: 10 TABLET, FILM COATED ORAL at 04:59

## 2024-09-07 RX ADMIN — LAMOTRIGINE 300 MG: 100 TABLET ORAL at 16:38

## 2024-09-07 RX ADMIN — FAMOTIDINE 40 MG: 20 TABLET, FILM COATED ORAL at 16:38

## 2024-09-07 RX ADMIN — Medication 1 APPLICATOR: at 16:37

## 2024-09-07 RX ADMIN — TIZANIDINE 4 MG: 4 TABLET ORAL at 08:25

## 2024-09-07 RX ADMIN — CETIRIZINE HYDROCHLORIDE 10 MG: 10 TABLET, FILM COATED ORAL at 16:38

## 2024-09-07 RX ADMIN — Medication 1 APPLICATOR: at 05:00

## 2024-09-07 RX ADMIN — DOCUSATE SODIUM 100 MG: 100 CAPSULE, LIQUID FILLED ORAL at 05:00

## 2024-09-07 RX ADMIN — DULOXETINE HYDROCHLORIDE 30 MG: 30 CAPSULE, DELAYED RELEASE ORAL at 16:38

## 2024-09-07 RX ADMIN — ACETAMINOPHEN 1000 MG: 500 TABLET ORAL at 05:00

## 2024-09-07 RX ADMIN — ONDANSETRON 4 MG: 2 INJECTION INTRAMUSCULAR; INTRAVENOUS at 22:18

## 2024-09-07 RX ADMIN — TIZANIDINE 4 MG: 4 TABLET ORAL at 22:31

## 2024-09-07 RX ADMIN — POTASSIUM CHLORIDE AND SODIUM CHLORIDE: 900; 150 INJECTION, SOLUTION INTRAVENOUS at 09:46

## 2024-09-07 RX ADMIN — DOCUSATE SODIUM 100 MG: 100 CAPSULE, LIQUID FILLED ORAL at 16:38

## 2024-09-07 RX ADMIN — POTASSIUM CHLORIDE AND SODIUM CHLORIDE: 900; 150 INJECTION, SOLUTION INTRAVENOUS at 19:20

## 2024-09-07 RX ADMIN — POLYETHYLENE GLYCOL 3350 1 PACKET: 17 POWDER, FOR SOLUTION ORAL at 05:00

## 2024-09-07 RX ADMIN — MORPHINE SULFATE 15 MG: 15 TABLET, FILM COATED, EXTENDED RELEASE ORAL at 16:38

## 2024-09-07 RX ADMIN — ACETAMINOPHEN 1000 MG: 500 TABLET ORAL at 16:38

## 2024-09-07 RX ADMIN — ACETAMINOPHEN 1000 MG: 500 TABLET ORAL at 11:49

## 2024-09-07 RX ADMIN — OMEPRAZOLE 20 MG: 20 CAPSULE, DELAYED RELEASE ORAL at 04:59

## 2024-09-07 RX ADMIN — TIZANIDINE 4 MG: 4 TABLET ORAL at 14:25

## 2024-09-07 RX ADMIN — MAGNESIUM HYDROXIDE 30 ML: 1200 LIQUID ORAL at 05:00

## 2024-09-07 RX ADMIN — PREGABALIN 100 MG: 100 CAPSULE ORAL at 14:25

## 2024-09-07 RX ADMIN — SENNOSIDES AND DOCUSATE SODIUM 1 TABLET: 50; 8.6 TABLET ORAL at 22:30

## 2024-09-07 RX ADMIN — LAMOTRIGINE 300 MG: 100 TABLET ORAL at 04:59

## 2024-09-07 RX ADMIN — FAMOTIDINE 40 MG: 20 TABLET, FILM COATED ORAL at 04:59

## 2024-09-07 RX ADMIN — MORPHINE SULFATE 15 MG: 15 TABLET, FILM COATED, EXTENDED RELEASE ORAL at 04:59

## 2024-09-07 RX ADMIN — ANTACID TABLETS 500 MG: 500 TABLET, CHEWABLE ORAL at 16:37

## 2024-09-07 RX ADMIN — Medication: at 13:27

## 2024-09-07 RX ADMIN — ANTACID TABLETS 500 MG: 500 TABLET, CHEWABLE ORAL at 04:59

## 2024-09-07 ASSESSMENT — GAIT ASSESSMENTS
ASSISTIVE DEVICE: FRONT WHEEL WALKER
GAIT LEVEL OF ASSIST: CONTACT GUARD ASSIST
DISTANCE (FEET): 80
DEVIATION: DECREASED BASE OF SUPPORT;BRADYKINETIC;DECREASED HEEL STRIKE;DECREASED TOE OFF

## 2024-09-07 ASSESSMENT — PAIN DESCRIPTION - PAIN TYPE
TYPE: ACUTE PAIN;SURGICAL PAIN

## 2024-09-07 ASSESSMENT — COGNITIVE AND FUNCTIONAL STATUS - GENERAL
SUGGESTED CMS G CODE MODIFIER MOBILITY: CK
STANDING UP FROM CHAIR USING ARMS: A LITTLE
MOVING TO AND FROM BED TO CHAIR: A LITTLE
CLIMB 3 TO 5 STEPS WITH RAILING: A LOT
TURNING FROM BACK TO SIDE WHILE IN FLAT BAD: A LITTLE
WALKING IN HOSPITAL ROOM: A LITTLE
MOVING FROM LYING ON BACK TO SITTING ON SIDE OF FLAT BED: A LITTLE
MOBILITY SCORE: 17

## 2024-09-07 NOTE — PROGRESS NOTES
Neurosurgery Progress Note    Subjective:  Pt awake, alert  Sitting up in bed eating breakfast  C/o worsening neck pain to left delt since she hit her neck last night getting into bed on the headboard    Exam:  UE str 5/5  Sensation intact touch x 4 extremities  Inc c/d/I  with provena   HVac 40 cc past 8 hours - clearing     BP  Min: 106/59  Max: 128/78  Pulse  Av.8  Min: 72  Max: 83  Resp  Av.6  Min: 16  Max: 17  Temp  Av.7 °C (98.1 °F)  Min: 36.7 °C (98.1 °F)  Max: 36.8 °C (98.2 °F)  SpO2  Av.4 %  Min: 91 %  Max: 96 %    No data recorded    Recent Labs     24   WBC 15.1*   RBC 3.60*   HEMOGLOBIN 10.3*   HEMATOCRIT 34.0*   MCV 94.4   MCH 28.6   MCHC 30.3*   RDW 56.4*   PLATELETCT 244   MPV 10.2     Recent Labs     24   SODIUM 139   POTASSIUM 4.1   CHLORIDE 105   CO2 22   GLUCOSE 146*   BUN 6*   CREATININE 0.54   CALCIUM 8.6               Intake/Output                         24 - 24 0659 24 - 24 59      Total 0819-6500 7106-9987 Total                 Intake    P.O.  --  400 400  --  -- --    P.O. -- 400 400 -- -- --    I.V.  --  81.8 81.8  --  -- --    PCA End of Shift Total Volume (ml) -- 81.8 81.8 -- -- --    Total Intake -- 481.8 481.8 -- -- --       Output    Urine  --  -- --  --  -- --    Number of Times Voided -- 1 x 1 x -- -- --    Drains  --  80 80  --  -- --    Output (mL) (Closed/Suction Drain Posterior Neck Hemovac 10 Fr.) -- 80 80 -- -- --    Other  --  40 40  --  -- --    Other -- 40 40 -- -- --    Total Output -- 120 120 -- -- --       Net I/O     -- 361.8 361.8 -- -- --              Intake/Output Summary (Last 24 hours) at 2024 1003  Last data filed at 2024 0657  Gross per 24 hour   Intake 481.8 ml   Output 120 ml   Net 361.8 ml             tizanidine  4 mg TID    Nozin nasal  swab  1 Applicator BID    morphine   Continuous    And    Pharmacy Consult Request  1 Each PHARMACY TO DOSE     DULoxetine  30 mg BID    cetirizine  10 mg BID    famotidine  40 mg BID    lamotrigine  300 mg BID    losartan  25 mg DAILY    omeprazole  20 mg DAILY    pregabalin  100 mg TID    morphine ER  15 mg Q12HRS    MD AMES...DO NOT ADMINISTER NSAIDS or ASPIRIN unless ORDERED By Neurosurgery  1 Each PRN    docusate sodium  100 mg BID    senna-docusate  1 Tablet Nightly    senna-docusate  1 Tablet Q24HRS PRN    polyethylene glycol/lytes  1 Packet BID PRN    magnesium hydroxide  30 mL QDAY PRN    bisacodyl  10 mg Q24HRS PRN    sodium phosphate  1 Each Once PRN    0.9 % NaCl with KCl 20 mEq 1,000 mL   Continuous    acetaminophen  1,000 mg Q6HRS    Followed by    [START ON 9/8/2024] acetaminophen  1,000 mg Q6HRS PRN    labetalol  10 mg Q HOUR PRN    hydrALAZINE  10 mg Q HOUR PRN    benzocaine-menthol  1 Lozenge Q2HRS PRN    calcium carbonate  500 mg BID       Assessment and Plan:  Hospital day #5 cervical OPLL, pseudoarthosis  POD #4 removal hardware with replacement, new C3 screws  Prophylactic anticoagulation: no         Start date/time: ambulatory      Stable exam  Continue PCA  cpntinue MS contin 15 mg bid  Tizantadine 4 mg tid scheduled  Ambulate with PT  Continue HVac until < 30 cc in 8hrs   Cervical collar on all times, can take off to clean neck by lying in bed, keeping head still, clean neck, change pads, put collar back on    Addendum:  D/w Dr. Brizuela - will order cervical /thoracic xrays to eval worsening pain after event last night.

## 2024-09-07 NOTE — CARE PLAN
The patient is Stable - Low risk of patient condition declining or worsening    Shift Goals  Clinical Goals: pain mgt, neuro checks, safety, mobility  Patient Goals: rest and comfort  Family Goals: N/A    Progress made toward(s) clinical / shift goals:      Patient is aox4, denies any SOB/Chest pain/N and V. Able to follow commands, dressing is C/D/I, hemovac and preveena in place functioning well.      Problem: Pain - Standard  Goal: Alleviation of pain or a reduction in pain to the patient’s comfort goal  Description: Target End Date:  Prior to discharge or change in level of care    Document on Vitals flowsheet    1.  Document pain using the appropriate pain scale per order or unit policy  2.  Educate and implement non-pharmacologic comfort measures (i.e. relaxation, distraction, massage, cold/heat therapy, etc.)  3.  Pain management medications as ordered  4.  Reassess pain after pain med administration per policy  5.  If opiods administered assess patient's response to pain medication is appropriate per POSS sedation scale  6.  Follow pain management plan developed in collaboration with patient and interdisciplinary team (including palliative care or pain specialists if applicable)  Outcome: Progressing   Denies any new numbness or tingling sensation, non-pharmacological and pharmacological intervention in place. Instructed to call RN if pain is gradually increasing.      Problem: Knowledge Deficit - Standard  Goal: Patient and family/care givers will demonstrate understanding of plan of care, disease process/condition, diagnostic tests and medications  Description: Target End Date:  1-3 days or as soon as patient condition allows    Document in Patient Education    1.  Patient and family/caregiver oriented to unit, equipment, visitation policy and means for communicating concern  2.  Complete/review Learning Assessment  3.  Assess knowledge level of disease process/condition, treatment plan, diagnostic tests  and medications  4.  Explain disease process/condition, treatment plan, diagnostic tests and medications  Outcome: Progressing   Bed alarm on, call light and belongings within reach, kept bed in lowest position. Instructed to call RN if getting OOB. SCDs and hourly rounds in place.    Patient is not progressing towards the following goals:

## 2024-09-07 NOTE — THERAPY
"Physical Therapy   Daily Treatment     Patient Name: Noy Rose  Age:  39 y.o., Sex:  female  Medical Record #: [id number]  Today's Date: 9/7/2024     Precautions  Precautions: Cervical Collar  ;Spinal / Back Precautions   Comments: Thien RÍOS    Assessment    Pt seen for PT treatment session. Reports somewhat increased pain due to positions she had to be in for xrays, ongoing use of PCA. Today, pt was able to complete bed mobility with HOB elevated and SBA. Completed functional transfers with FWW and SBA and was able to ambulate 80 ft with FWW and CGA. Most difficulty observed on stairs where pt demonstrated impaired eccentric control with descent of stairs (2 steps x2 trials), able to complete with B hand rails and CGA. Mom educated on how family can assist on stairs for increased safety. PT will continue to follow while in house.     Plan    Treatment Plan Status: Continue Current Treatment Plan  Type of Treatment: Bed Mobility, Equipment, Family / Caregiver Training, Gait Training, Neuro Re-Education / Balance, Self Care / Home Evaluation, Stair Training, Therapeutic Activities, Therapeutic Exercise  Treatment Frequency: 5 Times per Week  Treatment Duration: Until Therapy Goals Met    DC Equipment Recommendations: None  Discharge Recommendations: Recommend outpatient physical therapy services to address higher level deficits      Subjective    \"I really hurt after the xrays\"     Objective     09/07/24 1404   Precautions   Precautions Cervical Collar  ;Spinal / Back Precautions    Comments [place] J AAT   Vitals   O2 Delivery Device None - Room Air   Pain 0 - 10 Group   Therapist Pain Assessment During Activity;Nurse Notified  (noted increased pain following xrays. Still using PCA)   Cognition    Cognition / Consciousness WDL   Level of Consciousness Alert   Comments receptive to PT and agreeable to OOB   Active ROM Lower Body    Active ROM Lower Body  WDL   Comments adequate for functional mobility "   Strength Lower Body   Lower Body Strength  X   Gross Strength Generalized Weakness, Equal Bilaterally   Comments poor eccentric control L > R with stairs   Sensation Lower Body   Comments not assessed this session   Other Treatments   Other Treatments Provided reiterated importance for short distance ambulation throughout the day to progress activity tolerance and strength   Balance   Sitting Balance (Static) Fair   Sitting Balance (Dynamic) Fair   Standing Balance (Static) Fair -   Standing Balance (Dynamic) Fair -   Weight Shift Sitting Fair   Weight Shift Standing Fair   Skilled Intervention Compensatory Strategies;Verbal Cuing   Comments w/ FWW, no overt LOB, tremulous LE with ambulation   Bed Mobility    Supine to Sit Standby Assist  (with HOB elevated, declined OOB from flat bed)   Sit to Supine   (seated in chair at end of session)   Scooting Supervised   Rolling Standby Assist  (modified with HOB elevated and railing)   Skilled Intervention Verbal Cuing   Comments able to maintain neutral spine with pt's approach to OOB   Gait Analysis   Gait Level Of Assist Contact Guard Assist   Assistive Device Front Wheel Walker   Distance (Feet) 80   # of Times Distance was Traveled 1   Deviation Decreased Base Of Support;Bradykinetic;Decreased Heel Strike;Decreased Toe Off   # of Stairs Climbed 2  (x2 trials with B railing support)   Level of Assist with Stairs Contact Guard Assist   Weight Bearing Status no restrictions   Skilled Intervention Verbal Cuing   Comments mom present for PT session and educated on how family can assist FWW management or pt on stairs. poor eccentric control on stairs   Functional Mobility   Sit to Stand Standby Assist   Bed, Chair, Wheelchair Transfer Standby Assist   Transfer Method Stand Step   6 Clicks Assessment - How much HELP from from another person do you currently need... (If the patient hasn't done an activity recently, how much help from another person do you think he/she would  need if he/she tried?)   Turning from your back to your side while in a flat bed without using bedrails? 3   Moving from lying on your back to sitting on the side of a flat bed without using bedrails? 3   Moving to and from a bed to a chair (including a wheelchair)? 3   Standing up from a chair using your arms (e.g., wheelchair, or bedside chair)? 3   Walking in hospital room? 3   Climbing 3-5 steps with a railing? 2   6 clicks Mobility Score 17   Activity Tolerance   Sitting in Chair up to chair for lunch at end of session   Sitting Edge of Bed 2-3 min   Standing 8-10 min total   Short Term Goals    Short Term Goal # 1 in 6 visits patient will demo all functional transfers with Sup and LRAd for safe DC   Goal Outcome # 1 Progressing as expected   Short Term Goal # 2 in 6 visits patient will ambulate 200' with Sup and LRAD for safe DC   Goal Outcome # 2 Progressing as expected   Short Term Goal # 3 in 6 visits patient will navigte 4 stiars with Minoo and LRAD for safe DC   Goal Outcome # 3 Goal met, new goal added   Short Term Goal # 3 B Pt will negotiate 4 steps with single railing and SPV to access home independently in 6 visits   Short Term Goal # 4 in 6 visits patient will demo bed mobility indep via log roll from a flat surface for safe DC   Goal Outcome # 4 Progressing as expected   Physical Therapy Treatment Plan   Physical Therapy Treatment Plan Continue Current Treatment Plan

## 2024-09-07 NOTE — CARE PLAN
Problem: Pain - Standard  Goal: Alleviation of pain or a reduction in pain to the patient’s comfort goal  Outcome: Progressing     Problem: Knowledge Deficit - Standard  Goal: Patient and family/care givers will demonstrate understanding of plan of care, disease process/condition, diagnostic tests and medications  Outcome: Progressing       The patient is Stable - Low risk of patient condition declining or worsening    Shift Goals  Clinical Goals: Pain control, bowel protocol  Patient Goals: Pain control  Family Goals: N/A    Progress made toward(s) clinical / shift goals:  Taught pt 0-10 pain scale and non-pharmacological method of pain management, encouraged to verbalize when in pain. Administered PRN pain meds as needed. Pt demonstrates understanding of PCA pump. Bowel protocol in place.     Patient is not progressing towards the following goals:

## 2024-09-07 NOTE — PROGRESS NOTES
Patient went to the bathroom and when she came back to her bed, upon laying down she accidentally hit the headrest rail, she denies any dizziness, light headedness, N and V. Complains of pain at the back of the neck, Dressing is C/D/I. Page Neuro NP on call, Spoke to Charla NP made aware of the situation

## 2024-09-08 PROCEDURE — 700111 HCHG RX REV CODE 636 W/ 250 OVERRIDE (IP): Mod: JZ | Performed by: NEUROLOGICAL SURGERY

## 2024-09-08 PROCEDURE — A9270 NON-COVERED ITEM OR SERVICE: HCPCS | Performed by: NURSE PRACTITIONER

## 2024-09-08 PROCEDURE — 700102 HCHG RX REV CODE 250 W/ 637 OVERRIDE(OP): Performed by: NEUROLOGICAL SURGERY

## 2024-09-08 PROCEDURE — 700102 HCHG RX REV CODE 250 W/ 637 OVERRIDE(OP): Performed by: NURSE PRACTITIONER

## 2024-09-08 PROCEDURE — 700111 HCHG RX REV CODE 636 W/ 250 OVERRIDE (IP): Mod: JZ | Performed by: NURSE PRACTITIONER

## 2024-09-08 PROCEDURE — 700111 HCHG RX REV CODE 636 W/ 250 OVERRIDE (IP): Performed by: NURSE PRACTITIONER

## 2024-09-08 PROCEDURE — 770001 HCHG ROOM/CARE - MED/SURG/GYN PRIV*

## 2024-09-08 PROCEDURE — A9270 NON-COVERED ITEM OR SERVICE: HCPCS | Performed by: NEUROLOGICAL SURGERY

## 2024-09-08 RX ORDER — OXYCODONE HYDROCHLORIDE 10 MG/1
10 TABLET ORAL EVERY 4 HOURS PRN
Status: DISCONTINUED | OUTPATIENT
Start: 2024-09-08 | End: 2024-09-09

## 2024-09-08 RX ORDER — MORPHINE SULFATE 4 MG/ML
2 INJECTION INTRAVENOUS
Status: DISCONTINUED | OUTPATIENT
Start: 2024-09-08 | End: 2024-09-11 | Stop reason: HOSPADM

## 2024-09-08 RX ORDER — OXYCODONE HYDROCHLORIDE 15 MG/1
15 TABLET ORAL EVERY 4 HOURS PRN
Status: DISCONTINUED | OUTPATIENT
Start: 2024-09-08 | End: 2024-09-09

## 2024-09-08 RX ADMIN — PREGABALIN 100 MG: 100 CAPSULE ORAL at 15:10

## 2024-09-08 RX ADMIN — OXYCODONE HYDROCHLORIDE 15 MG: 15 TABLET ORAL at 10:17

## 2024-09-08 RX ADMIN — OXYCODONE HYDROCHLORIDE 15 MG: 15 TABLET ORAL at 15:10

## 2024-09-08 RX ADMIN — TIZANIDINE 4 MG: 4 TABLET ORAL at 07:50

## 2024-09-08 RX ADMIN — ANTACID TABLETS 500 MG: 500 TABLET, CHEWABLE ORAL at 04:40

## 2024-09-08 RX ADMIN — ONDANSETRON 4 MG: 2 INJECTION INTRAMUSCULAR; INTRAVENOUS at 06:49

## 2024-09-08 RX ADMIN — Medication: at 00:00

## 2024-09-08 RX ADMIN — ANTACID TABLETS 500 MG: 500 TABLET, CHEWABLE ORAL at 17:37

## 2024-09-08 RX ADMIN — ACETAMINOPHEN 1000 MG: 500 TABLET ORAL at 04:40

## 2024-09-08 RX ADMIN — MORPHINE SULFATE 2 MG: 4 INJECTION, SOLUTION INTRAMUSCULAR; INTRAVENOUS at 23:08

## 2024-09-08 RX ADMIN — OMEPRAZOLE 20 MG: 20 CAPSULE, DELAYED RELEASE ORAL at 04:40

## 2024-09-08 RX ADMIN — DULOXETINE HYDROCHLORIDE 30 MG: 30 CAPSULE, DELAYED RELEASE ORAL at 17:36

## 2024-09-08 RX ADMIN — Medication: at 06:26

## 2024-09-08 RX ADMIN — MORPHINE SULFATE 15 MG: 15 TABLET, FILM COATED, EXTENDED RELEASE ORAL at 04:40

## 2024-09-08 RX ADMIN — TIZANIDINE 4 MG: 4 TABLET ORAL at 20:39

## 2024-09-08 RX ADMIN — TIZANIDINE 4 MG: 4 TABLET ORAL at 15:09

## 2024-09-08 RX ADMIN — LAMOTRIGINE 300 MG: 100 TABLET ORAL at 04:40

## 2024-09-08 RX ADMIN — MAGNESIUM HYDROXIDE 30 ML: 1200 LIQUID ORAL at 04:41

## 2024-09-08 RX ADMIN — DULOXETINE HYDROCHLORIDE 30 MG: 30 CAPSULE, DELAYED RELEASE ORAL at 04:40

## 2024-09-08 RX ADMIN — CETIRIZINE HYDROCHLORIDE 10 MG: 10 TABLET, FILM COATED ORAL at 17:36

## 2024-09-08 RX ADMIN — FAMOTIDINE 40 MG: 20 TABLET, FILM COATED ORAL at 17:36

## 2024-09-08 RX ADMIN — ACETAMINOPHEN 1000 MG: 500 TABLET ORAL at 12:07

## 2024-09-08 RX ADMIN — Medication 1 APPLICATOR: at 04:46

## 2024-09-08 RX ADMIN — OXYCODONE HYDROCHLORIDE 15 MG: 15 TABLET ORAL at 20:39

## 2024-09-08 RX ADMIN — MORPHINE SULFATE 2 MG: 4 INJECTION, SOLUTION INTRAMUSCULAR; INTRAVENOUS at 12:07

## 2024-09-08 RX ADMIN — CETIRIZINE HYDROCHLORIDE 10 MG: 10 TABLET, FILM COATED ORAL at 04:40

## 2024-09-08 RX ADMIN — PREGABALIN 100 MG: 100 CAPSULE ORAL at 20:39

## 2024-09-08 RX ADMIN — DOCUSATE SODIUM 100 MG: 100 CAPSULE, LIQUID FILLED ORAL at 04:40

## 2024-09-08 RX ADMIN — PREGABALIN 100 MG: 100 CAPSULE ORAL at 07:50

## 2024-09-08 RX ADMIN — LAMOTRIGINE 300 MG: 100 TABLET ORAL at 17:35

## 2024-09-08 RX ADMIN — MORPHINE SULFATE 15 MG: 15 TABLET, FILM COATED, EXTENDED RELEASE ORAL at 17:37

## 2024-09-08 RX ADMIN — FAMOTIDINE 40 MG: 20 TABLET, FILM COATED ORAL at 04:40

## 2024-09-08 ASSESSMENT — PAIN DESCRIPTION - PAIN TYPE
TYPE: ACUTE PAIN;SURGICAL PAIN

## 2024-09-08 NOTE — CARE PLAN
The patient is Stable - Low risk of patient condition declining or worsening    Shift Goals  Clinical Goals: neuro q4hr, pain mgmt  Patient Goals: decreased pain  Family Goals: DEEPAK    Progress made toward(s) clinical / shift goals:    Problem: Pain - Standard  Goal: Alleviation of pain or a reduction in pain to the patient’s comfort goal  Outcome: Progressing  Note: PCA d/c today, oxy 10-15mg PRN + breakthrough 2mg IV morphine.     Problem: Knowledge Deficit - Standard  Goal: Patient and family/care givers will demonstrate understanding of plan of care, disease process/condition, diagnostic tests and medications  Outcome: Progressing  Note: Encouraged increased ambulation and OOB to chair with meals.

## 2024-09-08 NOTE — PROGRESS NOTES
Patient complaining of being nauseous and she feels like her BLE are swollen, Assessment done. Page MD on call. Spoke to Dr. Brizuela. Made aware of the situation, Provided orders, repeat back orders and will carry out.

## 2024-09-08 NOTE — PROGRESS NOTES
Patient accidentally pulled out completely her HV, Page on call MD to give an update.Spoke to Dr. Brizuela, provided update, last Output 30ml, ordered to covered it.

## 2024-09-08 NOTE — CARE PLAN
The patient is Stable - Low risk of patient condition declining or worsening    Shift Goals  Clinical Goals: neuro checks, pain mgt, ambulation  Patient Goals: rest  Family Goals: N/A    Progress made toward(s) clinical / shift goals:      Patient is aox4, denies any SOB/Chest pain/N and V. Able to follow commands, dressing is C/D/I.      Problem: Pain - Standard  Goal: Alleviation of pain or a reduction in pain to the patient’s comfort goal  Description: Target End Date:  Prior to discharge or change in level of care    Document on Vitals flowsheet    1.  Document pain using the appropriate pain scale per order or unit policy  2.  Educate and implement non-pharmacologic comfort measures (i.e. relaxation, distraction, massage, cold/heat therapy, etc.)  3.  Pain management medications as ordered  4.  Reassess pain after pain med administration per policy  5.  If opiods administered assess patient's response to pain medication is appropriate per POSS sedation scale  6.  Follow pain management plan developed in collaboration with patient and interdisciplinary team (including palliative care or pain specialists if applicable)  Outcome: Progressing     Denies any new numbness or tingling sensation, non-pharmacological and pharmacological intervention in place. Instructed to call RN if pain is gradually increasing.      Patient is not progressing towards the following goals:

## 2024-09-08 NOTE — PROGRESS NOTES
Neurosurgery Progress Note    Subjective:  Pt awake, alert  Pain continues to be the same, neck pain to left delt    Exam:  UE str 5/5/LE str 5/5  Some swelling to LE, no calf pain or erythema   Sensation intact touch x 4 extremities  Inc c/d/I  with provena   HVac  out    BP  Min: 115/74  Max: 127/61  Pulse  Av.4  Min: 72  Max: 86  Resp  Av.6  Min: 16  Max: 17  Temp  Av.7 °C (98.1 °F)  Min: 36.7 °C (98.1 °F)  Max: 36.8 °C (98.2 °F)  SpO2  Av.8 %  Min: 94 %  Max: 96 %    No data recorded                          Intake/Output                         24 - 24 - [id number] Total [id number] Total                 Intake    P.O.  --  -- --  240  -- 240    P.O. -- -- -- 240 -- 240    Total Intake -- -- -- 240 -- 240       Output    Urine  --  -- --  --  -- --    Number of Times Voided 1 x 2 x 3 x 1 x -- 1 x    Drains  40  70 110  --  -- --    Output (mL) ([REMOVED] Closed/Suction Drain Posterior Neck Hemovac 10 Fr. 24 0530) 40 70 110 -- -- --    Stool  --  -- --  --  -- --    Number of Times Stooled -- 1 x 1 x 1 x -- 1 x    Total Output 40 70 110 -- -- --       Net I/O     -40 -70 -110 240 -- 240              Intake/Output Summary (Last 24 hours) at 2024 0831  Last data filed at 2024 0800  Gross per 24 hour   Intake 240 ml   Output 110 ml   Net 130 ml             ondansetron  4 mg Q4HRS PRN    tizanidine  4 mg TID    Nozin nasal  swab  1 Applicator BID    morphine   Continuous    And    Pharmacy Consult Request  1 Each PHARMACY TO DOSE    DULoxetine  30 mg BID    cetirizine  10 mg BID    famotidine  40 mg BID    lamotrigine  300 mg BID    losartan  25 mg DAILY    omeprazole  20 mg DAILY    pregabalin  100 mg TID    morphine ER  15 mg Q12HRS    MD ALERT...DO NOT ADMINISTER NSAIDS or ASPIRIN unless ORDERED By Neurosurgery  1 Each PRN    docusate sodium  100 mg BID    senna-docusate  1 Tablet Nightly     senna-docusate  1 Tablet Q24HRS PRN    polyethylene glycol/lytes  1 Packet BID PRN    magnesium hydroxide  30 mL QDAY PRN    bisacodyl  10 mg Q24HRS PRN    sodium phosphate  1 Each Once PRN    0.9 % NaCl with KCl 20 mEq 1,000 mL   Continuous    acetaminophen  1,000 mg Q6HRS    Followed by    acetaminophen  1,000 mg Q6HRS PRN    labetalol  10 mg Q HOUR PRN    hydrALAZINE  10 mg Q HOUR PRN    benzocaine-menthol  1 Lozenge Q2HRS PRN    calcium carbonate  500 mg BID       Assessment and Plan:  Hospital day #6 cervical OPLL, pseudoarthosis  POD #5 removal hardware with replacement, new C3 screws  Prophylactic anticoagulation: no         Start date/time: ambulatory      Stable exam  DC pca, oxy 10-15 Q4 prn, continue MS contin 15 mg bid  Tizantadine 4 mg tid scheduled  Ambulate- at least TID   Cervical collar on all times, can take off to clean neck by lying in bed, keeping head still, clean neck, change pads, put collar back on  cervical /thoracic xrays rev'd - hardware intact, no fx

## 2024-09-09 PROCEDURE — A9270 NON-COVERED ITEM OR SERVICE: HCPCS | Performed by: NURSE PRACTITIONER

## 2024-09-09 PROCEDURE — 97530 THERAPEUTIC ACTIVITIES: CPT

## 2024-09-09 PROCEDURE — 700102 HCHG RX REV CODE 250 W/ 637 OVERRIDE(OP)

## 2024-09-09 PROCEDURE — A9270 NON-COVERED ITEM OR SERVICE: HCPCS

## 2024-09-09 PROCEDURE — A9270 NON-COVERED ITEM OR SERVICE: HCPCS | Performed by: NEUROLOGICAL SURGERY

## 2024-09-09 PROCEDURE — 700111 HCHG RX REV CODE 636 W/ 250 OVERRIDE (IP): Mod: JZ | Performed by: NURSE PRACTITIONER

## 2024-09-09 PROCEDURE — 700102 HCHG RX REV CODE 250 W/ 637 OVERRIDE(OP): Performed by: NURSE PRACTITIONER

## 2024-09-09 PROCEDURE — 700102 HCHG RX REV CODE 250 W/ 637 OVERRIDE(OP): Performed by: NEUROLOGICAL SURGERY

## 2024-09-09 PROCEDURE — 770001 HCHG ROOM/CARE - MED/SURG/GYN PRIV*

## 2024-09-09 RX ORDER — OXYCODONE HYDROCHLORIDE 10 MG/1
20 TABLET ORAL EVERY 4 HOURS PRN
Status: DISCONTINUED | OUTPATIENT
Start: 2024-09-09 | End: 2024-09-11 | Stop reason: HOSPADM

## 2024-09-09 RX ORDER — OXYCODONE HYDROCHLORIDE 15 MG/1
15 TABLET ORAL EVERY 4 HOURS PRN
Status: DISCONTINUED | OUTPATIENT
Start: 2024-09-09 | End: 2024-09-11 | Stop reason: HOSPADM

## 2024-09-09 RX ADMIN — DULOXETINE HYDROCHLORIDE 30 MG: 30 CAPSULE, DELAYED RELEASE ORAL at 17:45

## 2024-09-09 RX ADMIN — ACETAMINOPHEN 1000 MG: 500 TABLET ORAL at 23:28

## 2024-09-09 RX ADMIN — LAMOTRIGINE 300 MG: 100 TABLET ORAL at 04:49

## 2024-09-09 RX ADMIN — PREGABALIN 100 MG: 100 CAPSULE ORAL at 14:41

## 2024-09-09 RX ADMIN — CETIRIZINE HYDROCHLORIDE 10 MG: 10 TABLET, FILM COATED ORAL at 17:46

## 2024-09-09 RX ADMIN — FAMOTIDINE 40 MG: 20 TABLET, FILM COATED ORAL at 04:49

## 2024-09-09 RX ADMIN — PREGABALIN 100 MG: 100 CAPSULE ORAL at 08:49

## 2024-09-09 RX ADMIN — TIZANIDINE 4 MG: 4 TABLET ORAL at 20:41

## 2024-09-09 RX ADMIN — OXYCODONE HYDROCHLORIDE 15 MG: 15 TABLET ORAL at 06:00

## 2024-09-09 RX ADMIN — TIZANIDINE 4 MG: 4 TABLET ORAL at 08:49

## 2024-09-09 RX ADMIN — MORPHINE SULFATE 15 MG: 15 TABLET, FILM COATED, EXTENDED RELEASE ORAL at 17:46

## 2024-09-09 RX ADMIN — PREGABALIN 100 MG: 100 CAPSULE ORAL at 20:41

## 2024-09-09 RX ADMIN — OXYCODONE HYDROCHLORIDE 15 MG: 15 TABLET ORAL at 10:18

## 2024-09-09 RX ADMIN — CETIRIZINE HYDROCHLORIDE 10 MG: 10 TABLET, FILM COATED ORAL at 04:49

## 2024-09-09 RX ADMIN — TIZANIDINE 4 MG: 4 TABLET ORAL at 14:41

## 2024-09-09 RX ADMIN — LAMOTRIGINE 300 MG: 100 TABLET ORAL at 17:45

## 2024-09-09 RX ADMIN — OXYCODONE HYDROCHLORIDE 20 MG: 10 TABLET ORAL at 20:40

## 2024-09-09 RX ADMIN — DULOXETINE HYDROCHLORIDE 30 MG: 30 CAPSULE, DELAYED RELEASE ORAL at 04:49

## 2024-09-09 RX ADMIN — ANTACID TABLETS 500 MG: 500 TABLET, CHEWABLE ORAL at 04:49

## 2024-09-09 RX ADMIN — OMEPRAZOLE 20 MG: 20 CAPSULE, DELAYED RELEASE ORAL at 04:49

## 2024-09-09 RX ADMIN — DOCUSATE SODIUM 100 MG: 100 CAPSULE, LIQUID FILLED ORAL at 17:46

## 2024-09-09 RX ADMIN — MORPHINE SULFATE 15 MG: 15 TABLET, FILM COATED, EXTENDED RELEASE ORAL at 04:49

## 2024-09-09 RX ADMIN — MORPHINE SULFATE 2 MG: 4 INJECTION, SOLUTION INTRAMUSCULAR; INTRAVENOUS at 11:21

## 2024-09-09 RX ADMIN — ACETAMINOPHEN 1000 MG: 500 TABLET ORAL at 04:57

## 2024-09-09 RX ADMIN — OXYCODONE HYDROCHLORIDE 20 MG: 10 TABLET ORAL at 14:41

## 2024-09-09 RX ADMIN — MORPHINE SULFATE 2 MG: 4 INJECTION, SOLUTION INTRAMUSCULAR; INTRAVENOUS at 17:44

## 2024-09-09 RX ADMIN — MORPHINE SULFATE 2 MG: 4 INJECTION, SOLUTION INTRAMUSCULAR; INTRAVENOUS at 22:04

## 2024-09-09 RX ADMIN — ANTACID TABLETS 500 MG: 500 TABLET, CHEWABLE ORAL at 17:46

## 2024-09-09 RX ADMIN — FAMOTIDINE 40 MG: 20 TABLET, FILM COATED ORAL at 17:44

## 2024-09-09 ASSESSMENT — PAIN DESCRIPTION - PAIN TYPE
TYPE: ACUTE PAIN;SURGICAL PAIN

## 2024-09-09 ASSESSMENT — GAIT ASSESSMENTS
GAIT LEVEL OF ASSIST: STANDBY ASSIST
ASSISTIVE DEVICE: FRONT WHEEL WALKER
DISTANCE (FEET): 20
DEVIATION: ATAXIC;SHUFFLED GAIT;BRADYKINETIC

## 2024-09-09 ASSESSMENT — COGNITIVE AND FUNCTIONAL STATUS - GENERAL
WALKING IN HOSPITAL ROOM: A LITTLE
CLIMB 3 TO 5 STEPS WITH RAILING: A LITTLE
MOVING FROM LYING ON BACK TO SITTING ON SIDE OF FLAT BED: A LITTLE
SUGGESTED CMS G CODE MODIFIER MOBILITY: CK
STANDING UP FROM CHAIR USING ARMS: A LITTLE
TURNING FROM BACK TO SIDE WHILE IN FLAT BAD: A LITTLE
MOBILITY SCORE: 18
MOVING TO AND FROM BED TO CHAIR: A LITTLE

## 2024-09-09 ASSESSMENT — FIBROSIS 4 INDEX: FIB4 SCORE: 0.73

## 2024-09-09 NOTE — THERAPY
Physical Therapy   Daily Treatment     Patient Name: Noy Rose  Age:  39 y.o., Sex:  female  Medical Record #: [id number]  Today's Date: 9/9/2024     Precautions  Precautions: Fall Risk;Cervical Collar  ;Spinal / Back Precautions   Comments: Thien SOTELO AAT    Assessment    Pt seen for follow up PT tx. Pt with migraine pain but willing to work on stairs with therapist. She was received without c-collar on in bed icing her neck, education provided on importance of wearing c-collar AAT. Pt placed collar back on and compliant rest of session. She continues to be tremulous during gait but demonstrated improved control on stair negotiation. Discussed fall prevention strategies for dc. PT will cont while pt is in acute care setting.     Plan    Treatment Plan Status: Continue Current Treatment Plan  Type of Treatment: Bed Mobility, Equipment, Family / Caregiver Training, Gait Training, Neuro Re-Education / Balance, Self Care / Home Evaluation, Stair Training, Therapeutic Activities, Therapeutic Exercise  Treatment Frequency: 5 Times per Week  Treatment Duration: Until Therapy Goals Met    DC Equipment Recommendations: None  Discharge Recommendations: Recommend outpatient physical therapy services to address higher level deficits       09/09/24 1518   Vitals   O2 (LPM) 0   O2 Delivery Device None - Room Air   Pain 0 - 10 Group   Therapist Pain Assessment During Activity;Nurse Notified;9   Non Verbal Descriptors   Non Verbal Scale  Calm   Cognition    Level of Consciousness Alert   Other Treatments   Other Treatments Provided pt with migraine, only willing to perform stairs   Balance   Sitting Balance (Static) Fair   Sitting Balance (Dynamic) Fair   Standing Balance (Static) Fair   Standing Balance (Dynamic) Fair -   Weight Shift Sitting Good   Weight Shift Standing Fair   Skilled Intervention Verbal Cuing;Compensatory Strategies   Comments FWW   Bed Mobility    Supine to Sit Standby Assist   Sit to Supine Standby Assist    Scooting Supervised   Rolling Standby Assist   Skilled Intervention Verbal Cuing   Comments HOB raised   Gait Analysis   Gait Level Of Assist Standby Assist   Assistive Device Front Wheel Walker   Distance (Feet) 20   # of Times Distance was Traveled 2   Deviation Ataxic;Shuffled Gait;Bradykinetic   # of Stairs Climbed 4   Level of Assist with Stairs Standby Assist   Weight Bearing Status no restrictions   Skilled Intervention Verbal Cuing;Compensatory Strategies   Comments mother present   Functional Mobility   Sit to Stand Supervised   Bed, Chair, Wheelchair Transfer Supervised   Transfer Method Stand Step   Mobility in room, stairs, FWW   Skilled Intervention Verbal Cuing   Short Term Goals    Short Term Goal # 1 in 6 visits patient will demo all functional transfers with Sup and LRAd for safe DC   Goal Outcome # 1 Goal met   Short Term Goal # 2 in 6 visits patient will ambulate 200' with Sup and LRAD for safe DC   Goal Outcome # 2 Progressing as expected   Short Term Goal # 3 in 6 visits patient will navigte 4 stiars with Minoo and LRAD for safe DC   Goal Outcome # 3 Goal met, new goal added   Short Term Goal # 3 B Pt will negotiate 4 steps with single railing and SPV to access home independently in 6 visits   Goal Outcome # 3 B Progressing as expected   Short Term Goal # 4 in 6 visits patient will demo bed mobility indep via log roll from a flat surface for safe DC   Goal Outcome # 4 Progressing as expected   Physical Therapy Treatment Plan   Physical Therapy Treatment Plan Continue Current Treatment Plan   Anticipated Discharge Equipment and Recommendations   DC Equipment Recommendations None   Discharge Recommendations Recommend outpatient physical therapy services to address higher level deficits

## 2024-09-09 NOTE — PROGRESS NOTES
Virtual Nurse rounding complete.    Round Needs: Pain Rating 8/10 migraine. Offered cold moist washcloth and discussed pain management with pt.  and Notified of Patient Needs: Primary RN and CNA.

## 2024-09-09 NOTE — PROGRESS NOTES
Neurosurgery Progress Note    Subjective:  No events overnight  Pain continues to be the same, neck pain into both shoulders    Exam:  Sleeping, rouses easily to voice  UE str 5/5/LE str 5/5  Some swelling to LE, no calf pain or erythema   Sensation intact touch x 4 extremities  Inc c/d/I  with provena   HVac  out    BP  Min: 92/55  Max: 119/60  Pulse  Av.8  Min: 79  Max: 86  Resp  Av.3  Min: 16  Max: 17  Temp  Av.7 °C (98 °F)  Min: 36.5 °C (97.7 °F)  Max: 36.8 °C (98.2 °F)  SpO2  Av %  Min: 91 %  Max: 96 %    No data recorded                          Intake/Output                         24 - 24 - 09/10/24 0659     2282-2016 2511-3840 Total  Total                 Intake    P.O.  390  -- 390  --  -- --    P.O. 390 -- 390 -- -- --    Total Intake 390 -- 390 -- -- --       Output    Urine  --  -- --  --  -- --    Number of Times Voided 2 x 1 x 3 x -- -- --    Stool  --  -- --  --  -- --    Number of Times Stooled 1 x -- 1 x -- -- --    Total Output -- -- -- -- -- --       Net I/O     390 -- 390 -- -- --              Intake/Output Summary (Last 24 hours) at 2024 0935  Last data filed at 2024 1048  Gross per 24 hour   Intake 150 ml   Output --   Net 150 ml             oxyCODONE immediate-release  10 mg Q4HRS PRN    oxyCODONE immediate-release  15 mg Q4HRS PRN    morphine injection  2 mg Q2HRS PRN    ondansetron  4 mg Q4HRS PRN    tizanidine  4 mg TID    Pharmacy Consult Request  1 Each PHARMACY TO DOSE    DULoxetine  30 mg BID    cetirizine  10 mg BID    famotidine  40 mg BID    lamotrigine  300 mg BID    losartan  25 mg DAILY    omeprazole  20 mg DAILY    pregabalin  100 mg TID    morphine ER  15 mg Q12HRS    MD ALERT...DO NOT ADMINISTER NSAIDS or ASPIRIN unless ORDERED By Neurosurgery  1 Each PRN    docusate sodium  100 mg BID    senna-docusate  1 Tablet Nightly    senna-docusate  1 Tablet Q24HRS PRN    polyethylene glycol/lytes  1  Packet BID PRN    magnesium hydroxide  30 mL QDAY PRN    bisacodyl  10 mg Q24HRS PRN    sodium phosphate  1 Each Once PRN    acetaminophen  1,000 mg Q6HRS PRN    labetalol  10 mg Q HOUR PRN    hydrALAZINE  10 mg Q HOUR PRN    benzocaine-menthol  1 Lozenge Q2HRS PRN    calcium carbonate  500 mg BID       Assessment and Plan:  Hospital day #7 cervical OPLL, pseudoarthosis  POD #6 removal hardware with replacement, new C3 screws  Prophylactic anticoagulation: no         Start date/time: ambulatory      Stable exam  Continue pain control with oxy 10-15 Q4 prn, MS contin 15 mg bid  Will change Tizantadine 4 mg to q6hr scheduled  Ambulate- at least TID. MUST be OOB for all meals.  Cervical collar on all times, can take off to clean neck by lying in bed, keeping head still, clean neck, change pads, put collar back on

## 2024-09-09 NOTE — CARE PLAN
The patient is Stable - Low risk of patient condition declining or worsening    Shift Goals  Clinical Goals: Pain control, safety, neuro checks  Patient Goals: Pain control, comfort  Family Goals: N/A    Progress made toward(s) clinical / shift goals:    Problem: Pain - Standard  Goal: Alleviation of pain or a reduction in pain to the patient’s comfort goal  Outcome: Progressing     Problem: Knowledge Deficit - Standard  Goal: Patient and family/care givers will demonstrate understanding of plan of care, disease process/condition, diagnostic tests and medications  Outcome: Progressing     Problem: Psychosocial  Goal: Patient's level of anxiety will decrease  Outcome: Progressing     Problem: Mobility  Goal: Patient's capacity to carry out activities will improve  Outcome: Progressing     Problem: Fall Risk  Goal: Patient will remain free from falls  Outcome: Progressing       Patient is not progressing towards the following goals:

## 2024-09-10 PROCEDURE — A9270 NON-COVERED ITEM OR SERVICE: HCPCS

## 2024-09-10 PROCEDURE — 700111 HCHG RX REV CODE 636 W/ 250 OVERRIDE (IP): Mod: JZ | Performed by: NURSE PRACTITIONER

## 2024-09-10 PROCEDURE — 97535 SELF CARE MNGMENT TRAINING: CPT

## 2024-09-10 PROCEDURE — 770001 HCHG ROOM/CARE - MED/SURG/GYN PRIV*

## 2024-09-10 PROCEDURE — 700102 HCHG RX REV CODE 250 W/ 637 OVERRIDE(OP): Performed by: NEUROLOGICAL SURGERY

## 2024-09-10 PROCEDURE — 700102 HCHG RX REV CODE 250 W/ 637 OVERRIDE(OP)

## 2024-09-10 PROCEDURE — A9270 NON-COVERED ITEM OR SERVICE: HCPCS | Performed by: NEUROLOGICAL SURGERY

## 2024-09-10 RX ADMIN — OXYCODONE HYDROCHLORIDE 15 MG: 15 TABLET ORAL at 16:14

## 2024-09-10 RX ADMIN — CETIRIZINE HYDROCHLORIDE 10 MG: 10 TABLET, FILM COATED ORAL at 05:06

## 2024-09-10 RX ADMIN — FAMOTIDINE 40 MG: 20 TABLET, FILM COATED ORAL at 05:06

## 2024-09-10 RX ADMIN — MORPHINE SULFATE 15 MG: 15 TABLET, FILM COATED, EXTENDED RELEASE ORAL at 17:06

## 2024-09-10 RX ADMIN — LAMOTRIGINE 300 MG: 100 TABLET ORAL at 05:06

## 2024-09-10 RX ADMIN — ANTACID TABLETS 500 MG: 500 TABLET, CHEWABLE ORAL at 17:06

## 2024-09-10 RX ADMIN — DULOXETINE HYDROCHLORIDE 30 MG: 30 CAPSULE, DELAYED RELEASE ORAL at 17:06

## 2024-09-10 RX ADMIN — PREGABALIN 100 MG: 100 CAPSULE ORAL at 20:19

## 2024-09-10 RX ADMIN — PREGABALIN 100 MG: 100 CAPSULE ORAL at 09:19

## 2024-09-10 RX ADMIN — TIZANIDINE 4 MG: 4 TABLET ORAL at 09:19

## 2024-09-10 RX ADMIN — OMEPRAZOLE 20 MG: 20 CAPSULE, DELAYED RELEASE ORAL at 05:06

## 2024-09-10 RX ADMIN — DULOXETINE HYDROCHLORIDE 30 MG: 30 CAPSULE, DELAYED RELEASE ORAL at 05:06

## 2024-09-10 RX ADMIN — ANTACID TABLETS 500 MG: 500 TABLET, CHEWABLE ORAL at 05:06

## 2024-09-10 RX ADMIN — TIZANIDINE 4 MG: 4 TABLET ORAL at 16:14

## 2024-09-10 RX ADMIN — MORPHINE SULFATE 15 MG: 15 TABLET, FILM COATED, EXTENDED RELEASE ORAL at 05:06

## 2024-09-10 RX ADMIN — OXYCODONE HYDROCHLORIDE 20 MG: 10 TABLET ORAL at 20:19

## 2024-09-10 RX ADMIN — LAMOTRIGINE 300 MG: 100 TABLET ORAL at 17:06

## 2024-09-10 RX ADMIN — MORPHINE SULFATE 2 MG: 4 INJECTION, SOLUTION INTRAMUSCULAR; INTRAVENOUS at 02:23

## 2024-09-10 RX ADMIN — ACETAMINOPHEN 1000 MG: 500 TABLET ORAL at 17:09

## 2024-09-10 RX ADMIN — CETIRIZINE HYDROCHLORIDE 10 MG: 10 TABLET, FILM COATED ORAL at 17:06

## 2024-09-10 RX ADMIN — FAMOTIDINE 40 MG: 20 TABLET, FILM COATED ORAL at 17:06

## 2024-09-10 RX ADMIN — OXYCODONE HYDROCHLORIDE 20 MG: 10 TABLET ORAL at 07:55

## 2024-09-10 RX ADMIN — LOSARTAN POTASSIUM 25 MG: 25 TABLET, FILM COATED ORAL at 05:06

## 2024-09-10 RX ADMIN — TIZANIDINE 4 MG: 4 TABLET ORAL at 03:59

## 2024-09-10 RX ADMIN — ACETAMINOPHEN 1000 MG: 500 TABLET ORAL at 09:23

## 2024-09-10 RX ADMIN — OXYCODONE HYDROCHLORIDE 20 MG: 10 TABLET ORAL at 00:36

## 2024-09-10 RX ADMIN — TIZANIDINE 4 MG: 4 TABLET ORAL at 21:33

## 2024-09-10 RX ADMIN — PREGABALIN 100 MG: 100 CAPSULE ORAL at 16:14

## 2024-09-10 ASSESSMENT — COGNITIVE AND FUNCTIONAL STATUS - GENERAL
HELP NEEDED FOR BATHING: A LITTLE
DRESSING REGULAR LOWER BODY CLOTHING: A LITTLE
SUGGESTED CMS G CODE MODIFIER DAILY ACTIVITY: CJ
DAILY ACTIVITIY SCORE: 22

## 2024-09-10 ASSESSMENT — PAIN DESCRIPTION - PAIN TYPE
TYPE: SURGICAL PAIN
TYPE: ACUTE PAIN;SURGICAL PAIN
TYPE: ACUTE PAIN;SURGICAL PAIN
TYPE: SURGICAL PAIN
TYPE: ACUTE PAIN;SURGICAL PAIN
TYPE: SURGICAL PAIN
TYPE: ACUTE PAIN;SURGICAL PAIN
TYPE: SURGICAL PAIN
TYPE: ACUTE PAIN;SURGICAL PAIN

## 2024-09-10 NOTE — CARE PLAN
The patient is Stable - Low risk of patient condition declining or worsening    Shift Goals  Clinical Goals: Pain control, safety  Patient Goals: Comfort  Family Goals: Comfort    Progress made toward(s) clinical / shift goals:    Problem: Pain - Standard  Goal: Alleviation of pain or a reduction in pain to the patient’s comfort goal  Outcome: Progressing     Problem: Knowledge Deficit - Standard  Goal: Patient and family/care givers will demonstrate understanding of plan of care, disease process/condition, diagnostic tests and medications  Outcome: Progressing     Problem: Mobility  Goal: Patient's capacity to carry out activities will improve  Outcome: Progressing     Problem: Self Care  Goal: Patient will have the ability to perform ADLs independently or with assistance (bathe, groom, dress, toilet and feed)  Outcome: Progressing     Problem: Fall Risk  Goal: Patient will remain free from falls  Outcome: Progressing       Patient is not progressing towards the following goals:

## 2024-09-10 NOTE — PROGRESS NOTES
Bedside report recieved, assumed care at 0700.   Pt is A&Ox4, reports 9/10 neck pain-medicated per MAR.   Denies N/V. BM 9/10 . Room air. SCD's in use. Prevena Plus to posterior neck- off upon assessment. Turned machine on & is indicating air leak. Drape applied. Pt is concerned about L thigh redness. Picture uploaded to EPIC, will place wound consult.     IVF: Saline Locked   Lines/Drains: 20 L FA  Mobility: SBA    Plan of care discussed. All needs met at this time.   Safety Precautions: Pt instructed to use call light when in need of assistance. Low Fall Risk. Bed locked and in low position, call light and belongings within reach, upper rails up. Treaded socks on.

## 2024-09-10 NOTE — CARE PLAN
The patient is Stable - Low risk of patient condition declining or worsening    Shift Goals  Clinical Goals: New Prevena? Pain Cotrol, Mobility  Patient Goals: Pain Control  Family Goals: Comfort    Progress made toward(s) clinical / shift goals:      Problem: Pain - Standard  Goal: Alleviation of pain or a reduction in pain to the patient’s comfort goal  Outcome: Progressing  Note: Medicated per MAR with PRN pain medication. Discussed with patient pain medication timing as to avoid IV pain medications if she is going to DC home tomorrow. Pt agreeable.      Problem: Communication  Goal: The ability to communicate needs accurately and effectively will improve  Outcome: Progressing  Flowsheets (Taken 9/10/2024 1403)  Communication:   Assessed patient's ability to understand and communicate   Oriented patient to call light   Oriented family/support system to call light       Patient is not progressing towards the following goals:

## 2024-09-10 NOTE — THERAPY
"Occupational Therapy  Daily Treatment     Patient Name: Noy Rose  Age:  39 y.o., Sex:  female  Medical Record #: [id number]  Today's Date: 9/10/2024     Precautions: Fall Risk, Cervical Collar  , Spinal / Back Precautions   Comments: wound vac    Assessment    Pt seen for OT tx, demonstrates progress toward acute OT goals. Pt's mother at bedside, confirms ability to assist upon DC. Pt completes basic self-care ADLs at SPV level however perseveration on pain limits engagement in ADL tasks. Pt roused from sleep to participate in OT session at 4pm, stated \"My pain is always out of control in the mornings\", disoriented to time and requesting more pain medication. Anticipate DC home with support from family once medically cleared.     Plan    Treatment Plan Status: Continue Current Treatment Plan  Type of Treatment: Self Care / Activities of Daily Living, Therapeutic Exercises, Therapeutic Activity  Treatment Frequency: 4 Times per Week  Treatment Duration: Until Therapy Goals Met    DC Equipment Recommendations: None (has ADL DME at home and mother to assist)  Discharge Recommendations: Anticipate that the patient will have no further occupational therapy needs after discharge from the hospital    Objective     09/10/24 1605    Services   Is patient using  services for this encounter? No   Precautions   Precautions Fall Risk;Cervical Collar  ;Spinal / Back Precautions    Comments wound vac   Pain 0 - 10 Group   Therapist Pain Assessment Nurse Notified  (c/o pain, not rated, very calm and drowsy appears heavily medicated not in acute distress)   Cognition    Level of Consciousness Alert   Balance   Sitting Balance (Static) Fair   Sitting Balance (Dynamic) Fair   Standing Balance (Static) Fair   Standing Balance (Dynamic) Fair   Weight Shift Sitting Fair   Weight Shift Standing Fair   Skilled Intervention Verbal Cuing   Comments FWW   Bed Mobility    Supine to Sit Standby Assist   Sit to " Supine Standby Assist   Scooting Supervised   Skilled Intervention Verbal Cuing   Comments HOB elevated, reports plans to sleep in recliner chair   Activities of Daily Living   Upper Body Dressing Supervision   Toileting Supervision   Skilled Intervention Verbal Cuing   Comments declined grooming, appears capable   How much help from another person does the patient currently need...   Putting on and taking off regular lower body clothing? 3   Bathing (including washing, rinsing, and drying)? 3   Toileting, which includes using a toilet, bedpan, or urinal? 4   Putting on and taking off regular upper body clothing? 4   Taking care of personal grooming such as brushing teeth? 4   Eating meals? 4   6 Clicks Daily Activity Score 22   Functional Mobility   Sit to Stand Supervised   Bed, Chair, Wheelchair Transfer Supervised   Toilet Transfers Supervised   Transfer Method Stand Step   Mobility FWW   Activity Tolerance   Comments limited by report of high pain   Short Term Goals   Short Term Goal # 1 Pt will be demo toilet transfer with supervision   Goal Outcome # 1 Goal met   Short Term Goal # 2 Pt will demo LB dressing w SPV   Goal Outcome # 2 Goal not met  (declined to participate)   Short Term Goal # 3 Pt will demo grooming and hygiene with supervision   Goal Outcome # 3 Goal met   Short Term Goal # 4 Pt will verbalize 3/3 spine precautions without vc   Goal Outcome # 4 Goal not met   Education Group   Education Provided Role of Occupational Therapist;Activities of Daily Living;Pathology of bedrest;Transfers;Home Safety   Role of Occupational Therapist Patient Response Patient;Acceptance;Explanation;Verbal Demonstration   Home Safety Patient Response Patient;Acceptance;Explanation;Verbal Demonstration   Transfers Patient Response Patient;Acceptance;Explanation;Verbal Demonstration   ADL Patient Response Patient;Acceptance;Explanation;Verbal Demonstration   Pathology of Bedrest Patient Response  Patient;Acceptance;Explanation;Verbal Demonstration   Occupational Therapy Treatment Plan    O.T. Treatment Plan Continue Current Treatment Plan   Anticipated Discharge Equipment and Recommendations   DC Equipment Recommendations None  (has ADL DME at home and mother to assist)   Discharge Recommendations Anticipate that the patient will have no further occupational therapy needs after discharge from the hospital

## 2024-09-11 ENCOUNTER — PHARMACY VISIT (OUTPATIENT)
Dept: PHARMACY | Facility: MEDICAL CENTER | Age: 39
End: 2024-09-11
Payer: COMMERCIAL

## 2024-09-11 VITALS
HEART RATE: 70 BPM | TEMPERATURE: 97.3 F | BODY MASS INDEX: 34.29 KG/M2 | RESPIRATION RATE: 16 BRPM | HEIGHT: 64 IN | OXYGEN SATURATION: 91 % | SYSTOLIC BLOOD PRESSURE: 124 MMHG | DIASTOLIC BLOOD PRESSURE: 64 MMHG | WEIGHT: 200.84 LBS

## 2024-09-11 PROCEDURE — A9270 NON-COVERED ITEM OR SERVICE: HCPCS | Performed by: NEUROLOGICAL SURGERY

## 2024-09-11 PROCEDURE — 700102 HCHG RX REV CODE 250 W/ 637 OVERRIDE(OP)

## 2024-09-11 PROCEDURE — 700102 HCHG RX REV CODE 250 W/ 637 OVERRIDE(OP): Performed by: NEUROLOGICAL SURGERY

## 2024-09-11 PROCEDURE — RXMED WILLOW AMBULATORY MEDICATION CHARGE: Performed by: NEUROLOGICAL SURGERY

## 2024-09-11 PROCEDURE — A9270 NON-COVERED ITEM OR SERVICE: HCPCS

## 2024-09-11 RX ORDER — CEPHALEXIN 500 MG/1
CAPSULE ORAL
Qty: 21 CAPSULE | Refills: 1 | OUTPATIENT
Start: 2024-09-11

## 2024-09-11 RX ORDER — MORPHINE SULFATE 15 MG/1
TABLET, FILM COATED, EXTENDED RELEASE ORAL
Qty: 20 TABLET | Refills: 0 | OUTPATIENT
Start: 2024-09-11

## 2024-09-11 RX ORDER — BACITRACIN ZINC 500 [USP'U]/G
OINTMENT TOPICAL ONCE
Status: COMPLETED | OUTPATIENT
Start: 2024-09-11 | End: 2024-09-11

## 2024-09-11 RX ORDER — OXYCODONE HYDROCHLORIDE 15 MG/1
TABLET ORAL
Qty: 28 TABLET | Refills: 0 | OUTPATIENT
Start: 2024-09-11

## 2024-09-11 RX ADMIN — FAMOTIDINE 40 MG: 20 TABLET, FILM COATED ORAL at 04:33

## 2024-09-11 RX ADMIN — PREGABALIN 100 MG: 100 CAPSULE ORAL at 09:18

## 2024-09-11 RX ADMIN — OXYCODONE HYDROCHLORIDE 20 MG: 10 TABLET ORAL at 09:18

## 2024-09-11 RX ADMIN — CETIRIZINE HYDROCHLORIDE 10 MG: 10 TABLET, FILM COATED ORAL at 04:33

## 2024-09-11 RX ADMIN — TIZANIDINE 4 MG: 4 TABLET ORAL at 03:34

## 2024-09-11 RX ADMIN — TIZANIDINE 4 MG: 4 TABLET ORAL at 09:18

## 2024-09-11 RX ADMIN — LOSARTAN POTASSIUM 25 MG: 25 TABLET, FILM COATED ORAL at 04:33

## 2024-09-11 RX ADMIN — ANTACID TABLETS 500 MG: 500 TABLET, CHEWABLE ORAL at 04:33

## 2024-09-11 RX ADMIN — OMEPRAZOLE 20 MG: 20 CAPSULE, DELAYED RELEASE ORAL at 04:33

## 2024-09-11 RX ADMIN — LAMOTRIGINE 300 MG: 100 TABLET ORAL at 04:33

## 2024-09-11 RX ADMIN — ACETAMINOPHEN 1000 MG: 500 TABLET ORAL at 09:56

## 2024-09-11 RX ADMIN — OXYCODONE HYDROCHLORIDE 20 MG: 10 TABLET ORAL at 13:41

## 2024-09-11 RX ADMIN — MORPHINE SULFATE 15 MG: 15 TABLET, FILM COATED, EXTENDED RELEASE ORAL at 04:33

## 2024-09-11 RX ADMIN — DULOXETINE HYDROCHLORIDE 30 MG: 30 CAPSULE, DELAYED RELEASE ORAL at 04:33

## 2024-09-11 RX ADMIN — BACITRACIN ZINC: 500 OINTMENT TOPICAL at 09:56

## 2024-09-11 ASSESSMENT — PAIN DESCRIPTION - PAIN TYPE
TYPE: ACUTE PAIN;CHRONIC PAIN;SURGICAL PAIN
TYPE: ACUTE PAIN;SURGICAL PAIN
TYPE: ACUTE PAIN;CHRONIC PAIN;SURGICAL PAIN

## 2024-09-11 NOTE — PROGRESS NOTES
DC instructions reviewed w/ pt,, verbalized understanding. PIV dc'd, armband removed. Meds to bed delivered.

## 2024-09-11 NOTE — DISCHARGE PLANNING
Medically cleared by attending neurosurgeon. Discharge order and summary placed. Patient has FWW at home. Cervical Collar delivered to patient along with instructions on use.   She will be transported home via private transportation from mother who is at bedside.   Anticipating no further needs from case management at this time.     Case Management Discharge Planning    Admission Date: 9/3/2024  GMLOS: 2.6  ALOS: 8    6-Clicks ADL Score: 22  6-Clicks Mobility Score: 18      Anticipated Discharge Dispo: Discharge Disposition: Discharged to home/self care (01)  Discharge Address: [facility] [place] [address]  Discharge Contact Phone Number: [phone]    DME Needed: No    Action(s) Taken: Updated Provider/Nurse on Discharge Plan    Escalations Completed: None    Medically Clear: Yes    Next Steps: No further CM needs identified.     Barriers to Discharge: None    Is the patient up for discharge tomorrow: Today, 9/11/2024, via private transportation from mother.          Problem: Physical Therapy - Adult  Goal: By Discharge: Performs mobility at highest level of function for planned discharge setting. See evaluation for individualized goals. Description: FUNCTIONAL STATUS PRIOR TO ADMISSION: Pt reports mod I with rollator PTA. Has had functional decline over the last 3 years, now unable to negotiate stairs in her home. HOME SUPPORT PRIOR TO ADMISSION: Lives with  in 2 story home, 7 IVY with no rails. Unable to go to 2nd floor where  sleeps. Per pt,  just had IPR stay for acute CVA. Physical Therapy Goals  Initiated 9/18/2023  1. Patient will move from supine to sit and sit to supine, scoot up and down, and roll side to side in bed with modified independence within 7 day(s). 2.  Patient will perform sit to stand with Minimal assist within 7 day(s). 3.  Patient will transfer from bed to chair and chair to bed with minimal assistance using the least restrictive device within 7 day(s). 4.  Patient will ambulate with minimal assistance for 25 feet with the least restrictive device within 7 day(s). Outcome: Progressing   PHYSICAL THERAPY TREATMENT    Patient: David Santana (79 y.o. female)  Date: 9/19/2023  Diagnosis: Weakness generalized [R53.1]  Unable to ambulate [R26.2]  Bilateral leg weakness [R29.898] Weakness generalized      Precautions: Fall Risk                    ASSESSMENT:  Patient continues to benefit from skilled PT services and is slowly progressing towards goals. Pt received seated in chair, agreeable to PT session. Sit <> stands performed during session with min A and RW. Noted poor posture with initial stand, hard lean to L, but improving with cues and assist. Pt walked 20ft x2 with RW and Min A, 2nd person close for safety. Gait slow, short steps B and narrow HAZEL. Decreased upright with fatigue and required seated rest break between bouts on toilet.  Time spent sitting on commode for toileting and then standing with  min A for

## 2024-09-11 NOTE — DISCHARGE SUMMARY
Discharge Summary    CHIEF COMPLAINT ON ADMISSION  No chief complaint on file.      Reason for Admission  Failed cervical fusion     Admission Date  9/3/2024    CODE STATUS  Full Code    HPI & HOSPITAL COURSE  39 y.o. female admitted after cervical surgery for severe headaches numbness pseudoarthoarthosis.  She had issues with pain control but otherwise had an unremarkable hospital course.  Prevena was placed, and the battery has  at the dressing will be removed at discharge.  Bacitracin ointment Xeroform and an island dressing will be placed.      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  She had 10 out of 10 pain prior to surgery.  She is followed by pain management.    She will wear her rigid cervical collar at all times.  Even in the shower.  She can lay down on the bed after showering, remove the collar, wash her neck dry her neck and replaced the pads with dry pads.  She can replace the collar, and then get up out of bed.  She was instructed not to be moving her neck around while the collar is off.      Therefore, she is discharged in good and stable condition to home with close outpatient follow-up.    The patient met 2-midnight criteria for an inpatient stay at the time of discharge.    Discharge Date  2024    FOLLOW UP ITEMS POST DISCHARGE  2 weeks staple removal    DISCHARGE DIAGNOSES  Pseudoarthrosis  OPLL      FOLLOW UP  Future Appointments   Date Time Provider Department Center   2024  3:40 PM OUMAR [facility]     2 weeks    MEDICATIONS ON DISCHARGE     Medication List        CONTINUE taking these medications        Instructions   Blood Glucose Test Strips   Doctor's comments: Or per formulary preference. ICD-10 code: E11.65 Uncontrolled type 2 Diabetes Mellitus  1 Strip by Other route in the morning, at noon, and at bedtime. Use one test strip to test blood sugar three times daily.  Dose: 1  Strip     Lancets   Doctor's comments: Or per formulary preference. ICD-10 code: E11.65 Uncontrolled type 2 Diabetes Mellitus  Use one lancet to test blood sugar three times daily.            ASK your doctor about these medications        Instructions   acetaminophen 500 MG Tabs  Commonly known as: Tylenol   Take 1,000 mg by mouth 2 times a day as needed for Moderate Pain.  Dose: 1,000 mg     cetirizine 10 MG Tabs  Commonly known as: ZyrTEC   Take 1 tablet by mouth twice daily  Dose: 10 mg     DULoxetine 30 MG Cpep  Commonly known as: Cymbalta   Take 1 Capsule by mouth 2 times a day.  Dose: 30 mg     EPINEPHrine 0.3 MG/0.3ML Soaj solution for injection  Commonly known as: Epipen   Inject 0.3 mg into the shoulder, thigh, or buttocks one time.  Dose: 0.3 mg     famotidine 40 MG Tabs  Commonly known as: Pepcid   Take 1 tablet by mouth twice daily  Dose: 40 mg     lamotrigine 200 MG tablet  Commonly known as: LaMICtal   Take 1.5 Tablets by mouth 2 times a day for 180 days.  Dose: 300 mg     losartan 25 MG Tabs  Commonly known as: Cozaar   Take 1 Tablet by mouth every day.  Dose: 25 mg     metFORMIN  MG Tb24  Commonly known as: Glucophage XR   Take 1 tablet by mouth twice daily  Dose: 500 mg     Naloxone 4 MG/0.1ML Liqd  Commonly known as: Narcan   Administer 1 [place] into affected nostril(S) one time.  Dose: 1 Spray     Nurtec 75 MG Tbdp  Generic drug: Rimegepant Sulfate   Take 75 mg by mouth 1 time a day as needed (migraine).  Dose: 75 mg     omeprazole 20 MG delayed-release capsule  Commonly known as: PriLOSEC   Take 1 capsule by mouth once daily  Dose: 20 mg     oxyCODONE-acetaminophen  MG Tabs  Commonly known as: Percocet-10   Take 1 Tablet by mouth every 6 hours as needed for Severe Pain.  Dose: 1 Tablet     Ozempic (1 MG/DOSE) 4 MG/3ML Sopn  Generic drug: Semaglutide (1 MG/DOSE)   Inject 1 mg under the skin every 7 days.  Dose: 1 mg     pregabalin 100 MG Caps  Commonly known as: Lyrica   Take 100 mg by  mouth 3 times a day.  Dose: 100 mg     tizanidine 4 MG Tabs  Commonly known as: Zanaflex   Take 4 mg by mouth 3 times a day.  Dose: 4 mg              Allergies  Allergies   Allergen Reactions    Codeine Shortness of Breath, Itching, Nausea and Unspecified     Rash, itching    Hydrocodone Itching    Nsaids Hives     Ok to take Tylenol       DIET  Regular    ACTIVITY  As tolerated.  Weight bearing as tolerated    CONSULTATIONS  PT    PROCEDURES  Removal/replacement posterior cervical hardware    LABORATORY  Lab Results   Component Value Date    SODIUM 139 09/05/2024    POTASSIUM 4.1 09/05/2024    CHLORIDE 105 09/05/2024    CO2 22 09/05/2024    GLUCOSE 146 (H) 09/05/2024    BUN 6 (L) 09/05/2024    CREATININE 0.54 09/05/2024        Lab Results   Component Value Date    WBC 15.1 (H) 09/05/2024    HEMOGLOBIN 10.3 (L) 09/05/2024    HEMATOCRIT 34.0 (L) 09/05/2024    PLATELETCT 244 09/05/2024        Total time of the discharge process exceeds 45 minutes.

## 2024-09-11 NOTE — CARE PLAN
The patient is Stable - Low risk of patient condition declining or worsening    Shift Goals  Clinical Goals: Pain control, safety  Patient Goals: Pain control, comfort  Family Goals: N/A    Progress made toward(s) clinical / shift goals:    Problem: Pain - Standard  Goal: Alleviation of pain or a reduction in pain to the patient’s comfort goal  Outcome: Progressing     Problem: Knowledge Deficit - Standard  Goal: Patient and family/care givers will demonstrate understanding of plan of care, disease process/condition, diagnostic tests and medications  Outcome: Progressing     Problem: Psychosocial  Goal: Patient's level of anxiety will decrease  Outcome: Progressing     Problem: Mobility  Goal: Patient's capacity to carry out activities will improve  Outcome: Progressing     Problem: Wound/ / Incision Healing  Goal: Patient's wound/surgical incision will decrease in size and heals properly  Outcome: Progressing     Problem: Fall Risk  Goal: Patient will remain free from falls  Outcome: Progressing       Patient is not progressing towards the following goals:

## 2024-09-11 NOTE — PROGRESS NOTES
Neurosurgery Progress Note    Subjective:  No events overnight  C/o neck pain into both shoulders    Exam:  Sleeping, rouses easily to voice  UE str 5/5/LE str 5/5  Some swelling to LE, no calf pain or erythema   Sensation intact touch x 4 extremities  Inc c/d/I  with provena   HVac  out    BP  Min: 94/48  Max: 124/64  Pulse  Av.5  Min: 69  Max: 76  Resp  Avg: 15.8  Min: 14  Max: 17  Temp  Av.6 °C (97.8 °F)  Min: 36.3 °C (97.3 °F)  Max: 36.6 °C (97.9 °F)  SpO2  Av.3 %  Min: 91 %  Max: 96 %    No data recorded        Intake/Output                         09/10/24 0700 - 24 - 2459     [id number] [id number] Total 1900-0659 Total                 Intake    P.O.  480  -- 480  --  -- --    P.O. 480 -- 480 -- -- --    Total Intake 480 -- 480 -- -- --       Output    Urine  --  -- --  --  -- --    Number of Times Voided 1 x 1 x 2 x -- -- --    Total Output -- -- -- -- -- --       Net I/O     480 -- 480 -- -- --              Intake/Output Summary (Last 24 hours) at 2024 0752  Last data filed at 9/10/2024 1400  Gross per 24 hour   Intake 480 ml   Output --   Net 480 ml             tizanidine  4 mg Q6HR    oxyCODONE immediate-release  20 mg Q4HRS PRN    oxyCODONE immediate-release  15 mg Q4HRS PRN    morphine injection  2 mg Q2HRS PRN    ondansetron  4 mg Q4HRS PRN    Pharmacy Consult Request  1 Each PHARMACY TO DOSE    DULoxetine  30 mg BID    cetirizine  10 mg BID    famotidine  40 mg BID    lamotrigine  300 mg BID    losartan  25 mg DAILY    omeprazole  20 mg DAILY    pregabalin  100 mg TID    morphine ER  15 mg Q12HRS    MD ALERT...DO NOT ADMINISTER NSAIDS or ASPIRIN unless ORDERED By Neurosurgery  1 Each PRN    docusate sodium  100 mg BID    senna-docusate  1 Tablet Nightly    senna-docusate  1 Tablet Q24HRS PRN    polyethylene glycol/lytes  1 Packet BID PRN    magnesium hydroxide  30 mL QDAY PRN    bisacodyl  10 mg Q24HRS PRN    sodium phosphate  1 Each Once  PRN    acetaminophen  1,000 mg Q6HRS PRN    labetalol  10 mg Q HOUR PRN    hydrALAZINE  10 mg Q HOUR PRN    benzocaine-menthol  1 Lozenge Q2HRS PRN    calcium carbonate  500 mg BID       Assessment and Plan:  Hospital day #10 cervical OPLL, pseudoarthosis  POD #7 removal hardware with replacement, new C3 screws  Prophylactic anticoagulation: no         Start date/time: ambulatory      Stable exam  oxy 10-15 Q4 prn, MS contin 15 mg bid  Tizantadine 4 mg to q6hr scheduled  Ambulate- at least TID. MUST be OOB for all meals.  Cervical collar on all times, can take off to clean neck by lying in bed, keeping head still, clean neck, change pads, put collar back on

## 2024-09-11 NOTE — PROGRESS NOTES
Virtual Nurse rounding complete.    Round Needs: Pain Rating 5/10 headache. and Notified of Patient Needs: Primary RN.

## 2024-09-11 NOTE — PROGRESS NOTES
Pt verbalized understanding of discharge instructions. That per Dr. Fierro to leave C-collar on to shower then change pads. New pads ordered to traction. Pt. Waiting on meds to beds before discharging. Pt. To have all belongings with her and IV removed before leaving.

## 2024-09-11 NOTE — PROGRESS NOTES
Prevena removed per MD order. Bacitracin, xeroform and island dressing placed on top of incision. Skin is well approximated with all staples in place. No redness at incision site. Pt. States that she had been scratching the prevena, educated to not do so with new island dressing.

## 2024-10-16 DIAGNOSIS — T78.40XA ALLERGY, INITIAL ENCOUNTER: ICD-10-CM

## 2024-10-17 RX ORDER — CETIRIZINE HYDROCHLORIDE 10 MG/1
10 TABLET ORAL 2 TIMES DAILY
Qty: 60 TABLET | Refills: 0 | Status: SHIPPED | OUTPATIENT
Start: 2024-10-17

## 2024-10-17 RX ORDER — FAMOTIDINE 40 MG/1
40 TABLET, FILM COATED ORAL 2 TIMES DAILY
Qty: 60 TABLET | Refills: 0 | Status: SHIPPED | OUTPATIENT
Start: 2024-10-17

## 2024-10-29 ENCOUNTER — OFFICE VISIT (OUTPATIENT)
Dept: MEDICAL GROUP | Facility: MEDICAL CENTER | Age: 39
End: 2024-10-29
Payer: MEDICAID

## 2024-10-29 VITALS
TEMPERATURE: 98.4 F | BODY MASS INDEX: 34.6 KG/M2 | HEART RATE: 93 BPM | OXYGEN SATURATION: 96 % | DIASTOLIC BLOOD PRESSURE: 80 MMHG | WEIGHT: 201.6 LBS | RESPIRATION RATE: 16 BRPM | SYSTOLIC BLOOD PRESSURE: 120 MMHG

## 2024-10-29 DIAGNOSIS — F41.9 ANXIETY: ICD-10-CM

## 2024-10-29 DIAGNOSIS — Z23 NEED FOR INFLUENZA VACCINATION: ICD-10-CM

## 2024-10-29 DIAGNOSIS — E11.9 TYPE 2 DIABETES MELLITUS WITHOUT COMPLICATION, WITHOUT LONG-TERM CURRENT USE OF INSULIN (HCC): ICD-10-CM

## 2024-10-29 LAB
HBA1C MFR BLD: 5.7 % (ref ?–5.8)
POCT INT CON NEG: NEGATIVE
POCT INT CON POS: POSITIVE

## 2024-10-29 PROCEDURE — 83036 HEMOGLOBIN GLYCOSYLATED A1C: CPT

## 2024-10-29 PROCEDURE — 99213 OFFICE O/P EST LOW 20 MIN: CPT

## 2024-10-29 PROCEDURE — 90471 IMMUNIZATION ADMIN: CPT

## 2024-10-29 RX ORDER — BUSPIRONE HYDROCHLORIDE 10 MG/1
10 TABLET ORAL 2 TIMES DAILY
Qty: 60 TABLET | Refills: 5 | Status: SHIPPED | OUTPATIENT
Start: 2024-10-29

## 2024-10-29 ASSESSMENT — FIBROSIS 4 INDEX: FIB4 SCORE: 0.73

## 2024-11-03 DIAGNOSIS — E88.819 INSULIN RESISTANCE: ICD-10-CM

## 2024-11-03 DIAGNOSIS — E28.2 PCOS (POLYCYSTIC OVARIAN SYNDROME): ICD-10-CM

## 2024-11-05 RX ORDER — METFORMIN HYDROCHLORIDE 500 MG/1
500 TABLET, EXTENDED RELEASE ORAL 2 TIMES DAILY
Qty: 360 TABLET | Refills: 0 | Status: SHIPPED | OUTPATIENT
Start: 2024-11-05

## 2024-11-05 NOTE — TELEPHONE ENCOUNTER
Received request via: Pharmacy    Was the patient seen in the last year in this department? Yes    Does the patient have an active prescription (recently filled or refills available) for medication(s) requested? No    Pharmacy Name: [facility]    Does the patient have [facility] Plus and need 100-day supply? (This applies to ALL medications) Patient does not have SCP

## 2024-11-13 DIAGNOSIS — T78.40XA ALLERGY, INITIAL ENCOUNTER: ICD-10-CM

## 2024-11-13 RX ORDER — CETIRIZINE HYDROCHLORIDE 10 MG/1
10 TABLET ORAL 2 TIMES DAILY
Qty: 60 TABLET | Refills: 0 | Status: SHIPPED | OUTPATIENT
Start: 2024-11-13

## 2024-11-13 RX ORDER — FAMOTIDINE 40 MG/1
40 TABLET, FILM COATED ORAL 2 TIMES DAILY
Qty: 60 TABLET | Refills: 0 | Status: SHIPPED | OUTPATIENT
Start: 2024-11-13

## 2024-11-13 NOTE — TELEPHONE ENCOUNTER
Received request via: Pharmacy    Was the patient seen in the last year in this department? Yes    Does the patient have an active prescription (recently filled or refills available) for medication(s) requested? No    Pharm[facility] Name: [facility]    Does the patient have [facility] Plus and need 100-day supply? (This applies to ALL medications) Patient does not have SCP

## 2024-11-13 NOTE — TELEPHONE ENCOUNTER
A1c tsh, urine mac in 4 months In person Thanks Received request via: Pharmacy    Was the patient seen in the last year in this department? Yes    Does the patient have an active prescription (recently filled or refills available) for medication(s) requested? No    Does the patient have [facility] Plus and need 100 day supply (blood pressure, diabetes and cholesterol meds only)? Patient does not have SCP

## 2024-11-14 ENCOUNTER — TELEPHONE (OUTPATIENT)
Dept: MEDICAL GROUP | Facility: MEDICAL CENTER | Age: 39
End: 2024-11-14
Payer: MEDICAID

## 2024-11-14 NOTE — TELEPHONE ENCOUNTER
FINAL PRIOR AUTHORIZATION STATUS:    1.  Name of Medication & Dose: OZEMPIC     2. Prior Auth Status: Approved through 11/14/2025     3. Action Taken: Pharmacy Notified: N\A Patient Notified: yes

## 2024-12-06 DIAGNOSIS — E11.9 TYPE 2 DIABETES MELLITUS WITHOUT COMPLICATION, WITHOUT LONG-TERM CURRENT USE OF INSULIN (HCC): ICD-10-CM

## 2024-12-06 RX ORDER — SEMAGLUTIDE 1.34 MG/ML
1 INJECTION, SOLUTION SUBCUTANEOUS
Qty: 3 ML | Refills: 5 | Status: SHIPPED | OUTPATIENT
Start: 2024-12-06

## 2024-12-06 NOTE — TELEPHONE ENCOUNTER
Received request via: Pharmacy    Was the patient seen in the last year in this department? Yes    Does the patient have an active prescription (recently filled or refills available) for medication(s) requested? No    Pharmacy Name: [facility]    Does the patient have [facility] and need 100-day supply? (This applies to ALL medications) Patient does not have SCP

## 2024-12-09 DIAGNOSIS — K21.9 GASTROESOPHAGEAL REFLUX DISEASE, UNSPECIFIED WHETHER ESOPHAGITIS PRESENT: ICD-10-CM

## 2024-12-09 DIAGNOSIS — T78.40XA ALLERGY, INITIAL ENCOUNTER: ICD-10-CM

## 2024-12-09 DIAGNOSIS — I10 ESSENTIAL HYPERTENSION: ICD-10-CM

## 2024-12-09 DIAGNOSIS — R56.9 SEIZURE-LIKE ACTIVITY (HCC): ICD-10-CM

## 2024-12-09 NOTE — TELEPHONE ENCOUNTER
Received request via: Pharmacy    Was the patient seen in the last year in this department? Yes    Does the patient have an active prescription (recently filled or refills available) for medication(s) requested? No    Pharmacy Name: [facility]    Does the patient have [facility] Plus and need 100-day supply? (This applies to ALL medications) Patient does not have SCP

## 2024-12-10 RX ORDER — LOSARTAN POTASSIUM 25 MG/1
25 TABLET ORAL DAILY
Qty: 90 TABLET | Refills: 3 | Status: SHIPPED | OUTPATIENT
Start: 2024-12-10

## 2024-12-10 RX ORDER — CETIRIZINE HYDROCHLORIDE 10 MG/1
10 TABLET ORAL 2 TIMES DAILY
Qty: 60 TABLET | Refills: 5 | Status: SHIPPED | OUTPATIENT
Start: 2024-12-10

## 2024-12-10 RX ORDER — FAMOTIDINE 40 MG/1
40 TABLET, FILM COATED ORAL 2 TIMES DAILY
Qty: 60 TABLET | Refills: 5 | Status: SHIPPED | OUTPATIENT
Start: 2024-12-10

## 2024-12-12 NOTE — TELEPHONE ENCOUNTER
Received request via: Pharmacy    Medication Name/Dosage      lamotrigine (LAMICTAL) 200 MG tablet     When was medication last prescribed 04/01/2024    How many refills were previously provided     How many Refills does he patient have left from last prescription 0    Was the patient seen in the last year in this department? Yes   Date of last office visit 04/01/2024     Per last Neurology Office Visit, when was the date of next follow up visit set for?                            Date of office visit follow up request 3 months     Does the patient have an upcoming appointment? No   If yes, when Sent to front              If no, schedule appointment     Does the patient have [facility] Plus and need 100 day supply (blood pressure, diabetes and cholesterol meds only)? Patient does not have SCP

## 2024-12-13 RX ORDER — LAMOTRIGINE 200 MG/1
TABLET ORAL
Qty: 90 TABLET | Refills: 0 | Status: SHIPPED | OUTPATIENT
Start: 2024-12-13 | End: 2025-01-12

## 2025-04-06 DIAGNOSIS — I10 ESSENTIAL HYPERTENSION: ICD-10-CM

## 2025-04-07 RX ORDER — LOSARTAN POTASSIUM 25 MG/1
25 TABLET ORAL DAILY
Qty: 90 TABLET | Refills: 0 | Status: SHIPPED | OUTPATIENT
Start: 2025-04-07

## 2025-04-07 NOTE — TELEPHONE ENCOUNTER
Received request via: Pharmacy    Was the patient seen in the last year in this department? Yes    Does the patient have an active prescription (recently filled or refills available) for medication(s) requested? No    Pharmacy Name: [facility]    Does the patient have [facility] and need 100-day supply? (This applies to ALL medications) Patient does not have SCP

## 2025-06-12 NOTE — TELEPHONE ENCOUNTER
FINAL PRIOR AUTHORIZATION STATUS:    1.  Name of Medication & Dose: OXYCODONE      2. Prior Auth Status: Approved through 06/10/2025     3. Action Taken: Pharmacy Notified: N\A Patient Notified: yes   Opt out Yes

## (undated) DEVICE — DRAPE 36X28IN RAD CARM BND BG - (25/CA) O

## (undated) DEVICE — BOVIE BLADE COATED &INSULATED - 25/PK

## (undated) DEVICE — GLOVE SZ 6.5 BIOGEL PI MICRO - PF LF (50PR/BX)

## (undated) DEVICE — SUTURE 2-0 VICRYL PLUS CT-1 - 8 X 18 INCH(12/BX)

## (undated) DEVICE — GLOVE BIOGEL SZ 7 SURGICAL PF LTX - (50PR/BX 4BX/CA)

## (undated) DEVICE — SEALER BIPOLAR 2.3 AQUAMANTYS

## (undated) DEVICE — SET LEADWIRE 5 LEAD BEDSIDE DISPOSABLE ECG (1SET OF 5/EA)

## (undated) DEVICE — NEEDLE SPINAL NON-SAFETY 18 GA X 3 IN (25EA/BX)

## (undated) DEVICE — BIT DRILL 2.4MM NAVIGATED

## (undated) DEVICE — PIN HEAD MAYFIELD DISP. (3EA/PK 12PK/BX)

## (undated) DEVICE — CHLORAPREP 26 ML APPLICATOR - ORANGE TINT(25/CA)

## (undated) DEVICE — SUTURE 1 VICRYL PLUS CTX - 8 X 18 INCH (12/BX)

## (undated) DEVICE — DRAPE STRLE REG TOWEL 18X24 - (10/BX 4BX/CA)"

## (undated) DEVICE — TOWELS CLOTH SURGICAL - (4/PK 20PK/CA)

## (undated) DEVICE — SYRINGE NON SAFETY 10 CC 21 GA X 1-1/2 IN (100/BX 4BX/CA)

## (undated) DEVICE — SURGIFOAM (SIZE 100) - (6EA/CA)

## (undated) DEVICE — BLADE SURGICAL CLIPPER - (50EA/CA)

## (undated) DEVICE — SYRINGE 20 ML LL (50EA/BX 4BX/CA)

## (undated) DEVICE — NEEDLE NON SAFETY HYPO 22 GA X 1 1/2 IN (100/BX)

## (undated) DEVICE — GLOVE SZ 8 BIOGEL PI MICRO - PF LF (50PR/BX)

## (undated) DEVICE — SHEET PEDIATRIC LAPAROTOMY - (10/CA)

## (undated) DEVICE — TOOL MR8 15CM MATCH HEAD 2.2MM DIAMETER (1/EA)

## (undated) DEVICE — COVER LIGHT HANDLE ALC PLUS DISP (18EA/BX)

## (undated) DEVICE — GLOVE PROTEXIS PI MICRO SZ 8.5 (200PR/CA)

## (undated) DEVICE — GLOVE SZ 7 BIOGEL PI MICRO - PF LF (50PR/BX 4BX/CA)

## (undated) DEVICE — MIDAS LUBRICATOR DIFFUSER PACK (4EA/CA)

## (undated) DEVICE — DRAPE LARGE 3 QUARTER - (20/CA)

## (undated) DEVICE — SUTURE 3-0 VICRYL PLUS SH - 8X 18 INCH (12/BX)

## (undated) DEVICE — SLEEVE, VASO, THIGH, MED

## (undated) DEVICE — DRESSING TRANSPARENT FILM TEGADERM 2.375 X 2.75"  (100EA/BX)"

## (undated) DEVICE — SUTURE GENERAL

## (undated) DEVICE — TOOL MR8 14CM MATCH HD SYM-TRI 3MM DIAMETER (1/EA)

## (undated) DEVICE — SENSOR OXIMETER ADULT SPO2 RD SET (20EA/BX)

## (undated) DEVICE — PACK NEURO - (2EA/CA)

## (undated) DEVICE — SET EXTENSION WITH 2 PORTS (48EA/CA) ***PART #2C8610 IS A SUBSTITUTE*****

## (undated) DEVICE — DRAPE LAPAROTOMY T SHEET - (12EA/CA)

## (undated) DEVICE — PATTIES SURG X-RAYCOTTONOID - 1/2 X 3 IN (200/CA)

## (undated) DEVICE — KIT EVACUATER 3 SPRING PVC LF 1/8 DRAIN SIZE (10EA/CA)"

## (undated) DEVICE — TUBING CLEARLINK DUO-VENT - C-FLO (48EA/CA)

## (undated) DEVICE — GOWN SURGEONS LARGE - (32/CA)

## (undated) DEVICE — SUTURE 2-0 VICRYL CT-2  8 X 18 INCH (12EA/BX)

## (undated) DEVICE — GLOVE BIOGEL PI INDICATOR SZ 6.5 SURGICAL PF LF - (50/BX 4BX/CA)

## (undated) DEVICE — STAPLER SKIN DISP - (6/BX 10BX/CA) VISISTAT

## (undated) DEVICE — GOWN SURGEONS X-LARGE - DISP. (30/CA)

## (undated) DEVICE — BONE MILL BM210

## (undated) DEVICE — FORCEPS IRRIGATING 8 X 1.5MM (5EA/BX)

## (undated) DEVICE — COVER MAYO STAND X-LG - (22EA/CA)

## (undated) DEVICE — ARMREST CRADLE FOAM - (2PR/PK 12PR/CA)

## (undated) DEVICE — GLOVESZ 8.5 BIOGEL PI MICRO - PF LF (50PR/BX)

## (undated) DEVICE — LACTATED RINGERS INJ 1000 ML - (14EA/CA 60CA/PF)

## (undated) DEVICE — SUTURE 2-0 ETHILON FS - (36/BX) 18 INCH

## (undated) DEVICE — KIT ULTRASND COVER - (20EA/CA)

## (undated) DEVICE — SYRINGE NON SAFETY 10 CC 20 GA X 1-1/2 IN (100/BX 4BX/CA)

## (undated) DEVICE — GLOVE SZ 6 BIOGEL PI MICRO - PF LF (50PR/BX 4BX/CA)

## (undated) DEVICE — KIT SURGIFLO W/OUT THROMBIN - (6EA/CA)

## (undated) DEVICE — PACK JACKSON TABLE KIT W/OUT - HR (6EA/CA)

## (undated) DEVICE — CANISTER SUCTION 3000ML MECHANICAL FILTER AUTO SHUTOFF MEDI-VAC NONSTERILE LF DISP (40EA/CA)

## (undated) DEVICE — TRAY SURESTEP FOLEY TEMP SENSING 16FR (10EA/CA) ORDER  #18764 FOR TEMP FOLEY ONLY

## (undated) DEVICE — SODIUM CHL IRRIGATION 0.9% 1000ML (12EA/CA)

## (undated) DEVICE — SUCTION INSTRUMENT YANKAUER BULBOUS TIP W/O VENT (50EA/CA)

## (undated) DEVICE — TUBING C&T SET FLYING LEADS DRAIN TUBING (10EA/BX)

## (undated) DEVICE — GOWN WARMING STANDARD FLEX - (30/CA)

## (undated) DEVICE — DRESSING POST OP BORDER AG 4 IN X 14 IN (5EA/BX 12BX/CA)

## (undated) DEVICE — SPHERE NAVIGATION STEALTH (5EA/TY 12TY/PK)

## (undated) DEVICE — INTRAOP NEURO IN OR 1:1 PER 15 MIN

## (undated) DEVICE — ELECTRODE DUAL RETURN W/ CORD - (50/PK)

## (undated) DEVICE — LACTATED RINGERS INJ. 500 ML - (24EA/CA)

## (undated) DEVICE — KIT SURGIFLO W/OUT THROMBIN - (6EA/BX)

## (undated) DEVICE — LIGHT SOURCE MIS 12FT

## (undated) DEVICE — NEEDLE, DISP 16G X 1-1/2 BLUNT

## (undated) DEVICE — BONE WAX (12PK/BX)

## (undated) DEVICE — CANISTER SUCTION 3000ML MECHANICAL FILTER AUTO SHUTOFF MEDI-VAC NONSTERILE LF DISP  (40EA/CA)

## (undated) DEVICE — TRAY SRGPRP PVP IOD WT PRP - (20/CA)

## (undated) DEVICE — TRAY CATHETER FOLEY URINE METER W/STATLOCK 350ML (10EA/CA)

## (undated) DEVICE — GLOVE SIZE 6.5  SURGEON ACCELERATOR FREE GREEN (50PR/BX)

## (undated) DEVICE — FORCEPS ELECTROSURGICAL DISPOSABLE CODMAN 8IN 1.5MM

## (undated) DEVICE — GLOVE BIOGEL PI ORTHO SZ 6 1/2 SURGICAL PF LF (40PR/BX)

## (undated) DEVICE — BLANKET WARMING LOWER BODY (10EA/CA)

## (undated) DEVICE — SLEEVE STERILE A599T - 30/BX 2BX/CS

## (undated) DEVICE — DRESSING POST OP BORDER 4INX6IN AG (70/CA)

## (undated) DEVICE — BLADE CLIPPER FITS 2501 CLIPPER (BLUE) (20EA/CA)

## (undated) DEVICE — HEADREST PRONEVIEW LARGE - (10/CA)

## (undated) DEVICE — GLOVE BIOGEL PI INDICATOR SZ 7.0 SURGICAL PF LF - (50/BX 4BX/CA)

## (undated) DEVICE — GLOVE BIOGEL SZ 7.5 SURGICAL PF LTX - (50PR/BX 4BX/CA)